# Patient Record
Sex: MALE | Race: WHITE | NOT HISPANIC OR LATINO | Employment: OTHER | URBAN - METROPOLITAN AREA
[De-identification: names, ages, dates, MRNs, and addresses within clinical notes are randomized per-mention and may not be internally consistent; named-entity substitution may affect disease eponyms.]

---

## 2017-01-21 ENCOUNTER — GENERIC CONVERSION - ENCOUNTER (OUTPATIENT)
Dept: OTHER | Facility: OTHER | Age: 67
End: 2017-01-21

## 2017-02-08 ENCOUNTER — GENERIC CONVERSION - ENCOUNTER (OUTPATIENT)
Dept: OTHER | Facility: OTHER | Age: 67
End: 2017-02-08

## 2017-02-24 ENCOUNTER — GENERIC CONVERSION - ENCOUNTER (OUTPATIENT)
Dept: OTHER | Facility: OTHER | Age: 67
End: 2017-02-24

## 2017-02-24 PROCEDURE — 88305 TISSUE EXAM BY PATHOLOGIST: CPT | Performed by: INTERNAL MEDICINE

## 2017-02-24 PROCEDURE — 88342 IMHCHEM/IMCYTCHM 1ST ANTB: CPT | Performed by: INTERNAL MEDICINE

## 2017-02-25 ENCOUNTER — LAB REQUISITION (OUTPATIENT)
Dept: LAB | Facility: HOSPITAL | Age: 67
End: 2017-02-25
Payer: COMMERCIAL

## 2017-02-25 DIAGNOSIS — R05.9 COUGH: ICD-10-CM

## 2017-02-25 DIAGNOSIS — R10.13 EPIGASTRIC PAIN: ICD-10-CM

## 2017-02-25 DIAGNOSIS — K22.719 BARRETT'S ESOPHAGUS WITH DYSPLASIA: ICD-10-CM

## 2017-02-25 DIAGNOSIS — R07.0 PAIN IN THROAT: ICD-10-CM

## 2017-02-27 ENCOUNTER — TRANSCRIBE ORDERS (OUTPATIENT)
Dept: ADMINISTRATIVE | Facility: HOSPITAL | Age: 67
End: 2017-02-27

## 2017-02-27 ENCOUNTER — ALLSCRIPTS OFFICE VISIT (OUTPATIENT)
Dept: OTHER | Facility: OTHER | Age: 67
End: 2017-02-27

## 2017-02-27 DIAGNOSIS — R07.9 CHEST PAIN: ICD-10-CM

## 2017-02-27 DIAGNOSIS — I10 ESSENTIAL (PRIMARY) HYPERTENSION: ICD-10-CM

## 2017-02-27 DIAGNOSIS — Z00.00 ENCOUNTER FOR GENERAL ADULT MEDICAL EXAMINATION WITHOUT ABNORMAL FINDINGS: ICD-10-CM

## 2017-02-27 DIAGNOSIS — E79.0 HYPERURICEMIA WITHOUT SIGNS OF INFLAMMATORY ARTHRITIS AND TOPHACEOUS DISEASE: ICD-10-CM

## 2017-02-27 DIAGNOSIS — E53.8 DEFICIENCY OF OTHER SPECIFIED B GROUP VITAMINS: ICD-10-CM

## 2017-02-27 DIAGNOSIS — Z12.5 ENCOUNTER FOR SCREENING FOR MALIGNANT NEOPLASM OF PROSTATE: ICD-10-CM

## 2017-02-27 DIAGNOSIS — Z23 ENCOUNTER FOR IMMUNIZATION: ICD-10-CM

## 2017-02-27 DIAGNOSIS — R07.9 CHEST PAIN, UNSPECIFIED TYPE: Primary | ICD-10-CM

## 2017-02-27 DIAGNOSIS — K76.0 FATTY (CHANGE OF) LIVER, NOT ELSEWHERE CLASSIFIED: ICD-10-CM

## 2017-02-27 DIAGNOSIS — E78.2 MIXED HYPERLIPIDEMIA: ICD-10-CM

## 2017-02-27 DIAGNOSIS — R73.01 IMPAIRED FASTING GLUCOSE: ICD-10-CM

## 2017-02-27 DIAGNOSIS — I65.29 OCCLUSION AND STENOSIS OF UNSPECIFIED CAROTID ARTERY: ICD-10-CM

## 2017-02-27 DIAGNOSIS — K21.9 GASTRO-ESOPHAGEAL REFLUX DISEASE WITHOUT ESOPHAGITIS: ICD-10-CM

## 2017-02-27 DIAGNOSIS — K22.70 BARRETT'S ESOPHAGUS WITHOUT DYSPLASIA: ICD-10-CM

## 2017-03-06 ENCOUNTER — GENERIC CONVERSION - ENCOUNTER (OUTPATIENT)
Dept: OTHER | Facility: OTHER | Age: 67
End: 2017-03-06

## 2017-03-08 ENCOUNTER — HOSPITAL ENCOUNTER (OUTPATIENT)
Dept: NON INVASIVE DIAGNOSTICS | Facility: CLINIC | Age: 67
Discharge: HOME | End: 2017-03-08
Payer: COMMERCIAL

## 2017-03-08 DIAGNOSIS — R07.9 CHEST PAIN, UNSPECIFIED TYPE: ICD-10-CM

## 2017-03-08 LAB
CHEST PAIN STATEMENT: NORMAL
MAX DIASTOLIC BP: 98 MMHG
MAX HEART RATE: 110 BPM
MAX PREDICTED HEART RATE: 156 BPM
MAX. SYSTOLIC BP: 162 MMHG
PROTOCOL NAME: NORMAL
REASON FOR TERMINATION: NORMAL
TARGET HR FORMULA: NORMAL
TEST INDICATION: NORMAL
TIME IN EXERCISE PHASE: 180 S

## 2017-03-08 PROCEDURE — 78452 HT MUSCLE IMAGE SPECT MULT: CPT

## 2017-03-08 PROCEDURE — A9502 TC99M TETROFOSMIN: HCPCS

## 2017-03-08 PROCEDURE — 93017 CV STRESS TEST TRACING ONLY: CPT

## 2017-03-08 RX ADMIN — REGADENOSON 0.4 MG: 0.08 INJECTION, SOLUTION INTRAVENOUS at 13:35

## 2017-03-11 ENCOUNTER — APPOINTMENT (OUTPATIENT)
Dept: LAB | Facility: MEDICAL CENTER | Age: 67
End: 2017-03-11
Payer: COMMERCIAL

## 2017-03-11 DIAGNOSIS — E79.0 HYPERURICEMIA WITHOUT SIGNS OF INFLAMMATORY ARTHRITIS AND TOPHACEOUS DISEASE: ICD-10-CM

## 2017-03-11 DIAGNOSIS — I10 ESSENTIAL (PRIMARY) HYPERTENSION: ICD-10-CM

## 2017-03-11 DIAGNOSIS — R73.01 IMPAIRED FASTING GLUCOSE: ICD-10-CM

## 2017-03-11 DIAGNOSIS — Z23 ENCOUNTER FOR IMMUNIZATION: ICD-10-CM

## 2017-03-11 DIAGNOSIS — Z12.5 ENCOUNTER FOR SCREENING FOR MALIGNANT NEOPLASM OF PROSTATE: ICD-10-CM

## 2017-03-11 DIAGNOSIS — K21.9 GASTRO-ESOPHAGEAL REFLUX DISEASE WITHOUT ESOPHAGITIS: ICD-10-CM

## 2017-03-11 DIAGNOSIS — K76.0 FATTY (CHANGE OF) LIVER, NOT ELSEWHERE CLASSIFIED: ICD-10-CM

## 2017-03-11 DIAGNOSIS — E53.8 DEFICIENCY OF OTHER SPECIFIED B GROUP VITAMINS: ICD-10-CM

## 2017-03-11 DIAGNOSIS — K22.70 BARRETT'S ESOPHAGUS WITHOUT DYSPLASIA: ICD-10-CM

## 2017-03-11 DIAGNOSIS — I65.29 OCCLUSION AND STENOSIS OF UNSPECIFIED CAROTID ARTERY: ICD-10-CM

## 2017-03-11 DIAGNOSIS — Z00.00 ENCOUNTER FOR GENERAL ADULT MEDICAL EXAMINATION WITHOUT ABNORMAL FINDINGS: ICD-10-CM

## 2017-03-11 DIAGNOSIS — E78.2 MIXED HYPERLIPIDEMIA: ICD-10-CM

## 2017-03-11 LAB
ALBUMIN SERPL BCP-MCNC: 3.8 G/DL (ref 3.5–5)
ALP SERPL-CCNC: 57 U/L (ref 46–116)
ALT SERPL W P-5'-P-CCNC: 42 U/L (ref 12–78)
ANION GAP SERPL CALCULATED.3IONS-SCNC: 7 MMOL/L (ref 4–13)
AST SERPL W P-5'-P-CCNC: 20 U/L (ref 5–45)
BASOPHILS # BLD AUTO: 0.04 THOUSANDS/ΜL (ref 0–0.1)
BASOPHILS NFR BLD AUTO: 1 % (ref 0–1)
BILIRUB SERPL-MCNC: 0.56 MG/DL (ref 0.2–1)
BUN SERPL-MCNC: 22 MG/DL (ref 5–25)
CALCIUM SERPL-MCNC: 9 MG/DL (ref 8.3–10.1)
CHLORIDE SERPL-SCNC: 104 MMOL/L (ref 100–108)
CHOLEST SERPL-MCNC: 235 MG/DL (ref 50–200)
CO2 SERPL-SCNC: 28 MMOL/L (ref 21–32)
CREAT SERPL-MCNC: 1.1 MG/DL (ref 0.6–1.3)
EOSINOPHIL # BLD AUTO: 0.27 THOUSAND/ΜL (ref 0–0.61)
EOSINOPHIL NFR BLD AUTO: 4 % (ref 0–6)
ERYTHROCYTE [DISTWIDTH] IN BLOOD BY AUTOMATED COUNT: 13.9 % (ref 11.6–15.1)
EST. AVERAGE GLUCOSE BLD GHB EST-MCNC: 123 MG/DL
GFR SERPL CREATININE-BSD FRML MDRD: >60 ML/MIN/1.73SQ M
GLUCOSE SERPL-MCNC: 112 MG/DL (ref 65–140)
HBA1C MFR BLD: 5.9 % (ref 4.2–6.3)
HCT VFR BLD AUTO: 45.7 % (ref 36.5–49.3)
HDLC SERPL-MCNC: 31 MG/DL (ref 40–60)
HGB BLD-MCNC: 15.4 G/DL (ref 12–17)
LDLC SERPL CALC-MCNC: 125 MG/DL (ref 0–100)
LYMPHOCYTES # BLD AUTO: 1.74 THOUSANDS/ΜL (ref 0.6–4.47)
LYMPHOCYTES NFR BLD AUTO: 25 % (ref 14–44)
MCH RBC QN AUTO: 30.6 PG (ref 26.8–34.3)
MCHC RBC AUTO-ENTMCNC: 33.7 G/DL (ref 31.4–37.4)
MCV RBC AUTO: 91 FL (ref 82–98)
MONOCYTES # BLD AUTO: 0.59 THOUSAND/ΜL (ref 0.17–1.22)
MONOCYTES NFR BLD AUTO: 9 % (ref 4–12)
NEUTROPHILS # BLD AUTO: 4.23 THOUSANDS/ΜL (ref 1.85–7.62)
NEUTS SEG NFR BLD AUTO: 61 % (ref 43–75)
NRBC BLD AUTO-RTO: 0 /100 WBCS
PLATELET # BLD AUTO: 211 THOUSANDS/UL (ref 149–390)
PMV BLD AUTO: 11.4 FL (ref 8.9–12.7)
POTASSIUM SERPL-SCNC: 4.4 MMOL/L (ref 3.5–5.3)
PROT SERPL-MCNC: 7.6 G/DL (ref 6.4–8.2)
PSA SERPL-MCNC: 0.5 NG/ML (ref 0–4)
RBC # BLD AUTO: 5.04 MILLION/UL (ref 3.88–5.62)
SODIUM SERPL-SCNC: 139 MMOL/L (ref 136–145)
TRIGL SERPL-MCNC: 396 MG/DL
TSH SERPL DL<=0.05 MIU/L-ACNC: 1.41 UIU/ML (ref 0.36–3.74)
VIT B12 SERPL-MCNC: 469 PG/ML (ref 100–900)
WBC # BLD AUTO: 6.88 THOUSAND/UL (ref 4.31–10.16)

## 2017-03-11 PROCEDURE — 80053 COMPREHEN METABOLIC PANEL: CPT

## 2017-03-11 PROCEDURE — 80061 LIPID PANEL: CPT

## 2017-03-11 PROCEDURE — G0103 PSA SCREENING: HCPCS

## 2017-03-11 PROCEDURE — 83036 HEMOGLOBIN GLYCOSYLATED A1C: CPT

## 2017-03-11 PROCEDURE — 84443 ASSAY THYROID STIM HORMONE: CPT

## 2017-03-11 PROCEDURE — 36415 COLL VENOUS BLD VENIPUNCTURE: CPT

## 2017-03-11 PROCEDURE — 85025 COMPLETE CBC W/AUTO DIFF WBC: CPT

## 2017-03-11 PROCEDURE — 82607 VITAMIN B-12: CPT

## 2017-03-12 ENCOUNTER — GENERIC CONVERSION - ENCOUNTER (OUTPATIENT)
Dept: OTHER | Facility: OTHER | Age: 67
End: 2017-03-12

## 2017-07-27 DIAGNOSIS — M25.50 PAIN IN JOINT: ICD-10-CM

## 2017-07-29 ENCOUNTER — APPOINTMENT (OUTPATIENT)
Dept: LAB | Facility: MEDICAL CENTER | Age: 67
End: 2017-07-29
Payer: COMMERCIAL

## 2017-07-29 DIAGNOSIS — M25.50 PAIN IN JOINT: ICD-10-CM

## 2017-07-29 PROCEDURE — 86618 LYME DISEASE ANTIBODY: CPT

## 2017-07-29 PROCEDURE — 36415 COLL VENOUS BLD VENIPUNCTURE: CPT

## 2017-07-31 ENCOUNTER — GENERIC CONVERSION - ENCOUNTER (OUTPATIENT)
Dept: OTHER | Facility: OTHER | Age: 67
End: 2017-07-31

## 2017-07-31 LAB
B BURGDOR IGG SER IA-ACNC: 0.12
B BURGDOR IGM SER IA-ACNC: 0.25

## 2017-09-15 ENCOUNTER — APPOINTMENT (OUTPATIENT)
Dept: RADIOLOGY | Facility: MEDICAL CENTER | Age: 67
End: 2017-09-15

## 2017-09-15 ENCOUNTER — TRANSCRIBE ORDERS (OUTPATIENT)
Dept: URGENT CARE | Facility: MEDICAL CENTER | Age: 67
End: 2017-09-15

## 2017-09-15 DIAGNOSIS — M94.0 COSTOCHONDRITIS: ICD-10-CM

## 2017-09-15 DIAGNOSIS — R07.81 RIB PAIN: Primary | ICD-10-CM

## 2017-09-15 PROCEDURE — 71101 X-RAY EXAM UNILAT RIBS/CHEST: CPT

## 2017-09-15 PROCEDURE — 71120 X-RAY EXAM BREASTBONE 2/>VWS: CPT

## 2017-09-27 ENCOUNTER — ALLSCRIPTS OFFICE VISIT (OUTPATIENT)
Dept: OTHER | Facility: OTHER | Age: 67
End: 2017-09-27

## 2017-09-27 DIAGNOSIS — I10 ESSENTIAL (PRIMARY) HYPERTENSION: ICD-10-CM

## 2017-09-27 DIAGNOSIS — E78.2 MIXED HYPERLIPIDEMIA: ICD-10-CM

## 2017-09-27 DIAGNOSIS — R73.01 IMPAIRED FASTING GLUCOSE: ICD-10-CM

## 2017-09-27 DIAGNOSIS — E79.0 HYPERURICEMIA WITHOUT SIGNS OF INFLAMMATORY ARTHRITIS AND TOPHACEOUS DISEASE: ICD-10-CM

## 2017-09-27 DIAGNOSIS — M25.50 PAIN IN JOINT: ICD-10-CM

## 2017-09-27 DIAGNOSIS — Z00.00 ENCOUNTER FOR GENERAL ADULT MEDICAL EXAMINATION WITHOUT ABNORMAL FINDINGS: ICD-10-CM

## 2017-09-27 DIAGNOSIS — K21.9 GASTRO-ESOPHAGEAL REFLUX DISEASE WITHOUT ESOPHAGITIS: ICD-10-CM
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
## 2017-09-27 DIAGNOSIS — K76.0 FATTY (CHANGE OF) LIVER, NOT ELSEWHERE CLASSIFIED: ICD-10-CM

## 2017-09-30 ENCOUNTER — TRANSCRIBE ORDERS (OUTPATIENT)
Dept: ADMINISTRATIVE | Facility: HOSPITAL | Age: 67
End: 2017-09-30

## 2017-09-30 ENCOUNTER — APPOINTMENT (OUTPATIENT)
Dept: LAB | Facility: MEDICAL CENTER | Age: 67
End: 2017-09-30
Payer: COMMERCIAL

## 2017-09-30 DIAGNOSIS — R73.01 IMPAIRED FASTING GLUCOSE: ICD-10-CM

## 2017-09-30 DIAGNOSIS — S00.86XA NONVENOMOUS INSECT BITE OF FACE WITHOUT INFECTION, INITIAL ENCOUNTER: Primary | ICD-10-CM

## 2017-09-30 DIAGNOSIS — I10 ESSENTIAL (PRIMARY) HYPERTENSION: ICD-10-CM

## 2017-09-30 DIAGNOSIS — M25.50 PAIN IN JOINT: ICD-10-CM

## 2017-09-30 DIAGNOSIS — Z00.00 ENCOUNTER FOR GENERAL ADULT MEDICAL EXAMINATION WITHOUT ABNORMAL FINDINGS: ICD-10-CM

## 2017-09-30 DIAGNOSIS — K76.0 FATTY (CHANGE OF) LIVER, NOT ELSEWHERE CLASSIFIED: ICD-10-CM

## 2017-09-30 DIAGNOSIS — E78.2 MIXED HYPERLIPIDEMIA: ICD-10-CM

## 2017-09-30 DIAGNOSIS — S00.86XA NONVENOMOUS INSECT BITE OF FACE WITHOUT INFECTION, INITIAL ENCOUNTER: ICD-10-CM

## 2017-09-30 DIAGNOSIS — K21.9 GASTRO-ESOPHAGEAL REFLUX DISEASE WITHOUT ESOPHAGITIS: ICD-10-CM

## 2017-09-30 DIAGNOSIS — E79.0 HYPERURICEMIA WITHOUT SIGNS OF INFLAMMATORY ARTHRITIS AND TOPHACEOUS DISEASE: ICD-10-CM
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
## 2017-09-30 LAB
ALBUMIN SERPL BCP-MCNC: 3.7 G/DL (ref 3.5–5)
ALP SERPL-CCNC: 54 U/L (ref 46–116)
ALT SERPL W P-5'-P-CCNC: 36 U/L (ref 12–78)
ANION GAP SERPL CALCULATED.3IONS-SCNC: 9 MMOL/L (ref 4–13)
AST SERPL W P-5'-P-CCNC: 21 U/L (ref 5–45)
BILIRUB SERPL-MCNC: 0.64 MG/DL (ref 0.2–1)
BUN SERPL-MCNC: 17 MG/DL (ref 5–25)
CALCIUM SERPL-MCNC: 9.4 MG/DL (ref 8.3–10.1)
CHLORIDE SERPL-SCNC: 105 MMOL/L (ref 100–108)
CHOLEST SERPL-MCNC: 192 MG/DL (ref 50–200)
CO2 SERPL-SCNC: 24 MMOL/L (ref 21–32)
CREAT SERPL-MCNC: 1 MG/DL (ref 0.6–1.3)
ERYTHROCYTE [DISTWIDTH] IN BLOOD BY AUTOMATED COUNT: 13.8 % (ref 11.6–15.1)
ERYTHROCYTE [SEDIMENTATION RATE] IN BLOOD: 15 MM/HOUR (ref 0–10)
EST. AVERAGE GLUCOSE BLD GHB EST-MCNC: 117 MG/DL
GFR SERPL CREATININE-BSD FRML MDRD: 79 ML/MIN/1.73SQ M
GLUCOSE P FAST SERPL-MCNC: 102 MG/DL (ref 65–99)
HBA1C MFR BLD: 5.7 % (ref 4.2–6.3)
HCT VFR BLD AUTO: 44.2 % (ref 36.5–49.3)
HDLC SERPL-MCNC: 33 MG/DL (ref 40–60)
HGB BLD-MCNC: 14.5 G/DL (ref 12–17)
LDLC SERPL CALC-MCNC: 113 MG/DL (ref 0–100)
MCH RBC QN AUTO: 30.7 PG (ref 26.8–34.3)
MCHC RBC AUTO-ENTMCNC: 32.8 G/DL (ref 31.4–37.4)
MCV RBC AUTO: 94 FL (ref 82–98)
PLATELET # BLD AUTO: 213 THOUSANDS/UL (ref 149–390)
PMV BLD AUTO: 11.1 FL (ref 8.9–12.7)
POTASSIUM SERPL-SCNC: 3.8 MMOL/L (ref 3.5–5.3)
PROT SERPL-MCNC: 7.5 G/DL (ref 6.4–8.2)
RBC # BLD AUTO: 4.72 MILLION/UL (ref 3.88–5.62)
SODIUM SERPL-SCNC: 138 MMOL/L (ref 136–145)
TRIGL SERPL-MCNC: 229 MG/DL
TSH SERPL DL<=0.05 MIU/L-ACNC: 1.22 UIU/ML (ref 0.36–3.74)
URATE SERPL-MCNC: 9.4 MG/DL (ref 4.2–8)
WBC # BLD AUTO: 5.98 THOUSAND/UL (ref 4.31–10.16)

## 2017-09-30 PROCEDURE — 80061 LIPID PANEL: CPT

## 2017-09-30 PROCEDURE — 85027 COMPLETE CBC AUTOMATED: CPT

## 2017-09-30 PROCEDURE — 86618 LYME DISEASE ANTIBODY: CPT

## 2017-09-30 PROCEDURE — 86666 EHRLICHIA ANTIBODY: CPT

## 2017-09-30 PROCEDURE — 80053 COMPREHEN METABOLIC PANEL: CPT

## 2017-09-30 PROCEDURE — 86038 ANTINUCLEAR ANTIBODIES: CPT

## 2017-09-30 PROCEDURE — 86430 RHEUMATOID FACTOR TEST QUAL: CPT

## 2017-09-30 PROCEDURE — 86753 PROTOZOA ANTIBODY NOS: CPT

## 2017-09-30 PROCEDURE — 83036 HEMOGLOBIN GLYCOSYLATED A1C: CPT

## 2017-09-30 PROCEDURE — 87798 DETECT AGENT NOS DNA AMP: CPT

## 2017-09-30 PROCEDURE — 85652 RBC SED RATE AUTOMATED: CPT

## 2017-09-30 PROCEDURE — 36415 COLL VENOUS BLD VENIPUNCTURE: CPT

## 2017-09-30 PROCEDURE — 84443 ASSAY THYROID STIM HORMONE: CPT

## 2017-09-30 PROCEDURE — 84550 ASSAY OF BLOOD/URIC ACID: CPT

## 2017-10-01 ENCOUNTER — GENERIC CONVERSION - ENCOUNTER (OUTPATIENT)
Dept: OTHER | Facility: OTHER | Age: 67
End: 2017-10-01

## 2017-10-02 LAB
B BURGDOR IGG SER IA-ACNC: 0.15
B BURGDOR IGM SER IA-ACNC: 0.23
RHEUMATOID FACT SER QL LA: NEGATIVE
RYE IGE QN: NEGATIVE

## 2017-10-03 LAB
B MICROTI IGG TITR SER: NORMAL {TITER}
B MICROTI IGM TITR SER: NORMAL {TITER}

## 2017-10-04 LAB — A PHAGOCYTOPH DNA BLD QL NAA+PROBE: NEGATIVE

## 2017-10-05 ENCOUNTER — GENERIC CONVERSION - ENCOUNTER (OUTPATIENT)
Dept: OTHER | Facility: OTHER | Age: 67
End: 2017-10-05

## 2017-10-05 LAB
A PHAGOCYTOPH IGG TITR SER IF: NEGATIVE {TITER}
A PHAGOCYTOPH IGM TITR SER IF: ABNORMAL {TITER}
E CHAFFEENSIS IGG TITR SER IF: NEGATIVE {TITER}
E CHAFFEENSIS IGM TITR SER IF: NEGATIVE {TITER}

## 2017-10-06 ENCOUNTER — GENERIC CONVERSION - ENCOUNTER (OUTPATIENT)
Dept: OTHER | Facility: OTHER | Age: 67
End: 2017-10-06

## 2017-11-29 DIAGNOSIS — I65.29 OCCLUSION AND STENOSIS OF UNSPECIFIED CAROTID ARTERY: ICD-10-CM

## 2017-12-11 ENCOUNTER — GENERIC CONVERSION - ENCOUNTER (OUTPATIENT)
Dept: OTHER | Facility: OTHER | Age: 67
End: 2017-12-11

## 2017-12-11 ENCOUNTER — HOSPITAL ENCOUNTER (OUTPATIENT)
Dept: NON INVASIVE DIAGNOSTICS | Facility: CLINIC | Age: 67
Discharge: HOME | End: 2017-12-11
Payer: COMMERCIAL

## 2017-12-11 DIAGNOSIS — I65.29 OCCLUSION AND STENOSIS OF UNSPECIFIED CAROTID ARTERY: ICD-10-CM

## 2017-12-11 PROCEDURE — 93880 EXTRACRANIAL BILAT STUDY: CPT

## 2018-01-10 NOTE — RESULT NOTES
Verified Results  (1) LYME ANTIBODY PROFILE W/REFLEX TO WESTERN BLOT [id number] 10:21AM Halie Blackmno Order Number: [id number]     Test Name Result Flag Reference   LYME IGG 0 12  0 00-0 79   NEGATIVE(0 00-0 79)-Absence of detectable Borrelia IgG Antibodies  A negative result does not exclude the possibility of Borrelia infection  If early Lyme disease is suspected,a second sample should be collected & tested 4 weeks after initial testing  LYME IGM 0 25  0 00-0 79   NEGATIVE (0 00-0 79)-Absence of detectable Borrelia IgM antibodies  A negative result does not exclude the possibility of Borrelia infection  If early lyme disease is suspected, a second sample should be collected & tested 4 weeks after initial testing

## 2018-01-12 NOTE — MISCELLANEOUS
Message  I spoke with patient's wife re: recent labs  + IGM Ehrlichia  trial of Doxycycline ( history of GI sensitivity) 100 mg BID with food  use probiotics        Plan  Tick bite    · Doxycycline Hyclate 100 MG Oral Tablet; take 1 tablet by mouth twice a day    Results/Data     (1) EHRLICHIA ANTIBODY PANEL   E CHAFFEENSIS IGG AB: Negative  Reference Range Neg:<1:64  E CHAFFEENSIS IGM AB: Negative  Reference Range Neg:<1:20  HGE IGG TITER: Negative  Reference Range Neg:<1:64  HGE IGM TITER: 1:64  Abnormal High Reference Range Neg:<1:20    Signatures   Electronically signed by : Emilia Fothergill, M D ; Oct  6 2017  9:05AM EST                       (Author)

## 2018-01-12 NOTE — MISCELLANEOUS
Message  GI Reminder Recall [facility]:   Date: 01/21/2017   Dear Terri Course:     Review of our records shows you are due for the following: EGD  Please call the following office to schedule your appointment:   [address] [place] [address], [address] [place] [address], [address] [place] [address] [phone]  We look forward to hearing from you!      Sincerely,     [facility]      Signatures   Electronically signed by : Tamy Henderson, ; Jan 21 2017  8:33AM EST                       (Author)

## 2018-01-12 NOTE — PROGRESS NOTES
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)   7  Carotid artery stenosis (433 10) (I65 29)   8  Asthma (493 90) (J45 909)   9  Hyperuricemia without signs inflammatory arthritis/tophaceous disease (790 6) (E79 0)    Plan  Accelerated hypertension    · We encourage you to begin to make lifestyle changes to help control your blood  pressure  These may include losing weight, increasing your activity level, limiting salt in  your diet, decreasing alcohol intake, and eating a diet low in fat and rich in fruits  and vegetables ; Status:Complete;   Done: 67EFD0063  Benign essential hypertension    · Hydrochlorothiazide 12 5 MG Oral Tablet; TAKE 1 TABLET DAILY   · Losartan Potassium 50 MG Oral Tablet; TAKE 1 TABLET TWICE DAILY  Colon cancer screening    · COLONOSCOPY; Status:Active; Requested for:15Mar2016;   Gastroesophageal reflux disease    · Famotidine 40 MG Oral Tablet (Pepcid)   · Omeprazole 20 MG Oral Capsule Delayed Release; TAKE 1 CAPSULE TWICE  DAILY  Mixed hyperlipidemia    · A diet low in sugar may help your condition ; Status:Complete;   Done: 94XBV8985   · Eat a low fat and low cholesterol diet ; Status:Complete;   Done: 42HLZ3120    Discussion/Summary    I changed Losartan to 50 mg twice a day  I encouraged him to try to take this on a daily basis if tolerated  prescription for HCTZ 12 5 mg daily as tolerated  continue to monitor blood pressures at home  Follow up in several months with repeat labs at that time  advised to call if any changes  Possible side effects of new medications were reviewed with the patient/guardian today  The treatment plan was reviewed with the patient/guardian   The patient/guardian understands and agrees with the treatment plan History of Present Illness  Follow-up visit  History of hypertension with intolerance to multiple blood pressure agents  He has been using Losartan 25 mg as needed  he has had a number of elevated blood pressure readings in the last month  he has been working 10 hour shifts  his wife is undergoing chemotherapy for breast cancer  he has been asymptomatic  no headaches or dizziness  He has been using up to 2 Losartan 25 mg a day  labs 11/2015 reviewed  Impaired fasting glucose  A1c 5 5  Hyperlipidemia mixed type with intolerance to statins, fish oils and fibrates  cholesterol 230  TGs 335 HDL 31    LFTs normal  creatinine 1 04  electrolytes normal  TSH 1 470  Review of Systems    Constitutional: recent 4 lb weight loss  Eyes: no eyesight problems  Cardiovascular: asthma uses infrequent prn Advair  , but no chest pain, no palpitations and no extremity edema  Respiratory: no cough, no orthopnea, no wheezing, no shortness of breath during exertion and no PND  Gastrointestinal: GERD status post Nissen fundoplication  GI evaluation 2014  10/2014 barium swallow showed spontaneous reflux  11/2014 EGD esophagitis  + Genao's  mild erosive gastritis  He is currently on Omeprazole 20 mg twice a day  no dysphagia  , but no abdominal pain, no nausea, no vomiting, no constipation, no diarrhea and no blood in stools  Genitourinary: 11/2015 PSA 0 4, but no urinary hesitancy and no nocturia  Musculoskeletal: no arthralgias and no myalgias  Integumentary: no rashes and no skin lesions  Neurological: as noted in HPI  Active Problems    1  Asthma (493 90) (J45 909)   2  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)   3  Benign essential hypertension (401 1) (I10)   4  Carotid artery stenosis (433 10) (I65 29)   5  Diverticulosis (562 10) (K57 90)   6  Drug-induced erectile dysfunction (607 84,E980 5) (N52 2)   7  Fatty infiltration of liver (571 8) (K76 0)   8   Gastroesophageal reflux disease (530 81) (K21 9)   9  Hyperuricemia without signs inflammatory arthritis/tophaceous disease (790 6) (E79 0)   10  Impaired fasting glucose (790 21) (R73 01)   11  Mixed hyperlipidemia (272 2) (E78 2)   12  History of Need for prophylactic vaccination and inoculation against influenza (V04 81)    (Z23)   13  Non-allergic rhinitis (472 0) (J31 0)   14  Osteoarthritis of knee (715 36) (M17 9)   15  Vitamin B12 deficiency (266 2) (E53 8)    Past Medical History    1  History of Acute pansinusitis (461 8) (J01 40)   2  History of Allergic Reaction (995 3)   3  History of Bursitis Of The Heel (727 3)   4  History of Derangement Of Meniscus Of Left Knee Due To Old Tear/Injury (717 5)   5  History of diverticulitis of colon (V12 79) (Z87 19)   6  History of dizziness (V13 89) (Z87 898)   7  History of Need for prophylactic vaccination and inoculation against influenza (V04 81)   (Z23)   8  History of Plantar fasciitis (728 71) (M72 2)   9  History of Shoulder Impingement (726 2)    Surgical History    1  History of Appendectomy   2  History of Carotid Thromboendarterectomy   3  History of Esophagogastric Fundoplasty Nissen Fundoplication   4  History of Partial Colectomy - Sigmoid   5  History of Ventral Hernia Repair    Family History    1  Family history of High blood pressure   2  Family history of High cholesterol    3  Family history of High blood pressure   4  Family history of High cholesterol    Social History    · Never A Smoker   · No alcohol use    Current Meds   1  Advair -21 MCG/ACT Inhalation Aerosol; AERO Inhal X 30; Therapy: 82XXP1062-  Requested for: 11VAI5384; Status: NEED INFORMATION Ordered   2  Advair -21 MCG/ACT Inhalation Aerosol; INHALE 2 PUFFS,  BY MOUTH, TWICE   DAILY; Therapy: 11UCS3437 to (Last YS:72NMK0215)  Requested for: 50UCO8778 Ordered   3  Co Q 10 10 MG Oral Capsule; 1 x daily Recorded   4  Famotidine 40 MG Oral Tablet; TAKE 1 TABLET TWICE DAILY;    Therapy: 87TMQ2611 to (Colleen Davila)  Requested for: 60Bxm0203; Last   Rx:55Xyf5791 Ordered   5 2016 [facility] CAPS Recorded   6  Levalbuterol HCl - 1 25 MG/3ML Inhalation Nebulization Solution; USE 1 UNIT DOSE IN   NEBULIZER EVERY 8 HOURS AS NEEDED; Therapy: 42NOF8479 to (Evaluate:11Vkg4254)  Requested for: 63XLG8030; Last   Rx:54Iux1996 Ordered   7  Losartan Potassium 25 MG Oral Tablet; TAKE 1 TABLET DAILY AS DIRECTED; Therapy: 93TWB3549 to (Evaluate:80Gbm2915)  Requested for: 23ZKC6193; Last   Rx:58Xqd4417 Ordered   8  Proventil  (90 Base) MCG/ACT Inhalation Aerosol Solution; TAKE 2 PUFFS 4   TIMES A DAY AS NEEDED; Therapy: 56APQ5227 to (Last Rx:57Zkn7073)  Requested for: 21Nmn5905 Ordered   9  Viagra 100 MG Oral Tablet; 1/2-1 TAB AS DIRECTED PRIOR TO INTERCOURSE; Therapy: 45OHQ7317 to (Evaluate:12Mkz7882); Last Rx:33Iws0822 Ordered    Allergies    1  Depo-Medrol (PF) SUSP   2  Doxycycline Monohydrate CAPS   3  Marcaine SOLN   4  Sulfa Drugs    Vitals  Vital Signs [Data Includes: Current Encounter]    Recorded: 18AZT7648 10:17AM   Temperature 97 4 F   Heart Rate 78   Respiration 16   Systolic 419   Diastolic 88   Height 5 ft 8 in   Weight 256 lb    BMI Calculated 38 92   BSA Calculated 2 27     Physical Exam    Constitutional   General appearance: No acute distress, well appearing and well nourished  Ears, Nose, Mouth, and Throat   Otoscopic examination: Tympanic membranes translucent with normal light reflex  Canals patent without erythema  Oropharynx: Normal with no erythema, edema, exudate or lesions  Neck   Neck: Supple, symmetric, trachea midline, no masses  Thyroid: Normal, no thyromegaly  There were no thyroid nodules  Pulmonary   Auscultation of lungs: Clear to auscultation  Cardiovascular   Auscultation of heart: Normal rate and rhythm, normal S1 and S2, no murmurs  Heart sounds: no gallop heard  Carotid pulses: 2+ bilaterally    no bruit heard over the right carotid and no bruit heard over the left carotid  Examination of extremities for edema and/or varicosities: Normal     Lymphatic   Palpation of lymph nodes in neck: No lymphadenopathy  Skin   Skin and subcutaneous tissue: Normal without rashes or lesions  Psychiatric   Mood and affect: Normal        Results/Data  Results   (1) CBC/PLT/DIFF 19SJF5825 09:28AM Driss Knife     Test Name Result Flag Reference   DIFFERENTIAL METHOD Automated     WBC COUNT 6 68 Thousand/uL  4 31-10 16   RBC COUNT 4 77 Million/uL  3 88-5 62   HEMOGLOBIN 14 7 g/dL  12 0-17 0   HEMATOCRIT 43 7 %  36 5-49 3   MCV 92 fL  82-98   MCH 30 8 pg  26 8-34 3   MCHC 33 6 g/dL  31 4-37 4   RDW 13 7 %  11 6-15 1   MPV 11 1 fL  8 9-12 7   PLATELET COUNT 016 Thousand/uL  149-390   nRBC AUTOMATED 0 /100 WBC     NEUTROPHILS RELATIVE PERCENT 59 %  43-75   LYMPHOCYTES RELATIVE PERCENT 28 %  14-44   MONOCYTES RELATIVE PERCENT 9 %  4-12   EOSINOPHILS RELATIVE PERCENT 4 %  0-6   NEUTROPHILS ABSOLUTE COUNT 3 94 Thousand/uL  1 85-7 62   LYMPHOCYTES ABSOLUTE COUNT 1 87 Thousand/uL  0 60-4 47   MONOCYTES ABSOLUTE COUNT 0 60 Thousand/uL  0 17-1 22   EOSINOPHILS ABSOLUTE COUNT 0 27 Thousand/uL  0 00-0 61   BASOPHILS ABSOLUTE COUNT 0 03 Thousand/uL  0 00-0 10     (1) COMPREHENSIVE METABOLIC PANEL 46INS6419 88:74RQ Driss Sneed   Has the patient been fasting for 10-12 hours? -YES     Test Name Result Flag Reference   SODIUM 140 mmol/L  136-145   POTASSIUM 4 6 mmol/L  3 5-5 3   Slightly hemolyzed   CHLORIDE 108 mmol/L  100-108   CARBON DIOXIDE 25 mmol/L  21 0-32 0   ANION GAP (CALC) 7 mmol/L  4-13   BILI, TOTAL 0 47 mg/dL  0 20-1 00   TOTAL PROTEIN 7 6 g/dL  6 4-8 2   ALK PHOSPHATAS 58 U/L     ALT (SGPT) 46 U/L  12-78   AST(SGOT) 25 U/L  5-45   GLUCOSE,RANDM 112 mg/dL     If patient is fasting, the ADA then defines impaired fasting glucose as  >100 mg/dl and diabetes as  >or equal to 126 mg/dl     ALBUMIN 3 7 g/dL  3 5-5 0   BLOOD UREA NITROGEN 13 mg/dL  5-25   CALCIUM 8 8 mg/dL  8 3-10 1   CREATININE 1 04 mg/dL  0 60-1 30   Standardized to IDMS reference method   eGFR African American >60 ml/min/1 73sq m     [facility] Disease Education Program recommendations are as  follows:  GFR calculation is accurate only with a steady state creatinine  Chronic Kidney disease less than 60 ml/min/1 73 sq  meters  Kidney failure less than 15 ml/min/1 73 sq  meters  eGFR Non-African American >60 ml/min/1 73sq m       (1) HEMOGLOBIN A1C 19QLU4868 09:28AM Arlette Sears     Test Name Result Flag Reference   HEMOGLOBIN A1C 5 5 %  4 0-5 6   5 7-6 4% impaired fasting glucose  >=6 5% diagnosis of diabetes  Falsely low levels are seen in conditions linked to short RBC life span  ? hemolytic anemia, and splenomegaly  Falsely elevated levels are seen in situations where there is an  increased production of RBC ? receipt of erythropoietin or blood  transfusions  Adopted from ADA-Clinical Practice Recommendations   EST  AVG  GLUCOSE 111 mg/dL       (1) LIPID PANEL, FASTING 40ZRX3091 09:28AM Arlette Sears   Has the patient been fasting for 10-12 hours? -YES     Test Name Result Flag Reference   TRIGLYCERIDES 335 mg/dL     TRIGLYCERIDE:  Normal              <150 mg/dl  Borderline High    150-199 mg/dl  High               200-499 mg/dl  Very High          >499 mg/dl  _______________________________________   CHOLESTEROL 230 mg/dL     CHOLESTEROL:  Desirable        <200 mg/dl  Borderline High  200-239 mg/dl  High             >239 mg/dl  ____________________________________   HDL,DIRECT 31 mg/dL     HDL:  High       >59 mg/dl  Low        <41 mg/dl  ______________________________   LDL CHOLESTEROL CALCULATED 132 mg/dL H 0-100   LDL, CALCULATED:  This screening LDL is a calculated result  It does not have the accuracy of the Direct Measured LDL in the  monitoring of patients with hyperlipidemia and/or statin therapy    Direct Measured LDL (Test Code 3126) must be ordered separately in these  patients   ______________________________     (1) PSA (SCREEN) (Dx V76 44 Screen for Prostate Cancer) 52UFA9960 09:28AM Malka Knee     Test Name Result Flag Reference   PSA 0 4 ng/mL   0-4 0   PSA values from one laboratory may not be comparable to those from  another because of the various testing technologies employed  Additionally, PSA results can not be interpreted as absolute evidence of  the presence or absence of disease  This PSA value was obtained by [employer] Chemiluminometric Immunoassay  (1) VITAMIN B12 41QBB0297 09:28AM Malka Knee     Test Name Result Flag Reference   VITAMIN B12 365 pg/mL  100-900     (1) TSH 28XQK8131 09:28AM Malka Knee     Test Name Result Flag Reference   TSH 1 470 uIU/ML  0 358-3 740   *Patients undergoing fluorescein dye angiography may retain small  amounts of fluorescein in the body for 48-72 hours post procedure  Samples containing fluorescein can produce falsely depressed TSH   values  If the patient had this procedure, a specimen should be  resubmitted post fluorescein clearance  * CT Maxillofacial  Without Contrast [id number] 04:28PM Malka Knee     Test Name Result Flag Reference   CT Maxfac/Sinus W/O (Report)     [facility] [address] [place];;[place];[address]  02/26/2015 1632  02/26/2015 1642      CT SINUSES    INDICATION- Chronic sinusitis, asthma    COMPARISON- Head CT from 5/8/2012    TECHNIQUE- Axial CT imaging through the sinuses was performed  In  addition, sagittal and coronal reformatted images were submitted for  interpretation  Examination was performed utilizing techniques to  minimize radiation dose, including the use of dose reduction software  IMAGE QUALITY- Diagnostic  FINDINGS-     FRONTAL SINUSES- Clear  ETHMOID AIR CELLS- Clear  MAXILLARY SINUSES- Clear  SPHENOID SINUSES- Clear  NASAL SEPTUM- Mildly deviated to the right with rightward directed  bone spur   Nasal passages are patent  ANTERIOR OSTEOMEATAL COMPLEX- Patent  OSSEOUS STRUCTURES- No erosion or destruction  The bony walls of the  carotid canals are intact  SOFT TISSUES- Normal     IMPRESSION-    1  No evidence of sinusitis  Mild rightward nasal septal deviation    ETranscribed on- [id number]    - JERALD PRIDE MD  Reading Radiologist- JERALD PRIDE MD  Electronically Signed- JERALD Cunningham MD  Released Date Time- 02/26/15 1714  ------------------------------------------------------------------------------  04702^GIA LEON  [address]265^GIA LEON     EGD 27ERV4923 12:00AM Malka Knee     Test Name Result Flag Reference   EGD 11/11/2014       * XR Esophagus Barium Swallow 93IGX7808 08:55AM Donah Pour     Test Name Result Flag Reference   XR Esophagus (Report)     [facility] [address];;[place];[address]  10/22/2014 0900  10/22/2014 0930  35 IMAGES    BARIUM SWALLOW-ESOPHAGRAM    INDICATION- History of fundoplication surgery approximately 20 years  ago  Currently complaining of burning and reflux symptomatology  Provided ICD-9 Code-   530 81    COMPARISON- CT abdomen, 6/21/2012    IMAGES- 35    FLUOROSCOPY TIME- 1 7 minutes    TECHNIQUE-  The patient was given effervescent granules and barium by mouth and  images of the esophagus were obtained  FINDINGS-    Multiple surgical clips are located to the left of the esophagogastric  junction  The esophagogastric junction is patent, without evidence of  ulceration, with typical diameter  There is no waist-like narrowing  No esophageal lesion is seen  Intermittent mild to moderate tertiary  contractions are present  When the patient was placed supine and coughs, moderately large amount  of spontaneous gastroesophageal reflux occurs with contrast reaching  the upper most thoracic esophagus  There is no hiatal hernia      IMPRESSION-       1  Status post Nissen fundoplication with surgical clips appreciated  to the left of the esophagogastric junction  2  Gastroesophageal junction is patent with typical diameter  There  is no waist-like narrowing  3  Spontaneous gastroesophageal reflux occurs  4  Intermittent tertiary contractions        Transcribed on- [id number]    JERALD Noonan MD  Reading Radiologist- JERALD Harper MD  Electronically Signed- JERALD Harper MD  Released Date Time- 10/22/14 1057  ------------------------------------------------------------------------------  00166^JALEESA ROUSE  [address]^JALEESA Tijerina [address] [facility]  Genao esophagus   EGD; every 1 year; Last 24UKP4786; Next Due: 10DAF7950;  Overdue    Future Appointments    Date/Time Provider Specialty Site   05/25/2016 04:00 PM ZULEIKA Hardy MD, [place] [address]   Electronically signed by : Denton Lesches, MDM D M D ,MD; Mar 15 2016 11:37AM EST                       (Author)

## 2018-01-13 VITALS
WEIGHT: 260.6 LBS | DIASTOLIC BLOOD PRESSURE: 88 MMHG | HEART RATE: 72 BPM | SYSTOLIC BLOOD PRESSURE: 172 MMHG | HEIGHT: 68 IN | TEMPERATURE: 97.8 F | RESPIRATION RATE: 18 BRPM | BODY MASS INDEX: 39.5 KG/M2

## 2018-01-13 NOTE — PROGRESS NOTES
Assessment    1  Cerumen impaction (380 4)   2  Bilateral impacted cerumen (380 4) (H61 23)    Discussion/Summary    LAVAGED FOR CERUMEN B/L  FOLLOW UP IF NO BETTER  The patient was counseled regarding diagnostic results, instructions for management, risk factor reductions, prognosis, patient and family education, impressions, risks and benefits of treatment options, importance of compliance with treatment  Chief Complaint  PATIENT RECNELTY HAD SEPTOPLASTY RECENTLY- HE HAD A PRLONGED POST OP COURSE AND HE IS BREATHING BETTER; History of Present Illness  Ear Fullness: The patient is being seen for worsening symptoms of and BLOCKED EARS B/L ear fullness  Symptoms:  ear fullness and ear plugging, but no ear pain, no ear drainage, no ear itching, no hearing loss, no tinnitus, no vertigo and no foreign body sensation in the ear  The patient is currently experiencing symptoms  Associated symptoms:  no sore throat, no headache, no nasal drainage, no nasal congestion, no postnasal drainage, no eye itching, no cough, no fever, no irritability and no nausea  No associated symptoms are reported  HPI: PRESSURE B/L EARS      Review of Systems    ENT: DECREASED HEARING AND BLOCKED EARS B/L  Cardiovascular: no chest pain and no palpitations  Respiratory: no shortness of breath and no wheezing  ROS reviewed  Active Problems    1  Asthma (493 90) (J45 909)   2  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)   3  Benign essential hypertension (401 1) (I10)   4  Carotid artery stenosis (433 10) (I65 29)   5  Cerumen impaction (380 4)   6  Diverticulosis (562 10) (K57 90)   7  Drug-induced erectile dysfunction (607 84,E980 5) (N52 2)   8  Esophageal reflux (530 81) (K21 9)   9  Fatty liver (571 8) (K76 0)   10  Hyperuricemia without signs inflammatory arthritis/tophaceous disease (790 6) (E79 0)   11  Impaired fasting glucose (790 21) (R73 01)   12  Mixed hyperlipidemia (272 2) (E78 2)   13   Need for prophylactic vaccination and inoculation against influenza (V04 81) (Z23)   14  Non-allergic rhinitis (472 0) (J31 0)   15  Osteoarthritis of knee (715 36) (M17 9)   16  Screening for prostate cancer (V76 44) (Z12 5)   17  Vitamin B12 deficiency (266 2) (E53 8)    Social History    · Never A Smoker   · No alcohol use    Current Meds   1  Advair -21 MCG/ACT Inhalation Aerosol; AERO Inhal X 30; Therapy: 29YNG0659-  Requested for: 36XOW4319; Status: NEED INFORMATION Ordered   2  Advair -21 MCG/ACT Inhalation Aerosol; INHALE 2 PUFFS,  BY MOUTH, TWICE   DAILY; Therapy: 01SQC3421 to (Last XD:97FXO1221)  Requested for: 55XYR7434 Ordered   3  Famotidine 40 MG Oral Tablet (Pepcid); TAKE 1 TABLET TWICE DAILY; Therapy: 32QQY3768 to (Liliana Gonzalez)  Requested for: 72Aiv1060; Last   Rx:08Sep2015 Ordered   4  Levalbuterol HCl - 1 25 MG/3ML Inhalation Nebulization Solution (Xopenex); USE 1 UNIT   DOSE IN NEBULIZER EVERY 8 HOURS AS NEEDED; Therapy: 53ZWJ7669 to (Evaluate:21Jkj3922)  Requested for: 42CAV9419; Last   Rx:10Mar2014 Ordered   5  Losartan Potassium 25 MG Oral Tablet; TAKE 1 TABLET DAILY AS DIRECTED; Therapy: 44QBA2482 to ([phone])  Requested for: 01JWE4761; Last   Rx:05Jan2015 Ordered   6  Permethrin 5 % External Cream; MASSAGE INTO SKIN FROM HEAD TO SOLES OF   FEET  WASH OFF AFTER 8-14 HOURS  REPEAT IN 1 WEEK; Therapy: 63WJM7270 to (Last Rx:54Pne1818)  Requested for: 06Kzp0023 Ordered   7  Proventil  (90 Base) MCG/ACT Inhalation Aerosol Solution; TAKE 2 PUFFS 4   TIMES A DAY AS NEEDED; Therapy: 47OQB6679 to (Last Rx:29Vsn5399)  Requested for: 29Syb4348 Ordered   8  Viagra 100 MG Oral Tablet; 1/2-1 TAB AS DIRECTED PRIOR TO INTERCOURSE; Therapy: 87TQC2129 to (Evaluate:81Rwo4451); Last Rx:47Vlq7194 Ordered    Allergies    1  Depo-Medrol (PF) SUSP   2  Doxycycline Monohydrate CAPS   3  Marcaine SOLN   4   Sulfa Drugs    Vitals   Recorded: 21Jan2016 02:22PM   Temperature 98 4 F, Tympanic Heart Rate 76   Respiration 16   Systolic 176, LUE, Sitting   Diastolic 90, LUE, Sitting   Height 5 ft 8 in   Weight 260 lb 8 0 oz   BMI Calculated 39 61   BSA Calculated 2 29     Physical Exam    Constitutional   General appearance: No acute distress, well appearing and well nourished  Eyes   Conjunctiva and lids: No swelling, erythema, or discharge  Pupils and irises: Equal, round and reactive to light  Ears, Nose, Mouth, and Throat CREUMEN B/L  Oropharynx: Normal with no erythema, edema, exudate or lesions  Pulmonary   Respiratory effort: No increased work of breathing or signs of respiratory distress  Auscultation of lungs: Clear to auscultation, equal breath sounds bilaterally, no wheezes, no rales, no rhonci  Procedure            Procedure: cerumen removal  ear cleaning due to otorrhea  Indication: tympanic membrane(s) could not be visualized in both ears  Prep: hydrogen peroxide was placed in the canal prior to the procedure  Procedure Note: The procedure was performed by the Provider  A otoscope was placed in the ear canal(s) to visualize the ear canal debris  The ear was cleaned by using warm water irrigation  The procedure was successful  Post-Procedure:   Patient Status: the patient tolerated the procedure well  Complications: there were no complications  Signatures   Electronically signed by :  Aggie Jernigan DO; Jan 21 2016  2:53PM EST                       (Author)

## 2018-01-14 VITALS
DIASTOLIC BLOOD PRESSURE: 92 MMHG | WEIGHT: 258.6 LBS | RESPIRATION RATE: 18 BRPM | SYSTOLIC BLOOD PRESSURE: 174 MMHG | BODY MASS INDEX: 39.19 KG/M2 | HEIGHT: 68 IN | TEMPERATURE: 97.3 F | HEART RATE: 80 BPM

## 2018-01-14 NOTE — RESULT NOTES
Message  Amrita Mccullough I have enclosed a copy of your recent labs  abnormals fasting blood glucose 112 normal less than 100  borderline 100 to 125  cholesterol 235 normal less than 200  triglycerides 396 normal less than 150  watch diet  Results/Data     (1) CBC/PLT/DIFF   WBC COUNT: 6 88 Thousand/uL Reference Range 4 31-10 16  RBC COUNT: 5 04 Million/uL Reference Range 3 88-5 62  HEMOGLOBIN: 15 4 g/dL Reference Range 12 0-17 0  HEMATOCRIT: 45 7 % Reference Range 36 5-49 3  MCV: 91 fL Reference Range 82-98  MCH: 30 6 pg Reference Range 26 8-34 3  MCHC: 33 7 g/dL Reference Range 31 4-37 4  RDW: 13 9 % Reference Range 11 6-15 1  MPV: 11 4 fL Reference Range 8 9-12 7  PLATELET COUNT: 908 Thousands/uL Reference Range 149-390  nRBC AUTOMATED: 0 /100 WBCs  NEUTROPHILS RELATIVE PERCENT: 61 % Reference Range 43-75  LYMPHOCYTES RELATIVE PERCENT: 25 % Reference Range 14-44  MONOCYTES RELATIVE PERCENT: 9 % Reference Range 4-12  EOSINOPHILS RELATIVE PERCENT: 4 % Reference Range 0-6  BASOPHILS RELATIVE PERCENT: 1 % Reference Range 0-1  NEUTROPHILS ABSOLUTE COUNT: 4 23 Thousands/? ??L Reference Range 1 85-7 62  LYMPHOCYTES ABSOLUTE COUNT: 1 74 Thousands/? ??L Reference Range 0 60-4 47  MONOCYTES ABSOLUTE COUNT: 0 59 Thousand/? ??L Reference Range 0 17-1 22  EOSINOPHILS ABSOLUTE COUNT: 0 27 Thousand/? ??L Reference Range 0 00-0 61  BASOPHILS ABSOLUTE COUNT: 0 04 Thousands/? ??L Reference Range 0 00-0 10  (1) COMPREHENSIVE METABOLIC PANEL   SODIUM: 477 mmol/L Reference Range 136-145  POTASSIUM: 4 4 mmol/L Reference Range 3 5-5 3  CHLORIDE: 104 mmol/L Reference Range 100-108  CARBON DIOXIDE: 28 mmol/L Reference Range 21-32  ANION GAP (CALC): 7 mmol/L Reference Range 4-13  BLOOD UREA NITROGEN: 22 mg/dL Reference Range 5-25  CREATININE: 1 10 mg/dL Reference Range 0 60-1 30  GLUCOSE,RANDM: 112 mg/dL Reference Range   CALCIUM: 9 0 mg/dL Reference Range 8 3-10 1  AST(SGOT): 20 U/L Reference Range 5-45  ALT (SGPT): 42 U/L Reference Range 12-78  ALK PHOSPHATAS: 57 U/L Reference Range   TOTAL PROTEIN: 7 6 g/dL Reference Range 6 4-8 2  ALBUMIN: 3 8 g/dL Reference Range 3 5-5 0  BILI, TOTAL: 0 56 mg/dL Reference Range 0 20-1 00  eGFR Non-: >60 0 ml/min/1 73sq m  (1) HEMOGLOBIN A1C   HEMOGLOBIN A1C: 5 9 % Reference Range 4 2-6 3  EST  AVG   GLUCOSE: 123 mg/dl  (1) LIPID PANEL, FASTING   CHOLESTEROL: 235 mg/dL Abnormal High Reference Range   TRIGLYCERIDES: 396 mg/dL Abnormal High Reference Range <=150  HDL,DIRECT: 31 mg/dL Abnormal Low Reference Range 40-60  LDL CHOLESTEROL CALCULATED: 125 mg/dL Abnormal High Reference Range  0-100  (1) PSA (SCREEN) (Dx V76 44 Screen for Prostate Cancer)   PROSTATE SPECIFIC ANTIGEN: 0 5 ng/mL Reference Range 0 0-4 0  (1) TSH   TSH: 1 410 uIU/mL Reference Range 0 358-3 740  (1) VITAMIN B12   VITAMIN B12: 469 pg/mL Reference Range 100-900    Signatures   Electronically signed by : ZULEIKA Coy ; Mar 12 2017  7:45PM EST                       (Author)

## 2018-01-16 NOTE — RESULT NOTES
Message  Fransisco Orellana I have enclosed a copy of your recent labs  Your fasting blood sugar slightly elevated at 102 normal less than 100  Triglycerides elevated at 229 these have decreased from 396  Cholesterol normal at 192 this is decreased from 235  uric acid elevated at 9  4 this is a test for gout  Verified Results  (1) CBC/ PLT (NO DIFF) [id number] 09:56AM Lorn Christensen Order Number: [id number]     Test Name Result Flag Reference   HEMATOCRIT 44 2 %  36 5-49 3   HEMOGLOBIN 14 5 g/dL  12 0-17 0   MCHC 32 8 g/dL  31 4-37 4   MCH 30 7 pg  26 8-34 3   MCV 94 fL  82-98   PLATELET COUNT 102 Thousands/uL  149-390   RBC COUNT 4 72 Million/uL  3 88-5 62   RDW 13 8 %  11 6-15 1   WBC COUNT 5 98 Thousand/uL  4 31-10 16   MPV 11 1 fL  8 9-12 7     (1) COMPREHENSIVE METABOLIC PANEL [id number] 72:43TZ Lorn Christensen Order Number: [id number]     Test Name Result Flag Reference   SODIUM 138 mmol/L  136-145   POTASSIUM 3 8 mmol/L  3 5-5 3   CHLORIDE 105 mmol/L  100-108   CARBON DIOXIDE 24 mmol/L  21-32   ANION GAP (CALC) 9 mmol/L  4-13   BLOOD UREA NITROGEN 17 mg/dL  5-25   CREATININE 1 00 mg/dL  0 60-1 30   Standardized to IDMS reference method   CALCIUM 9 4 mg/dL  8 3-10 1   BILI, TOTAL 0 64 mg/dL  0 20-1 00   ALK PHOSPHATAS 54 U/L     ALT (SGPT) 36 U/L  12-78   Specimen collection should occur prior to Sulfasalazine and/or Sulfapyridine administration due to the potential for falsely depressed results  AST(SGOT) 21 U/L  5-45   Specimen collection should occur prior to Sulfasalazine administration due to the potential for falsely depressed results  ALBUMIN 3 7 g/dL  3 5-5 0   TOTAL PROTEIN 7 5 g/dL  6 4-8 2   eGFR 79 ml/min/1 73sq m     [facility] Disease Education Program recommendations are as follows:  GFR calculation is accurate only with a steady state creatinine  Chronic Kidney disease less than 60 ml/min/1 73 sq  meters  Kidney failure less than 15 ml/min/1 73 sq  meters  Surgery GLUCOSE FASTING 102 mg/dL H 65-99   Specimen collection should occur prior to Sulfasalazine administration due to the potential for falsely depressed results  Specimen collection should occur prior to Sulfapyridine administration due to the potential for falsely elevated results  (1) HEMOGLOBIN A1C [id number] 09:56AM [facility] Order Number: [id number]     Test Name Result Flag Reference   HEMOGLOBIN A1C 5 7 %  4 2-6 3   EST  AVG  GLUCOSE 117 mg/dl       (1) LIPID PANEL, FASTING [id number] 09:56AM [facility] Order Number: [id number]     Test Name Result Flag Reference   CHOLESTEROL 192 mg/dL     HDL,DIRECT 33 mg/dL L 40-60   Specimen collection should occur prior to Metamizole administration due to the potential for falsley depressed results  LDL CHOLESTEROL CALCULATED 113 mg/dL H 0-100   Triglyceride:        Normal <150 mg/dl   Borderline High 150-199 mg/dl   High 200-499 mg/dl   Very High >499 mg/dl      Cholesterol:       Desirable <200 mg/dl    Borderline High 200-239 mg/dl    High >239 mg/dl      HDL Cholesterol:       High>59 mg/dL    Low <41 mg/dL      This screening LDL is a calculated result  It does not have the accuracy of the Direct Measured LDL in the monitoring of patients with hyperlipidemia and/or statin therapy  Direct Measure LDL (GJK715) must be ordered separately in these patients  TRIGLYCERIDES 229 mg/dL H <=150   Specimen collection should occur prior to N-Acetylcysteine or Metamizole administration due to the potential for falsely depressed results       (1) SED RATE [id number] 09:56AM [facility] Order Number: [id number]     Test Name Result Flag Reference   SED RATE 15 mm/hour H 0-10     (1) TSH [id number] 09:56AM [facility] Order Number: [id number]     Test Name Result Flag Reference   TSH 1 220 uIU/mL  0 358-3 740   Patients undergoing fluorescein dye angiography may retain small amounts of fluorescein in the body for 48-72 hours post procedure  Samples containing fluorescein can produce falsely depressed TSH values  If the patient had this procedure,a specimen should be resubmitted post fluorescein clearance  (1) URIC ACID [id number] 09:56AM Scot POLLACK Order Number: [id number]     Test Name Result Flag Reference   URIC ACID 9 4 mg/dL H 4 2-8 0   Specimen collection should occur prior to Metamizole administration due to the potential for falsely depressed results         Signatures   Electronically signed by : ZULEIKA Austin ; Oct  1 2017  3:16PM EST                       (Author)

## 2018-01-18 NOTE — RESULT NOTES
Jesenia Coto additional tests for tick borne illnesses normal       Verified Results  (1) BABESIA MICROTI ANTIBODY, IGG & IGM [id number] 09:56AM Vernell Schuler Order Number: [id number]     Test Name Result Flag Reference   BABESIA Otilio Beatrice <1:10  Neg:<1:10   This test was developed and its performance characteristics determined  by [employer]  It has not been cleared or approved by the  [place]  Food and Drug Administration  The FDA has determined that such  clearance or approval is not necessary  This test is used for clinical  purposes  It should not be regarded as investigational or research  BABESIA MICROTI IGM <1:10  Neg:<1:10   Performed at:  [address]  [id number]  : Chelsea Hilario MD, Phone:  [phone]     (1) ANAPLASMA PHAGOCYTOPHILUM, PCR [id number] 09:56AM Alda Crystal     Test Name Result Flag Reference   APHAG Negative  Negative   No Anaplasma phagocytophilum DNA detected  A  phagocytophilum has been  characterized as the causative agent of Human Granulocytic  Ehrlichiosis (HGE)  This test was developed and its performance characteristics  determined by [facility]  It has not been cleared or approved  by the Food and Drug Administration    Performed at:  [address]  [id number]  : Chelsea Hilario MD, Phone:  [phone]       Signatures   Electronically signed by : ZULEIKA Hooker ; Oct  5 2017  7:27AM EST                       (Author)

## 2018-01-25 ENCOUNTER — OFFICE VISIT (OUTPATIENT)
Dept: FAMILY MEDICINE CLINIC | Facility: CLINIC | Age: 68
End: 2018-01-25
Payer: COMMERCIAL

## 2018-01-25 VITALS
DIASTOLIC BLOOD PRESSURE: 80 MMHG | BODY MASS INDEX: 39.71 KG/M2 | HEIGHT: 67 IN | SYSTOLIC BLOOD PRESSURE: 148 MMHG | WEIGHT: 253 LBS | RESPIRATION RATE: 18 BRPM | HEART RATE: 78 BPM | TEMPERATURE: 99.1 F

## 2018-01-25 DIAGNOSIS — K21.9 GASTROESOPHAGEAL REFLUX DISEASE WITHOUT ESOPHAGITIS: ICD-10-CM

## 2018-01-25 DIAGNOSIS — K76.0 FATTY INFILTRATION OF LIVER: ICD-10-CM

## 2018-01-25 DIAGNOSIS — I10 BENIGN ESSENTIAL HYPERTENSION: Primary | ICD-10-CM

## 2018-01-25 DIAGNOSIS — K57.30 DIVERTICULOSIS OF LARGE INTESTINE WITHOUT HEMORRHAGE: ICD-10-CM

## 2018-01-25 DIAGNOSIS — Z23 NEED FOR 23-POLYVALENT PNEUMOCOCCAL POLYSACCHARIDE VACCINE: ICD-10-CM

## 2018-01-25 DIAGNOSIS — E79.0 HYPERURICEMIA WITHOUT SIGNS INFLAMMATORY ARTHRITIS/TOPHACEOUS DISEASE: ICD-10-CM

## 2018-01-25 DIAGNOSIS — I65.22 STENOSIS OF LEFT CAROTID ARTERY: ICD-10-CM

## 2018-01-25 DIAGNOSIS — R73.01 IMPAIRED FASTING GLUCOSE: ICD-10-CM

## 2018-01-25 DIAGNOSIS — E78.2 MIXED HYPERLIPIDEMIA: ICD-10-CM

## 2018-01-25 DIAGNOSIS — J45.20 MILD INTERMITTENT ASTHMA WITHOUT COMPLICATION: ICD-10-CM

## 2018-01-25 PROCEDURE — 99214 OFFICE O/P EST MOD 30 MIN: CPT | Performed by: FAMILY MEDICINE

## 2018-01-25 RX ORDER — HYDROCHLOROTHIAZIDE 25 MG/1
1 TABLET ORAL DAILY
COMMUNITY
Start: 2018-01-11 | End: 2018-04-20

## 2018-01-25 NOTE — PROGRESS NOTES
Assessment/Plan:    No problem-specific Assessment & Plan notes found for this encounter  Problem List Items Addressed This Visit     Asthma    Benign essential hypertension - Primary    Relevant Medications    hydrochlorothiazide (HYDRODIURIL) 25 mg tablet    Carotid artery stenosis    Diverticulosis    Fatty infiltration of liver    Gastroesophageal reflux disease    Hyperuricemia without signs inflammatory arthritis/tophaceous disease    Impaired fasting glucose    Mixed hyperlipidemia           patient is going to stop Losartan to see if his joint pains resolve  Although I explained to him this would be an uncommon side effect from this class of drug  he is going to continue with hydrochlorothiazide 25 mg a day and monitor his blood pressures at home  Could consider treatment for his elevated uric acid level although no guarantee his joint symptoms will improve  he declined treatment for now  Start 1 enteric-coated aspirin 81 mg daily  Watch diet  lose weight  Subjective:      Patient ID: José Powell is a 74 y o  male  Follow-up visit  Longstanding history of hypertension with intolerance to multiple blood pressure agents  He is currently on losartan 50 mg daily and HCTZ 25 mg daily  he states he has been experiencing side effects from losartan including joint pains and diarrhea  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  repetitive activity with arms and legs  his pain symptoms are primarily in shoulders, elbows knees  Morning stiffness lasting less than an hour  No swelling  He had a number rheumatologic studies done 09/2017 this included elevated uric acid level at 9 4  TSH 1 220  Sed rate 15  Rheumatoid factor negative  RED negative  Lyme titer negative  Babesia negative  Anaplasma negative  Ehrlichia antibody panel HGE IgM titer borderline abnormal at 1:64  He was treated with antibiotics at that time  CBC WBC 5980  Hemoglobin 14 5    Platelet count 016898  Chemistry profile normal except for   creatinine 1 00  LFTs normal electrolytes normal   A1c 5 7  hyperlipidemia mixed type with intolerance to statin fibroids and fish oils  09/2017 lipid profile cholesterol 192  Triglycerides 228  HDL 33    Review of Systems   Constitutional: Positive for fatigue  Negative for unexpected weight change  HENT: Negative for congestion, postnasal drip, rhinorrhea and sore throat  Respiratory: Positive for cough and wheezing  Asthma intermittent cough and wheezing he uses as needed Advair/albuterol  Cardiovascular: Negative for chest pain, palpitations and leg swelling  History of carotid artery disease status post right carotid endarterectomy  Follow-up carotid artery study 09/2017 patent right internal carotid artery  Less than 50% stenosis of left ICA  Gastrointestinal: Negative for blood in stool  GERD on as needed omeprazole no dysphagia  Status post Nissen fundoplication  Intermittent bouts of diarrhea past history of diverticulitis  Skin: Negative for rash  Neurological: Negative for light-headedness and headaches  Objective:     Physical Exam   HENT:   Right Ear: Tympanic membrane normal    Left Ear: Tympanic membrane normal    Mouth/Throat: Oropharynx is clear and moist and mucous membranes are normal  No oral lesions  Eyes: Conjunctivae and EOM are normal  Pupils are equal, round, and reactive to light  Neck: Carotid bruit is not present  No tracheal deviation present  No thyroid mass and no thyromegaly present  Cardiovascular: Normal rate, regular rhythm, S1 normal and S2 normal   Exam reveals no S3 and no S4  No murmur heard  Pulmonary/Chest: Breath sounds normal  He has no wheezes  He has no rales  Musculoskeletal: Normal range of motion  No active synovitis  Psychiatric: He has a normal mood and affect

## 2018-01-26 PROCEDURE — 90471 IMMUNIZATION ADMIN: CPT

## 2018-01-26 PROCEDURE — 90732 PPSV23 VACC 2 YRS+ SUBQ/IM: CPT

## 2018-02-09 ENCOUNTER — OFFICE VISIT (OUTPATIENT)
Dept: URGENT CARE | Facility: MEDICAL CENTER | Age: 68
End: 2018-02-09
Payer: COMMERCIAL

## 2018-02-09 VITALS
HEART RATE: 90 BPM | DIASTOLIC BLOOD PRESSURE: 92 MMHG | SYSTOLIC BLOOD PRESSURE: 165 MMHG | TEMPERATURE: 98.2 F | WEIGHT: 265 LBS | RESPIRATION RATE: 20 BRPM | BODY MASS INDEX: 41.5 KG/M2

## 2018-02-09 DIAGNOSIS — R59.0 ENLARGED LYMPH NODE IN NECK: Primary | ICD-10-CM

## 2018-02-09 PROCEDURE — 99213 OFFICE O/P EST LOW 20 MIN: CPT

## 2018-02-09 RX ORDER — AMOXICILLIN 500 MG/1
500 CAPSULE ORAL EVERY 8 HOURS SCHEDULED
Qty: 30 CAPSULE | Refills: 0 | Status: SHIPPED | OUTPATIENT
Start: 2018-02-09 | End: 2018-02-19

## 2018-02-10 NOTE — PATIENT INSTRUCTIONS
Take antibiotic as directed  Yogurt avoid GI upset  Increase fluid intake  Continue Motrin as directed for pain  Discussed the possibility of trigeminal neuralgia versus enlarged lymph node  Follow up with PCP    Go to ER for worsening symptoms

## 2018-02-12 NOTE — PROGRESS NOTES
[facility]        NAME: Abel Pereira is a 74 y o  male  : 1950    MRN: [id number]  DATE: 2018  TIME: 10:05 PM    Assessment and Plan   Enlarged lymph node in neck [R59 0]  1  Enlarged lymph node in neck  amoxicillin (AMOXIL) 500 mg capsule         Patient Instructions     Follow up with PCP in 3-5 days  Proceed to  ER if symptoms worsen  Chief Complaint     Chief Complaint   Patient presents with    Earache     Left, started one week ago         History of Present Illness   Abel Pereira presents to the clinic c/o    This is a 73 y/o M here c/o L ear pain x 7 days  Pt reports he thought maybe it was related to his teeth pt reports he went to see the dentist and there were no issues with his teeth  Pt reports last coupled of day pain seems to be worse  Pt reports he also has has pain in his left lymph node  Pt reports it is tender to touch  Pt denies any fever/chills, URI symptoms, fever/chills  Denies any numbness, tingling, loss of sensation, chest pain, shortness of breath  Earache    There is pain in the left ear  The current episode started in the past 7 days  The problem occurs constantly  The problem has been unchanged  There has been no fever  Pertinent negatives include no abdominal pain, coughing, diarrhea, ear discharge, headaches, hearing loss, neck pain, rash, rhinorrhea, sore throat or vomiting  There is no history of a chronic ear infection, hearing loss or a tympanostomy tube  Review of Systems   Review of Systems   Constitutional: Negative  Negative for chills, fatigue and fever  HENT: Positive for ear pain  Negative for congestion, ear discharge, hearing loss, postnasal drip, rhinorrhea, sinus pain, sinus pressure and sore throat  Eyes: Negative  Negative for pain and discharge  Respiratory: Negative for cough, chest tightness and shortness of breath  Cardiovascular: Negative for chest pain     Gastrointestinal: Negative for abdominal pain, constipation, diarrhea, nausea and vomiting  Genitourinary: Negative for difficulty urinating  Musculoskeletal: Negative for arthralgias, myalgias and neck pain  Skin: Negative for rash  Neurological: Negative for dizziness and headaches  Psychiatric/Behavioral: Negative for behavioral problems  Current Medications     Long-Term Prescriptions   Medication Sig Dispense Refill    fluticasone (FLONASE) 50 mcg/act nasal spray 1 spray into each nostril daily      hydrochlorothiazide (HYDRODIURIL) 25 mg tablet Take 1 tablet by mouth daily      multivitamin (THERAGRAN) TABS Take 1 tablet by mouth daily      losartan (COZAAR) 50 mg tablet Take 50 mg by mouth daily      omeprazole (PriLOSEC) 20 mg delayed release capsule Take 20 mg by mouth daily         Current Allergies     Allergies as of 02/09/2018 - Reviewed 02/09/2018   Allergen Reaction Noted    Depo-medrol [methylprednisolone] Anaphylaxis 12/09/2016    Sulfa antibiotics  09/10/2012            The following portions of the patient's history were reviewed and updated as appropriate: allergies, current medications, past family history, past medical history, past social history, past surgical history and problem list     Objective   /92 (Patient Position: Sitting)   Pulse 90   Temp 98 2 °F (36 8 °C) (Temporal)   Resp 20   Wt 120 kg (265 lb)   BMI 41 50 kg/m²        Physical Exam     Physical Exam   Constitutional: He is oriented to person, place, and time  Eyes: Pupils are equal, round, and reactive to light  Cardiovascular: Normal rate, regular rhythm and normal heart sounds  Pulmonary/Chest: Effort normal and breath sounds normal  No respiratory distress  He has no wheezes  He has no rales  He exhibits no tenderness  Lymphadenopathy:     He has cervical adenopathy  Left cervical: Superficial cervical adenopathy present  He has no axillary adenopathy     Neurological: He is alert and oriented to person, place, and time  He has normal strength  He displays a negative Romberg sign  Skin: No rash noted  Psychiatric: He has a normal mood and affect  His behavior is normal    Nursing note and vitals reviewed

## 2018-04-18 ENCOUNTER — TELEPHONE (OUTPATIENT)
Dept: FAMILY MEDICINE CLINIC | Facility: CLINIC | Age: 68
End: 2018-04-18

## 2018-04-18 DIAGNOSIS — I10 ESSENTIAL HYPERTENSION: Primary | ICD-10-CM

## 2018-04-18 RX ORDER — AMLODIPINE BESYLATE 5 MG/1
5 TABLET ORAL DAILY
Qty: 30 TABLET | Refills: 3 | Status: SHIPPED | OUTPATIENT
Start: 2018-04-18 | End: 2018-04-20 | Stop reason: ALTCHOICE

## 2018-04-18 NOTE — TELEPHONE ENCOUNTER
Spoke with pt wife, gave message  She wants you to send rx to pt pharmacy and he will try it  They also are going to try "something herbal"  (rx entered in pt chart, just approve it)

## 2018-04-18 NOTE — TELEPHONE ENCOUNTER
Pt wife called said he had a bad reaction to his Losartan Potassium  BP medication again  Asking if there is anything else that he can take or if he should retry one of the old ones   [phone]

## 2018-04-18 NOTE — TELEPHONE ENCOUNTER
Call patient he has been on a multitude of different blood pressure medications and has side effects with all of them not really sure what else to prescribe at this point  Amlodipine 5 mg daily ? ?

## 2018-04-20 ENCOUNTER — OFFICE VISIT (OUTPATIENT)
Dept: FAMILY MEDICINE CLINIC | Facility: CLINIC | Age: 68
End: 2018-04-20
Payer: COMMERCIAL

## 2018-04-20 VITALS
RESPIRATION RATE: 16 BRPM | TEMPERATURE: 96.4 F | SYSTOLIC BLOOD PRESSURE: 164 MMHG | WEIGHT: 258 LBS | BODY MASS INDEX: 40.49 KG/M2 | HEIGHT: 67 IN | HEART RATE: 72 BPM | DIASTOLIC BLOOD PRESSURE: 92 MMHG

## 2018-04-20 DIAGNOSIS — R10.84 GENERALIZED ABDOMINAL PAIN: Primary | ICD-10-CM

## 2018-04-20 DIAGNOSIS — I10 BENIGN ESSENTIAL HYPERTENSION: ICD-10-CM

## 2018-04-20 DIAGNOSIS — K21.9 GASTROESOPHAGEAL REFLUX DISEASE WITHOUT ESOPHAGITIS: ICD-10-CM

## 2018-04-20 PROCEDURE — 99214 OFFICE O/P EST MOD 30 MIN: CPT | Performed by: FAMILY MEDICINE

## 2018-04-20 RX ORDER — GLUCOSAMINE SULFATE 500 MG
500 CAPSULE ORAL DAILY
COMMUNITY
End: 2019-08-30 | Stop reason: ALTCHOICE

## 2018-04-20 RX ORDER — LEVALBUTEROL INHALATION SOLUTION 1.25 MG/3ML
1 SOLUTION RESPIRATORY (INHALATION) AS NEEDED
COMMUNITY
Start: 2014-03-10 | End: 2018-06-06 | Stop reason: SDUPTHER

## 2018-04-20 RX ORDER — CHLORTHALIDONE 25 MG/1
25 TABLET ORAL DAILY
Qty: 30 TABLET | Refills: 0 | Status: SHIPPED | OUTPATIENT
Start: 2018-04-20 | End: 2018-06-06 | Stop reason: SDUPTHER

## 2018-04-20 RX ORDER — SIMETHICONE 125 MG
125 TABLET,CHEWABLE ORAL EVERY 6 HOURS PRN
Qty: 30 TABLET | Refills: 0 | Status: SHIPPED | OUTPATIENT
Start: 2018-04-20 | End: 2018-06-06 | Stop reason: ALTCHOICE

## 2018-04-20 RX ORDER — LORATADINE 10 MG/1
10 TABLET ORAL DAILY
COMMUNITY
End: 2018-11-05

## 2018-04-20 RX ORDER — FAMOTIDINE 20 MG/1
20 TABLET, FILM COATED ORAL DAILY
COMMUNITY
End: 2018-11-05 | Stop reason: SDUPTHER

## 2018-04-20 NOTE — ASSESSMENT & PLAN NOTE
Pt has superficial abdominal pain at the site of his healed surgical scars  No evidence of infection  Likely the pain is secondary to the mild bloating/distention   Given he does not have ac acute and abdomen and has normal BS I am not concerned for an obstruction   -Simethicone for gas  -Aspercreme and Lidocaine patches to the scar tissue  -Reviewed signs/sx for which to go to the ER

## 2018-04-20 NOTE — PROGRESS NOTES
FAMILY MEDICINE PROGRESS NOTE  Anjali Guerrero 74 y o  male   DATE: April 20, 2018     ASSESSMENT and PLAN:  Anjali Guerrero is a 74 y o  male with:     Benign essential hypertension  BP Readings from Last 3 Encounters:   04/20/18 164/92   02/09/18 165/92   01/25/18 148/80   Uncontrolled HTN  Pt has a history of "intolerance", but not allergy to multiple classes of antihypertensives  Losartan caused him to have a cough, joint pain, worsened his asthma, etc  So he has stopped taking that  He is on a "water pill" that he is not intolerant to, HCTZ 25mg   -Switch to Chlorthalidone 25mg since it is the same class and slightly more potent, if pt tolerates it, ok to double to 50mg  -Check BP daily and call in with readings in 1-2 weeks  -If does not tolerated would potentially try HCTZ 50mg, though not much benefit likely  Gastroesophageal reflux disease  Cont Pepcid 20mg daily    Generalized abdominal pain  Pt has superficial abdominal pain at the site of his healed surgical scars  No evidence of infection  Likely the pain is secondary to the mild bloating/distention  Given he does not have ac acute and abdomen and has normal BS I am not concerned for an obstruction   -Simethicone for gas  -Aspercreme and Lidocaine patches to the scar tissue  -Reviewed signs/sx for which to go to the ER      SUBJECTIVE:  Anjali Guerrero is a 74 y o  male who presents today with a chief complaint of Hypertension and Abdominal Pain  Pt states that he is intolerant to all blood pressure medications because his system wont take it anymore  He has been taking Chlorthalidone for his blood pressure and is very worried about adverse effects  He is trying to watch his sodium intake right now  Pt has a list of his BP readings that range from 124//79 for the most part, except on episode of 104/55 while he was having a bowel movement     His last BP med was Losartan and was having blurry vision ,     Pt has known GERD, Genao's esophagus, Diverticulosis and Fatty liver  Has had 10 abdominal surgeries years ago, he had Randall fundoplication and can't vomit  He has abdominal pain for the past <1 week, has only had two episodes where his stomach gets bloated and then his incisions start hurting, but then had an episode of hypotension that he was concerned because he was having associated blurry vision  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 [address] him and thinks that       Abdominal Pain   This is a recurrent problem  The current episode started in the past 7 days  The onset quality is gradual  The problem occurs every several days  The problem has been unchanged  The pain is located in the generalized abdominal region  The pain is moderate  The abdominal pain does not radiate  Associated symptoms include belching, constipation (chronic issues) and flatus  Pertinent negatives include no anorexia, diarrhea, fever, hematochezia, hematuria, myalgias, nausea, vomiting or weight loss  The pain is aggravated by certain positions  He has tried H2 blockers for the symptoms  His past medical history is significant for abdominal surgery and GERD  Review of Systems   Constitutional: Negative for chills, fever and weight loss  Respiratory: Negative for cough and shortness of breath  Cardiovascular: Negative for chest pain and palpitations  Gastrointestinal: Positive for abdominal pain, constipation (chronic issues) and flatus  Negative for anorexia, diarrhea, hematochezia, nausea and vomiting  Genitourinary: Negative for hematuria  Musculoskeletal: Negative for myalgias  I have reviewed the patient's PMH, Social History, Medication List and Allergies        OBJECTIVE:  /92 (BP Location: Left arm, Patient Position: Sitting, Cuff Size: Large)   Pulse 72   Temp (!) 96 4 °F (35 8 °C)   Resp 16   Ht 5' 6 7" (1 694 m)   Wt 117 kg (258 lb)   BMI 40 77 kg/m²   Physical Exam   Constitutional: He appears well-developed and well-nourished  No distress  HENT:   Head: Normocephalic and atraumatic  Cardiovascular: Normal rate, regular rhythm and normal heart sounds  No murmur heard  Pulmonary/Chest: Effort normal and breath sounds normal  No respiratory distress  He has no wheezes  Abdominal: Soft  Normal appearance and bowel sounds are normal  He exhibits distension (morbidly obese, but does still appear slightly distended)  He exhibits no shifting dullness, no fluid wave, no ascites and no pulsatile midline mass  There is generalized tenderness (mostly at location of healed incision scars)  Neurological: He is alert  Skin: He is not diaphoretic  Vitals reviewed  Susanna Hardy MD

## 2018-04-20 NOTE — ASSESSMENT & PLAN NOTE
BP Readings from Last 3 Encounters:   04/20/18 164/92   02/09/18 165/92   01/25/18 148/80   Uncontrolled HTN  Pt has a history of "intolerance", but not allergy to multiple classes of antihypertensives  Losartan caused him to have a cough, joint pain, worsened his asthma, etc  So he has stopped taking that  He is on a "water pill" that he is not intolerant to, HCTZ 25mg   -Switch to Chlorthalidone 25mg since it is the same class and slightly more potent, if pt tolerates it, ok to double to 50mg  -Check BP daily and call in with readings in 1-2 weeks  -If does not tolerated would potentially try HCTZ 50mg, though not much benefit likely

## 2018-04-25 ENCOUNTER — TELEPHONE (OUTPATIENT)
Dept: FAMILY MEDICINE CLINIC | Facility: CLINIC | Age: 68
End: 2018-04-25

## 2018-04-25 NOTE — TELEPHONE ENCOUNTER
Pt's wife called  He has been feeling light headed and also has dry mouth after taking his prescription for chlorthalidone  Pt has been taking  one tab in the am 25 mg  Pt is asking if he should be taking the prescription differently or if any changes need to be made    [phone]

## 2018-05-19 RX ORDER — AMLODIPINE BESYLATE 5 MG/1
5 TABLET ORAL DAILY
Qty: 30 TABLET | Refills: 1 | Status: SHIPPED | OUTPATIENT
Start: 2018-05-19 | End: 2018-05-22 | Stop reason: ALTCHOICE

## 2018-05-22 ENCOUNTER — OFFICE VISIT (OUTPATIENT)
Dept: FAMILY MEDICINE CLINIC | Facility: CLINIC | Age: 68
End: 2018-05-22
Payer: COMMERCIAL

## 2018-05-22 VITALS
OXYGEN SATURATION: 98 % | DIASTOLIC BLOOD PRESSURE: 100 MMHG | HEART RATE: 81 BPM | TEMPERATURE: 97.6 F | SYSTOLIC BLOOD PRESSURE: 182 MMHG | WEIGHT: 218 LBS | BODY MASS INDEX: 34.21 KG/M2 | RESPIRATION RATE: 18 BRPM | HEIGHT: 67 IN

## 2018-05-22 DIAGNOSIS — I10 BENIGN ESSENTIAL HYPERTENSION: ICD-10-CM

## 2018-05-22 DIAGNOSIS — J45.21 MILD INTERMITTENT ASTHMA WITH ACUTE EXACERBATION: Primary | ICD-10-CM

## 2018-05-22 PROCEDURE — 99214 OFFICE O/P EST MOD 30 MIN: CPT | Performed by: FAMILY MEDICINE

## 2018-05-22 PROCEDURE — 3008F BODY MASS INDEX DOCD: CPT | Performed by: FAMILY MEDICINE

## 2018-05-22 RX ORDER — METHYLPREDNISOLONE 4 MG/1
TABLET ORAL
Qty: 21 TABLET | Refills: 0 | Status: SHIPPED | OUTPATIENT
Start: 2018-05-22 | End: 2018-06-06 | Stop reason: ALTCHOICE

## 2018-05-22 NOTE — PATIENT INSTRUCTIONS
-Start taking Mucinex DM 1200mg twice a day  -Drink at least 10 glasses of water per day  -Use intranasal saline  -Honey has been shown to help with coughs  -You can use Tylenol as needed for fevers  -Practice good hygiene and cover your mouth if you cough  -Call us back if you have new or worsening fevers and other symptoms    Asthma   AMBULATORY CARE:   Asthma  is a lung disease that makes breathing difficult  Chronic inflammation and reactions to triggers narrow the airways in your lungs  Asthma can become life-threatening if it is not managed  Cough-variant asthma  is a type of asthma that causes a dry cough that keeps coming back  A dry cough may be your only symptom, or you may also have chest tightness  These symptoms may be caused by exercise or exposure to odors, allergens, or respiratory tract infections  Cough-variant asthma is treated the same way as typical asthma  Common symptoms include the following:   · Coughing     · Wheezing     · Shortness of breath     · Chest tightness  Seek care immediately if:   · You have severe shortness of breath  · Your lips or nails turn blue or gray  · The skin around your neck and ribs pulls in with each breath  · You have shortness of breath, even after you take your short-term medicine as directed  · Your peak flow numbers are in the red zone of your AAP  Contact your healthcare provider if:   · You run out of medicine before your next refill is due  · Your symptoms get worse  · You need to take more medicine than usual to control your symptoms  · You have questions or concerns about your condition or care  Treatment for asthma  will depend on how severe your asthma is  Medicine may decrease inflammation, open airways, and make it easier to breathe  Medicines may be inhaled, taken as a pill, or injected  Short-term medicines relieve your symptoms quickly  Long-term medicines are used to prevent future attacks   You may also need medicine to help control your allergies  Manage and prevent future asthma attacks:   · Follow your asthma action plan  This is a written plan that you and your healthcare provider create  It explains which medicine you need and when to change doses if necessary  It also explains how you can monitor symptoms and use a peak flow meter  The meter measures how well your lungs are working  · Manage other health conditions , such as allergies, acid reflux, and sleep apnea  · Identify and avoid triggers  These may include pets, dust mites, mold, and cockroaches  · Do not smoke or be around others who smoke  Nicotine and other chemicals in cigarettes and cigars can cause lung damage  Ask your healthcare provider for information if you currently smoke and need help to quit  E-cigarettes or smokeless tobacco still contain nicotine  Talk to your healthcare provider before you use these products  · Ask about the flu vaccine  The flu can make your asthma worse  You may need a yearly flu shot  Follow up with your healthcare provider as directed: You will need to return to make sure your medicine is working and your symptoms are controlled  You may be referred to an asthma or allergy specialist  Reed Goldmann may be asked to keep a record of your peak flow values and bring it with you to your appointments  Write down your questions so you remember to ask them during your visits  © 2017 [employer]  Information is for End User's use only and may not be sold, redistributed or otherwise used for commercial purposes  All illustrations and images included in CareNotes® are the copyrighted property of [employer]  or [employer] Shah  The above information is an  only  It is not intended as medical advice for individual conditions or treatments  Talk to your doctor, nurse or pharmacist before following any medical regimen to see if it is safe and effective for you

## 2018-05-22 NOTE — PROGRESS NOTES
FAMILY MEDICINE PROGRESS NOTE  Kaila Izquierdo 74 y o  male   DATE: May 22, 2018     ASSESSMENT and PLAN:  Kaila Izquierdo is a 74 y o  male with:     Benign essential hypertension  BP Readings from Last 3 Encounters:   05/22/18 (!) 182/100   04/20/18 164/92   02/09/18 165/92      patient's blood pressure readings at home have been under better control since starting Chlorthalidone  Elevated today likely because of acute illness  RTC for BP check in 2 weeks, likely needs a repeat BMP at the next visit    Asthma  Pt has an asthma exacerbation, has not been taking Advair scheduled  Exacerbation on exam today with diffuse wheezing without evidence of focal consolidation   -Refill of high dose Advair  -Medrol dose jaci  -Tessalon PRN (has Rx at home)  -LE Inhalers/nebulizers PRN  -If symptoms don't improve, call back and check CXR or empiric Abx      SUBJECTIVE:  Kaila Izquierdo is a 74 y o  male who presents today with a chief complaint of Cough and Earache  Hasnt been feeling well for over a week  Then yesterday had a change in his sputum, and then   Has had PNA three times in his life, last was 3 years ago  Used to see a Pulmonologist   Has been using his inhalers more often  At least twice a day with the inhaler and tries to rarely use the nebulizer because of jiterrs  No fevers/chills  Cough   This is a new problem  The current episode started 1 to 4 weeks ago  The cough is productive of brown sputum  Associated symptoms include ear congestion, ear pain, hemoptysis, nasal congestion, a sore throat and wheezing  Pertinent negatives include no chills, fever or rhinorrhea  Earache    Associated symptoms include coughing and a sore throat  Pertinent negatives include no rhinorrhea  Review of Systems   Constitutional: Negative for chills and fever  HENT: Positive for ear pain and sore throat  Negative for rhinorrhea  Respiratory: Positive for cough, hemoptysis and wheezing        I have reviewed the patient's PMH, Social History, Medication List and Allergies  OBJECTIVE:  BP (!) 182/100   Pulse 81   Temp 97 6 °F (36 4 °C)   Resp 18   Ht 5' 6 7" (1 694 m)   Wt 98 9 kg (218 lb)   SpO2 98%   BMI 34 45 kg/m²   Physical Exam   Constitutional: He appears well-developed and well-nourished  No distress  HENT:   Head: Normocephalic and atraumatic  Right Ear: External ear normal    Left Ear: External ear normal    Nose: Right sinus exhibits no maxillary sinus tenderness and no frontal sinus tenderness  Left sinus exhibits no maxillary sinus tenderness and no frontal sinus tenderness  Mouth/Throat: Uvula is midline, oropharynx is clear and moist and mucous membranes are normal  No oropharyngeal exudate, posterior oropharyngeal edema, posterior oropharyngeal erythema or tonsillar abscesses  Eyes: Conjunctivae are normal  Right eye exhibits no discharge  Left eye exhibits no discharge  Neck: Normal range of motion  Neck supple  Cardiovascular: Normal rate and normal heart sounds  Pulmonary/Chest: Effort normal  No tachypnea  No respiratory distress  He has wheezes (diffuse wheezing)  He has no rhonchi  He has no rales  Lymphadenopathy:     He has no cervical adenopathy  Skin: He is not diaphoretic  Vitals reviewed  Breanna Wagner MD

## 2018-05-22 NOTE — ASSESSMENT & PLAN NOTE
BP Readings from Last 3 Encounters:   05/22/18 (!) 182/100   04/20/18 164/92   02/09/18 165/92      patient's blood pressure readings at home have been under better control since starting Chlorthalidone  Elevated today likely because of acute illness    RTC for BP check in 2 weeks, likely needs a repeat BMP at the next visit

## 2018-05-22 NOTE — ASSESSMENT & PLAN NOTE
Pt has an asthma exacerbation, has not been taking Advair scheduled   Exacerbation on exam today with diffuse wheezing without evidence of focal consolidation   -Refill of high dose Advair  -Medrol dose jaci  -Tessalon PRN (has Rx at home)  -LE Inhalers/nebulizers PRN  -If symptoms don't improve, call back and check CXR or empiric Abx

## 2018-05-24 ENCOUNTER — TELEPHONE (OUTPATIENT)
Dept: FAMILY MEDICINE CLINIC | Facility: CLINIC | Age: 68
End: 2018-05-24

## 2018-05-24 ENCOUNTER — TRANSCRIBE ORDERS (OUTPATIENT)
Dept: RADIOLOGY | Facility: HOSPITAL | Age: 68
End: 2018-05-24

## 2018-05-24 ENCOUNTER — HOSPITAL ENCOUNTER (OUTPATIENT)
Dept: RADIOLOGY | Facility: HOSPITAL | Age: 68
Discharge: HOME | End: 2018-05-24
Payer: COMMERCIAL

## 2018-05-24 DIAGNOSIS — J06.9 ACUTE URI: Primary | ICD-10-CM

## 2018-05-24 DIAGNOSIS — J06.9 ACUTE URI: ICD-10-CM

## 2018-05-24 PROCEDURE — 71046 X-RAY EXAM CHEST 2 VIEWS: CPT

## 2018-05-24 RX ORDER — BENZONATATE 100 MG/1
100 CAPSULE ORAL 3 TIMES DAILY PRN
Qty: 30 CAPSULE | Refills: 0 | Status: SHIPPED | OUTPATIENT
Start: 2018-05-24 | End: 2018-06-03

## 2018-05-24 NOTE — TELEPHONE ENCOUNTER
Pt was in with you on Tuesday  He is still coughing all night and also didn't go to work today  He mentioned you spoke to him about a cough medicine because he is coughing up mucous  The Wes Олег he has are two years old so he is asking if a new prescription can be put in for that also and if you want him to be on any antibiotic  He is on the second day of his prednisone  He is also asking if he can have an excuse for work for today and tomorrow  He would like a call back [phone]

## 2018-05-24 NOTE — TELEPHONE ENCOUNTER
Tessalon sent in  Cont Mucinex DM 1200mg BID  Ok to give work excuse  Check CXR to see if he needs Abx

## 2018-05-27 ENCOUNTER — TELEPHONE (OUTPATIENT)
Dept: FAMILY MEDICINE CLINIC | Facility: CLINIC | Age: 68
End: 2018-05-27

## 2018-05-27 NOTE — TELEPHONE ENCOUNTER
Please call Delmy Duran and let him know that his CXR was normal, he does not have pneumonia  Likely was worsening of his asthma

## 2018-06-06 ENCOUNTER — OFFICE VISIT (OUTPATIENT)
Dept: FAMILY MEDICINE CLINIC | Facility: CLINIC | Age: 68
End: 2018-06-06
Payer: COMMERCIAL

## 2018-06-06 VITALS
OXYGEN SATURATION: 98 % | DIASTOLIC BLOOD PRESSURE: 76 MMHG | BODY MASS INDEX: 39.51 KG/M2 | SYSTOLIC BLOOD PRESSURE: 182 MMHG | TEMPERATURE: 98.2 F | WEIGHT: 250 LBS | HEART RATE: 64 BPM | RESPIRATION RATE: 18 BRPM

## 2018-06-06 DIAGNOSIS — I10 BENIGN ESSENTIAL HYPERTENSION: ICD-10-CM

## 2018-06-06 DIAGNOSIS — J06.9 UPPER RESPIRATORY TRACT INFECTION, UNSPECIFIED TYPE: Primary | ICD-10-CM

## 2018-06-06 DIAGNOSIS — J45.21 MILD INTERMITTENT ASTHMA WITH ACUTE EXACERBATION: ICD-10-CM

## 2018-06-06 PROCEDURE — 99213 OFFICE O/P EST LOW 20 MIN: CPT | Performed by: FAMILY MEDICINE

## 2018-06-06 RX ORDER — ALBUTEROL SULFATE 90 UG/1
2 AEROSOL, METERED RESPIRATORY (INHALATION) EVERY 6 HOURS PRN
Qty: 18 G | Refills: 3 | Status: SHIPPED | OUTPATIENT
Start: 2018-06-06 | End: 2018-11-05

## 2018-06-06 RX ORDER — AMOXICILLIN 500 MG/1
500 CAPSULE ORAL 3 TIMES DAILY
Qty: 21 CAPSULE | Refills: 0 | Status: SHIPPED | OUTPATIENT
Start: 2018-06-06 | End: 2018-06-13

## 2018-06-06 RX ORDER — CHLORTHALIDONE 25 MG/1
25 TABLET ORAL DAILY
Qty: 90 TABLET | Refills: 3 | Status: SHIPPED | OUTPATIENT
Start: 2018-06-06 | End: 2018-11-05

## 2018-06-06 RX ORDER — BENZONATATE 200 MG/1
200 CAPSULE ORAL 3 TIMES DAILY PRN
Qty: 30 CAPSULE | Refills: 0 | Status: SHIPPED | OUTPATIENT
Start: 2018-06-06 | End: 2018-08-30 | Stop reason: ALTCHOICE

## 2018-06-06 RX ORDER — LEVALBUTEROL INHALATION SOLUTION 1.25 MG/3ML
1.25 SOLUTION RESPIRATORY (INHALATION) EVERY 8 HOURS PRN
Qty: 25 VIAL | Refills: 3 | Status: SHIPPED | OUTPATIENT
Start: 2018-06-06 | End: 2018-11-05

## 2018-06-06 NOTE — PROGRESS NOTES
Assessment/Plan:     Diagnoses and all orders for this visit:    Upper respiratory tract infection, unspecified type  -     amoxicillin (AMOXIL) 500 mg capsule; Take 1 capsule (500 mg total) by mouth 3 (three) times a day for 7 days  -     benzonatate (TESSALON) 200 MG capsule; Take 1 capsule (200 mg total) by mouth 3 (three) times a day as needed for cough    Mild intermittent asthma with acute exacerbation  -     albuterol (PROVENTIL HFA,VENTOLIN HFA) 90 mcg/act inhaler; Inhale 2 puffs every 6 (six) hours as needed for wheezing  -     levalbuterol (XOPENEX) 1 25 mg/3 mL nebulizer solution; Take 1 vial (1 25 mg total) by nebulization every 8 (eight) hours as needed for wheezing    Benign essential hypertension  -     chlorthalidone 25 mg tablet; Take 1 tablet (25 mg total) by mouth daily for 90 days    Other orders  -     US thyroid; Future  -     TSH, 3rd generation; Future  -     T4, free; Future        Start antibiotics  Change Advair 250/50 to BID  Continue with Tessalon and Xopenex  Prn Claritin  Monitor blood pressures at home  Recheck prn  Advised to call if any changes  Patient ID: Crista Medina is a 74 y o  male  History of asthma and allergic rhinitis  Patient was seen on 05/14 with URI symptoms  He was treated with Medrol Dose Puneet  He reports persistent cough productive of yellow phlegm  + nasal congestion with post nasal drainage  He has been using prn Tessalon Perles for cough  He is on Advair 250/50 once a day with prn Xopenex via nebulizer  05/24 CXR normal        The following portions of the patient's history were reviewed and updated as appropriate: allergies, current medications, past family history, past medical history, past social history, past surgical history and problem list     Review of Systems   Constitutional: Negative for chills and fever  HENT: Positive for sinus pressure  Negative for ear pain, rhinorrhea and sore throat           See HPI   Respiratory: Negative for shortness of breath and wheezing  See HPI   Cardiovascular: Negative for chest pain and palpitations  Hypertension with intolerance to multiple medications  She is currently on Chlorthalidone 25 mg daily  Home blood pressures readings reviewed  130-140/80-90 range  Gastrointestinal: Negative for diarrhea, nausea and vomiting  Musculoskeletal: Negative for myalgias  Neurological: Negative for headaches  Objective:      BP (!) 182/76   Pulse 64   Temp 98 2 °F (36 8 °C)   Resp 18   Wt 113 kg (250 lb)   SpO2 98%   BMI 39 51 kg/m²          Physical Exam   Constitutional: He appears well-developed and well-nourished  No distress  HENT:   Right Ear: Tympanic membrane normal    Left Ear: Tympanic membrane normal    Nose: Right sinus exhibits no maxillary sinus tenderness and no frontal sinus tenderness  Left sinus exhibits no maxillary sinus tenderness and no frontal sinus tenderness  Mouth/Throat: No posterior oropharyngeal erythema  Eyes: Conjunctivae are normal    Neck: No thyroid mass and no thyromegaly present  Cardiovascular: Normal rate, regular rhythm and normal heart sounds  Exam reveals no gallop  No murmur heard  Pulmonary/Chest: Breath sounds normal  He has no wheezes  He has no rales  Lymphadenopathy:     He has no cervical adenopathy  Skin: No rash noted  Nursing note and vitals reviewed

## 2018-08-29 ENCOUNTER — TELEPHONE (OUTPATIENT)
Dept: FAMILY MEDICINE CLINIC | Facility: CLINIC | Age: 68
End: 2018-08-29

## 2018-08-29 NOTE — TELEPHONE ENCOUNTER
Patient's wife called to say her  would like to try the SARAPIN injections in his left shoulder  She gets them and says the pt would like to try them  He had a very bad reaction to cortisone  Please advise      Wei Bateman   [phone]

## 2018-08-30 ENCOUNTER — OFFICE VISIT (OUTPATIENT)
Dept: FAMILY MEDICINE CLINIC | Facility: CLINIC | Age: 68
End: 2018-08-30
Payer: COMMERCIAL

## 2018-08-30 VITALS
WEIGHT: 257 LBS | BODY MASS INDEX: 40.61 KG/M2 | TEMPERATURE: 98 F | DIASTOLIC BLOOD PRESSURE: 86 MMHG | HEART RATE: 74 BPM | RESPIRATION RATE: 16 BRPM | SYSTOLIC BLOOD PRESSURE: 182 MMHG

## 2018-08-30 DIAGNOSIS — M75.42 IMPINGEMENT SYNDROME OF LEFT SHOULDER: ICD-10-CM

## 2018-08-30 DIAGNOSIS — N20.0 NEPHROLITHIASIS: ICD-10-CM

## 2018-08-30 DIAGNOSIS — R73.01 IMPAIRED FASTING GLUCOSE: ICD-10-CM

## 2018-08-30 DIAGNOSIS — E78.2 MIXED HYPERLIPIDEMIA: ICD-10-CM

## 2018-08-30 DIAGNOSIS — I10 BENIGN ESSENTIAL HYPERTENSION: ICD-10-CM

## 2018-08-30 DIAGNOSIS — M54.50 ACUTE BILATERAL LOW BACK PAIN WITHOUT SCIATICA: Primary | ICD-10-CM

## 2018-08-30 LAB
SL AMB  POCT GLUCOSE, UA: NORMAL
SL AMB LEUKOCYTE ESTERASE,UA: NORMAL
SL AMB POCT BILIRUBIN,UA: NORMAL
SL AMB POCT BLOOD,UA: NORMAL
SL AMB POCT CLARITY,UA: CLEAR
SL AMB POCT COLOR,UA: YELLOW
SL AMB POCT KETONES,UA: NORMAL
SL AMB POCT NITRITE,UA: NORMAL
SL AMB POCT PH,UA: 5
SL AMB POCT SPECIFIC GRAVITY,UA: 5
SL AMB POCT URINE PROTEIN: NORMAL
SL AMB POCT UROBILINOGEN: 0.2

## 2018-08-30 PROCEDURE — 20610 DRAIN/INJ JOINT/BURSA W/O US: CPT | Performed by: FAMILY MEDICINE

## 2018-08-30 PROCEDURE — 81002 URINALYSIS NONAUTO W/O SCOPE: CPT | Performed by: FAMILY MEDICINE

## 2018-08-30 PROCEDURE — 99214 OFFICE O/P EST MOD 30 MIN: CPT | Performed by: FAMILY MEDICINE

## 2018-08-30 NOTE — PROGRESS NOTES
Assessment/Plan     Diagnoses and all orders for this visit:    Acute bilateral low back pain without sciatica  -     POCT urine dip    Nephrolithiasis  -     US kidney and bladder; Future  -     POCT urine dip    Impingement syndrome of left shoulder  -     SARAPIN SOLN 2 mL; Inject 2 mL as directed once   -     Large joint arthrocentesis    Benign essential hypertension  -     CBC and differential  -     Comprehensive metabolic panel    Impaired fasting glucose  -     Hemoglobin A1C    Mixed hyperlipidemia  -     Lipid panel  -     TSH, 3rd generation with Free T4 reflex        UA negative  Schedule kidney and bladder u/s  Check labs  Prn Tylenol for lower back pain  sensitivity to NSAIDs  As a separate procedure today I performed an injection left shoulder  See procedure note  Consider PT if no improvement  Monitor Bps at home  Follow up once results are back  Advised to call if any changes     Patient ID: Emilie Ocasio is a 79 y o  male  Patient presents with intermittent bilateral lower back pain over the last week  Pain does not radiate into legs  No leg weakness or numbness  No bowel changes  + urinary frequency voiding in small amounts  no dysuria or gross hematuria  Remote history of kidney stone  No 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  Current medications reviewed  He has been using Tylenol for pain  History of hypertension with intolerance to multiple blood pressure medications  He is currently Chlorthalidone 25 mg as needed  he is monitoring his blood pressures at home  Last labs 09/2017 see note           The following portions of the patient's history were reviewed and updated as appropriate: allergies, current medications, past family history, past medical history, past social history, past surgical history and problem list     Review of Systems   Constitutional: Negative for activity change, appetite change, chills, fever and unexpected weight change  HENT: Negative for congestion, ear pain, postnasal drip, rhinorrhea, sinus pain, sinus pressure, sore throat and trouble swallowing  Eyes: Negative for visual disturbance  Respiratory: Negative for cough, shortness of breath and wheezing  Cardiovascular: Negative for chest pain, palpitations and leg swelling  Gastrointestinal: Negative for abdominal pain, blood in stool, constipation, diarrhea, nausea and vomiting  GERD/Genao's esophagus on Famotidine 20 mg daily  History of fatty liver  Genitourinary:        See HPI   Musculoskeletal: Negative for arthralgias and myalgias  See HPI  Recurrent left shoulder pain at times radiating to left upper arm  No left arm weakness or numbness  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  Repetitive activities with arms  09/2017 rheumatologic studies uric acid level elevated 9 4  TSH 1 220 sed rate 15  Rheumatoid factor negative  RED negative Lyme titer negative the PCN negative Anaplasma negative Ehrlichia antibody panel HGE titer borderline abnormal at 1:64 he was treated with antibiotics at that time  Skin: Negative for rash  Allergic/Immunologic: Positive for environmental allergies  Neurological: Negative for dizziness and headaches  Hematological: Negative for adenopathy  Does not bruise/bleed easily  Psychiatric/Behavioral: Negative for dysphoric mood and sleep disturbance  Objective:      BP (!) 182/86   Pulse 74   Temp 98 °F (36 7 °C)   Resp 16   Wt 117 kg (257 lb)   BMI 40 61 kg/m²          Physical Exam   Constitutional: He appears well-developed and well-nourished  No distress  Eyes: No scleral icterus  Cardiovascular: Normal rate, regular rhythm and normal heart sounds  Exam reveals no gallop  No murmur heard  Pulmonary/Chest: Effort normal and breath sounds normal  No respiratory distress  He has no wheezes  He has no rales  Abdominal: Soft   Bowel sounds are normal  He exhibits no distension and no mass  There is no tenderness  There is no rebound and no guarding  No CVA tenderness  Musculoskeletal: He exhibits tenderness  He exhibits no edema  Muscular tender areas lower lumbar paraspinal region bilaterally  + tenderness left subacromial area left shoulder with positive impingement sign  decreased range of motion with internal external rotation  No bicipital tendon tenderness  Negative cross arm test   Negative sulcus sign  Strength in upper extremities normal  negative SLR   Skin: No rash noted  Psychiatric: He has a normal mood and affect           Recent Results (from the past 168 hour(s))   POCT urine dip    Collection Time: 08/30/18  3:39 PM   Result Value Ref Range    LEUKOCYTE ESTERASE,UA neg      NITRITE,UA neg     SL AMB POCT UROBILINOGEN 0 2     SL AMB POCT URINE PROTEIN trace      PH,UA 5 0      BLOOD,UA neg      SPECIFIC GRAVITY,UA 5 0      KETONES,UA neg      BILIRUBIN,UA neg     GLUCOSE, UA neg      COLOR,UA yellow      CLARITY,UA clear      Recent Results (from the past 9408 hour(s))   CBC    Collection Time: 09/30/17  9:56 AM   Result Value Ref Range    WBC 5 98 4 31 - 10 16 Thousand/uL    RBC 4 72 3 88 - 5 62 Million/uL    Hemoglobin 14 5 12 0 - 17 0 g/dL    Hematocrit 44 2 36 5 - 49 3 %    MCV 94 82 - 98 fL    MCH 30 7 26 8 - 34 3 pg    MCHC 32 8 31 4 - 37 4 g/dL    RDW 13 8 11 6 - 15 1 %    Platelets 269 953 - 058 Thousands/uL    MPV 11 1 8 9 - 12 7 fL   Comprehensive metabolic panel    Collection Time: 09/30/17  9:56 AM   Result Value Ref Range    Sodium 138 136 - 145 mmol/L    Potassium 3 8 3 5 - 5 3 mmol/L    Chloride 105 100 - 108 mmol/L    CO2 24 21 - 32 mmol/L    ANION GAP 9 4 - 13 mmol/L    BUN 17 5 - 25 mg/dL    Creatinine 1 00 0 60 - 1 30 mg/dL    Glucose, Fasting 102 (H) 65 - 99 mg/dL    Calcium 9 4 8 3 - 10 1 mg/dL    AST 21 5 - 45 U/L    ALT 36 12 - 78 U/L    Alkaline Phosphatase 54 46 - 116 U/L    Total Protein 7 5 6 4 - 8 2 g/dL Albumin 3 7 3 5 - 5 0 g/dL    Total Bilirubin 0 64 0 20 - 1 00 mg/dL    eGFR 79 ml/min/1 73sq m   Hemoglobin A1c    Collection Time: 09/30/17  9:56 AM   Result Value Ref Range    Hemoglobin A1C 5 7 4 2 - 6 3 %     mg/dl   RED Screen w/ Reflex to Titer/Pattern    Collection Time: 09/30/17  9:56 AM   Result Value Ref Range    RED Negative Negative   Lipid panel    Collection Time: 09/30/17  9:56 AM   Result Value Ref Range    Cholesterol 192 50 - 200 mg/dL    Triglycerides 229 (H) <=150 mg/dL    HDL, Direct 33 (L) 40 - 60 mg/dL    LDL Calculated 113 (H) 0 - 100 mg/dL   Lyme Antibody Profile with reflex to WB    Collection Time: 09/30/17  9:56 AM   Result Value Ref Range    LYME AB IGG 0 15 0 00 - 0 79    LYME AB IGM 0 23 0 00 - 0 79   Sedimentation rate, automated    Collection Time: 09/30/17  9:56 AM   Result Value Ref Range    Sed Rate 15 (H) 0 - 10 mm/hour   RF Screen w/ Reflex to Titer    Collection Time: 09/30/17  9:56 AM   Result Value Ref Range    Rheumatoid Factor Negative Negative   TSH, 3rd generation    Collection Time: 09/30/17  9:56 AM   Result Value Ref Range    TSH 3RD GENERATON 1 220 0 358 - 3 740 uIU/mL   Uric acid    Collection Time: 09/30/17  9:56 AM   Result Value Ref Range    Uric Acid 9 4 (H) 4 2 - 8 0 mg/dL   Babesia microti antibody, IgG & Igm    Collection Time: 09/30/17  9:56 AM   Result Value Ref Range    Babesia microti IgG <1:10 Neg:<1:10    Babesia microti IgM <1:10 Neg:<1:10   Anaplasma Phagocytophilum, PCR    Collection Time: 09/30/17  9:56 AM   Result Value Ref Range    Anaplasma phagocytophilum Negative Negative   Ehrlichia antibody panel    Collection Time: 09/30/17  9:56 AM   Result Value Ref Range    E  Chaffeensis IgG Negative Neg:<1:64    E  Chaffeensis IgM Negative Neg:<1:20    HGE IgG Titer Negative Neg:<1:64    HGE IgM Titer 1:64 (H) Neg:<1:20   POCT urine dip    Collection Time: 08/30/18  3:39 PM   Result Value Ref Range    LEUKOCYTE ESTERASE,UA neg      NITRITE,UA neg SL AMB POCT UROBILINOGEN 0 2     SL AMB POCT URINE PROTEIN trace      PH,UA 5 0      BLOOD,UA neg      SPECIFIC GRAVITY,UA 5 0      KETONES,UA neg      BILIRUBIN,UA neg     GLUCOSE, UA neg      COLOR,UA yellow      CLARITY,UA clear            Large joint arthrocentesis  Date/Time: 8/30/2018 1:45 PM  Consent given by: patient  Site marked: site marked  Supporting Documentation  Indications: pain (impingement syndrome left shoulder  )   Procedure Details  Location: shoulder - L subacromial bursa  Preparation: Betadine   Needle size: 22 G  Ultrasound guidance: no  Approach: posterolateral    Patient tolerance: patient tolerated the procedure well with no immediate complications  Dressing:  Sterile dressing applied        Patient has a history of anaphylactic reaction to steroid injection  Sarapin 2 ML injection into left subacromial space  Patient tolerated procedure  No complications

## 2018-09-01 ENCOUNTER — APPOINTMENT (OUTPATIENT)
Dept: LAB | Facility: MEDICAL CENTER | Age: 68
End: 2018-09-01
Payer: COMMERCIAL

## 2018-09-01 LAB
ALBUMIN SERPL BCP-MCNC: 3.6 G/DL (ref 3.5–5)
ALP SERPL-CCNC: 49 U/L (ref 46–116)
ALT SERPL W P-5'-P-CCNC: 39 U/L (ref 12–78)
ANION GAP SERPL CALCULATED.3IONS-SCNC: 7 MMOL/L (ref 4–13)
AST SERPL W P-5'-P-CCNC: 21 U/L (ref 5–45)
BASOPHILS # BLD AUTO: 0.04 THOUSANDS/ΜL (ref 0–0.1)
BASOPHILS NFR BLD AUTO: 1 % (ref 0–1)
BILIRUB SERPL-MCNC: 0.51 MG/DL (ref 0.2–1)
BUN SERPL-MCNC: 19 MG/DL (ref 5–25)
CALCIUM SERPL-MCNC: 9.3 MG/DL (ref 8.3–10.1)
CHLORIDE SERPL-SCNC: 103 MMOL/L (ref 100–108)
CHOLEST SERPL-MCNC: 203 MG/DL (ref 50–200)
CO2 SERPL-SCNC: 26 MMOL/L (ref 21–32)
CREAT SERPL-MCNC: 1.04 MG/DL (ref 0.6–1.3)
EOSINOPHIL # BLD AUTO: 0.19 THOUSAND/ΜL (ref 0–0.61)
EOSINOPHIL NFR BLD AUTO: 3 % (ref 0–6)
ERYTHROCYTE [DISTWIDTH] IN BLOOD BY AUTOMATED COUNT: 13.3 % (ref 11.6–15.1)
EST. AVERAGE GLUCOSE BLD GHB EST-MCNC: 117 MG/DL
GFR SERPL CREATININE-BSD FRML MDRD: 74 ML/MIN/1.73SQ M
GLUCOSE P FAST SERPL-MCNC: 117 MG/DL (ref 65–99)
HBA1C MFR BLD: 5.7 % (ref 4.2–6.3)
HCT VFR BLD AUTO: 43.6 % (ref 36.5–49.3)
HDLC SERPL-MCNC: 30 MG/DL (ref 40–60)
HGB BLD-MCNC: 14.4 G/DL (ref 12–17)
IMM GRANULOCYTES # BLD AUTO: 0.01 THOUSAND/UL (ref 0–0.2)
IMM GRANULOCYTES NFR BLD AUTO: 0 % (ref 0–2)
LDLC SERPL CALC-MCNC: 106 MG/DL (ref 0–100)
LYMPHOCYTES # BLD AUTO: 1.59 THOUSANDS/ΜL (ref 0.6–4.47)
LYMPHOCYTES NFR BLD AUTO: 27 % (ref 14–44)
MCH RBC QN AUTO: 30.6 PG (ref 26.8–34.3)
MCHC RBC AUTO-ENTMCNC: 33 G/DL (ref 31.4–37.4)
MCV RBC AUTO: 93 FL (ref 82–98)
MONOCYTES # BLD AUTO: 0.54 THOUSAND/ΜL (ref 0.17–1.22)
MONOCYTES NFR BLD AUTO: 9 % (ref 4–12)
NEUTROPHILS # BLD AUTO: 3.54 THOUSANDS/ΜL (ref 1.85–7.62)
NEUTS SEG NFR BLD AUTO: 60 % (ref 43–75)
NONHDLC SERPL-MCNC: 173 MG/DL
NRBC BLD AUTO-RTO: 0 /100 WBCS
PLATELET # BLD AUTO: 194 THOUSANDS/UL (ref 149–390)
PMV BLD AUTO: 11.6 FL (ref 8.9–12.7)
POTASSIUM SERPL-SCNC: 3.8 MMOL/L (ref 3.5–5.3)
PROT SERPL-MCNC: 7.5 G/DL (ref 6.4–8.2)
RBC # BLD AUTO: 4.7 MILLION/UL (ref 3.88–5.62)
SODIUM SERPL-SCNC: 136 MMOL/L (ref 136–145)
TRIGL SERPL-MCNC: 336 MG/DL
TSH SERPL DL<=0.05 MIU/L-ACNC: 1.44 UIU/ML (ref 0.36–3.74)
WBC # BLD AUTO: 5.91 THOUSAND/UL (ref 4.31–10.16)

## 2018-09-01 PROCEDURE — 83036 HEMOGLOBIN GLYCOSYLATED A1C: CPT | Performed by: FAMILY MEDICINE

## 2018-09-01 PROCEDURE — 36415 COLL VENOUS BLD VENIPUNCTURE: CPT | Performed by: FAMILY MEDICINE

## 2018-09-01 PROCEDURE — 84443 ASSAY THYROID STIM HORMONE: CPT | Performed by: FAMILY MEDICINE

## 2018-09-01 PROCEDURE — 80061 LIPID PANEL: CPT | Performed by: FAMILY MEDICINE

## 2018-09-01 PROCEDURE — 85025 COMPLETE CBC W/AUTO DIFF WBC: CPT | Performed by: FAMILY MEDICINE

## 2018-09-01 PROCEDURE — 80053 COMPREHEN METABOLIC PANEL: CPT | Performed by: FAMILY MEDICINE

## 2018-10-29 DIAGNOSIS — I65.23 BILATERAL CAROTID ARTERY STENOSIS: Primary | ICD-10-CM

## 2018-11-02 RX ORDER — CHLORTHALIDONE 25 MG/1
25 TABLET ORAL DAILY
Refills: 3 | COMMUNITY
Start: 2018-10-08 | End: 2018-11-05

## 2018-11-05 ENCOUNTER — OFFICE VISIT (OUTPATIENT)
Dept: FAMILY MEDICINE CLINIC | Facility: CLINIC | Age: 68
End: 2018-11-05
Payer: COMMERCIAL

## 2018-11-05 VITALS
HEIGHT: 67 IN | WEIGHT: 265 LBS | DIASTOLIC BLOOD PRESSURE: 80 MMHG | BODY MASS INDEX: 41.59 KG/M2 | TEMPERATURE: 98.6 F | SYSTOLIC BLOOD PRESSURE: 178 MMHG | HEART RATE: 100 BPM

## 2018-11-05 DIAGNOSIS — I10 BENIGN ESSENTIAL HYPERTENSION: Primary | ICD-10-CM

## 2018-11-05 DIAGNOSIS — K21.9 GASTROESOPHAGEAL REFLUX DISEASE WITHOUT ESOPHAGITIS: ICD-10-CM

## 2018-11-05 PROBLEM — R10.84 GENERALIZED ABDOMINAL PAIN: Status: RESOLVED | Noted: 2018-04-20 | Resolved: 2018-11-05

## 2018-11-05 PROCEDURE — 99214 OFFICE O/P EST MOD 30 MIN: CPT | Performed by: FAMILY MEDICINE

## 2018-11-05 PROCEDURE — 1160F RVW MEDS BY RX/DR IN RCRD: CPT | Performed by: FAMILY MEDICINE

## 2018-11-05 PROCEDURE — 1036F TOBACCO NON-USER: CPT | Performed by: FAMILY MEDICINE

## 2018-11-05 PROCEDURE — 3008F BODY MASS INDEX DOCD: CPT | Performed by: FAMILY MEDICINE

## 2018-11-05 PROCEDURE — 1101F PT FALLS ASSESS-DOCD LE1/YR: CPT | Performed by: FAMILY MEDICINE

## 2018-11-05 RX ORDER — FAMOTIDINE 20 MG/1
20 TABLET, FILM COATED ORAL 2 TIMES DAILY PRN
Qty: 60 TABLET | Refills: 1 | Status: SHIPPED | OUTPATIENT
Start: 2018-11-05 | End: 2019-01-24 | Stop reason: ALTCHOICE

## 2018-11-05 RX ORDER — HYDROCHLOROTHIAZIDE 25 MG/1
25 TABLET ORAL DAILY
Qty: 30 TABLET | Refills: 1 | Status: SHIPPED | OUTPATIENT
Start: 2018-11-05 | End: 2019-01-18 | Stop reason: SDUPTHER

## 2018-11-05 RX ORDER — ASPIRIN 81 MG/1
81 TABLET ORAL DAILY
COMMUNITY
End: 2019-02-11 | Stop reason: ALTCHOICE

## 2018-11-05 RX ORDER — OMEPRAZOLE 20 MG/1
20 CAPSULE, DELAYED RELEASE ORAL DAILY
COMMUNITY
End: 2019-01-18 | Stop reason: ALTCHOICE

## 2018-11-05 NOTE — PATIENT INSTRUCTIONS
DASH Eating Plan   AMBULATORY CARE:   The DASH (Dietary Approaches to Stop Hypertension) Eating Plan  is designed to help prevent or lower high blood pressure  It can also help to lower LDL (bad) cholesterol and decrease your risk for heart disease  The plan is low in sodium, sugar, unhealthy fats, and total fat  It is high in potassium, calcium, magnesium, and fiber  These nutrients are added when you eat more fruits, vegetables, and whole grains  Your sodium limit each day: Your dietitian will tell you how much sodium is safe for you to have each day  People with high blood pressure should have no more than 1,500 to 2,300 mg of sodium in a day  A teaspoon (tsp) of salt has 2,300 mg of sodium  This may seem like a difficult goal, but small changes to the foods you eat can make a big difference  Your healthcare provider or dietitian can help you create a meal plan that follows your sodium limit  How to limit sodium:   · Read food labels  Food labels can help you choose foods that are low in sodium  The amount of sodium is listed in milligrams (mg)  The % Daily Value (DV) column tells you how much of your daily needs are met by 1 serving of the food for each nutrient listed  Choose foods that have less than 5% of the DV of sodium  These foods are considered low in sodium  Foods that have 20% or more of the DV of sodium are considered high in sodium  Avoid foods that have more than 300 mg of sodium in each serving  Choose foods that say low-sodium, reduced-sodium, or no salt added on the food label  · Avoid salt  Do not salt food at the table, and add very little salt to foods during cooking  Use herbs and spices, such as onions, garlic, and salt-free seasonings to add flavor to foods  Try lemon or lime juice or vinegar to give foods a tart flavor  Use hot peppers or a small amount of hot pepper sauce to add a spicy flavor to foods  · Ask about salt substitutes    Ask your healthcare provider if you may use salt substitutes  Some salt substitutes have ingredients that can be harmful if you have certain health conditions  · Choose foods carefully at restaurants  Meals from restaurants, especially fast food restaurants, are often high in sodium  Some restaurants have nutrition information that tells you the amount of sodium in their foods  Ask to have your food prepared with less, or no salt  What you need to know about fats:   · Include healthy fats  Examples are unsaturated fats and omega-3 fatty acids  Unsaturated fats are found in soybean, canola, olive, or sunflower oil, and liquid and soft tub margarines  Omega-3 fatty acids are found in fatty fish, such as salmon, tuna, mackerel, and sardines  It is also found in flaxseed oil and ground flaxseed  · Avoid unhealthy fats  Do not eat unhealthy fats, such as saturated fats and trans fats  Saturated fats are found in foods that contain fat from animals  Examples are fatty meats, whole milk, butter, cream, and other dairy foods  It is also found in shortening, stick margarine, palm oil, and coconut oil  Trans fats are found in fried foods, crackers, chips, and baked goods made with margarine or shortening  Foods to include: With the DASH eating plan, you need to eat a certain number of servings from each food group  This will help you get enough of certain nutrients and limit others  The amount of servings you should eat depends on how many calories you need  Your dietitian can tell you how many calories you need  The number of servings listed next to the food groups below are for people who need about 2,000 calories each day    · Grains:  6 to 8 servings (3 of these servings should be whole-grain foods)    ¨ 1 slice of whole-grain bread     ¨ 1 ounce of dry cereal    ¨ ½ cup of cooked cereal, pasta, or brown rice    · Vegetables and fruits:  4 to 5 servings of fruits and 4 to 5 servings of vegetables    ¨ 1 medium fruit    ¨ ½ cup of frozen, canned (no added salt), or chopped fresh vegetables     ¨ ½ cup of fresh, frozen, dried, or canned fruit (canned in light syrup or fruit juice)    ¨ ½ cup of vegetable or fruit juice    · Dairy:  2 to 3 servings    ¨ 1 cup of nonfat (skim) or 1% milk    ¨ 1½ ounces of fat-free or low-fat cheese    ¨ 6 ounces of nonfat or low-fat yogurt    · Lean meat, poultry, and fish:  6 ounces or less    [employer] (chicken, turkey) with no skin    ¨ Fish (especially fatty fish, such as salmon, fresh tuna, or mackerel)    ¨ Lean beef and pork (loin, round, extra lean hamburger)    ¨ Egg whites and egg substitutes    · Nuts, seeds, and legumes:  4 to 5 servings each week    ¨ ½ cup of cooked beans and peas    ¨ 1½ ounces of unsalted nuts    ¨ 2 tablespoons of peanut butter or seeds    · Sweets and added sugars:  5 or less each week    ¨ 1 tablespoon of sugar, jelly, or jam    ¨ ½ cup of sorbet or gelatin    ¨ 1 cup of lemonade    · Fats:  2 to 3 servings each week    ¨ 1 teaspoon of soft margarine or vegetable oil    ¨ 1 tablespoon of mayonnaise    ¨ 2 tablespoons of salad dressing  Foods to avoid:   · Grains:      [employer], such as doughnuts, pastries, cookies, and biscuits (high in fat and sugar)    ¨ Mixes for cornbread and biscuits, packaged foods, such as bread stuffing, rice and pasta mixes, macaroni and cheese, and instant cereals (high in sodium)    · Fruits and vegetables:      ¨ Regular, canned vegetables (high in sodium)    ¨ Sauerkraut, pickled vegetables, and other foods prepared in brine (high in sodium)    ¨ Fried vegetables or vegetables in butter or high-fat sauces    ¨ Fruit in cream or butter sauce (high in fat)    · Dairy:      ¨ Whole milk, 2% milk, and cream (high in fat)    ¨ Regular cheese and processed cheese (high in fat and sodium)    · Meats and protein foods:      ¨ Smoked or cured meat, such as corned beef, freeman, ham, hot dogs, and sausage (high in fat and sodium)    ¨ Canned beans and canned meats or spreads, such as potted meats, sardines, anchovies, and imitation seafood (high in sodium)    ¨ Deli or lunch meats, such as bologna, ham, turkey, and roast beef (high in sodium)    ¨ High-fat meat (T-bone steak, regular hamburger, and ribs)    ¨ Whole eggs and egg yolks (high in fat)    · Other:      ¨ Seasonings made with salt, such as garlic salt, celery salt, onion salt, seasoned salt, meat tenderizers, and monosodium glutamate (MSG)    ¨ Miso soup and canned or dried soup mixes (high in sodium)    ¨ Regular soy sauce, barbecue sauce, teriyaki sauce, steak sauce, Worcestershire sauce, and most flavored vinegars (high in sodium)    ¨ Regular condiments, such as mustard, ketchup, and salad dressings (high in sodium)    ¨ Gravy and sauces, such as Tristan or cheese sauces (high in sodium and fat)    ¨ Drinks high in sugar, such as soda or fruit drinks    [employer] foods, such as salted chips, popcorn, pretzels, pork rinds, salted crackers, and salted nuts    ¨ Frozen foods, such as dinners, entrees, vegetables with sauces, and breaded meats (high in sodium)  Other guidelines to follow:   · Maintain a healthy weight  Your risk for heart disease is higher if you are overweight  Your healthcare provider may suggest that you lose weight if you are overweight  You can lose weight by eating fewer calories and foods that have added sugars and fat  The DASH meal plan can help you do this  Decrease calories by eating smaller portions at each meal and fewer snacks  Ask your healthcare provider for more information about how to lose weight  · Exercise regularly  Regular exercise can help you reach or maintain a healthy weight  Regular exercise can also help decrease your blood pressure and improve your cholesterol levels  Get 30 minutes or more of moderate exercise each day of the week  To lose weight, get at least 60 minutes of exercise  Talk to your healthcare provider about the best exercise program for you      · Limit alcohol  Women should limit alcohol to 1 drink a day  Men should limit alcohol to 2 drinks a day  A drink of alcohol is 12 ounces of beer, 5 ounces of wine, or 1½ ounces of liquor  © 2017 [address] [employer] Information is for End User's use only and may not be sold, redistributed or otherwise used for commercial purposes  All illustrations and images included in CareNotes® are the copyrighted property of [employer] , [employer]  or Alex Shah  The above information is an  only  It is not intended as medical advice for individual conditions or treatments  Talk to your doctor, nurse or pharmacist before following any medical regimen to see if it is safe and effective for you

## 2018-11-05 NOTE — PROGRESS NOTES
FAMILY MEDICINE PROGRESS NOTE  Leopoldo Folks 79 y o  male   DATE: November 5, 2018     ASSESSMENT and PLAN:  Leopoldo Folks is a 79 y o  male with:     Benign essential hypertension  BP Readings from Last 3 Encounters:   11/05/18 (!) 178/80   08/30/18 (!) 182/86   06/06/18 (!) 182/76       Results from last 6 Months  Lab Units 09/01/18  0938   POTASSIUM mmol/L 3 8   CHLORIDE mmol/L 103   CO2 mmol/L 26   BUN mg/dL 19   CREATININE mg/dL 1 04   CALCIUM mg/dL 9 3   ALK PHOS U/L 49   ALT U/L 39   AST U/L 21     Uncontrolled on current regimen  Stopped Chlorthalidone this time because of generalized body aches  In the past had RENA with Losartan (cough), did ok with HCTZ in the past, will try again  -HCTZ 25mg daily  -Recheck BMP before follow-up    Gastroesophageal reflux disease without esophagitis  Has a sliding hiatal hernia s/p Nissen fundoplication (done by Dr Rachelle Rodriguez)  He does do Tums every now and then, also has a history of PUD  -Continue Omeprazole 20mg BID x 1 month  -Add Pepcid 20mg BID PRN and Gaviscon PRN      SUBJECTIVE:  Leopoldo Folks is a 79 y o  male who presents today with a chief complaint of Generalized Body Aches (For the last month); Heartburn; and Medication Problem  Pt has uncontrolled blood pressure for years  He states he has an intolerance to many other medications  Losartan gave him a cough  He has been having body aches and joint pain and stopped the Chlorthalidone 1 week ago and has noticed a difference  His home BP readings have been 150-70-80s  His wife watches his salt intake  She is here today and she is worried that he works too much  Has a sliding hiatal hernia s/p Nissen fundoplication (done by Dr Rachelle Rodriguez)  He does do Tums every now and then, restarted Omeprazole 20mg BID 2 weeks ago  Also has a history of PUD  Review of Systems   Eyes: Negative for visual disturbance  Respiratory: Negative for cough and shortness of breath      Cardiovascular: Negative for chest pain and palpitations  Neurological: Negative for dizziness and headaches  I have reviewed the patient's PMH, Social History, Medication List and Allergies as appropriate  OBJECTIVE:  BP (!) 178/80 (BP Location: Left arm, Patient Position: Sitting, Cuff Size: Large)   Pulse 100   Temp 98 6 °F (37 °C)   Ht 5' 6 7" (1 694 m)   Wt 120 kg (265 lb)   BMI 41 88 kg/m²    Physical Exam   Constitutional: He appears well-developed and well-nourished  No distress  Morbidly obese   Cardiovascular: Normal rate, regular rhythm and normal heart sounds  Pulmonary/Chest: Effort normal and breath sounds normal  No respiratory distress  He has no wheezes  He has no rales  Musculoskeletal: Normal range of motion  He exhibits no edema  Skin: He is not diaphoretic  Vitals reviewed  Piedad Cole MD    Note: Portions of the record may have been created with voice recognition software  Occasional wrong word or "sound a like" substitutions may have occurred due to the inherent limitations of voice recognition software  Read the chart carefully and recognize, using context, where substitutions have occurred

## 2018-11-05 NOTE — ASSESSMENT & PLAN NOTE
Has a sliding hiatal hernia s/p Nissen fundoplication (done by Dr Kirit Martinez)  He does do Tums every now and then, also has a history of PUD    -Continue Omeprazole 20mg BID x 1 month  -Add Pepcid 20mg BID PRN and Gaviscon PRN

## 2018-11-05 NOTE — ASSESSMENT & PLAN NOTE
BP Readings from Last 3 Encounters:   11/05/18 (!) 178/80   08/30/18 (!) 182/86   06/06/18 (!) 182/76       Results from last 6 Months  Lab Units 09/01/18  0938   POTASSIUM mmol/L 3 8   CHLORIDE mmol/L 103   CO2 mmol/L 26   BUN mg/dL 19   CREATININE mg/dL 1 04   CALCIUM mg/dL 9 3   ALK PHOS U/L 49   ALT U/L 39   AST U/L 21     Uncontrolled on current regimen  Stopped Chlorthalidone this time because of generalized body aches   In the past had RENA with Losartan (cough), did ok with HCTZ in the past, will try again  -HCTZ 25mg daily  -Recheck BMP before follow-up

## 2018-12-04 ENCOUNTER — TELEPHONE (OUTPATIENT)
Dept: FAMILY MEDICINE CLINIC | Facility: CLINIC | Age: 68
End: 2018-12-04

## 2018-12-04 NOTE — TELEPHONE ENCOUNTER
Patient needs a jury duty excuse  He got a summons for jury duty in [place]  He cannot serve due to chronic back issues and spastic bowel  He needs you to write a letter stating this and it must include any and all diagnoses that would prevent him from serving     Please call wife when ready

## 2018-12-14 ENCOUNTER — HOSPITAL ENCOUNTER (OUTPATIENT)
Dept: NON INVASIVE DIAGNOSTICS | Facility: CLINIC | Age: 68
Discharge: HOME | End: 2018-12-14
Payer: COMMERCIAL

## 2018-12-14 DIAGNOSIS — I65.23 BILATERAL CAROTID ARTERY STENOSIS: ICD-10-CM

## 2018-12-14 PROCEDURE — 93880 EXTRACRANIAL BILAT STUDY: CPT

## 2018-12-15 ENCOUNTER — APPOINTMENT (OUTPATIENT)
Dept: LAB | Facility: MEDICAL CENTER | Age: 68
End: 2018-12-15
Payer: COMMERCIAL

## 2018-12-15 DIAGNOSIS — I10 BENIGN ESSENTIAL HYPERTENSION: ICD-10-CM

## 2018-12-15 LAB
ANION GAP SERPL CALCULATED.3IONS-SCNC: 7 MMOL/L (ref 4–13)
BUN SERPL-MCNC: 20 MG/DL (ref 5–25)
CALCIUM SERPL-MCNC: 9.6 MG/DL (ref 8.3–10.1)
CHLORIDE SERPL-SCNC: 105 MMOL/L (ref 100–108)
CO2 SERPL-SCNC: 24 MMOL/L (ref 21–32)
CREAT SERPL-MCNC: 1.2 MG/DL (ref 0.6–1.3)
GFR SERPL CREATININE-BSD FRML MDRD: 63 ML/MIN/1.73SQ M
GLUCOSE P FAST SERPL-MCNC: 110 MG/DL (ref 65–99)
POTASSIUM SERPL-SCNC: 3.7 MMOL/L (ref 3.5–5.3)
SODIUM SERPL-SCNC: 136 MMOL/L (ref 136–145)

## 2018-12-15 PROCEDURE — 80048 BASIC METABOLIC PNL TOTAL CA: CPT

## 2018-12-15 PROCEDURE — 36415 COLL VENOUS BLD VENIPUNCTURE: CPT

## 2018-12-15 PROCEDURE — 93880 EXTRACRANIAL BILAT STUDY: CPT | Performed by: SURGERY

## 2018-12-31 ENCOUNTER — TELEPHONE (OUTPATIENT)
Dept: FAMILY MEDICINE CLINIC | Facility: CLINIC | Age: 68
End: 2018-12-31

## 2018-12-31 ENCOUNTER — OFFICE VISIT (OUTPATIENT)
Dept: URGENT CARE | Facility: MEDICAL CENTER | Age: 68
End: 2018-12-31
Payer: COMMERCIAL

## 2018-12-31 VITALS
OXYGEN SATURATION: 98 % | HEIGHT: 67 IN | RESPIRATION RATE: 17 BRPM | WEIGHT: 255 LBS | DIASTOLIC BLOOD PRESSURE: 88 MMHG | TEMPERATURE: 97.9 F | BODY MASS INDEX: 40.02 KG/M2 | SYSTOLIC BLOOD PRESSURE: 162 MMHG | HEART RATE: 70 BPM

## 2018-12-31 DIAGNOSIS — R19.7 DIARRHEA, UNSPECIFIED TYPE: Primary | ICD-10-CM

## 2018-12-31 PROCEDURE — 99213 OFFICE O/P EST LOW 20 MIN: CPT | Performed by: PHYSICIAN ASSISTANT

## 2018-12-31 RX ORDER — DICYCLOMINE HYDROCHLORIDE 10 MG/1
10 CAPSULE ORAL
Qty: 30 CAPSULE | Refills: 0 | Status: SHIPPED | OUTPATIENT
Start: 2018-12-31 | End: 2019-01-18 | Stop reason: SDUPTHER

## 2018-12-31 NOTE — TELEPHONE ENCOUNTER
Patient's wife called stating that patient has had diarrhea for a week, abdomen is tender to touch and he is weak  Suggested that patient go to ER to be checked as they could do blood work to check for dehydration and an x-ray to check abdomen  Patient's wife agreed and will inform patient

## 2018-12-31 NOTE — PATIENT INSTRUCTIONS
Irritable Bowel Syndrome   WHAT YOU NEED TO KNOW:   Irritable bowel syndrome (IBS) is a condition that prevents food from moving through your intestines normally  The food may move through too slowly or too quickly  This causes bloating, increased gas, constipation, or diarrhea  DISCHARGE INSTRUCTIONS:   Seek care immediately if:   · You have severe abdominal pain  · Your bowel movements are dark or have blood in them  Contact your healthcare provider if:   · You have a fever  · You have pain in your rectum  · Your abdominal pain does not go away, even after treatment  · You have questions or concerns about your condition or care  Medicines:   · Diarrhea medicine  helps decrease the amount of diarrhea you have  Some of these medicines coat the intestine and make bowel movements less watery  Other medicines work by slowing down how fast the intestines move food through  · Laxatives  help treat constipation by moving food and liquids out of your stomach faster  · Stool softeners  soften your bowel movements to prevent straining  · Muscle relaxers  decrease abdominal pain and muscle spasms  · Take your medicine as directed  Contact your healthcare provider if you think your medicine is not helping or if you have side effects  Tell him of her if you are allergic to any medicine  Keep a list of the medicines, vitamins, and herbs you take  Include the amounts, and when and why you take them  Bring the list or the pill bottles to follow-up visits  Carry your medicine list with you in case of an emergency  Manage IBS:   · Eat a variety of healthy foods  Healthy foods include fruits, vegetables, whole-grain breads, low-fat dairy products, beans, lean meats, and fish  You may need to avoid certain foods to decrease your symptoms  · Drink liquids as directed  Ask how much liquid to drink each day and which liquids are best for you   For most people, good liquids to drink are water, juice, and milk  · Exercise regularly  Ask about the best exercise plan for you  Exercise can decrease your blood pressure and improve your health  · Manage stress  Stress may slow healing and cause illness  Learn new ways to relax, such as deep breathing  · Keep a record  of everything you eat and drink, and your symptoms, for 3 weeks  Bring this record with you to your follow-up visits  Follow up with your healthcare provider as directed:  Write down your questions so you remember to ask them during your visits  © 2017 [employer] [employer]  Information is for End User's use only and may not be sold, redistributed or otherwise used for commercial purposes  All illustrations and images included in CareNotes® are the copyrighted property of [employer]  or Alex Shah  The above information is an  only  It is not intended as medical advice for individual conditions or treatments  Talk to your doctor, nurse or pharmacist before following any medical regimen to see if it is safe and effective for you

## 2018-12-31 NOTE — PROGRESS NOTES
[facility]      NAME: Junior Florentino is a 79 y o  male  : 1950    MRN: [id number]  DATE: 2018  TIME: 10:32 AM    Assessment and Plan   Diarrhea, unspecified type [R19 7]  1  Diarrhea, unspecified type  dicyclomine (BENTYL) 10 mg capsule       Patient Instructions     Take bentyl as directed  Any worsening of symptoms, go to ER  Follow up with PCP in 3-5 days  Proceed to  ER if symptoms worsen  Chief Complaint     Chief Complaint   Patient presents with    Diarrhea     x10 days with loose/watery stool, and abd pain (denies N/V, fevers)         History of Present Illness   Junior Florentino presents to the clinic c/o      24-year-old male, with a history of irritable bowel, comes in for evaluation of diarrhea that he has had for approximately 10 days, that has been on and off  Patient states he has had normal bowel movements in between  Days very, sometimes he has 1 episode, symptoms has 2-3 episodes of diarrhea  Has been nonbloody in nature  She does have some generalized abdominal pain as well  Denies any shortness of breath, chest pain, difficulty breathing  Denies any known fevers  No nausea or vomiting  Patient has extensive history of abdominal surgeries, and previous obstructions  In the past he has taken Bentyl, which he was able to handle  Review of Systems   Review of Systems   Constitutional: Negative for fever  HENT: Negative for sore throat  Cardiovascular: Negative for chest pain  Gastrointestinal: Positive for abdominal pain and diarrhea  Negative for blood in stool, nausea and vomiting           Current Medications     Long-Term Prescriptions   Medication Sig Dispense Refill    aspirin (ECOTRIN LOW STRENGTH) 81 mg EC tablet Take 81 mg by mouth daily      dicyclomine (BENTYL) 10 mg capsule Take 1 capsule (10 mg total) by mouth 4 (four) times a day (before meals and at bedtime) 30 capsule 0    famotidine (PEPCID) 20 mg tablet Take 1 tablet (20 mg total) by mouth 2 (two) times a day as needed for heartburn (Patient not taking: Reported on 12/31/2018 ) 60 tablet 1    fluticasone (FLONASE) 50 mcg/act nasal spray 1 spray into each nostril daily      hydrochlorothiazide (HYDRODIURIL) 25 mg tablet Take 1 tablet (25 mg total) by mouth daily 30 tablet 1    multivitamin (THERAGRAN) TABS Take 1 tablet by mouth daily         Current Allergies     Allergies as of 12/31/2018 - Reviewed 12/31/2018   Allergen Reaction Noted    Depo-medrol [methylprednisolone] Anaphylaxis 12/09/2016    Sulfa antibiotics  09/10/2012            The following portions of the patient's history were reviewed and updated as appropriate: allergies, current medications, past family history, past medical history, past social history, past surgical history and problem list     HISTORICAL INFO:  Past Medical History:   Diagnosis Date    Allergic reaction     LAST ASSESSED: [id number]    Asthma     Bursitis of heel     LAST ASSESSED: [id number]    Derangement of meniscus of left knee due to old injury     LAST ASSESSED: [id number]    Diverticulitis of colon     LAST ASSESSED: [id number]    Dizziness     LAST ASSESSED: [id number]    GERD (gastroesophageal reflux disease)     History of colon polyps     Hypertension     Malignant hypertension     LAST ASSESSED: [id number]    Plantar fasciitis     Seasonal allergies     Shoulder impingement     LAST ASSESSED: [id number]    Stroke ([facility] [address] )      Past Surgical History:   Procedure Laterality Date    APPENDECTOMY      CAROTID ENDARTARECTOMY      COLECTOMY Left     PARTIAL - SIGMOID; HEMICOLECTOMY FOR DIVERTICULITIS; ONSET: 1987    COLON SURGERY      COLONOSCOPY N/A 12/9/2016    Procedure: COLONOSCOPY;  Surgeon: Gabriel Salguero MD;  [facility];   Service:     HERNIA REPAIR      VENTRAL    NISSEN FUNDOPLICATION      ESOPHAGOGASTRIC FUNDOPLASTY LAST ASSESSED: [id number]    SINUS SURGERY      THROMBOENDARTERECTOMY      CAROTID; ONSET; MAY 2012       Objective   /88   Pulse 70   Temp 97 9 °F (36 6 °C) (Temporal)   Resp 17   Ht 5' 7" (1 702 m)   Wt 116 kg (255 lb)   SpO2 98%   BMI 39 94 kg/m²        Physical Exam     Physical Exam   Constitutional: He appears well-developed and well-nourished  No distress  HENT:   Head: Normocephalic and atraumatic  Cardiovascular: Normal rate, regular rhythm and normal heart sounds  Pulmonary/Chest: Effort normal and breath sounds normal  No respiratory distress  He has no wheezes  He has no rales  Abdominal: There is generalized tenderness  There is no CVA tenderness, no tenderness at McBurney's point and negative Metzger's sign  Skin: Skin is warm and dry  He is not diaphoretic  Nursing note and vitals reviewed  M*Modal software was used to dictate this note  It may contain errors with dictating incorrect words/spelling  Please contact provider directly for any questions

## 2019-01-02 ENCOUNTER — TELEPHONE (OUTPATIENT)
Dept: FAMILY MEDICINE CLINIC | Facility: CLINIC | Age: 69
End: 2019-01-02

## 2019-01-02 ENCOUNTER — OFFICE VISIT (OUTPATIENT)
Dept: FAMILY MEDICINE CLINIC | Facility: CLINIC | Age: 69
End: 2019-01-02
Payer: COMMERCIAL

## 2019-01-02 ENCOUNTER — APPOINTMENT (OUTPATIENT)
Dept: RADIOLOGY | Facility: MEDICAL CENTER | Age: 69
End: 2019-01-02
Payer: COMMERCIAL

## 2019-01-02 VITALS
WEIGHT: 255 LBS | HEIGHT: 67 IN | TEMPERATURE: 97 F | SYSTOLIC BLOOD PRESSURE: 180 MMHG | HEART RATE: 60 BPM | DIASTOLIC BLOOD PRESSURE: 102 MMHG | RESPIRATION RATE: 14 BRPM | BODY MASS INDEX: 40.02 KG/M2

## 2019-01-02 DIAGNOSIS — R10.13 EPIGASTRIC PAIN: Primary | ICD-10-CM

## 2019-01-02 DIAGNOSIS — R10.13 EPIGASTRIC PAIN: ICD-10-CM

## 2019-01-02 DIAGNOSIS — I10 BENIGN ESSENTIAL HYPERTENSION: ICD-10-CM

## 2019-01-02 DIAGNOSIS — R19.7 ACUTE DIARRHEA: ICD-10-CM

## 2019-01-02 PROCEDURE — 99214 OFFICE O/P EST MOD 30 MIN: CPT | Performed by: FAMILY MEDICINE

## 2019-01-02 PROCEDURE — 74022 RADEX COMPL AQT ABD SERIES: CPT

## 2019-01-02 RX ORDER — LEVALBUTEROL INHALATION SOLUTION 1.25 MG/3ML
1 SOLUTION RESPIRATORY (INHALATION) EVERY 8 HOURS PRN
Refills: 3 | COMMUNITY
Start: 2018-12-07 | End: 2019-05-13 | Stop reason: SDUPTHER

## 2019-01-02 RX ORDER — ONDANSETRON 4 MG/1
4 TABLET, ORALLY DISINTEGRATING ORAL EVERY 6 HOURS PRN
Qty: 20 TABLET | Refills: 0 | Status: SHIPPED | OUTPATIENT
Start: 2019-01-02 | End: 2019-06-05 | Stop reason: ALTCHOICE

## 2019-01-02 NOTE — TELEPHONE ENCOUNTER
Please call Romayne Lennert and let him know that I reviewed his AXR that showed he does NOT have an obstruction, but did have some air in his bowels related to the diarrhea  Once I have the stool results, we'll call him, in the meantime he can take the Zofran PRN and he can also start taking Imodium PRN  Thank you! Procedure: Xr Abdomen Obstruction Series    Result Date: 1/2/2019  Narrative: OBSTRUCTION SERIES INDICATION:   R10 13: Epigastric pain  Abdominal bloating diarrhea COMPARISON:  2/25/2008 EXAM PERFORMED/VIEWS:  XR ABDOMEN OBSTRUCTION SERIES FINDINGS: There is a nonobstructive bowel gas pattern  No free air beneath the hemidiaphragms  Several air-fluid levels within nondistended colon identified, which will be compatible with the provided clinical history of diarrhea  There are multiple surgical clips present in the left upper abdomen  Osseous structures are unremarkable  Examination of the chest reveals a normal cardiomediastinal silhouette  Lungs are clear  Impression: 1  No evidence of small bowel obstruction or free intraperitoneal air 2  Several air-fluid levels within nondistended colon, felt to correlate with the provided clinical history of diarrhea 3  Clear lungs Workstation performed: [id number]     Procedure: Vas Carotid Complete Study    Result Date: 12/15/2018  Narrative:  [facility] REPORT [facility]INICAL: Indications: Yearly surveillance of carotid artery disease  Patient is asymptomatic at this time  Operative History: 2012-05-11 Right Standard (ICA, ECA and CCA) endarterectomy Bovine pericardial patch Risk Factors The patient has history of HTN and HLD  Clinical Right Pressure:  184/ mm Hg, Left Pressure:  175/ mm Hg  FINDINGS:  Right        Impression     PSV  EDV (cm/s)  Direction of Flow  Ratio  Dist  ICA                   103          38                      1 20  Mid  ICA                     87          27                      1 02  Prox   ICA    Widely Patent   57 16                      0 66  Dist CCA                     84          21                            Mid CCA                      85          22                      0 89  Prox CCA                     96          24                            Ext Carotid                 110          21                      1 29  Prox Vert                     0           0  Not Identified            Subclavian                  113           0                             Left         Impression  PSV  EDV (cm/s)  Direction of Flow  Ratio  Dist  ICA                 56          20                      0 47  Mid  ICA                  97          37                      0 81  Prox  ICA    1 - 49%     121          50                      1 07  Dist CCA                 106          31                            Mid CCA                  119          31                      1 10  Prox CCA                 109          32                            Ext Carotid              179          36                      1 50  Prox Vert                 49          18  Antegrade                 Subclavian               119          18                               CONCLUSION:  Impression RIGHT: Widely patent internal carotid artery and endarterectomy site  The vertebral artery was not identified suggesting a hypoplastic versus diseased vessel  There is no significant subclavian artery disease  LEFT: There is <50% stenosis noted in the internal carotid artery  Plaque is heterogenous and irregular  Vertebral artery flow is antegrade  There is no significant subclavian artery disease  In comparison to the study of 12/11/2017, there is no significant interval change in the disease process  Recommend repeat testing in 1 year as per protocol unless otherwise indicated    Internal carotid artery stenosis determination by consensus criteria from: Kayla Block , et al  Carotid Artery Stenosis: Gray-Scale and Doppler US Diagnosis - Society of Radiologists in Ultrasound Consensus Conference, Radiology 2003; [id number]    SIGNATURE: Electronically Signed by: Jose Gutierrez on 2018-12-15 10:28:57 AM

## 2019-01-02 NOTE — PROGRESS NOTES
FAMILY MEDICINE PROGRESS NOTE  Yohana Desir 79 y o  male   DATE: January 2, 2019     ASSESSMENT and PLAN:  Yohana Desir is a 79 y o  male with:     1  Epigastric pain  Concern for possible SBO given absent/decreased BS with distention and tenderness to palpation  Pt has a history of IBS, diverticulitis s/p hemicolectomy and complicated history  Get Stat obstruction series  Add Zofran to help with nausea  - XR abdomen obstruction series; Future  - ondansetron (ZOFRAN-ODT) 4 mg disintegrating tablet; Take 1 tablet (4 mg total) by mouth every 6 (six) hours as needed for nausea or vomiting  Dispense: 20 tablet; Refill: 0    2  Acute diarrhea  May be related to constipation and now having diarrhea around an obstruction versus infectious GE, though unclear source  Avoid Imodium until results are available  Check stool studies and AXR  - Clostridium difficile toxin by PCR; Future  - Ova and parasite examination; Future  - Stool Enteric Bacterial Panel by PCR; Future  - Fecal leukocytes; Future    3  Benign essential hypertension  BP Readings from Last 3 Encounters:   01/02/19 (!) 180/102   12/31/18 162/88   11/05/18 (!) 178/80       Results from last 6 Months  Lab Units 12/15/18  1121 09/01/18  0938   POTASSIUM mmol/L 3 7 3 8   CHLORIDE mmol/L 105 103   CO2 mmol/L 24 26   BUN mg/dL 20 19   CREATININE mg/dL 1 20 1 04   CALCIUM mg/dL 9 6 9 3   ALK PHOS U/L  --  49   ALT U/L  --  39   AST U/L  --  21     Uncontrolled, has had RENA with other meds in the past, advised to retry chlorthalidone, if unable to tolerate, RTC to recheck and try another med  Patient agreeable with the plan and expressed understanding  I discucssed signs and symptoms for which to RTC, go to ER or seek urgent medical care  SUBJECTIVE:  Yohana Desir is a 79 y o  male who presents today with a chief complaint of Diarrhea (For the last weeek and a half ); Abdominal Pain (bi lateral abdominal pain); and Bloated  Pt here with his wife today    Pt has had diarrhea that is non-bloody, non-mucousy  Sometimes he has loose stools other times has pure water  He has abdominal pain due to previous abdominal surgeries  He will have diarrhea at least 5x/day  He has diarrhea after every meal  He feels nausea, no vomiting, but states that he can't vomit after his Nissen procedure years ago  Initially was constipated, and now has diarrhea constantly, has known IBS  Never has had IBS flares this bad though  He has a history of multiple abdominal surgeries, including hemicolectomy  He went urgent care on 12/31 and given Bentyl which helps moderately with the pain  No recent travel, Abx, meds  He has been non-compliant with his BP meds as well  Review of Systems   Constitutional: Negative for chills and fever  Respiratory: Negative for shortness of breath  Gastrointestinal: Positive for abdominal distention, abdominal pain, constipation, diarrhea, nausea and vomiting  Negative for blood in stool and rectal pain  I have reviewed the patient's PMH, Social History, Medication List and Allergies as appropriate  OBJECTIVE:  BP (!) 180/102 (BP Location: Left arm, Patient Position: Sitting, Cuff Size: Standard)   Pulse 60   Temp (!) 97 °F (36 1 °C)   Resp 14   Ht 5' 7" (1 702 m)   Wt 116 kg (255 lb)   BMI 39 94 kg/m²    Physical Exam   Constitutional: He appears well-developed  HENT:   Head: Normocephalic and atraumatic  Cardiovascular: Normal rate, regular rhythm and normal heart sounds  No murmur heard  Pulmonary/Chest: Effort normal and breath sounds normal  No respiratory distress  He has no wheezes  Abdominal: Soft  Normal appearance  He exhibits distension  He exhibits no fluid wave  Bowel sounds are decreased  There is tenderness in the right upper quadrant, epigastric area and periumbilical area  There is rigidity  There is no rebound and no guarding  Neurological: He is alert  Skin: He is not diaphoretic  There is pallor     Vitals reviewed  Andrzej Funes MD    Note: Portions of the record may have been created with voice recognition software  Occasional wrong word or "sound a like" substitutions may have occurred due to the inherent limitations of voice recognition software  Read the chart carefully and recognize, using context, where substitutions have occurred

## 2019-01-03 ENCOUNTER — APPOINTMENT (OUTPATIENT)
Dept: LAB | Facility: CLINIC | Age: 69
End: 2019-01-03
Payer: COMMERCIAL

## 2019-01-03 DIAGNOSIS — R19.7 ACUTE DIARRHEA: ICD-10-CM

## 2019-01-03 LAB
C DIFF TOX GENS STL QL NAA+PROBE: NORMAL
CAMPYLOBACTER DNA SPEC NAA+PROBE: NORMAL
SALMONELLA DNA SPEC QL NAA+PROBE: NORMAL
SHIGA TOXIN STX GENE SPEC NAA+PROBE: NORMAL
SHIGELLA DNA SPEC QL NAA+PROBE: NORMAL

## 2019-01-03 PROCEDURE — 87209 SMEAR COMPLEX STAIN: CPT

## 2019-01-03 PROCEDURE — 87505 NFCT AGENT DETECTION GI: CPT

## 2019-01-03 PROCEDURE — 87493 C DIFF AMPLIFIED PROBE: CPT

## 2019-01-03 PROCEDURE — 87177 OVA AND PARASITES SMEARS: CPT

## 2019-01-03 PROCEDURE — 89055 LEUKOCYTE ASSESSMENT FECAL: CPT

## 2019-01-04 ENCOUNTER — TELEPHONE (OUTPATIENT)
Dept: FAMILY MEDICINE CLINIC | Facility: CLINIC | Age: 69
End: 2019-01-04

## 2019-01-04 LAB
O+P STL CONC: NORMAL
WBC SPEC QL GRAM STN: NORMAL

## 2019-01-04 NOTE — TELEPHONE ENCOUNTER
Patient called office stating that he still has diarrhea and his abdominal pain is wrapping around to his back  Dr Cornelio Harris notified and suggested that patient go to ER  Patient informed and stated that he will talk to his wife and probably go to [facility] ER  Dr Cornelio Harris made aware of patient's response

## 2019-01-18 ENCOUNTER — OFFICE VISIT (OUTPATIENT)
Dept: FAMILY MEDICINE CLINIC | Facility: CLINIC | Age: 69
End: 2019-01-18
Payer: COMMERCIAL

## 2019-01-18 VITALS
HEART RATE: 86 BPM | DIASTOLIC BLOOD PRESSURE: 76 MMHG | HEIGHT: 67 IN | SYSTOLIC BLOOD PRESSURE: 142 MMHG | BODY MASS INDEX: 40.65 KG/M2 | WEIGHT: 259 LBS | TEMPERATURE: 97.7 F | RESPIRATION RATE: 16 BRPM

## 2019-01-18 DIAGNOSIS — E78.2 MIXED HYPERLIPIDEMIA: ICD-10-CM

## 2019-01-18 DIAGNOSIS — R73.01 IMPAIRED FASTING GLUCOSE: ICD-10-CM

## 2019-01-18 DIAGNOSIS — K21.9 GASTROESOPHAGEAL REFLUX DISEASE WITHOUT ESOPHAGITIS: ICD-10-CM

## 2019-01-18 DIAGNOSIS — R10.84 GENERALIZED ABDOMINAL PAIN: ICD-10-CM

## 2019-01-18 DIAGNOSIS — K76.0 FATTY INFILTRATION OF LIVER: ICD-10-CM

## 2019-01-18 DIAGNOSIS — K58.0 IRRITABLE BOWEL SYNDROME WITH DIARRHEA: Primary | ICD-10-CM

## 2019-01-18 DIAGNOSIS — I65.23 BILATERAL CAROTID ARTERY STENOSIS: ICD-10-CM

## 2019-01-18 DIAGNOSIS — E66.01 MORBID OBESITY (HCC): ICD-10-CM

## 2019-01-18 DIAGNOSIS — K22.70 BARRETT'S ESOPHAGUS WITHOUT DYSPLASIA: ICD-10-CM

## 2019-01-18 DIAGNOSIS — I10 BENIGN ESSENTIAL HYPERTENSION: ICD-10-CM

## 2019-01-18 PROCEDURE — 99214 OFFICE O/P EST MOD 30 MIN: CPT | Performed by: FAMILY MEDICINE

## 2019-01-18 RX ORDER — DICYCLOMINE HYDROCHLORIDE 10 MG/1
10 CAPSULE ORAL
Qty: 120 CAPSULE | Refills: 5 | Status: SHIPPED | OUTPATIENT
Start: 2019-01-18 | End: 2019-08-20 | Stop reason: HOSPADM

## 2019-01-18 RX ORDER — HYDROCHLOROTHIAZIDE 25 MG/1
25 TABLET ORAL DAILY
Qty: 90 TABLET | Refills: 3 | Status: SHIPPED | OUTPATIENT
Start: 2019-01-18 | End: 2019-08-02 | Stop reason: SDUPTHER

## 2019-01-18 RX ORDER — CHOLESTYRAMINE LIGHT 4 G/5.7G
4 POWDER, FOR SUSPENSION ORAL DAILY
Qty: 30 PACKET | Refills: 5 | Status: SHIPPED | OUTPATIENT
Start: 2019-01-18 | End: 2019-06-05 | Stop reason: ALTCHOICE

## 2019-01-18 NOTE — PROGRESS NOTES
Assessment/Plan:     Diagnoses and all orders for this visit:    Irritable bowel syndrome with diarrhea  -     dicyclomine (BENTYL) 10 mg capsule; Take 1 capsule (10 mg total) by mouth 4 (four) times a day (before meals and at bedtime) for 30 days  -     cholestyramine sugar free (QUESTRAN LIGHT) 4 g packet; Take 1 packet (4 g total) by mouth daily for 30 days    Generalized abdominal pain  -     US abdomen complete; Future    Benign essential hypertension  -     hydrochlorothiazide (HYDRODIURIL) 25 mg tablet; Take 1 tablet (25 mg total) by mouth daily for 90 days    Mixed hyperlipidemia    Morbid obesity ([address] )    Bilateral carotid artery stenosis    Gastroesophageal reflux disease without esophagitis    Genao's esophagus without dysplasia    Fatty infiltration of liver    Impaired fasting glucose        Schedule abdominal u/s trial of Questran powder daily  Continue with prn Bentyl  BMI Counseling: Body mass index is 40 57 kg/m²  Discussed the patient's BMI with him  The BMI is above average  BMI counseling and education was provided to the patient  Nutrition recommendations include reducing portion sizes, decreasing overall calorie intake, consuming healthier snacks, moderation in carbohydrate intake, reducing intake of saturated fat and trans fat and reducing intake of cholesterol  Patient ID: Familia Mendez is a 79 y o  male  Follow up visit  Current medications reviewed  Ongoing issues with diarrhea  Symptoms worse after meals  No specific aggravating foods  Diarrhea associated with abdominal pain and bloating  No rectal bleeding  01/2019 stool studies negative  Normal obstruction series  He has been using prn Bentyl 10 mg generally 2 doses a day  Last colonoscopy 12/2016  History of GERD on p r n  Pepcid  Status post fundoplication  Multiple abdominal surgeries s/p left hemicolectomy for diverticular disease  Repair of ventral hernias     History of hypertension with intolerance to multiple blood pressure medications  He is on HCTZ 25 mg daily and prn Losartan 25 mg  Labs 09/2018 see note  The following portions of the patient's history were reviewed and updated as appropriate: allergies, current medications, past family history, past medical history, past social history, past surgical history and problem list     Review of Systems   Constitutional: Positive for unexpected weight change (4 lb weight gain from 12/2018)  Negative for appetite change, chills and fever  HENT: Negative for congestion, ear pain, rhinorrhea, sore throat and trouble swallowing  Eyes: Negative for visual disturbance  Respiratory: Negative for cough, shortness of breath and wheezing  Cardiovascular: Negative for chest pain, palpitations and leg swelling  Carotid artery stenosis  Status post right CEA  Carotid artery Dopplers 12/2018 widely patent internal carotid artery and endarterectomy site  Left ICA left and 50% stenosis  03/2017 nuclear stress test normal    Gastrointestinal: Negative for nausea and vomiting  See HPI  GERD/Genao's esophagus  History of fatty liver  Genitourinary: Negative for difficulty urinating  See HPI   Musculoskeletal: Negative for arthralgias and myalgias  Recurrent left shoulder pain  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  Repetitive activities with arms  09/2017 rheumatologic studies uric acid level elevated 9 4  TSH 1 220 sed rate 15  Rheumatoid factor negative  RED negative Lyme titer negative the PCN negative Anaplasma negative Ehrlichia antibody panel HGE titer borderline abnormal at 1:64 he was treated with antibiotics at that time  Skin: Negative for rash  Allergic/Immunologic: Negative for environmental allergies  Neurological: Negative for dizziness, weakness, light-headedness and headaches  Hematological: Negative for adenopathy  Does not bruise/bleed easily     Psychiatric/Behavioral: Negative for dysphoric mood and sleep disturbance  Objective:      /76   Pulse 86   Temp 97 7 °F (36 5 °C)   Resp 16   Ht 5' 7" (1 702 m)   Wt 117 kg (259 lb)   BMI 40 57 kg/m²          Physical Exam   Constitutional: He is oriented to person, place, and time  He appears well-developed and well-nourished  No distress  Eyes: Conjunctivae are normal  No scleral icterus  Funduscopic exam normal   Neck: No JVD present  Carotid bruit is not present  No tracheal deviation present  No thyroid mass and no thyromegaly present  Cardiovascular: Normal rate, regular rhythm, normal heart sounds and intact distal pulses  Exam reveals no gallop  No murmur heard  Pulses:       Carotid pulses are 2+ on the right side, and 2+ on the left side  Pulmonary/Chest: Effort normal and breath sounds normal  No respiratory distress  He has no wheezes  He has no rales  Abdominal: Soft  Bowel sounds are normal  He exhibits no distension, no ascites and no mass  There is no hepatosplenomegaly  There is no tenderness  There is no rebound and no guarding  Musculoskeletal: He exhibits no edema  Lymphadenopathy:     He has no cervical adenopathy  Neurological: He is alert and oriented to person, place, and time  No cranial nerve deficit  Skin: No rash noted  Psychiatric: He has a normal mood and affect  Nursing note and vitals reviewed          Recent Results (from the past 6048 hour(s))   POCT urine dip    Collection Time: 08/30/18  3:39 PM   Result Value Ref Range    LEUKOCYTE ESTERASE,UA neg     NITRITE,UA neg     SL AMB POCT UROBILINOGEN 0 2     POCT URINE PROTEIN trace      PH,UA 5 0     BLOOD,UA neg     SPECIFIC GRAVITY,UA 5 0     KETONES,UA neg     BILIRUBIN,UA neg     GLUCOSE, UA neg      COLOR,UA yellow     CLARITY,UA clear    CBC and differential    Collection Time: 09/01/18  9:38 AM   Result Value Ref Range    WBC 5 91 4 31 - 10 16 Thousand/uL    RBC 4 70 3 88 - 5 62 Million/uL Hemoglobin 14 4 12 0 - 17 0 g/dL    Hematocrit 43 6 36 5 - 49 3 %    MCV 93 82 - 98 fL    MCH 30 6 26 8 - 34 3 pg    MCHC 33 0 31 4 - 37 4 g/dL    RDW 13 3 11 6 - 15 1 %    MPV 11 6 8 9 - 12 7 fL    Platelets 949 075 - 744 Thousands/uL    nRBC 0 /100 WBCs    Neutrophils Relative 60 43 - 75 %    Immat GRANS % 0 0 - 2 %    Lymphocytes Relative 27 14 - 44 %    Monocytes Relative 9 4 - 12 %    Eosinophils Relative 3 0 - 6 %    Basophils Relative 1 0 - 1 %    Neutrophils Absolute 3 54 1 85 - 7 62 Thousands/µL    Immature Grans Absolute 0 01 0 00 - 0 20 Thousand/uL    Lymphocytes Absolute 1 59 0 60 - 4 47 Thousands/µL    Monocytes Absolute 0 54 0 17 - 1 22 Thousand/µL    Eosinophils Absolute 0 19 0 00 - 0 61 Thousand/µL    Basophils Absolute 0 04 0 00 - 0 10 Thousands/µL   Comprehensive metabolic panel    Collection Time: 09/01/18  9:38 AM   Result Value Ref Range    Sodium 136 136 - 145 mmol/L    Potassium 3 8 3 5 - 5 3 mmol/L    Chloride 103 100 - 108 mmol/L    CO2 26 21 - 32 mmol/L    ANION GAP 7 4 - 13 mmol/L    BUN 19 5 - 25 mg/dL    Creatinine 1 04 0 60 - 1 30 mg/dL    Glucose, Fasting 117 (H) 65 - 99 mg/dL    Calcium 9 3 8 3 - 10 1 mg/dL    AST 21 5 - 45 U/L    ALT 39 12 - 78 U/L    Alkaline Phosphatase 49 46 - 116 U/L    Total Protein 7 5 6 4 - 8 2 g/dL    Albumin 3 6 3 5 - 5 0 g/dL    Total Bilirubin 0 51 0 20 - 1 00 mg/dL    eGFR 74 ml/min/1 73sq m   Lipid panel    Collection Time: 09/01/18  9:38 AM   Result Value Ref Range    Cholesterol 203 (H) 50 - 200 mg/dL    Triglycerides 336 (H) <=150 mg/dL    HDL, Direct 30 (L) 40 - 60 mg/dL    LDL Calculated 106 (H) 0 - 100 mg/dL    Non-HDL-Chol (CHOL-HDL) 173 mg/dl   TSH, 3rd generation with Free T4 reflex    Collection Time: 09/01/18  9:38 AM   Result Value Ref Range    TSH 3RD GENERATON 1 440 0 358 - 3 740 uIU/mL   Hemoglobin A1C    Collection Time: 09/01/18  9:38 AM   Result Value Ref Range    Hemoglobin A1C 5 7 4 2 - 6 3 %     mg/dl   Basic metabolic panel Collection Time: 12/15/18 11:21 AM   Result Value Ref Range    Sodium 136 136 - 145 mmol/L    Potassium 3 7 3 5 - 5 3 mmol/L    Chloride 105 100 - 108 mmol/L    CO2 24 21 - 32 mmol/L    ANION GAP 7 4 - 13 mmol/L    BUN 20 5 - 25 mg/dL    Creatinine 1 20 0 60 - 1 30 mg/dL    Glucose, Fasting 110 (H) 65 - 99 mg/dL    Calcium 9 6 8 3 - 10 1 mg/dL    eGFR 63 ml/min/1 73sq m   Ova and parasite examination    Collection Time: 01/03/19  7:16 AM   Result Value Ref Range    Ova + Parasite Exam       No ova, cysts, or parasites seen     One negative specimen does not rule out the possibility of a  parasitic infection  Stool Enteric Bacterial Panel by PCR    Collection Time: 01/03/19  7:16 AM   Result Value Ref Range    Salmonella sp PCR None Detected None Detected    Shigella sp/Enteroinvasive E  coli (EIEC) PCR None Detected None Detected    Campylobacter sp (jejuni and coli) PCR None Detected None Detected    Shiga toxin 1/Shiga toxin 2 genes PCR None Detected None Detected   Clostridium difficile toxin by PCR    Collection Time: 01/03/19  7:17 AM   Result Value Ref Range    C difficile toxin by PCR NEGATIVE for C difficle toxin by PCR  NEGATIVE for C difficle toxin by PCR  Fecal leukocytes    Collection Time: 01/03/19  7:17 AM   Result Value Ref Range    White Blood Cells, Stool Rare white blood cells   None Seen

## 2019-01-23 ENCOUNTER — HOSPITAL ENCOUNTER (OUTPATIENT)
Dept: RADIOLOGY | Facility: MEDICAL CENTER | Age: 69
Discharge: HOME | End: 2019-01-23
Payer: COMMERCIAL

## 2019-01-23 DIAGNOSIS — R10.84 GENERALIZED ABDOMINAL PAIN: ICD-10-CM

## 2019-01-23 PROCEDURE — 76700 US EXAM ABDOM COMPLETE: CPT

## 2019-01-24 ENCOUNTER — TELEPHONE (OUTPATIENT)
Dept: FAMILY MEDICINE CLINIC | Facility: CLINIC | Age: 69
End: 2019-01-24

## 2019-01-24 ENCOUNTER — APPOINTMENT (EMERGENCY)
Dept: CT IMAGING | Facility: HOSPITAL | Age: 69
End: 2019-01-24
Payer: COMMERCIAL

## 2019-01-24 ENCOUNTER — HOSPITAL ENCOUNTER (EMERGENCY)
Facility: HOSPITAL | Age: 69
Discharge: HOME | End: 2019-01-24
Attending: EMERGENCY MEDICINE
Payer: COMMERCIAL

## 2019-01-24 VITALS
TEMPERATURE: 97.8 F | WEIGHT: 252 LBS | SYSTOLIC BLOOD PRESSURE: 163 MMHG | OXYGEN SATURATION: 95 % | HEART RATE: 56 BPM | RESPIRATION RATE: 18 BRPM | DIASTOLIC BLOOD PRESSURE: 98 MMHG | BODY MASS INDEX: 39.47 KG/M2

## 2019-01-24 DIAGNOSIS — R10.9 ABDOMINAL PAIN: Primary | ICD-10-CM

## 2019-01-24 LAB
ALBUMIN SERPL BCP-MCNC: 3.7 G/DL (ref 3.5–5)
ALP SERPL-CCNC: 59 U/L (ref 46–116)
ALT SERPL W P-5'-P-CCNC: 41 U/L (ref 12–78)
ANION GAP SERPL CALCULATED.3IONS-SCNC: 11 MMOL/L (ref 4–13)
APTT PPP: 28 SECONDS (ref 26–38)
AST SERPL W P-5'-P-CCNC: 29 U/L (ref 5–45)
ATRIAL RATE: 147 BPM
BACTERIA UR QL AUTO: ABNORMAL /HPF
BASOPHILS # BLD AUTO: 0.05 THOUSANDS/ΜL (ref 0–0.1)
BASOPHILS NFR BLD AUTO: 1 % (ref 0–1)
BILIRUB SERPL-MCNC: 0.6 MG/DL (ref 0.2–1)
BILIRUB UR QL STRIP: NEGATIVE
BUN SERPL-MCNC: 13 MG/DL (ref 5–25)
CALCIUM SERPL-MCNC: 9.1 MG/DL (ref 8.3–10.1)
CHLORIDE SERPL-SCNC: 103 MMOL/L (ref 100–108)
CLARITY UR: CLEAR
CO2 SERPL-SCNC: 25 MMOL/L (ref 21–32)
COLOR UR: YELLOW
CREAT SERPL-MCNC: 1.13 MG/DL (ref 0.6–1.3)
EOSINOPHIL # BLD AUTO: 0.82 THOUSAND/ΜL (ref 0–0.61)
EOSINOPHIL NFR BLD AUTO: 12 % (ref 0–6)
ERYTHROCYTE [DISTWIDTH] IN BLOOD BY AUTOMATED COUNT: 13.2 % (ref 11.6–15.1)
GFR SERPL CREATININE-BSD FRML MDRD: 67 ML/MIN/1.73SQ M
GLUCOSE SERPL-MCNC: 118 MG/DL (ref 65–140)
GLUCOSE UR STRIP-MCNC: NEGATIVE MG/DL
HCT VFR BLD AUTO: 45 % (ref 36.5–49.3)
HGB BLD-MCNC: 15.1 G/DL (ref 12–17)
HGB UR QL STRIP.AUTO: NEGATIVE
IMM GRANULOCYTES # BLD AUTO: 0.01 THOUSAND/UL (ref 0–0.2)
IMM GRANULOCYTES NFR BLD AUTO: 0 % (ref 0–2)
INR PPP: 1.04 (ref 0.86–1.17)
KETONES UR STRIP-MCNC: NEGATIVE MG/DL
LACTATE SERPL-SCNC: 1.2 MMOL/L (ref 0.5–2)
LEUKOCYTE ESTERASE UR QL STRIP: NEGATIVE
LIPASE SERPL-CCNC: 107 U/L (ref 73–393)
LYMPHOCYTES # BLD AUTO: 1.54 THOUSANDS/ΜL (ref 0.6–4.47)
LYMPHOCYTES NFR BLD AUTO: 22 % (ref 14–44)
MCH RBC QN AUTO: 31 PG (ref 26.8–34.3)
MCHC RBC AUTO-ENTMCNC: 33.6 G/DL (ref 31.4–37.4)
MCV RBC AUTO: 92 FL (ref 82–98)
MONOCYTES # BLD AUTO: 0.49 THOUSAND/ΜL (ref 0.17–1.22)
MONOCYTES NFR BLD AUTO: 7 % (ref 4–12)
NEUTROPHILS # BLD AUTO: 4.23 THOUSANDS/ΜL (ref 1.85–7.62)
NEUTS SEG NFR BLD AUTO: 58 % (ref 43–75)
NITRITE UR QL STRIP: NEGATIVE
NON-SQ EPI CELLS URNS QL MICRO: ABNORMAL /HPF
NRBC BLD AUTO-RTO: 0 /100 WBCS
PH UR STRIP.AUTO: 5.5 [PH] (ref 4.5–8)
PLATELET # BLD AUTO: 222 THOUSANDS/UL (ref 149–390)
PMV BLD AUTO: 10.5 FL (ref 8.9–12.7)
POTASSIUM SERPL-SCNC: 4 MMOL/L (ref 3.5–5.3)
PROT SERPL-MCNC: 7.5 G/DL (ref 6.4–8.2)
PROT UR STRIP-MCNC: ABNORMAL MG/DL
PROTHROMBIN TIME: 13.3 SECONDS (ref 11.8–14.2)
QRS AXIS: 73 DEGREES
QRSD INTERVAL: 98 MS
QT INTERVAL: 422 MS
QTC INTERVAL: 417 MS
RBC # BLD AUTO: 4.87 MILLION/UL (ref 3.88–5.62)
RBC #/AREA URNS AUTO: ABNORMAL /HPF
SODIUM SERPL-SCNC: 139 MMOL/L (ref 136–145)
SP GR UR STRIP.AUTO: 1.02 (ref 1–1.03)
T WAVE AXIS: 26 DEGREES
TSH SERPL DL<=0.05 MIU/L-ACNC: 1.45 UIU/ML (ref 0.36–3.74)
UROBILINOGEN UR QL STRIP.AUTO: 0.2 E.U./DL
VENTRICULAR RATE: 59 BPM
WBC # BLD AUTO: 7.14 THOUSAND/UL (ref 4.31–10.16)
WBC #/AREA URNS AUTO: ABNORMAL /HPF

## 2019-01-24 PROCEDURE — 83605 ASSAY OF LACTIC ACID: CPT | Performed by: PHYSICIAN ASSISTANT

## 2019-01-24 PROCEDURE — 93005 ELECTROCARDIOGRAM TRACING: CPT

## 2019-01-24 PROCEDURE — 80053 COMPREHEN METABOLIC PANEL: CPT | Performed by: PHYSICIAN ASSISTANT

## 2019-01-24 PROCEDURE — 85730 THROMBOPLASTIN TIME PARTIAL: CPT | Performed by: PHYSICIAN ASSISTANT

## 2019-01-24 PROCEDURE — 96361 HYDRATE IV INFUSION ADD-ON: CPT

## 2019-01-24 PROCEDURE — 93010 ELECTROCARDIOGRAM REPORT: CPT | Performed by: INTERNAL MEDICINE

## 2019-01-24 PROCEDURE — 99284 EMERGENCY DEPT VISIT MOD MDM: CPT

## 2019-01-24 PROCEDURE — 83690 ASSAY OF LIPASE: CPT | Performed by: PHYSICIAN ASSISTANT

## 2019-01-24 PROCEDURE — 81001 URINALYSIS AUTO W/SCOPE: CPT

## 2019-01-24 PROCEDURE — 85025 COMPLETE CBC W/AUTO DIFF WBC: CPT | Performed by: PHYSICIAN ASSISTANT

## 2019-01-24 PROCEDURE — 74176 CT ABD & PELVIS W/O CONTRAST: CPT

## 2019-01-24 PROCEDURE — 85610 PROTHROMBIN TIME: CPT | Performed by: PHYSICIAN ASSISTANT

## 2019-01-24 PROCEDURE — 96374 THER/PROPH/DIAG INJ IV PUSH: CPT

## 2019-01-24 PROCEDURE — 84443 ASSAY THYROID STIM HORMONE: CPT | Performed by: PHYSICIAN ASSISTANT

## 2019-01-24 PROCEDURE — 36415 COLL VENOUS BLD VENIPUNCTURE: CPT | Performed by: PHYSICIAN ASSISTANT

## 2019-01-24 RX ORDER — ONDANSETRON 2 MG/ML
4 INJECTION INTRAMUSCULAR; INTRAVENOUS ONCE
Status: COMPLETED | OUTPATIENT
Start: 2019-01-24 | End: 2019-01-24

## 2019-01-24 RX ADMIN — SODIUM CHLORIDE 1000 ML: 0.9 INJECTION, SOLUTION INTRAVENOUS at 09:34

## 2019-01-24 RX ADMIN — ONDANSETRON 4 MG: 2 INJECTION INTRAMUSCULAR; INTRAVENOUS at 09:34

## 2019-01-24 NOTE — DISCHARGE INSTRUCTIONS

## 2019-01-24 NOTE — TELEPHONE ENCOUNTER
Call re u/s fatty liver  No gallstones  Procedure: Ct Abdomen Pelvis Wo Contrast    Result Date: 1/24/2019  Narrative: CT ABDOMEN AND PELVIS WITHOUT IV CONTRAST INDICATION: 79year old man presenting with epigastric abdominal pain and left flank pain  COMPARISON:  Abdominal CT 6/21/2012  TECHNIQUE:  CT examination of the abdomen and pelvis was performed without intravenous contrast   Axial, sagittal, and coronal 2D reformatted images were created from the source data and submitted for interpretation  Radiation dose length product (DLP) for this visit:  1101 mGy-cm   This examination, like all CT scans performed in the [facility], was performed utilizing techniques to minimize radiation dose exposure, including the use of iterative reconstruction and automated exposure control  Enteric contrast was not administered  FINDINGS: ABDOMEN LOWER CHEST:  No clinically significant abnormality identified in the visualized lower chest  LIVER/BILIARY TREE:  Liver is diffusely decreased in density consistent with fatty change  No CT evidence of suspicious hepatic mass  Normal hepatic contours  No biliary dilatation  GALLBLADDER:  No calcified gallstones  No pericholecystic inflammatory change  SPLEEN:  Unremarkable  PANCREAS:  Unremarkable  ADRENAL GLANDS:  Unremarkable  KIDNEYS/URETERS:  Unremarkable  No hydronephrosis  STOMACH AND BOWEL:  Perigastric surgical clips are noted  Sigmoid anastomosis is noted  Colonic diverticulosis without diverticulitis  APPENDIX:  No findings to suggest appendicitis  ABDOMINOPELVIC CAVITY:  No ascites or free intraperitoneal air  No lymphadenopathy  VESSELS:  Atherosclerotic changes are present  No evidence of aneurysm  PELVIS REPRODUCTIVE ORGANS:  Unremarkable for patient's age  URINARY BLADDER:  Nondistended  ABDOMINAL WALL/INGUINAL REGIONS:  Prior ventral hernia repair  Loops of small bowel are directly opposed to the hernia mesh which may indicated adhesions    This is unchanged since 2012  OSSEOUS STRUCTURES:  No acute fracture or destructive osseous lesion  Impression: 1  No acute plantar process in the abdomen or pelvis  2   Hepatic steatosis  Workstation performed: [id number]     Procedure: Xr Abdomen Obstruction Series    Result Date: 1/2/2019  Narrative: OBSTRUCTION SERIES INDICATION:   R10 13: Epigastric pain  Abdominal bloating diarrhea COMPARISON:  2/25/2008 EXAM PERFORMED/VIEWS:  XR ABDOMEN OBSTRUCTION SERIES FINDINGS: There is a nonobstructive bowel gas pattern  No free air beneath the hemidiaphragms  Several air-fluid levels within nondistended colon identified, which will be compatible with the provided clinical history of diarrhea  There are multiple surgical clips present in the left upper abdomen  Osseous structures are unremarkable  Examination of the chest reveals a normal cardiomediastinal silhouette  Lungs are clear  Impression: 1  No evidence of small bowel obstruction or free intraperitoneal air 2  Several air-fluid levels within nondistended colon, felt to correlate with the provided clinical history of diarrhea 3  Clear lungs Workstation performed: [id number]     Procedure: Us Abdomen Complete    Result Date: 1/23/2019  Narrative: ABDOMEN ULTRASOUND, COMPLETE INDICATION:   R10 84: Generalized abdominal pain  COMPARISON: January 14, 2014 TECHNIQUE:   Real-time ultrasound of the abdomen was performed with a curvilinear transducer with both volumetric sweeps and still imaging techniques  FINDINGS: PANCREAS: Pancreatic tail obscured by overlying bowel gas  Visualized portions of the pancreas are within normal limits  AORTA AND IVC:  Visualized portions are normal for patient age  LIVER: Size:  Top normal   The liver measures 17 4 cm in the midclavicular line  Contour:  Surface contour is smooth  Parenchyma:  Increased parenchymal echogenicity with diminished through transmission No evidence of suspicious mass   Limited imaging of the main portal vein shows it to be patent and hepatopetal  BILIARY: The gallbladder is normal in caliber  No wall thickening or pericholecystic fluid  No stones or sludge identified  No sonographic Metzger's sign  No intrahepatic biliary dilatation  CBD measures 4 mm  No choledocholithiasis  KIDNEY: Right kidney measures 13 1 x 5 8 cm  Within normal limits  Left kidney measures 12 5 x 5 2 cm  Within normal limits  SPLEEN: Measures 10 3 x 9 7 x 4 0 cm  Within normal limits  ASCITES:  None  Impression: Pancreatic tail obscured by overlying bowel gas  Top normal sized fatty liver  Unremarkable gallbladder  Unremarkable biliary ducts  Unremarkable kidneys and spleen   Workstation performed: [id number]

## 2019-01-24 NOTE — ED PROVIDER NOTES
History  Chief Complaint   Patient presents with    Flank Pain     pt c/o left flank pain that radiates to left side of abd  pt has hx of kidney stones and pancreatitis; states pain is similar to both  worsening since thanksgiving; advised to come to ED multiple times since   Abdominal Pain     This patient presents emergency room with a 32 month history of tenderness in his epigastric area  He complains of associated nausea but no active vomiting  He states that he does not vomit because of his previous Randall fundoplication  He states the pain is worse with eating at times  He went for a gallbladder ultrasound yesterday but is unaware of the results  He also complains of a 2 day history of sharp intermittent left flank pain that radiates to his groin  He denies any testicle pain  He denies any dysuria, hematuria, urgency, frequency  He denies any fever chills  He states that recently over the past month he had a three-week history of diarrhea  He had stool cultures that were normal   That has since resolved  He does move his bowels daily  He has a history of having a previous bowel resection, sigmoid colectomy, for diverticulitis  He also had an appendectomy in the past   His past medical history is positive for pancreatitis, diverticulitis/diverticulosis, asthma, per bursitis, GERD, Colon polyps, malignant hypertension, plantar fasciitis, seasonal allergies, impingement syndrome of the shoulder, stroke  Past surgical history is include a carotid endarterectomy, sigmoid colectomy, appendectomy, hernia repair-ventral, Randall fundoplication, sinus surgeries  Differential diagnosis includes but is  not limited to pancreatitis, kidney stone, gastritis, esophagitis, colitis, diverticulitis, pyelonephritis, urinary tract infection, bowel obstruction          History provided by:  Patient  Flank Pain   Pain location:  Epigastric and L flank  Pain quality: aching    Pain radiates to:  Groin  Pain severity:  Mild  Onset quality:  Gradual  Duration:  10 weeks  Timing:  Intermittent  Progression:  Waxing and waning  Chronicity:  New  Context: diet changes and previous surgery    Context: not alcohol use, not awakening from sleep, not eating, not laxative use, not medication withdrawal, not recent illness, not recent sexual activity, not recent travel, not retching, not sick contacts, not suspicious food intake and not trauma    Relieved by:  None tried  Worsened by:  Palpation and movement  Ineffective treatments:  None tried  Associated symptoms: anorexia, nausea, sore throat and vomiting    Associated symptoms: no belching, no chest pain, no chills, no constipation, no cough, no diarrhea, no dysuria, no fatigue, no fever, no flatus, no hematemesis, no hematochezia, no hematuria and no shortness of breath    Risk factors: no alcohol abuse, no aspirin use, not elderly, has not had multiple surgeries, no NSAID use, not obese, not pregnant and no recent hospitalization    Abdominal Pain   Pain location:  Epigastric and L flank  Pain quality: aching    Pain radiates to:  Groin  Pain severity:  Moderate  Onset quality:  Sudden  Duration:  2 days  Timing:  Intermittent  Progression:  Waxing and waning  Associated symptoms: anorexia, nausea, sore throat and vomiting    Associated symptoms: no belching, no chest pain, no chills, no constipation, no cough, no diarrhea, no dysuria, no fatigue, no fever, no flatus, no hematemesis, no hematochezia, no hematuria and no shortness of breath        Prior to Admission Medications   Prescriptions Last Dose Informant Patient Reported? Taking?    TURMERIC PO  Self Yes Yes   Sig: Take 1 Dose by mouth daily   aspirin (ECOTRIN LOW STRENGTH) 81 mg EC tablet  Self Yes Yes   Sig: Take 81 mg by mouth daily   cholestyramine sugar free (QUESTRAN LIGHT) 4 g packet   No Yes   Sig: Take 1 packet (4 g total) by mouth daily for 30 days   co-enzyme Q-10 30 MG capsule  Self Yes Yes   Sig: Take 30 mg by mouth daily   dicyclomine (BENTYL) 10 mg capsule   No Yes   Sig: Take 1 capsule (10 mg total) by mouth 4 (four) times a day (before meals and at bedtime) for 30 days   fluticasone (FLONASE) 50 mcg/act nasal spray  Self Yes Yes   Si spray into each nostril daily   glucosamine 500 MG CAPS capsule  Self Yes Yes   Sig: Take 500 mg by mouth daily   hydrochlorothiazide (HYDRODIURIL) 25 mg tablet   No Yes   Sig: Take 1 tablet (25 mg total) by mouth daily for 90 days   levalbuterol (XOPENEX) 1 25 mg/3 mL nebulizer solution  Self Yes Yes   Sig: Take 1 vial by nebulization every 8 (eight) hours as needed   multivitamin (THERAGRAN) TABS  Self Yes Yes   Sig: Take 1 tablet by mouth daily   ondansetron (ZOFRAN-ODT) 4 mg disintegrating tablet  Self No Yes   Sig: Take 1 tablet (4 mg total) by mouth every 6 (six) hours as needed for nausea or vomiting      Facility-Administered Medications: None       Past Medical History:   Diagnosis Date    Allergic reaction     LAST ASSESSED: [id number]    Asthma     Bursitis of heel     LAST ASSESSED: [id number]    Derangement of meniscus of left knee due to old injury     LAST ASSESSED: [id number]    Diverticulitis of colon     LAST ASSESSED: [id number]    Dizziness     LAST ASSESSED: [id number]    GERD (gastroesophageal reflux disease)     History of colon polyps     Hypertension     Malignant hypertension     LAST ASSESSED: [id number]    Plantar fasciitis     Seasonal allergies     Shoulder impingement     LAST ASSESSED: [id number]    Stroke ([facility] [address] )        Past Surgical History:   Procedure Laterality Date    APPENDECTOMY      CAROTID ENDARTARECTOMY      COLECTOMY Left     PARTIAL - SIGMOID; HEMICOLECTOMY FOR DIVERTICULITIS; ONSET:     COLON SURGERY      COLONOSCOPY N/A 2016    Procedure: COLONOSCOPY;  Surgeon: Marian Todd MD;  [facility];   Service:     HERNIA REPAIR      VENTRAL    NISSEN FUNDOPLICATION      ESOPHAGOGASTRIC FUNDOPLASTY LAST ASSESSED: [id number]    SINUS SURGERY      THROMBOENDARTERECTOMY      CAROTID; ONSET; MAY 2012       Family History   Problem Relation Age of Onset    Cancer Paternal Grandfather     Hypertension Mother     Hyperlipidemia Mother     Hypertension Father     Hyperlipidemia Father      I have reviewed and agree with the history as documented  Social History   Substance Use Topics    Smoking status: Former Smoker    Smokeless tobacco: Never Used    Alcohol use No        Review of Systems   Constitutional: Positive for activity change and appetite change  Negative for chills, diaphoresis, fatigue and fever  HENT: Positive for sore throat  Negative for congestion, dental problem, ear discharge, ear pain, mouth sores, postnasal drip, rhinorrhea and trouble swallowing  Eyes: Negative for pain, discharge, redness and itching  Respiratory: Negative for cough, chest tightness, shortness of breath and wheezing  Cardiovascular: Negative for chest pain and palpitations  Gastrointestinal: Positive for abdominal pain, anorexia, nausea and vomiting  Negative for constipation, diarrhea, flatus, hematemesis and hematochezia  Endocrine: Negative for cold intolerance, heat intolerance, polydipsia, polyphagia and polyuria  Genitourinary: Positive for flank pain  Negative for dysuria, frequency, hematuria and urgency  Musculoskeletal: Positive for back pain  Negative for myalgias, neck pain and neck stiffness  Skin: Negative for color change, pallor and rash  Neurological: Negative for weakness  Hematological: Negative for adenopathy  Psychiatric/Behavioral: Negative for confusion  All other systems reviewed and are negative  Physical Exam  Physical Exam   Constitutional: He is oriented to person, place, and time  He appears well-developed and well-nourished  No distress  HENT:   Head: Normocephalic and atraumatic     Right Ear: External ear normal    Left Ear: External ear normal    Nose: Nose normal    Eyes: Conjunctivae are normal  Right eye exhibits no discharge  Left eye exhibits no discharge  Neck: Neck supple  No JVD present  No tracheal deviation present  No thyromegaly present  Cardiovascular: Normal rate, regular rhythm and normal heart sounds  Exam reveals no gallop and no friction rub  No murmur heard  Pulmonary/Chest: Effort normal and breath sounds normal  No stridor  No respiratory distress  He has no wheezes  He has no rales  Abdominal: Soft  There is tenderness  There is guarding  There is no rebound  epigastric area   Musculoskeletal: He exhibits edema  Lymphadenopathy:     He has no cervical adenopathy  Neurological: He is alert and oriented to person, place, and time  Skin: Skin is warm  Capillary refill takes less than 2 seconds  He is not diaphoretic  Psychiatric: He has a normal mood and affect  His behavior is normal  Judgment and thought content normal    Nursing note and vitals reviewed        Vital Signs  ED Triage Vitals [01/24/19 0845]   Temperature Pulse Respirations Blood Pressure SpO2   97 8 °F (36 6 °C) 65 18 (!) 184/84 96 %      Temp Source Heart Rate Source Patient Position - Orthostatic VS BP Location FiO2 (%)   Oral Monitor Sitting Right arm --      Pain Score       --           Vitals:    01/24/19 0845 01/24/19 0945   BP: (!) 184/84 163/98   Pulse: 65 56   Patient Position - Orthostatic VS: Sitting Sitting       Visual Acuity      ED Medications  Medications   sodium chloride 0 9 % bolus 1,000 mL (0 mL Intravenous Stopped 1/24/19 1105)   ondansetron (ZOFRAN) injection 4 mg (4 mg Intravenous Given 1/24/19 0934)       Diagnostic Studies  Results Reviewed     Procedure Component Value Units Date/Time    Urine Microscopic [[id number]]  (Abnormal) Collected:  01/24/19 1005    Lab Status:  Final result Specimen:  Urine from Urine, Clean Catch Updated:  01/24/19 1043     RBC, UA None Seen /hpf      WBC, UA 0-1 (A) /hpf      Epithelial Cells None Seen /hpf      Bacteria, UA Occasional /hpf     ED Urine Macroscopic [[id number]]  (Abnormal) Collected:  01/24/19 1005    Lab Status:  Final result Specimen:  Urine Updated:  01/24/19 1004     Color, UA Yellow     Clarity, UA Clear     pH, UA 5 5     Leukocytes, UA Negative     Nitrite, UA Negative     Protein, UA 30 (1+) (A) mg/dl      Glucose, UA Negative mg/dl      Ketones, UA Negative mg/dl      Urobilinogen, UA 0 2 E U /dl      Bilirubin, UA Negative     Blood, UA Negative     Specific Gravity, UA 1 025    Narrative:       CLINITEK RESULT    Lactic acid, plasma [[id number]]  (Normal) Collected:  01/24/19 0927    Lab Status:  Final result Specimen:  Blood from Arm, Right Updated:  01/24/19 1002     LACTIC ACID 1 2 mmol/L     Narrative:         Result may be elevated if tourniquet was used during collection  TSH [[id number]]  (Normal) Collected:  01/24/19 0927    Lab Status:  Final result Specimen:  Blood from Arm, Right Updated:  01/24/19 1000     TSH 3RD GENERATON 1 446 uIU/mL     Narrative:         Patients undergoing fluorescein dye angiography may retain small amounts of fluorescein in the body for 48-72 hours post procedure  Samples containing fluorescein can produce falsely depressed TSH values  If the patient had this procedure,a specimen should be resubmitted post fluorescein clearance      Lipase [[id number]]  (Normal) Collected:  01/24/19 0927    Lab Status:  Final result Specimen:  Blood from Arm, Right Updated:  01/24/19 1000     Lipase 107 u/L     Comprehensive metabolic panel [[id number]] Collected:  01/24/19 0927    Lab Status:  Final result Specimen:  Blood from Arm, Right Updated:  01/24/19 0956     Sodium 139 mmol/L      Potassium 4 0 mmol/L      Chloride 103 mmol/L      CO2 25 mmol/L      ANION GAP 11 mmol/L      BUN 13 mg/dL      Creatinine 1 13 mg/dL      Glucose 118 mg/dL      Calcium 9 1 mg/dL      AST 29 U/L      ALT 41 U/L      Alkaline Phosphatase 59 U/L      Total Protein 7 5 g/dL Albumin 3 7 g/dL      Total Bilirubin 0 60 mg/dL      eGFR 67 ml/min/1 73sq m     Narrative:         National Kidney Disease Education Program recommendations are as follows:  GFR calculation is accurate only with a steady state creatinine  Chronic Kidney disease less than 60 ml/min/1 73 sq  meters  Kidney failure less than 15 ml/min/1 73 sq  meters  Protime-INR [[id number]]  (Normal) Collected:  01/24/19 0927    Lab Status:  Final result Specimen:  Blood from Arm, Right Updated:  01/24/19 0945     Protime 13 3 seconds      INR 1 04    APTT [[id number]]  (Normal) Collected:  01/24/19 0927    Lab Status:  Final result Specimen:  Blood from Arm, Right Updated:  01/24/19 0945     PTT 28 seconds     CBC and differential [[id number]]  (Abnormal) Collected:  01/24/19 0927    Lab Status:  Final result Specimen:  Blood from Arm, Right Updated:  01/24/19 0941     WBC 7 14 Thousand/uL      RBC 4 87 Million/uL      Hemoglobin 15 1 g/dL      Hematocrit 45 0 %      MCV 92 fL      MCH 31 0 pg      MCHC 33 6 g/dL      RDW 13 2 %      MPV 10 5 fL      Platelets 227 Thousands/uL      nRBC 0 /100 WBCs      Neutrophils Relative 58 %      Immat GRANS % 0 %      Lymphocytes Relative 22 %      Monocytes Relative 7 %      Eosinophils Relative 12 (H) %      Basophils Relative 1 %      Neutrophils Absolute 4 23 Thousands/µL      Immature Grans Absolute 0 01 Thousand/uL      Lymphocytes Absolute 1 54 Thousands/µL      Monocytes Absolute 0 49 Thousand/µL      Eosinophils Absolute 0 82 (H) Thousand/µL      Basophils Absolute 0 05 Thousands/µL                  CT abdomen pelvis wo contrast   ED Interpretation by Haseeb Loya PA-C (01/24 1037)   No acute process in the abdomen or pelvis  Final Result by Sivakumar Carvajal MD (01/24 1024)   1  No acute plantar process in the abdomen or pelvis  2   Hepatic steatosis           Workstation performed: [id number]                    Procedures  ECG 12 Lead Documentation  Date/Time: 1/24/2019 9:33 AM  Performed by: Sury Santos  Authorized by: Sury Santos     Indications / Diagnosis:  Epigstric pain  ECG reviewed by me, the ED Provider: yes    Patient location:  ED  Previous ECG:     Previous ECG:  Compared to current    Comparison ECG info:  12/10/15    Similarity:  No change    Comparison to cardiac monitor: Yes    Interpretation:     Interpretation: abnormal    Rate:     ECG rate:  59    ECG rate assessment: bradycardic    Rhythm:     Rhythm: sinus bradycardia      Rhythm comment:  Sionus arrythmia  Ectopy:     Ectopy: none    QRS:     QRS axis:  Normal  Conduction:     Conduction: normal    ST segments:     ST segments:  Normal  T waves:     T waves: normal             Phone Contacts  ED Phone Contact    ED Course  ED Course as of Jan 24 1705   Thu Jan 24, 2019   1103 I reviewed the results with the patient and his wife  Had ultrasound that he had taken yesterday at [facility] was normal   All of his laboratory studies were reviewed and were within normal limits  His CT scan did not demonstrate any acute process  He was discharged home and was instructed to follow up with his family doctor  He may need follow-up with Gastroenterology if his symptoms persist                                 MDM  Number of Diagnoses or Management Options  Abdominal pain: new and requires workup     Amount and/or Complexity of Data Reviewed  Clinical lab tests: ordered and reviewed  Tests in the radiology section of CPT®: ordered and reviewed  Tests in the medicine section of CPT®: ordered and reviewed  Review and summarize past medical records: (US 1/23/19-tail of the pancreas was obscured by gas    There is no acute evidence of cholecystitis or cholelithiasis )    Risk of Complications, Morbidity, and/or Mortality  Presenting problems: high  Diagnostic procedures: high  Management options: high  General comments: Patient presents emergency room with the a long history of abdominal pain   He was seen and evaluated  CT scan of the abdomen and pelvis were performed and were within normal limits  I reviewed his ultrasound from yesterday of the gallbladder which was also normal   His laboratory tests were reviewed were also within normal limits  He was discharged home and was instructed to follow up with his gastroenterologist for further evaluation  It has been a long time since he has had a colonoscopy  He will call and try to set up the repeat exam   In the meantime he will see his family physician    Patient Progress  Patient progress: stable    CritCare Time    Disposition  Final diagnoses:   Abdominal pain     Time reflects when diagnosis was documented in both MDM as applicable and the Disposition within this note     Time User Action Codes Description Comment    1/24/2019 10:37 AM Sampson Andrade Anastasia [R10 9] Abdominal pain       ED Disposition     ED Disposition Condition Comment    Discharge  Jayson Ruggiero discharge to home/self care      Condition at discharge: Good        Follow-up Information     Follow up With Specialties Details Why Contact Info    Richie Deng MD Family Medicine In 2 days  [address] [place] [address]  [phone]            Discharge Medication List as of 1/24/2019 10:40 AM      CONTINUE these medications which have NOT CHANGED    Details   aspirin (ECOTRIN LOW STRENGTH) 81 mg EC tablet Take 81 mg by mouth daily, Historical Med      cholestyramine sugar free (QUESTRAN LIGHT) 4 g packet Take 1 packet (4 g total) by mouth daily for 30 days, Starting Fri 1/18/2019, Until Sun 2/17/2019, Normal      co-enzyme Q-10 30 MG capsule Take 30 mg by mouth daily, Historical Med      dicyclomine (BENTYL) 10 mg capsule Take 1 capsule (10 mg total) by mouth 4 (four) times a day (before meals and at bedtime) for 30 days, Starting Fri 1/18/2019, Until Sun 2/17/2019, Normal      fluticasone (FLONASE) 50 mcg/act nasal spray 1 spray into each nostril daily, Historical Med      glucosamine 500 MG CAPS capsule Take 500 mg by mouth daily, Historical Med      hydrochlorothiazide (HYDRODIURIL) 25 mg tablet Take 1 tablet (25 mg total) by mouth daily for 90 days, Starting Fri 1/18/2019, Until Thu 4/18/2019, Normal      levalbuterol (XOPENEX) 1 25 mg/3 mL nebulizer solution Take 1 vial by nebulization every 8 (eight) hours as needed, Starting Fri 12/7/2018, Historical Med      multivitamin (THERAGRAN) TABS Take 1 tablet by mouth daily, Historical Med      ondansetron (ZOFRAN-ODT) 4 mg disintegrating tablet Take 1 tablet (4 mg total) by mouth every 6 (six) hours as needed for nausea or vomiting, Starting Wed 1/2/2019, Normal      TURMERIC PO Take 1 Dose by mouth daily, Historical Med           No discharge procedures on file      ED Provider  Electronically Signed by           Marga Alvarez PA-C  01/24/19 6342

## 2019-01-24 NOTE — TELEPHONE ENCOUNTER
Patient called and I gave message  He wanted to let you know he went to the ER today and would like for you to review ER visit  He stated he is home now  Please advise

## 2019-02-11 ENCOUNTER — OFFICE VISIT (OUTPATIENT)
Dept: FAMILY MEDICINE CLINIC | Facility: CLINIC | Age: 69
End: 2019-02-11
Payer: COMMERCIAL

## 2019-02-11 VITALS
BODY MASS INDEX: 37.89 KG/M2 | WEIGHT: 250 LBS | HEART RATE: 76 BPM | RESPIRATION RATE: 16 BRPM | DIASTOLIC BLOOD PRESSURE: 82 MMHG | HEIGHT: 68 IN | SYSTOLIC BLOOD PRESSURE: 170 MMHG | TEMPERATURE: 96.8 F | OXYGEN SATURATION: 98 %

## 2019-02-11 DIAGNOSIS — J45.21 MILD INTERMITTENT ASTHMA WITH ACUTE EXACERBATION: ICD-10-CM

## 2019-02-11 DIAGNOSIS — J01.00 ACUTE NON-RECURRENT MAXILLARY SINUSITIS: Primary | ICD-10-CM

## 2019-02-11 PROCEDURE — 99213 OFFICE O/P EST LOW 20 MIN: CPT | Performed by: NURSE PRACTITIONER

## 2019-02-11 RX ORDER — DEXTROMETHORPHAN HYDROBROMIDE AND PROMETHAZINE HYDROCHLORIDE 15; 6.25 MG/5ML; MG/5ML
5 SYRUP ORAL EVERY 6 HOURS PRN
Qty: 118 ML | Refills: 0 | Status: SHIPPED | OUTPATIENT
Start: 2019-02-11 | End: 2019-03-05 | Stop reason: SDUPTHER

## 2019-02-11 RX ORDER — AZITHROMYCIN 250 MG/1
500 TABLET, FILM COATED ORAL EVERY 24 HOURS
Qty: 10 TABLET | Refills: 0 | Status: SHIPPED | OUTPATIENT
Start: 2019-02-11 | End: 2019-02-16

## 2019-02-11 RX ORDER — FLUTICASONE PROPIONATE AND SALMETEROL XINAFOATE 230; 21 UG/1; UG/1
AEROSOL, METERED RESPIRATORY (INHALATION)
Qty: 12 INHALER | Refills: 0 | OUTPATIENT
Start: 2019-02-11

## 2019-02-11 NOTE — PROGRESS NOTES
Assessment/Plan:     Diagnoses and all orders for this visit:    Acute non-recurrent maxillary sinusitis  -     azithromycin (ZITHROMAX) 250 mg tablet; Take 2 tablets (500 mg total) by mouth every 24 hours for 5 days  -     promethazine-dextromethorphan (PHENERGAN-DM) 6 25-15 mg/5 mL oral syrup; Take 5 mL by mouth every 6 (six) hours as needed for cough    Discussed with patient plan to treat with azithromycin and promethazine DM for cough and congestion management  Patient instructed to call if no improvement in 72 hours or symptoms worsen      Subjective:      Patient ID: Elvis Malone is a 79 y o  male  68 y o male presenting with nasal congestion and cough for the past two weeks  He denies fevers, chills or generalized body aches being associated with other symptoms  He as taken multiple OTC cold medication  He reports that he generally does not feel well         Family History   Problem Relation Age of Onset    Cancer Paternal Grandfather     Hypertension Mother     Hyperlipidemia Mother     Hypertension Father     Hyperlipidemia Father      Social History     Socioeconomic History    Marital status: /Civil Union     Spouse name: Not on file    Number of children: Not on file    Years of education: Not on file    Highest education level: Not on file   Occupational History    Not on file   Social Needs    Financial resource strain: Not on file    Food insecurity:     Worry: Not on file     Inability: Not on file    Transportation needs:     Medical: Not on file     Non-medical: Not on file   Tobacco Use    Smoking status: Former Smoker    Smokeless tobacco: Never Used   Substance and Sexual Activity    Alcohol use: No    Drug use: No    Sexual activity: Not on file   Lifestyle    Physical activity:     Days per week: Not on file     Minutes per session: Not on file    Stress: Not on file   Relationships    Social connections:     Talks on phone: Not on file     Gets together: Not on file     Attends Taoist service: Not on file     Active member of club or organization: Not on file     Attends meetings of clubs or organizations: Not on file     Relationship status: Not on file    Intimate partner violence:     Fear of current or ex partner: Not on file     Emotionally abused: Not on file     Physically abused: Not on file     Forced sexual activity: Not on file   Other Topics Concern    Not on file   Social History Narrative    Not on file     Past Medical History:   Diagnosis Date    Allergic reaction     LAST ASSESSED: [id number]    Asthma     Bursitis of heel     LAST ASSESSED: [id number]    Derangement of meniscus of left knee due to old injury     LAST ASSESSED: [id number]    Diverticulitis of colon     LAST ASSESSED: [id number]    Dizziness     LAST ASSESSED: [id number]    GERD (gastroesophageal reflux disease)     History of colon polyps     Hypertension     Malignant hypertension     LAST ASSESSED: [id number]    Plantar fasciitis     Seasonal allergies     Shoulder impingement     LAST ASSESSED: [id number]    Stroke [facility])      Past Surgical History:   Procedure Laterality Date    APPENDECTOMY      CAROTID ENDARTARECTOMY      COLECTOMY Left     PARTIAL - SIGMOID; HEMICOLECTOMY FOR DIVERTICULITIS; ONSET: 1987    COLON SURGERY      COLONOSCOPY N/A 12/9/2016    Procedure: COLONOSCOPY;  Surgeon: Oral Torres MD;  [facility];   Service:     HERNIA REPAIR      VENTRAL    NISSEN FUNDOPLICATION      ESOPHAGOGASTRIC FUNDOPLASTY LAST ASSESSED: [id number]    SINUS SURGERY      THROMBOENDARTERECTOMY      CAROTID; ONSET; MAY 2012     Allergies   Allergen Reactions    Depo-Medrol [Methylprednisolone] Anaphylaxis     Severe Vagal Reaction    Iv Contrast [Iodinated Diagnostic Agents] Angioedema     Pt states itchy/swollen eyes, trouble breathing (years ago)    Sulfa Antibiotics        Current Outpatient Medications:     cholestyramine sugar free (QUESTRAN LIGHT) 4 g packet, Take 1 packet (4 g total) by mouth daily for 30 days, Disp: 30 packet, Rfl: 5    co-enzyme Q-10 30 MG capsule, Take 30 mg by mouth daily, Disp: , Rfl:     dicyclomine (BENTYL) 10 mg capsule, Take 1 capsule (10 mg total) by mouth 4 (four) times a day (before meals and at bedtime) for 30 days, Disp: 120 capsule, Rfl: 5    fluticasone (FLONASE) 50 mcg/act nasal spray, 1 spray into each nostril daily, Disp: , Rfl:     glucosamine 500 MG CAPS capsule, Take 500 mg by mouth daily, Disp: , Rfl:     hydrochlorothiazide (HYDRODIURIL) 25 mg tablet, Take 1 tablet (25 mg total) by mouth daily for 90 days, Disp: 90 tablet, Rfl: 3    levalbuterol (XOPENEX) 1 25 mg/3 mL nebulizer solution, Take 1 vial by nebulization every 8 (eight) hours as needed, Disp: , Rfl: 3    multivitamin (THERAGRAN) TABS, Take 1 tablet by mouth daily, Disp: , Rfl:     ondansetron (ZOFRAN-ODT) 4 mg disintegrating tablet, Take 1 tablet (4 mg total) by mouth every 6 (six) hours as needed for nausea or vomiting, Disp: 20 tablet, Rfl: 0    TURMERIC PO, Take 1 Dose by mouth daily, Disp: , Rfl:     azithromycin (ZITHROMAX) 250 mg tablet, Take 2 tablets (500 mg total) by mouth every 24 hours for 5 days, Disp: 10 tablet, Rfl: 0    promethazine-dextromethorphan (PHENERGAN-DM) 6 25-15 mg/5 mL oral syrup, Take 5 mL by mouth every 6 (six) hours as needed for cough, Disp: 118 mL, Rfl: 0      Review of Systems   Constitutional: Negative  HENT: Positive for congestion  Eyes: Negative  Respiratory: Positive for cough  Cardiovascular: Negative  Musculoskeletal: Negative  Neurological: Negative  Psychiatric/Behavioral: Negative  Objective:    BP (!) 184/90   Pulse 76   Temp (!) 96 8 °F (36 °C)   Resp 16   Ht 5' 8" (1 727 m)   Wt 113 kg (250 lb)   SpO2 98%   BMI 38 01 kg/m² (Reviewed)     Physical Exam   Constitutional: He is oriented to person, place, and time   Vital signs are normal  He appears well-developed and well-nourished  HENT:   Head: Normocephalic and atraumatic  Right Ear: Tympanic membrane, external ear and ear canal normal    Left Ear: Tympanic membrane, external ear and ear canal normal    Nose: Mucosal edema and rhinorrhea present  Right sinus exhibits maxillary sinus tenderness  Left sinus exhibits maxillary sinus tenderness  Mouth/Throat: Uvula is midline and mucous membranes are normal  Posterior oropharyngeal erythema present  Eyes: Pupils are equal, round, and reactive to light  Conjunctivae, EOM and lids are normal    Neck: Trachea normal and normal range of motion  Cardiovascular: Normal rate, regular rhythm and normal heart sounds  Pulmonary/Chest: Effort normal  He has rhonchi in the right upper field, the right middle field, the left upper field and the left middle field  Neurological: He is alert and oriented to person, place, and time  No cranial nerve deficit  Skin: Skin is warm and dry  Psychiatric: He has a normal mood and affect   His behavior is normal

## 2019-03-05 ENCOUNTER — OFFICE VISIT (OUTPATIENT)
Dept: FAMILY MEDICINE CLINIC | Facility: CLINIC | Age: 69
End: 2019-03-05
Payer: COMMERCIAL

## 2019-03-05 VITALS
DIASTOLIC BLOOD PRESSURE: 90 MMHG | HEIGHT: 68 IN | HEART RATE: 66 BPM | TEMPERATURE: 98.9 F | WEIGHT: 254.4 LBS | BODY MASS INDEX: 38.55 KG/M2 | SYSTOLIC BLOOD PRESSURE: 168 MMHG

## 2019-03-05 DIAGNOSIS — J01.00 ACUTE NON-RECURRENT MAXILLARY SINUSITIS: ICD-10-CM

## 2019-03-05 DIAGNOSIS — J20.9 ACUTE BRONCHITIS, UNSPECIFIED ORGANISM: ICD-10-CM

## 2019-03-05 DIAGNOSIS — J01.00 ACUTE MAXILLARY SINUSITIS, RECURRENCE NOT SPECIFIED: Primary | ICD-10-CM

## 2019-03-05 PROCEDURE — 3008F BODY MASS INDEX DOCD: CPT | Performed by: NURSE PRACTITIONER

## 2019-03-05 PROCEDURE — 1036F TOBACCO NON-USER: CPT | Performed by: NURSE PRACTITIONER

## 2019-03-05 PROCEDURE — 1160F RVW MEDS BY RX/DR IN RCRD: CPT | Performed by: NURSE PRACTITIONER

## 2019-03-05 PROCEDURE — 99214 OFFICE O/P EST MOD 30 MIN: CPT | Performed by: NURSE PRACTITIONER

## 2019-03-05 RX ORDER — LEVOFLOXACIN 500 MG/1
500 TABLET, FILM COATED ORAL EVERY 24 HOURS
Qty: 10 TABLET | Refills: 0 | Status: SHIPPED | OUTPATIENT
Start: 2019-03-05 | End: 2019-03-15

## 2019-03-05 RX ORDER — ALBUTEROL SULFATE 90 UG/1
AEROSOL, METERED RESPIRATORY (INHALATION)
COMMUNITY
Start: 2019-03-04 | End: 2019-07-30 | Stop reason: SDUPTHER

## 2019-03-05 RX ORDER — DEXTROMETHORPHAN HYDROBROMIDE AND PROMETHAZINE HYDROCHLORIDE 15; 6.25 MG/5ML; MG/5ML
5 SYRUP ORAL EVERY 6 HOURS PRN
Qty: 118 ML | Refills: 0 | Status: CANCELLED | OUTPATIENT
Start: 2019-03-05

## 2019-03-05 RX ORDER — CHLORTHALIDONE 25 MG/1
25 TABLET ORAL DAILY
Refills: 3 | COMMUNITY
Start: 2019-02-11 | End: 2019-08-20 | Stop reason: HOSPADM

## 2019-03-05 RX ORDER — DEXTROMETHORPHAN HYDROBROMIDE AND PROMETHAZINE HYDROCHLORIDE 15; 6.25 MG/5ML; MG/5ML
5 SYRUP ORAL EVERY 6 HOURS PRN
Qty: 118 ML | Refills: 0 | Status: SHIPPED | OUTPATIENT
Start: 2019-03-05 | End: 2019-06-05 | Stop reason: ALTCHOICE

## 2019-03-05 NOTE — PROGRESS NOTES
Assessment/Plan:         Problem List Items Addressed This Visit     None      Visit Diagnoses     Acute maxillary sinusitis + acute bronchitis    -  Primary  --Rest, fluids, OTC expectorant, Rx cough suppressant (refilled), cool mist humidifier, antibiotic   --Continue inhaler PRN  --Call for no improvement/worsening next 3-5 days    Relevant Medications    albuterol (PROVENTIL HFA,VENTOLIN HFA) 90 mcg/act inhaler    levofloxacin (LEVAQUIN) 500 mg tablet QD x 10 days    promethazine-dextromethorphan (PHENERGAN-DM) 6 25-15 mg/5 mL oral syrup        Hypertension      --BP high today on meds, but he insists that he has white coat hypertension and this is normal for him  --Advise home monitoring, calling for readings consistently > 130/80         Subjective:      Patient ID: Jaymie Sender is a 79 y o  male  Here with complaints of persistent cough productive of yellow sputum, nasal/sinus congestion, yellow rhinorrhea x 6 weeks  Seen here a month ago  Placed on Z-pack which he completed  Got a little better, but symptoms have remained unchanged since then  No fever/chills, headache, body aches, sore throat  Appetite fair  No N/V/D, abdominal pain  Taking Chloracedin  No worsening of asthma including increased chest tightness, wheezing, dyspnea, CP  Uses albuterol every other day on average  Will be retiring from [employer] in 2 months  [facility] [place])  States his home blood pressure readings are "fine" and he always runs high in the office  The following portions of the patient's history were reviewed and updated as appropriate: current medications, past family history, past medical history, past social history, past surgical history and problem list     Review of Systems   Constitutional: Negative for fever  HENT: Positive for rhinorrhea  Negative for ear pain and sore throat  Eyes: Negative for discharge  Respiratory: Positive for cough      Cardiovascular: Negative for chest pain  Musculoskeletal: Negative for myalgias  Neurological: Positive for headaches  Objective:      /90 (BP Location: Left arm, Patient Position: Sitting, Cuff Size: Standard)   Pulse 66   Temp 98 9 °F (37 2 °C)   Ht 5' 8" (1 727 m)   Wt 115 kg (254 lb 6 4 oz)   BMI 38 68 kg/m²          Physical Exam   Constitutional: He is oriented to person, place, and time  He appears well-developed and well-nourished  HENT:   Head: Normocephalic and atraumatic  Right Ear: External ear normal    Left Ear: External ear normal    Mouth/Throat: Oropharynx is clear and moist    Turbinates swollen, erythematous  Bilateral maxillary sinus tenderness with mild overlying erythema (baseline, per patient)   Eyes: Pupils are equal, round, and reactive to light  Conjunctivae are normal    Neck: Normal range of motion  Neck supple  Cardiovascular: Normal rate, regular rhythm, normal heart sounds and intact distal pulses  Pulmonary/Chest: Effort normal and breath sounds normal    Abdominal: Soft  Bowel sounds are normal  There is no tenderness  Lymphadenopathy:     He has no cervical adenopathy  Neurological: He is alert and oriented to person, place, and time  He has normal reflexes  Skin: Skin is warm and dry  Psychiatric: He has a normal mood and affect

## 2019-03-05 NOTE — PATIENT INSTRUCTIONS
Acute Bronchitis   WHAT YOU NEED TO KNOW:   Acute bronchitis is swelling and irritation in the air passages of your lungs  This irritation may cause you to cough or have other breathing problems  Acute bronchitis often starts because of another illness, such as a cold or the flu  The illness spreads from your nose and throat to your windpipe and airways  Bronchitis is often called a chest cold  Acute bronchitis lasts about 3 to 6 weeks and is usually not a serious illness  Your cough can last for several weeks  DISCHARGE INSTRUCTIONS:   Return to the emergency department if:   · You cough up blood  · Your lips or fingernails turn blue  · You feel like you are not getting enough air when you breathe  Contact your healthcare provider if:   · You have a fever  · Your breathing problems do not go away or get worse  · Your cough does not get better within 4 weeks  · You have questions or concerns about your condition or care  Self-care:   · Get more rest   Rest helps your body to heal  Slowly start to do more each day  Rest when you feel it is needed  · Avoid irritants in the air  Avoid chemicals, fumes, and dust  Wear a face mask if you must work around dust or fumes  Stay inside on days when air pollution levels are high  If you have allergies, stay inside when pollen counts are high  Do not use aerosol products, such as spray-on deodorant, bug spray, and hair spray  · Do not smoke or be around others who smoke  Nicotine and other chemicals in cigarettes and cigars damages the cilia that move mucus out of your lungs  Ask your healthcare provider for information if you currently smoke and need help to quit  E-cigarettes or smokeless tobacco still contain nicotine  Talk to your healthcare provider before you use these products  · Drink liquids as directed  Liquids help keep your air passages moist and help you cough up mucus   You may need to drink more liquids when you have acute bronchitis  Ask how much liquid to drink each day and which liquids are best for you  · Use a humidifier or vaporizer  Use a cool mist humidifier or a vaporizer to increase air moisture in your home  This may make it easier for you to breathe and help decrease your cough  Decrease risk for acute bronchitis:   · Get the vaccinations you need  Ask your healthcare provider if you should get vaccinated against the flu or pneumonia  · Prevent the spread of germs  You can decrease your risk of acute bronchitis and other illnesses by doing the following:     [facility] your hands often with soap and water  Carry germ-killing hand lotion or gel with you  You can use the lotion or gel to clean your hands when soap and water are not available  ¨ Do not touch your eyes, nose, or mouth unless you have washed your hands first     ¨ Always cover your mouth when you cough to prevent the spread of germs  It is best to cough into a tissue or your shirt sleeve instead of into your hand  Ask those around you cover their mouths when they cough  ¨ Try to avoid people who have a cold or the flu  If you are sick, stay away from others as much as possible  Medicines: Your healthcare provider may  give you any of the following:  · Ibuprofen or acetaminophen  are medicines that help lower your fever  They are available without a doctor's order  Ask your healthcare provider which medicine is right for you  Ask how much to take and how often to take it  Follow directions  These medicines can cause stomach bleeding if not taken correctly  Ibuprofen can cause kidney damage  Do not take ibuprofen if you have kidney disease, an ulcer, or allergies to aspirin  Acetaminophen can cause liver damage  Do not take more than 4,000 milligrams in 24 hours  · Decongestants  help loosen mucus in your lungs and make it easier to cough up  This can help you breathe easier  · Cough suppressants  decrease your urge to cough   If your cough produces mucus, do not take a cough suppressant unless your healthcare provider tells you to  Your healthcare provider may suggest that you take a cough suppressant at night so you can rest     · Inhalers  may be given  Your healthcare provider may give you one or more inhalers to help you breathe easier and cough less  An inhaler gives your medicine to open your airways  Ask your healthcare provider to show you how to use your inhaler correctly  · Take your medicine as directed  Contact your healthcare provider if you think your medicine is not helping or if you have side effects  Tell him of her if you are allergic to any medicine  Keep a list of the medicines, vitamins, and herbs you take  Include the amounts, and when and why you take them  Bring the list or the pill bottles to follow-up visits  Carry your medicine list with you in case of an emergency  Follow up with your healthcare provider as directed:  Write down questions you have so you will remember to ask them during your follow-up visits  © 2017 [facility] Information is for End User's use only and may not be sold, redistributed or otherwise used for commercial purposes  All illustrations and images included in CareNotes® are the copyrighted property of [employer]  or Alex Shah  The above information is an  only  It is not intended as medical advice for individual conditions or treatments  Talk to your doctor, nurse or pharmacist before following any medical regimen to see if it is safe and effective for you

## 2019-03-11 ENCOUNTER — TELEPHONE (OUTPATIENT)
Dept: FAMILY MEDICINE CLINIC | Facility: CLINIC | Age: 69
End: 2019-03-11

## 2019-03-11 DIAGNOSIS — J20.9 ACUTE BRONCHITIS, UNSPECIFIED ORGANISM: Primary | ICD-10-CM

## 2019-03-11 RX ORDER — BENZONATATE 200 MG/1
200 CAPSULE ORAL 3 TIMES DAILY PRN
Qty: 20 CAPSULE | Refills: 0 | Status: SHIPPED | OUTPATIENT
Start: 2019-03-11 | End: 2019-03-29 | Stop reason: SDUPTHER

## 2019-03-11 NOTE — TELEPHONE ENCOUNTER
Patient called for refill on the benzonate perles  He said he is using the phenergan with codeine but not during the day because it makes him tired  He is also using his inhalers during the day but still wants the benzonate

## 2019-03-29 DIAGNOSIS — J20.9 ACUTE BRONCHITIS, UNSPECIFIED ORGANISM: ICD-10-CM

## 2019-03-29 RX ORDER — BENZONATATE 200 MG/1
200 CAPSULE ORAL 3 TIMES DAILY PRN
Qty: 20 CAPSULE | Refills: 0 | Status: SHIPPED | OUTPATIENT
Start: 2019-03-29 | End: 2019-06-05 | Stop reason: ALTCHOICE

## 2019-05-13 DIAGNOSIS — J45.21 MILD INTERMITTENT ASTHMA WITH ACUTE EXACERBATION: Primary | ICD-10-CM

## 2019-05-13 RX ORDER — LEVALBUTEROL INHALATION SOLUTION 1.25 MG/3ML
SOLUTION RESPIRATORY (INHALATION)
Qty: 72 VIAL | Refills: 3 | Status: SHIPPED | OUTPATIENT
Start: 2019-05-13 | End: 2020-03-04 | Stop reason: ALTCHOICE

## 2019-05-15 ENCOUNTER — OFFICE VISIT (OUTPATIENT)
Dept: FAMILY MEDICINE CLINIC | Facility: CLINIC | Age: 69
End: 2019-05-15
Payer: COMMERCIAL

## 2019-05-15 VITALS
TEMPERATURE: 99.4 F | DIASTOLIC BLOOD PRESSURE: 92 MMHG | RESPIRATION RATE: 16 BRPM | OXYGEN SATURATION: 96 % | HEART RATE: 68 BPM | WEIGHT: 255 LBS | BODY MASS INDEX: 38.65 KG/M2 | SYSTOLIC BLOOD PRESSURE: 158 MMHG | HEIGHT: 68 IN

## 2019-05-15 DIAGNOSIS — M10.9 ACUTE GOUTY ARTHRITIS: Primary | ICD-10-CM

## 2019-05-15 DIAGNOSIS — J06.9 ACUTE URI: ICD-10-CM

## 2019-05-15 DIAGNOSIS — S99.921A INJURY OF RIGHT FOOT, INITIAL ENCOUNTER: ICD-10-CM

## 2019-05-15 PROCEDURE — 1160F RVW MEDS BY RX/DR IN RCRD: CPT | Performed by: FAMILY MEDICINE

## 2019-05-15 PROCEDURE — 99214 OFFICE O/P EST MOD 30 MIN: CPT | Performed by: FAMILY MEDICINE

## 2019-05-15 RX ORDER — AZITHROMYCIN 250 MG/1
TABLET, FILM COATED ORAL
Qty: 6 TABLET | Refills: 0 | Status: SHIPPED | OUTPATIENT
Start: 2019-05-15 | End: 2019-05-19

## 2019-05-15 RX ORDER — PREDNISONE 20 MG/1
20 TABLET ORAL 2 TIMES DAILY WITH MEALS
Qty: 10 TABLET | Refills: 0 | Status: SHIPPED | OUTPATIENT
Start: 2019-05-15 | End: 2019-06-05 | Stop reason: ALTCHOICE

## 2019-05-16 ENCOUNTER — APPOINTMENT (OUTPATIENT)
Dept: RADIOLOGY | Facility: MEDICAL CENTER | Age: 69
End: 2019-05-16
Payer: COMMERCIAL

## 2019-05-16 ENCOUNTER — TELEPHONE (OUTPATIENT)
Dept: FAMILY MEDICINE CLINIC | Facility: CLINIC | Age: 69
End: 2019-05-16

## 2019-05-16 DIAGNOSIS — S99.921A INJURY OF RIGHT FOOT, INITIAL ENCOUNTER: ICD-10-CM

## 2019-05-16 PROCEDURE — 73630 X-RAY EXAM OF FOOT: CPT

## 2019-05-20 ENCOUNTER — TELEPHONE (OUTPATIENT)
Dept: FAMILY MEDICINE CLINIC | Facility: CLINIC | Age: 69
End: 2019-05-20

## 2019-05-21 DIAGNOSIS — R10.84 GENERALIZED ABDOMINAL PAIN: Primary | ICD-10-CM

## 2019-05-23 ENCOUNTER — APPOINTMENT (OUTPATIENT)
Dept: LAB | Facility: MEDICAL CENTER | Age: 69
End: 2019-05-23
Payer: COMMERCIAL

## 2019-05-23 ENCOUNTER — TELEPHONE (OUTPATIENT)
Dept: FAMILY MEDICINE CLINIC | Facility: CLINIC | Age: 69
End: 2019-05-23

## 2019-05-23 DIAGNOSIS — R10.84 GENERALIZED ABDOMINAL PAIN: ICD-10-CM

## 2019-05-23 LAB
ALBUMIN SERPL BCP-MCNC: 3.9 G/DL (ref 3.5–5)
ALP SERPL-CCNC: 58 U/L (ref 46–116)
ALT SERPL W P-5'-P-CCNC: 49 U/L (ref 12–78)
ANION GAP SERPL CALCULATED.3IONS-SCNC: 10 MMOL/L (ref 4–13)
AST SERPL W P-5'-P-CCNC: 26 U/L (ref 5–45)
BASOPHILS # BLD AUTO: 0.05 THOUSANDS/ΜL (ref 0–0.1)
BASOPHILS NFR BLD AUTO: 1 % (ref 0–1)
BILIRUB SERPL-MCNC: 0.85 MG/DL (ref 0.2–1)
BUN SERPL-MCNC: 26 MG/DL (ref 5–25)
CALCIUM SERPL-MCNC: 9.1 MG/DL (ref 8.3–10.1)
CHLORIDE SERPL-SCNC: 98 MMOL/L (ref 100–108)
CO2 SERPL-SCNC: 27 MMOL/L (ref 21–32)
CREAT SERPL-MCNC: 1.26 MG/DL (ref 0.6–1.3)
EOSINOPHIL # BLD AUTO: 0.31 THOUSAND/ΜL (ref 0–0.61)
EOSINOPHIL NFR BLD AUTO: 4 % (ref 0–6)
ERYTHROCYTE [DISTWIDTH] IN BLOOD BY AUTOMATED COUNT: 13.3 % (ref 11.6–15.1)
GFR SERPL CREATININE-BSD FRML MDRD: 59 ML/MIN/1.73SQ M
GLUCOSE P FAST SERPL-MCNC: 122 MG/DL (ref 65–99)
HCT VFR BLD AUTO: 44.4 % (ref 36.5–49.3)
HGB BLD-MCNC: 14.9 G/DL (ref 12–17)
IMM GRANULOCYTES # BLD AUTO: 0.04 THOUSAND/UL (ref 0–0.2)
IMM GRANULOCYTES NFR BLD AUTO: 1 % (ref 0–2)
LIPASE SERPL-CCNC: 116 U/L (ref 73–393)
LYMPHOCYTES # BLD AUTO: 2.39 THOUSANDS/ΜL (ref 0.6–4.47)
LYMPHOCYTES NFR BLD AUTO: 28 % (ref 14–44)
MCH RBC QN AUTO: 31 PG (ref 26.8–34.3)
MCHC RBC AUTO-ENTMCNC: 33.6 G/DL (ref 31.4–37.4)
MCV RBC AUTO: 93 FL (ref 82–98)
MONOCYTES # BLD AUTO: 0.76 THOUSAND/ΜL (ref 0.17–1.22)
MONOCYTES NFR BLD AUTO: 9 % (ref 4–12)
NEUTROPHILS # BLD AUTO: 4.88 THOUSANDS/ΜL (ref 1.85–7.62)
NEUTS SEG NFR BLD AUTO: 57 % (ref 43–75)
NRBC BLD AUTO-RTO: 0 /100 WBCS
PLATELET # BLD AUTO: 234 THOUSANDS/UL (ref 149–390)
PMV BLD AUTO: 11.1 FL (ref 8.9–12.7)
POTASSIUM SERPL-SCNC: 3 MMOL/L (ref 3.5–5.3)
PROT SERPL-MCNC: 7.8 G/DL (ref 6.4–8.2)
RBC # BLD AUTO: 4.8 MILLION/UL (ref 3.88–5.62)
SODIUM SERPL-SCNC: 135 MMOL/L (ref 136–145)
WBC # BLD AUTO: 8.43 THOUSAND/UL (ref 4.31–10.16)

## 2019-05-23 PROCEDURE — 85025 COMPLETE CBC W/AUTO DIFF WBC: CPT | Performed by: FAMILY MEDICINE

## 2019-05-23 PROCEDURE — 83690 ASSAY OF LIPASE: CPT

## 2019-05-23 PROCEDURE — 80053 COMPREHEN METABOLIC PANEL: CPT | Performed by: FAMILY MEDICINE

## 2019-05-23 PROCEDURE — 36415 COLL VENOUS BLD VENIPUNCTURE: CPT | Performed by: FAMILY MEDICINE

## 2019-06-05 ENCOUNTER — OFFICE VISIT (OUTPATIENT)
Dept: FAMILY MEDICINE CLINIC | Facility: CLINIC | Age: 69
End: 2019-06-05
Payer: MEDICARE

## 2019-06-05 VITALS
TEMPERATURE: 97.4 F | HEIGHT: 67 IN | WEIGHT: 251 LBS | BODY MASS INDEX: 39.39 KG/M2 | DIASTOLIC BLOOD PRESSURE: 84 MMHG | SYSTOLIC BLOOD PRESSURE: 138 MMHG

## 2019-06-05 DIAGNOSIS — R10.13 EPIGASTRIC ABDOMINAL PAIN: Primary | ICD-10-CM

## 2019-06-05 PROCEDURE — 99213 OFFICE O/P EST LOW 20 MIN: CPT | Performed by: NURSE PRACTITIONER

## 2019-06-11 ENCOUNTER — HOSPITAL ENCOUNTER (OUTPATIENT)
Dept: ULTRASOUND IMAGING | Facility: HOSPITAL | Age: 69
Discharge: HOME | End: 2019-06-11
Payer: MEDICARE

## 2019-06-11 DIAGNOSIS — R10.13 EPIGASTRIC ABDOMINAL PAIN: ICD-10-CM

## 2019-06-11 PROCEDURE — 76700 US EXAM ABDOM COMPLETE: CPT

## 2019-06-17 ENCOUNTER — TELEPHONE (OUTPATIENT)
Dept: FAMILY MEDICINE CLINIC | Facility: CLINIC | Age: 69
End: 2019-06-17

## 2019-07-29 PROBLEM — R11.10 VOMITING: Status: ACTIVE | Noted: 2019-07-29

## 2019-07-29 PROBLEM — R10.9 ABDOMINAL PAIN: Status: ACTIVE | Noted: 2019-07-29

## 2019-07-30 DIAGNOSIS — J45.21 MILD INTERMITTENT ASTHMA WITH ACUTE EXACERBATION: Primary | ICD-10-CM

## 2019-07-30 DIAGNOSIS — J45.20 MILD INTERMITTENT ASTHMA WITHOUT COMPLICATION: ICD-10-CM

## 2019-07-30 RX ORDER — ALBUTEROL SULFATE 90 UG/1
AEROSOL, METERED RESPIRATORY (INHALATION)
Qty: 18 G | Refills: 3 | Status: SHIPPED | OUTPATIENT
Start: 2019-07-30 | End: 2020-09-04 | Stop reason: SDUPTHER

## 2019-08-02 DIAGNOSIS — I10 BENIGN ESSENTIAL HYPERTENSION: ICD-10-CM

## 2019-08-02 RX ORDER — HYDROCHLOROTHIAZIDE 25 MG/1
25 TABLET ORAL DAILY
Qty: 90 TABLET | Refills: 0 | Status: SHIPPED | OUTPATIENT
Start: 2019-08-02 | End: 2019-08-20 | Stop reason: HOSPADM

## 2019-08-02 NOTE — TELEPHONE ENCOUNTER
Patient needs refill on HYDROCHLOROTHIAZIDE 25 MG 1 PILL DAILY #90  W [facility]    [address] [facility]

## 2019-08-06 ENCOUNTER — TRANSCRIBE ORDERS (OUTPATIENT)
Dept: ADMINISTRATIVE | Facility: HOSPITAL | Age: 69
End: 2019-08-06

## 2019-08-06 ENCOUNTER — APPOINTMENT (OUTPATIENT)
Dept: LAB | Facility: MEDICAL CENTER | Age: 69
End: 2019-08-06
Payer: MEDICARE

## 2019-08-06 DIAGNOSIS — R74.01 NONSPECIFIC ELEVATION OF LEVELS OF TRANSAMINASE OR LACTIC ACID DEHYDROGENASE (LDH): Primary | ICD-10-CM

## 2019-08-06 DIAGNOSIS — R74.02 NONSPECIFIC ELEVATION OF LEVELS OF TRANSAMINASE OR LACTIC ACID DEHYDROGENASE (LDH): ICD-10-CM

## 2019-08-06 DIAGNOSIS — R74.02 NONSPECIFIC ELEVATION OF LEVELS OF TRANSAMINASE OR LACTIC ACID DEHYDROGENASE (LDH): Primary | ICD-10-CM

## 2019-08-06 DIAGNOSIS — R74.01 NONSPECIFIC ELEVATION OF LEVELS OF TRANSAMINASE OR LACTIC ACID DEHYDROGENASE (LDH): ICD-10-CM

## 2019-08-06 LAB
FERRITIN SERPL-MCNC: 349 NG/ML (ref 8–388)
IRON SERPL-MCNC: 84 UG/DL (ref 65–175)
TRANSFERRIN SERPL-MCNC: 203 MG/DL (ref 200–400)

## 2019-08-06 PROCEDURE — 84165 PROTEIN E-PHORESIS SERUM: CPT | Performed by: PATHOLOGY

## 2019-08-06 PROCEDURE — 86708 HEPATITIS A ANTIBODY: CPT

## 2019-08-06 PROCEDURE — 87340 HEPATITIS B SURFACE AG IA: CPT

## 2019-08-06 PROCEDURE — 82103 ALPHA-1-ANTITRYPSIN TOTAL: CPT

## 2019-08-06 PROCEDURE — 83540 ASSAY OF IRON: CPT

## 2019-08-06 PROCEDURE — 86803 HEPATITIS C AB TEST: CPT

## 2019-08-06 PROCEDURE — 84165 PROTEIN E-PHORESIS SERUM: CPT

## 2019-08-06 PROCEDURE — 86235 NUCLEAR ANTIGEN ANTIBODY: CPT

## 2019-08-06 PROCEDURE — 82390 ASSAY OF CERULOPLASMIN: CPT

## 2019-08-06 PROCEDURE — 82104 ALPHA-1-ANTITRYPSIN PHENO: CPT

## 2019-08-06 PROCEDURE — 82728 ASSAY OF FERRITIN: CPT

## 2019-08-06 PROCEDURE — 86376 MICROSOMAL ANTIBODY EACH: CPT

## 2019-08-06 PROCEDURE — 86706 HEP B SURFACE ANTIBODY: CPT

## 2019-08-06 PROCEDURE — 83516 IMMUNOASSAY NONANTIBODY: CPT

## 2019-08-06 PROCEDURE — 84466 ASSAY OF TRANSFERRIN: CPT

## 2019-08-06 PROCEDURE — 86038 ANTINUCLEAR ANTIBODIES: CPT

## 2019-08-06 PROCEDURE — 36415 COLL VENOUS BLD VENIPUNCTURE: CPT

## 2019-08-07 LAB
ACTIN IGG SERPL-ACNC: 3 UNITS (ref 0–19)
ALBUMIN SERPL ELPH-MCNC: 3.97 G/DL (ref 3.5–5)
ALBUMIN SERPL ELPH-MCNC: 59.3 % (ref 52–65)
ALPHA1 GLOB SERPL ELPH-MCNC: 0.25 G/DL (ref 0.1–0.4)
ALPHA1 GLOB SERPL ELPH-MCNC: 3.7 % (ref 2.5–5)
ALPHA2 GLOB SERPL ELPH-MCNC: 0.74 G/DL (ref 0.4–1.2)
ALPHA2 GLOB SERPL ELPH-MCNC: 11.1 % (ref 7–13)
BETA GLOB ABNORMAL SERPL ELPH-MCNC: 0.41 G/DL (ref 0.4–0.8)
BETA1 GLOB SERPL ELPH-MCNC: 6.1 % (ref 5–13)
BETA2 GLOB SERPL ELPH-MCNC: 6.3 % (ref 2–8)
BETA2+GAMMA GLOB SERPL ELPH-MCNC: 0.42 G/DL (ref 0.2–0.5)
CERULOPLASMIN SERPL-MCNC: 18.2 MG/DL (ref 16–31)
GAMMA GLOB ABNORMAL SERPL ELPH-MCNC: 0.9 G/DL (ref 0.5–1.6)
GAMMA GLOB SERPL ELPH-MCNC: 13.5 % (ref 12–22)
HAV AB SER QL IA: NORMAL
HBV SURFACE AB SER-ACNC: <3.1 MIU/ML
HBV SURFACE AG SER QL: NORMAL
HCV AB SER QL: NORMAL
IGG/ALB SER: 1.46 {RATIO} (ref 1.1–1.8)
LKM-1 AB SER-ACNC: <20.1 UNITS (ref 0–20)
PROT PATTERN SERPL ELPH-IMP: NORMAL
PROT SERPL-MCNC: 6.7 G/DL (ref 6.4–8.2)
RYE IGE QN: NEGATIVE

## 2019-08-08 LAB
A1AT PHENOTYP SERPL IFE: NORMAL
A1AT SERPL-MCNC: 114 MG/DL (ref 90–200)
TTG IGA SER-ACNC: <2 U/ML (ref 0–3)
TTG IGG SER-ACNC: <2 U/ML (ref 0–5)

## 2019-08-12 PROCEDURE — 88342 IMHCHEM/IMCYTCHM 1ST ANTB: CPT | Performed by: PATHOLOGY

## 2019-08-12 PROCEDURE — 88305 TISSUE EXAM BY PATHOLOGIST: CPT | Performed by: PATHOLOGY

## 2019-08-13 ENCOUNTER — LAB REQUISITION (OUTPATIENT)
Dept: LAB | Facility: HOSPITAL | Age: 69
End: 2019-08-13
Payer: MEDICARE

## 2019-08-13 DIAGNOSIS — K22.70 BARRETT'S ESOPHAGUS WITHOUT DYSPLASIA: ICD-10-CM

## 2019-08-13 DIAGNOSIS — R11.2 NAUSEA WITH VOMITING: ICD-10-CM

## 2019-08-13 DIAGNOSIS — R10.13 EPIGASTRIC PAIN: ICD-10-CM

## 2019-08-17 ENCOUNTER — APPOINTMENT (EMERGENCY)
Dept: RADIOLOGY | Facility: HOSPITAL | Age: 69
DRG: 247 | End: 2019-08-17
Payer: MEDICARE

## 2019-08-17 ENCOUNTER — HOSPITAL ENCOUNTER (INPATIENT)
Facility: HOSPITAL | Age: 69
LOS: 2 days | Discharge: HOME | DRG: 247 | End: 2019-08-20
Attending: EMERGENCY MEDICINE | Admitting: INTERNAL MEDICINE
Payer: MEDICARE

## 2019-08-17 ENCOUNTER — TRANSCRIBE ORDERS (OUTPATIENT)
Dept: ADMINISTRATIVE | Facility: HOSPITAL | Age: 69
End: 2019-08-17

## 2019-08-17 ENCOUNTER — APPOINTMENT (OUTPATIENT)
Dept: LAB | Facility: MEDICAL CENTER | Age: 69
DRG: 247 | End: 2019-08-17
Payer: MEDICARE

## 2019-08-17 DIAGNOSIS — K76.0 FATTY METAMORPHOSIS OF LIVER: Primary | ICD-10-CM

## 2019-08-17 DIAGNOSIS — I21.4 NSTEMI (NON-ST ELEVATED MYOCARDIAL INFARCTION) (HCC): ICD-10-CM

## 2019-08-17 DIAGNOSIS — R74.01 NONSPECIFIC ELEVATION OF LEVELS OF TRANSAMINASE OR LACTIC ACID DEHYDROGENASE (LDH): ICD-10-CM

## 2019-08-17 DIAGNOSIS — R07.9 CHEST PAIN: ICD-10-CM

## 2019-08-17 DIAGNOSIS — R74.02 NONSPECIFIC ELEVATION OF LEVELS OF TRANSAMINASE OR LACTIC ACID DEHYDROGENASE (LDH): ICD-10-CM

## 2019-08-17 DIAGNOSIS — R77.8 ELEVATED TROPONIN: Primary | ICD-10-CM

## 2019-08-17 DIAGNOSIS — K76.0 FATTY METAMORPHOSIS OF LIVER: ICD-10-CM

## 2019-08-17 PROBLEM — R07.89 OTHER CHEST PAIN: Status: ACTIVE | Noted: 2019-08-17

## 2019-08-17 LAB
ALBUMIN SERPL BCP-MCNC: 3.8 G/DL (ref 3.5–5)
ALP SERPL-CCNC: 52 U/L (ref 46–116)
ALT SERPL W P-5'-P-CCNC: 34 U/L (ref 12–78)
ANION GAP SERPL CALCULATED.3IONS-SCNC: 15 MMOL/L (ref 4–13)
AST SERPL W P-5'-P-CCNC: 31 U/L (ref 5–45)
BASOPHILS # BLD AUTO: 0.04 THOUSANDS/ΜL (ref 0–0.1)
BASOPHILS NFR BLD AUTO: 0 % (ref 0–1)
BILIRUB SERPL-MCNC: 0.6 MG/DL (ref 0.2–1)
BUN SERPL-MCNC: 16 MG/DL (ref 5–25)
CALCIUM SERPL-MCNC: 8.9 MG/DL (ref 8.3–10.1)
CHLORIDE SERPL-SCNC: 102 MMOL/L (ref 100–108)
CO2 SERPL-SCNC: 23 MMOL/L (ref 21–32)
CREAT SERPL-MCNC: 1.33 MG/DL (ref 0.6–1.3)
EOSINOPHIL # BLD AUTO: 0.06 THOUSAND/ΜL (ref 0–0.61)
EOSINOPHIL NFR BLD AUTO: 1 % (ref 0–6)
ERYTHROCYTE [DISTWIDTH] IN BLOOD BY AUTOMATED COUNT: 13 % (ref 11.6–15.1)
GFR SERPL CREATININE-BSD FRML MDRD: 55 ML/MIN/1.73SQ M
GLUCOSE SERPL-MCNC: 143 MG/DL (ref 65–140)
HCT VFR BLD AUTO: 41.3 % (ref 36.5–49.3)
HGB BLD-MCNC: 14 G/DL (ref 12–17)
IMM GRANULOCYTES # BLD AUTO: 0.04 THOUSAND/UL (ref 0–0.2)
IMM GRANULOCYTES NFR BLD AUTO: 0 % (ref 0–2)
LYMPHOCYTES # BLD AUTO: 1.09 THOUSANDS/ΜL (ref 0.6–4.47)
LYMPHOCYTES NFR BLD AUTO: 11 % (ref 14–44)
MCH RBC QN AUTO: 31.3 PG (ref 26.8–34.3)
MCHC RBC AUTO-ENTMCNC: 33.9 G/DL (ref 31.4–37.4)
MCV RBC AUTO: 92 FL (ref 82–98)
MONOCYTES # BLD AUTO: 0.6 THOUSAND/ΜL (ref 0.17–1.22)
MONOCYTES NFR BLD AUTO: 6 % (ref 4–12)
NEUTROPHILS # BLD AUTO: 7.95 THOUSANDS/ΜL (ref 1.85–7.62)
NEUTS SEG NFR BLD AUTO: 82 % (ref 43–75)
NRBC BLD AUTO-RTO: 0 /100 WBCS
NT-PROBNP SERPL-MCNC: 193 PG/ML
PLATELET # BLD AUTO: 206 THOUSANDS/UL (ref 149–390)
PMV BLD AUTO: 11.4 FL (ref 8.9–12.7)
POTASSIUM SERPL-SCNC: 3.6 MMOL/L (ref 3.5–5.3)
PROT SERPL-MCNC: 7.4 G/DL (ref 6.4–8.2)
RBC # BLD AUTO: 4.47 MILLION/UL (ref 3.88–5.62)
SODIUM SERPL-SCNC: 140 MMOL/L (ref 136–145)
TROPONIN I SERPL-MCNC: 0.02 NG/ML
WBC # BLD AUTO: 9.78 THOUSAND/UL (ref 4.31–10.16)

## 2019-08-17 PROCEDURE — 84484 ASSAY OF TROPONIN QUANT: CPT | Performed by: EMERGENCY MEDICINE

## 2019-08-17 PROCEDURE — 83883 ASSAY NEPHELOMETRY NOT SPEC: CPT

## 2019-08-17 PROCEDURE — 84478 ASSAY OF TRIGLYCERIDES: CPT

## 2019-08-17 PROCEDURE — 82977 ASSAY OF GGT: CPT

## 2019-08-17 PROCEDURE — 82465 ASSAY BLD/SERUM CHOLESTEROL: CPT

## 2019-08-17 PROCEDURE — 1123F ACP DISCUSS/DSCN MKR DOCD: CPT | Performed by: INTERNAL MEDICINE

## 2019-08-17 PROCEDURE — 82947 ASSAY GLUCOSE BLOOD QUANT: CPT

## 2019-08-17 PROCEDURE — 83010 ASSAY OF HAPTOGLOBIN QUANT: CPT

## 2019-08-17 PROCEDURE — 84450 TRANSFERASE (AST) (SGOT): CPT

## 2019-08-17 PROCEDURE — 84460 ALANINE AMINO (ALT) (SGPT): CPT

## 2019-08-17 PROCEDURE — 82247 BILIRUBIN TOTAL: CPT

## 2019-08-17 PROCEDURE — 36415 COLL VENOUS BLD VENIPUNCTURE: CPT

## 2019-08-17 PROCEDURE — 83880 ASSAY OF NATRIURETIC PEPTIDE: CPT | Performed by: EMERGENCY MEDICINE

## 2019-08-17 PROCEDURE — 99285 EMERGENCY DEPT VISIT HI MDM: CPT

## 2019-08-17 PROCEDURE — 85025 COMPLETE CBC W/AUTO DIFF WBC: CPT | Performed by: EMERGENCY MEDICINE

## 2019-08-17 PROCEDURE — 82172 ASSAY OF APOLIPOPROTEIN: CPT

## 2019-08-17 PROCEDURE — 71045 X-RAY EXAM CHEST 1 VIEW: CPT

## 2019-08-17 PROCEDURE — 80053 COMPREHEN METABOLIC PANEL: CPT | Performed by: EMERGENCY MEDICINE

## 2019-08-17 PROCEDURE — 93005 ELECTROCARDIOGRAM TRACING: CPT

## 2019-08-17 RX ORDER — ONDANSETRON 2 MG/ML
4 INJECTION INTRAMUSCULAR; INTRAVENOUS EVERY 6 HOURS PRN
Status: DISCONTINUED | OUTPATIENT
Start: 2019-08-17 | End: 2019-08-20 | Stop reason: HOSPADM

## 2019-08-17 RX ORDER — FLUTICASONE PROPIONATE 50 MCG
1 SPRAY, SUSPENSION (ML) NASAL DAILY
Status: DISCONTINUED | OUTPATIENT
Start: 2019-08-18 | End: 2019-08-20 | Stop reason: HOSPADM

## 2019-08-17 RX ORDER — ACETAMINOPHEN 325 MG/1
650 TABLET ORAL EVERY 6 HOURS PRN
Status: DISCONTINUED | OUTPATIENT
Start: 2019-08-17 | End: 2019-08-20 | Stop reason: HOSPADM

## 2019-08-17 RX ORDER — CHLORTHALIDONE 25 MG/1
25 TABLET ORAL DAILY
Status: DISCONTINUED | OUTPATIENT
Start: 2019-08-18 | End: 2019-08-18

## 2019-08-17 RX ORDER — PANTOPRAZOLE SODIUM 20 MG/1
20 TABLET, DELAYED RELEASE ORAL
Status: DISCONTINUED | OUTPATIENT
Start: 2019-08-18 | End: 2019-08-19

## 2019-08-17 RX ORDER — HYDROCHLOROTHIAZIDE 25 MG/1
25 TABLET ORAL DAILY
Status: DISCONTINUED | OUTPATIENT
Start: 2019-08-18 | End: 2019-08-18

## 2019-08-17 RX ORDER — SODIUM PHOSPHATE,MONO-DIBASIC 19G-7G/118
500 ENEMA (ML) RECTAL DAILY
Status: DISCONTINUED | OUTPATIENT
Start: 2019-08-18 | End: 2019-08-20 | Stop reason: HOSPADM

## 2019-08-17 RX ORDER — ATORVASTATIN CALCIUM 40 MG/1
40 TABLET, FILM COATED ORAL EVERY EVENING
Status: DISCONTINUED | OUTPATIENT
Start: 2019-08-18 | End: 2019-08-20 | Stop reason: HOSPADM

## 2019-08-17 RX ORDER — ASPIRIN 81 MG/1
81 TABLET, CHEWABLE ORAL DAILY
Status: DISCONTINUED | OUTPATIENT
Start: 2019-08-18 | End: 2019-08-20 | Stop reason: HOSPADM

## 2019-08-17 RX ORDER — MAGNESIUM HYDROXIDE/ALUMINUM HYDROXICE/SIMETHICONE 120; 1200; 1200 MG/30ML; MG/30ML; MG/30ML
30 SUSPENSION ORAL ONCE
Status: COMPLETED | OUTPATIENT
Start: 2019-08-17 | End: 2019-08-17

## 2019-08-17 RX ADMIN — ALUMINUM HYDROXIDE, MAGNESIUM HYDROXIDE, AND SIMETHICONE 30 ML: 200; 200; 20 SUSPENSION ORAL at 22:15

## 2019-08-18 ENCOUNTER — APPOINTMENT (OUTPATIENT)
Dept: NON INVASIVE DIAGNOSTICS | Facility: HOSPITAL | Age: 69
DRG: 247 | End: 2019-08-18
Payer: MEDICARE

## 2019-08-18 LAB
ANION GAP SERPL CALCULATED.3IONS-SCNC: 11 MMOL/L (ref 4–13)
APTT PPP: 30 SECONDS (ref 23–37)
APTT PPP: 41 SECONDS (ref 23–37)
APTT PPP: 49 SECONDS (ref 23–37)
APTT PPP: 76 SECONDS (ref 23–37)
ATRIAL RATE: 54 BPM
ATRIAL RATE: 57 BPM
ATRIAL RATE: 60 BPM
BUN SERPL-MCNC: 14 MG/DL (ref 5–25)
CALCIUM SERPL-MCNC: 9 MG/DL (ref 8.3–10.1)
CHLORIDE SERPL-SCNC: 103 MMOL/L (ref 100–108)
CO2 SERPL-SCNC: 25 MMOL/L (ref 21–32)
CREAT SERPL-MCNC: 1.07 MG/DL (ref 0.6–1.3)
ERYTHROCYTE [DISTWIDTH] IN BLOOD BY AUTOMATED COUNT: 13.1 % (ref 11.6–15.1)
GFR SERPL CREATININE-BSD FRML MDRD: 71 ML/MIN/1.73SQ M
GLUCOSE P FAST SERPL-MCNC: 109 MG/DL (ref 65–99)
GLUCOSE SERPL-MCNC: 109 MG/DL (ref 65–140)
HCT VFR BLD AUTO: 43.1 % (ref 36.5–49.3)
HGB BLD-MCNC: 14.3 G/DL (ref 12–17)
INR PPP: 1.15 (ref 0.84–1.19)
MCH RBC QN AUTO: 31.2 PG (ref 26.8–34.3)
MCHC RBC AUTO-ENTMCNC: 33.2 G/DL (ref 31.4–37.4)
MCV RBC AUTO: 94 FL (ref 82–98)
P AXIS: 0 DEGREES
P AXIS: 14 DEGREES
P AXIS: 19 DEGREES
PLATELET # BLD AUTO: 220 THOUSANDS/UL (ref 149–390)
PMV BLD AUTO: 11.2 FL (ref 8.9–12.7)
POTASSIUM SERPL-SCNC: 3.3 MMOL/L (ref 3.5–5.3)
PR INTERVAL: 182 MS
PR INTERVAL: 188 MS
PR INTERVAL: 190 MS
PROTHROMBIN TIME: 14.1 SECONDS (ref 11.6–14.5)
QRS AXIS: 28 DEGREES
QRS AXIS: 35 DEGREES
QRS AXIS: 36 DEGREES
QRSD INTERVAL: 112 MS
QRSD INTERVAL: 114 MS
QRSD INTERVAL: 114 MS
QT INTERVAL: 450 MS
QT INTERVAL: 454 MS
QT INTERVAL: 456 MS
QTC INTERVAL: 432 MS
QTC INTERVAL: 441 MS
QTC INTERVAL: 450 MS
RBC # BLD AUTO: 4.58 MILLION/UL (ref 3.88–5.62)
SODIUM SERPL-SCNC: 139 MMOL/L (ref 136–145)
T WAVE AXIS: 2 DEGREES
T WAVE AXIS: 4 DEGREES
T WAVE AXIS: 8 DEGREES
TROPONIN I SERPL-MCNC: 1.53 NG/ML
TROPONIN I SERPL-MCNC: 3.07 NG/ML
TROPONIN I SERPL-MCNC: 3.82 NG/ML
TROPONIN I SERPL-MCNC: 4.61 NG/ML
VENTRICULAR RATE: 54 BPM
VENTRICULAR RATE: 57 BPM
VENTRICULAR RATE: 60 BPM
WBC # BLD AUTO: 8.03 THOUSAND/UL (ref 4.31–10.16)

## 2019-08-18 PROCEDURE — 93005 ELECTROCARDIOGRAM TRACING: CPT

## 2019-08-18 PROCEDURE — 85730 THROMBOPLASTIN TIME PARTIAL: CPT | Performed by: INTERNAL MEDICINE

## 2019-08-18 PROCEDURE — 84484 ASSAY OF TROPONIN QUANT: CPT | Performed by: NURSE PRACTITIONER

## 2019-08-18 PROCEDURE — 85610 PROTHROMBIN TIME: CPT | Performed by: NURSE PRACTITIONER

## 2019-08-18 PROCEDURE — 85730 THROMBOPLASTIN TIME PARTIAL: CPT | Performed by: NURSE PRACTITIONER

## 2019-08-18 PROCEDURE — 93010 ELECTROCARDIOGRAM REPORT: CPT | Performed by: INTERNAL MEDICINE

## 2019-08-18 PROCEDURE — 85027 COMPLETE CBC AUTOMATED: CPT | Performed by: NURSE PRACTITIONER

## 2019-08-18 PROCEDURE — 99220 PR INITIAL OBSERVATION CARE/DAY 70 MINUTES: CPT | Performed by: INTERNAL MEDICINE

## 2019-08-18 PROCEDURE — 93306 TTE W/DOPPLER COMPLETE: CPT | Performed by: INTERNAL MEDICINE

## 2019-08-18 PROCEDURE — 84484 ASSAY OF TROPONIN QUANT: CPT | Performed by: INTERNAL MEDICINE

## 2019-08-18 PROCEDURE — 99285 EMERGENCY DEPT VISIT HI MDM: CPT | Performed by: EMERGENCY MEDICINE

## 2019-08-18 PROCEDURE — 93306 TTE W/DOPPLER COMPLETE: CPT

## 2019-08-18 PROCEDURE — 99222 1ST HOSP IP/OBS MODERATE 55: CPT | Performed by: INTERNAL MEDICINE

## 2019-08-18 PROCEDURE — 80048 BASIC METABOLIC PNL TOTAL CA: CPT | Performed by: INTERNAL MEDICINE

## 2019-08-18 RX ORDER — ALBUTEROL SULFATE 90 UG/1
2 AEROSOL, METERED RESPIRATORY (INHALATION) EVERY 4 HOURS PRN
Status: DISCONTINUED | OUTPATIENT
Start: 2019-08-18 | End: 2019-08-20 | Stop reason: HOSPADM

## 2019-08-18 RX ORDER — DIPHENHYDRAMINE HCL 25 MG
50 TABLET ORAL ONCE
Status: COMPLETED | OUTPATIENT
Start: 2019-08-19 | End: 2019-08-19

## 2019-08-18 RX ORDER — HYDROCHLOROTHIAZIDE 12.5 MG/1
12.5 TABLET ORAL DAILY
Status: DISCONTINUED | OUTPATIENT
Start: 2019-08-18 | End: 2019-08-20

## 2019-08-18 RX ORDER — HEPARIN SODIUM 1000 [USP'U]/ML
2000 INJECTION, SOLUTION INTRAVENOUS; SUBCUTANEOUS AS NEEDED
Status: DISCONTINUED | OUTPATIENT
Start: 2019-08-18 | End: 2019-08-20 | Stop reason: HOSPADM

## 2019-08-18 RX ORDER — PREDNISONE 50 MG/1
50 TABLET ORAL EVERY 6 HOURS
Status: DISCONTINUED | OUTPATIENT
Start: 2019-08-18 | End: 2019-08-18

## 2019-08-18 RX ORDER — HEPARIN SODIUM 10000 [USP'U]/100ML
3-20 INJECTION, SOLUTION INTRAVENOUS
Status: DISCONTINUED | OUTPATIENT
Start: 2019-08-18 | End: 2019-08-20 | Stop reason: HOSPADM

## 2019-08-18 RX ORDER — ASPIRIN 81 MG/1
324 TABLET, CHEWABLE ORAL ONCE
Status: COMPLETED | OUTPATIENT
Start: 2019-08-19 | End: 2019-08-19

## 2019-08-18 RX ORDER — CARVEDILOL 3.12 MG/1
3.12 TABLET ORAL 2 TIMES DAILY WITH MEALS
Status: DISCONTINUED | OUTPATIENT
Start: 2019-08-18 | End: 2019-08-20 | Stop reason: HOSPADM

## 2019-08-18 RX ORDER — POTASSIUM CHLORIDE 20 MEQ/1
40 TABLET, EXTENDED RELEASE ORAL ONCE
Status: COMPLETED | OUTPATIENT
Start: 2019-08-18 | End: 2019-08-18

## 2019-08-18 RX ORDER — SODIUM CHLORIDE 9 MG/ML
100 INJECTION, SOLUTION INTRAVENOUS CONTINUOUS
Status: DISCONTINUED | OUTPATIENT
Start: 2019-08-19 | End: 2019-08-19

## 2019-08-18 RX ORDER — HEPARIN SODIUM 1000 [USP'U]/ML
4000 INJECTION, SOLUTION INTRAVENOUS; SUBCUTANEOUS AS NEEDED
Status: DISCONTINUED | OUTPATIENT
Start: 2019-08-18 | End: 2019-08-20 | Stop reason: HOSPADM

## 2019-08-18 RX ORDER — PREDNISONE 50 MG/1
50 TABLET ORAL EVERY 6 HOURS
Status: COMPLETED | OUTPATIENT
Start: 2019-08-18 | End: 2019-08-19

## 2019-08-18 RX ORDER — LEVALBUTEROL 1.25 MG/.5ML
1.25 SOLUTION, CONCENTRATE RESPIRATORY (INHALATION) EVERY 8 HOURS PRN
Status: DISCONTINUED | OUTPATIENT
Start: 2019-08-18 | End: 2019-08-20 | Stop reason: HOSPADM

## 2019-08-18 RX ADMIN — Medication 500 MG: at 08:57

## 2019-08-18 RX ADMIN — CARVEDILOL 3.12 MG: 3.12 TABLET, FILM COATED ORAL at 09:01

## 2019-08-18 RX ADMIN — ATORVASTATIN CALCIUM 40 MG: 40 TABLET, FILM COATED ORAL at 17:27

## 2019-08-18 RX ADMIN — HYDROCHLOROTHIAZIDE 12.5 MG: 12.5 TABLET ORAL at 09:01

## 2019-08-18 RX ADMIN — HEPARIN SODIUM AND DEXTROSE 11.1 UNITS/KG/HR: 10000; 5 INJECTION INTRAVENOUS at 10:19

## 2019-08-18 RX ADMIN — Medication 30 MG: at 08:57

## 2019-08-18 RX ADMIN — HEPARIN SODIUM 2000 UNITS: 1000 INJECTION INTRAVENOUS; SUBCUTANEOUS at 17:26

## 2019-08-18 RX ADMIN — ATORVASTATIN CALCIUM 40 MG: 40 TABLET, FILM COATED ORAL at 01:15

## 2019-08-18 RX ADMIN — PREDNISONE 50 MG: 50 TABLET ORAL at 23:19

## 2019-08-18 RX ADMIN — ASPIRIN 81 MG 81 MG: 81 TABLET ORAL at 08:58

## 2019-08-18 RX ADMIN — PANTOPRAZOLE SODIUM 20 MG: 20 TABLET, DELAYED RELEASE ORAL at 08:58

## 2019-08-18 RX ADMIN — POTASSIUM CHLORIDE 40 MEQ: 1500 TABLET, EXTENDED RELEASE ORAL at 08:58

## 2019-08-18 RX ADMIN — FLUTICASONE PROPIONATE 1 SPRAY: 50 SPRAY, METERED NASAL at 09:02

## 2019-08-18 RX ADMIN — TICAGRELOR 180 MG: 90 TABLET ORAL at 11:35

## 2019-08-18 RX ADMIN — PREDNISONE 50 MG: 50 TABLET ORAL at 17:29

## 2019-08-18 RX ADMIN — TICAGRELOR 90 MG: 90 TABLET ORAL at 23:19

## 2019-08-18 NOTE — ASSESSMENT & PLAN NOTE
· Has history of Randall fundoplication for sliding hiatal hernia (and multiple other GI surgeries)  · Follows with GI Dr Raeford Mcardle as an outpatient, had EGD last week--I cannot see the report but patient tells me they found nonbleeding ulcers  · Patient is aware of the risks of GI bleeding while on dual anti-platelet therapy but understands that this is necessary status post stent  · He states he was doing okay until this morning when he had epigastric pain due to drinking ice water  · Continue home medicine including Protonix 20 b i d   And add Pepcid as needed

## 2019-08-18 NOTE — UTILIZATION REVIEW
Initial Clinical Review    Admission: Date/Time/Statement:    OBS  ORDER    8/17  @   2259   08/18/19 1421  Inpatient Admission Once     Transfer Service: General Medicine       Question Answer Comment   Admitting Physician MALIKA SPANN    Level of Care Med Surg    Estimated length of stay More than 2 Midnights    Certification I certify that inpatient services are medically necessary for this patient for a duration of greater than two midnights  See H&P and MD Progress Notes for additional information about the patient's course of treatment  08/18/19 1420         ED Arrival Information     Expected Arrival Acuity Means of Arrival Escorted By Service Admission Type    - 8/17/2019 21:09 Urgent Ambulance [facility] Urgent    Arrival Complaint    Chest Pain        Chief Complaint   Patient presents with    Chest Pain     Arrives via EMS for evaluation of sudden onset of substernal chest pain which started a couple hours ago  Describes the pain as heavy  Denies cardiac history; pt does have significant abdominal PMH  Received 2x SL nitro and 324 mg aspirin en route  Assessment/Plan: * Other chest pain  Assessment & Plan  · Initially presented with substernal chest pain without radiation 3 hours prior  · EKG was negative  · Troponin< 0 02     · MARCK score is 3  · Telemetry monitoring  · Serial troponins and EKGs        Abdominal pain  Assessment & Plan  · Had Randall fundoplication for sliding hiatal hernia  · Has had several weeks of decreased appetite and recurrent epigastric pain  · Follows with GI as an outpatient     Benign essential hypertension  Assessment & Plan  · /81  · Continue to monitor  · Continue chlorthalidone and hydrochlorothiazide  Anticipated Length of Stay:  Patient will be admitted on an Observation basis with an anticipated length of stay of  less than 2 midnights     Justification for Hospital Stay:  Telemetry observation    Alyse Molina is a 81 y o  male with a history of multiple abdominal surgeries, hypertension, GERD, and asthma who presents with chest pain  He said it started 3 hours prior to calling the ambulance, was substernal, severe, constant, with mild shortness of breath  He denies sweating, nausea, vomiting, or radiation  He has an extensive abdominal history including Niessen fundoplication for sliding hiatal hernia, peptic ulcer disease, ventral hernia and appendectomy, and peritonitis  He says that it felt like his usual pain he gets from his stomach ulcers but this time it lasted longer  He did not try any treatment, and there were no worsening factors  Denies dizziness, leg swelling, or lightheadedness  He mentions that he is very active and last week he was even cutting lumbar with a chainsaw  He said that he was repeatedly bending over, and he reported feeling some chest tightness or fullness associated with some mild shortness of breath  He expresses concern that his sliding hiatal hernia has returned, as the symptoms are similar  Per  Cardiology  Consult:     Chest pain with elevated troponin concerning for type 1 MI  Troponin 0 02/1 53/3 07  Reviewed ECG showing sinus bradycardia, no ischemic changes   Check lipid panel  Continue aspirin and statin  Start heparin drip and low-dose beta-blocker  Echocardiogram ordered  Plan for cardiac cath tomorrow  Patient has allergy to IV contrast  Ordered prednisone prep prior  Patient reports he tolerates Prednisone PO prior to IV contrast      2  Hypertension- average pressure 163/70  Patient is on hydrochlorothiazide at home, discontinued for now  Coreg added  Monitor BP     3  Hypokalemia- potassium 3 3  Replete    PROGRESS  NOTE  8/18  @    7:51  9583 while reviewing morning labs noted troponin jessica from <0 02 to 3 07  Patient denies chest pain  Upon assessment he is laying in bed, calm, respirations unlabored, skin pink warm and dry  EKGs overnight showed no interval change    EKG obtained at 7: 12 a m  Showed no interval change  Contacted Dr Tanisha Mccall on-call for Cardiology to notify of troponin rise  Patient made NPO  Cardiology consult placed  Per Dr Tanisha Mccall, no clear evidence for acute coronary syndrome in the absence of chest pain or ST elevations, so holding on heparin drip pending Cardiology consultation  I spoke with patient, provided explanation and answered his questions to his satisfaction  ·  NSTEMI - Presented with chest pain and now elevated troponin  No significant EKG changes  He will be started on heparin infusion, continue aspirin, start on statin, beta-blocker  Echocardiogram ordered  Check lipid profile  Patient will be scheduled for cardiac catheterization tomorrow  Cardiology following  · Gastric ulcer - Hx of Nissen fundoplication in 6753  Recent EGD on 8/12/19 which showed mucosa suggestive of Barrets esophagus  Continue PPI  Essential hypertension - blood pressure is elevated in the 236Q systolic    Will continue his home medication with HCTZ, low-dose beta-blocker started         ED Triage Vitals [08/17/19 2113]   Temperature Pulse Respirations Blood Pressure SpO2   98 1 °F (36 7 °C) 56 20 170/79 100 %      Temp Source Heart Rate Source Patient Position - Orthostatic VS BP Location FiO2 (%)   Oral Monitor Lying Right arm --      Pain Score       8        Wt Readings from Last 1 Encounters:   08/17/19 96 kg (211 lb 10 3 oz)     Additional Vital Signs:   08/18/19 0709  97 7 °F (36 5 °C)  58  18  174/81Abnormal   116  98 %  None (Room air)  Sitting   08/17/19 2348    55  18  153/74    97 %  None (Room air)  Lying   08/17/19 2230    54Abnormal   20  147/67    98 %       08/17/19 2130    62  20  171/88Abnormal     100 %       08/17/19 2113  98 1 °F (36 7 °C)  56  20  170/79    100 %  None (Room air)           Pertinent Labs/Diagnostic Test Results:   Results from last 7 days   Lab Units 08/17/19  2211   WBC Thousand/uL 9 78   HEMOGLOBIN g/dL 14 0   HEMATOCRIT % 41 3 PLATELETS Thousands/uL 206   NEUTROS ABS Thousands/µL 7 95*         Results from last 7 days   Lab Units 08/18/19  0411 08/17/19  2211   SODIUM mmol/L 139 140   POTASSIUM mmol/L 3 3* 3 6   CHLORIDE mmol/L 103 102   CO2 mmol/L 25 23   ANION GAP mmol/L 11 15*   BUN mg/dL 14 16   CREATININE mg/dL 1 07 1 33*   EGFR ml/min/1 73sq m 71 55   CALCIUM mg/dL 9 0 8 9     Results from last 7 days   Lab Units 08/17/19  2211   AST U/L 31   ALT U/L 34   ALK PHOS U/L 52   TOTAL PROTEIN g/dL 7 4   ALBUMIN g/dL 3 8   TOTAL BILIRUBIN mg/dL 0 60         Results from last 7 days   Lab Units 08/18/19  0411 08/17/19  2211   GLUCOSE RANDOM mg/dL 109 143*           Results from last 7 days   Lab Units 08/18/19  0754 08/18/19  0411 08/18/19  0116 08/17/19  2211   TROPONIN I ng/mL 4 61* 3 07* 1 53* 0 02           Results from last 7 days   Lab Units 08/17/19  2220   NT-PRO BNP pg/mL 193*       EKG:  NSR      No ectopy  Or  ST  changes      ED Treatment:   Medication Administration from 08/17/2019 2109 to 08/17/2019 2320       Date/Time Order Dose Route Action Action by Comments     08/17/2019 2215 aluminum-magnesium hydroxide-simethicone (MYLANTA) 200-200-20 mg/5 mL oral suspension 30 mL 30 mL Oral Given Froy Chiang RN         Past Medical History:   Diagnosis Date    Allergic reaction     LAST ASSESSED: [id number]    Asthma     Bursitis of heel     LAST ASSESSED: [id number]    Derangement of meniscus of left knee due to old injury     LAST ASSESSED: [id number]    Diverticulitis of colon     LAST ASSESSED: [id number]    Dizziness     LAST ASSESSED: [id number]    GERD (gastroesophageal reflux disease)     History of colon polyps     Hypertension     Malignant hypertension     LAST ASSESSED: [id number]    Plantar fasciitis     Seasonal allergies     Shoulder impingement     LAST ASSESSED: 74IUM0171    Stroke ([facility] [address] )      Present on Admission:   Abdominal pain   Benign essential hypertension      Admitting Diagnosis: Chest pain [R07 9]  Elevated troponin [R74 8]  Age/Sex: 81 y o  male  Admission Orders:    Current Facility-Administered Medications:  acetaminophen 650 mg Oral Q6H PRN REGLA Griffiths   albuterol 2 puff Inhalation Q4H PRN Temitope Enrique MD   [START ON 8/19/2019] aspirin 324 mg Oral Once Rivka Clarke, VIANNEYNP   aspirin 81 mg Oral Daily REGLA Griffiths   atorvastatin 40 mg Oral QPM REGLA Griffiths   carvedilol 3 125 mg Oral BID With Meals REGLA Andujar   co-enzyme Q-10 30 mg Oral Daily REGLA Griffiths   [START ON 8/19/2019] diphenhydrAMINE 50 mg Oral Once Rivka Clarke, REGLA   fluticasone 1 spray Nasal Daily REGLA Griffiths   glucosamine sulfate 500 mg Oral Daily REGLA Griffiths   heparin  Intravenous Once Marilianda Vince, REGLA   levalbuterol 1 25 mg Nebulization Q8H PRN Temitope Enrique MD   ondansetron 4 mg Intravenous Q6H PRN REGLA Griffiths   pantoprazole 20 mg Oral Early Morning REGLA Griffiths   potassium chloride 40 mEq Oral Once Mckaylaa Vince, REGLA   predniSONE 50 mg Oral Q6H VIANNEY AndujarNP   [START ON 8/19/2019] sodium chloride 100 mL/hr Intravenous Continuous VIANNEY AndujarNP       IP CONSULT TO CARDIOLOGY   Tele  2 DE  Cardiac  Cath   8/18    Network Utilization Review Department  Phone: [phone]; Fax [phone]  [email or web address]  ATTENTION: Please call with any questions or concerns to [phone]  and carefully listen to the prompts so that you are directed to the right person  Send all requests for admission clinical reviews, approved or denied determinations and any other requests to fax [phone]   All voicemails are confidential

## 2019-08-18 NOTE — ASSESSMENT & PLAN NOTE
· Had Randall fundoplication for sliding hiatal hernia  · Has had several weeks of decreased appetite and recurrent epigastric pain  · Follows with GI as an outpatient
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
## 2019-08-18 NOTE — ED PROVIDER NOTES
History  Chief Complaint   Patient presents with    Chest Pain     Arrives via EMS for evaluation of sudden onset of substernal chest pain which started a couple hours ago  Describes the pain as heavy  Denies cardiac history; pt does have significant abdominal PMH  Received 2x SL nitro and 324 mg aspirin en route  81year old male presenting for evaluation of chest pain  Patient states for the past few hours he has had substernal chest pain without significant radiation  He does not have significant past cardiac history although he has multiple risk factors including hypertension, hyperlipidemia, obstructive sleep apnea, obesity, family history  States that he is not experiencing any nausea, vomiting, abdominal pain, lightheadedness, dizziness  Does have a significant history of GERD and and has not been 100% compliant with medications  He was given a full aspirin along with 2 sublingual nitros pre-hospital which he states have decreased his chest pain  Heart score is 7 and troponin is 0 02  Patient admitted for cardiac workup  Chest Pain   Associated symptoms: no abdominal pain, no back pain, no cough, no dizziness, no fever, no headache, no nausea, no numbness, no palpitations, no shortness of breath and not vomiting        Prior to Admission Medications   Prescriptions Last Dose Informant Patient Reported? Taking?    B Complex Vitamins (VITAMIN B COMPLEX PO) 8/15/2019  Yes Yes   Sig: Take 1 tablet by mouth daily   TURMERIC PO 8/15/2019 Self Yes No   Sig: Take 1 Dose by mouth daily   albuterol (PROVENTIL HFA,VENTOLIN HFA) 90 mcg/act inhaler 8/17/2019 at Unknown time  No Yes   Sig: INHALE 2 PUFFS BY MOUTH EVERY 6 HOURS AS NEEDED FOR WHEEZING   chlorthalidone 25 mg tablet Not Taking at Unknown time Self Yes No   Sig: Take 25 mg by mouth daily   co-enzyme Q-10 30 MG capsule 8/17/2019 at Unknown time Self Yes Yes   Sig: Take 30 mg by mouth daily   dicyclomine (BENTYL) 10 mg capsule  Self No No   Sig: Take 1 capsule (10 mg total) by mouth 4 (four) times a day (before meals and at bedtime) for 30 days   esomeprazole (NexIUM) 20 mg capsule 2019 at Unknown time  Yes Yes   Sig: Take 20 mg by mouth every morning before breakfast   fluticasone (FLONASE) 50 mcg/act nasal spray  Self Yes No   Si spray into each nostril daily   glucosamine 500 MG CAPS capsule Not Taking at Unknown time Self Yes No   Sig: Take 500 mg by mouth daily   hydrochlorothiazide (HYDRODIURIL) 25 mg tablet 8/15/2019  No No   Sig: Take 1 tablet (25 mg total) by mouth daily for 138 days   levalbuterol (XOPENEX) 1 25 mg/3 mL nebulizer solution 2019 at Unknown time  No Yes   Sig: INHALE 1 VIAL BY NEBULIZER EVERY 8 HOURS AS NEEDED FOR WHEEZING   multivitamin (THERAGRAN) TABS 2019 at Unknown time Self Yes Yes   Sig: Take 1 tablet by mouth daily      Facility-Administered Medications: None       Past Medical History:   Diagnosis Date    Allergic reaction     LAST ASSESSED: [id number]    Asthma     Bursitis of heel     LAST ASSESSED: [id number]    Derangement of meniscus of left knee due to old injury     LAST ASSESSED: [id number]    Diverticulitis of colon     LAST ASSESSED: [id number]    Dizziness     LAST ASSESSED: [id number]    GERD (gastroesophageal reflux disease)     History of colon polyps     Hypertension     Malignant hypertension     LAST ASSESSED: [id number]    Plantar fasciitis     Seasonal allergies     Shoulder impingement     LAST ASSESSED: [id number]    Stroke ([facility] [address] )        Past Surgical History:   Procedure Laterality Date    APPENDECTOMY      CAROTID ENDARTARECTOMY      COLECTOMY Left     PARTIAL - SIGMOID; HEMICOLECTOMY FOR DIVERTICULITIS; ONSET:     COLON SURGERY      COLONOSCOPY N/A 2016    Procedure: COLONOSCOPY;  Surgeon: Narcisa Escoto MD;  [facility];   Service:     HERNIA REPAIR      VENTRAL    NISSEN FUNDOPLICATION      ESOPHAGOGASTRIC FUNDOPLASTY LAST ASSESSED: [id number]    SINUS SURGERY      THROMBOENDARTERECTOMY      CAROTID; ONSET; MAY 2012       Family History   Problem Relation Age of Onset    Cancer Paternal Grandfather     Hypertension Mother     Hyperlipidemia Mother     Hypertension Father     Hyperlipidemia Father      I have reviewed and agree with the history as documented  Social History     Tobacco Use    Smoking status: Former Smoker    Smokeless tobacco: Never Used   Substance Use Topics    Alcohol use: No    Drug use: No        Review of Systems   Constitutional: Negative for activity change, appetite change and fever  HENT: Negative for facial swelling  Respiratory: Negative for cough, choking, shortness of breath and wheezing  Cardiovascular: Positive for chest pain  Negative for palpitations and leg swelling  Gastrointestinal: Negative for abdominal pain, constipation, diarrhea, nausea and vomiting  Genitourinary: Negative for difficulty urinating and flank pain  Musculoskeletal: Negative for back pain and neck pain  Skin: Negative for color change  Allergic/Immunologic: Negative for immunocompromised state  Neurological: Negative for dizziness, syncope, facial asymmetry, light-headedness, numbness and headaches  Psychiatric/Behavioral: Negative for agitation, behavioral problems and confusion  Physical Exam  Physical Exam   Constitutional: He is oriented to person, place, and time  He appears well-developed and well-nourished  HENT:   Head: Normocephalic and atraumatic  Nose: Nose normal    Eyes: Pupils are equal, round, and reactive to light  Conjunctivae and EOM are normal    Neck: Normal range of motion  Neck supple  Cardiovascular: Regular rhythm and normal pulses  Pulses:       Carotid pulses are 2+ on the right side, and 2+ on the left side  Regular rate and rhythm appropriate S1-S2 no murmurs rubs noted   Pulmonary/Chest: Breath sounds normal  No accessory muscle usage  Tachypnea noted   No respiratory distress  He has no decreased breath sounds  He has no wheezes  He has no rhonchi  He has no rales  Patient conversationally dyspneic   Abdominal: Soft  Bowel sounds are normal    Musculoskeletal: Normal range of motion  Neurological: He is alert and oriented to person, place, and time  Skin: Skin is warm  Psychiatric: He has a normal mood and affect  Judgment normal    Nursing note and vitals reviewed        Vital Signs  ED Triage Vitals [08/17/19 2113]   Temperature Pulse Respirations Blood Pressure SpO2   98 1 °F (36 7 °C) 56 20 170/79 100 %      Temp Source Heart Rate Source Patient Position - Orthostatic VS BP Location FiO2 (%)   Oral Monitor Lying Right arm --      Pain Score       8           Vitals:    08/17/19 2130 08/17/19 2230 08/17/19 2348 08/18/19 0709   BP: (!) 171/88 147/67 153/74 (!) 174/81   Pulse: 62 (!) 54 55 58   Patient Position - Orthostatic VS:   Lying Sitting         Visual Acuity      ED Medications  Medications   co-enzyme Q-10 capsule 30 mg (30 mg Oral Given 8/18/19 0857)   pantoprazole (PROTONIX) EC tablet 20 mg (20 mg Oral Given 8/18/19 0858)   fluticasone (FLONASE) 50 mcg/act nasal spray 1 spray (1 spray Nasal Given 8/18/19 0902)   glucosamine sulfate capsule 500 mg (500 mg Oral Given 8/18/19 0857)   ondansetron (ZOFRAN) injection 4 mg (has no administration in time range)   acetaminophen (TYLENOL) tablet 650 mg (has no administration in time range)   aspirin chewable tablet 81 mg (81 mg Oral Given 8/18/19 0858)   atorvastatin (LIPITOR) tablet 40 mg (40 mg Oral Given 8/18/19 0115)   albuterol (PROVENTIL HFA,VENTOLIN HFA) inhaler 2 puff (has no administration in time range)   levalbuterol (XOPENEX) inhalation solution 1 25 mg (has no administration in time range)   carvedilol (COREG) tablet 3 125 mg (3 125 mg Oral Given 8/18/19 0901)   sodium chloride 0 9 % infusion (has no administration in time range)   diphenhydrAMINE (BENADRYL) tablet 50 mg (has no administration in time range)   aspirin chewable tablet 324 mg (has no administration in time range)   predniSONE tablet 50 mg (has no administration in time range)   heparin (porcine) 25,000 units in 250 mL infusion (premix) (11 1 Units/kg/hr × 90 kg (Order-Specific) Intravenous New Bag 8/18/19 1019)   heparin (porcine) injection 4,000 Units (has no administration in time range)   heparin (porcine) injection 2,000 Units (has no administration in time range)   hydrochlorothiazide (HYDRODIURIL) tablet 12 5 mg (12 5 mg Oral Given 8/18/19 0901)   ticagrelor (BRILINTA) tablet 90 mg (has no administration in time range)   aluminum-magnesium hydroxide-simethicone (MYLANTA) 200-200-20 mg/5 mL oral suspension 30 mL (30 mL Oral Given 8/17/19 2215)   potassium chloride (K-DUR,KLOR-CON) CR tablet 40 mEq (40 mEq Oral Given 8/18/19 0858)   ticagrelor (BRILINTA) tablet 180 mg (180 mg Oral Given 8/18/19 1135)       Diagnostic Studies  Results Reviewed     Procedure Component Value Units Date/Time    NT-BNP PRO [[id number]]  (Abnormal) Collected:  08/17/19 2220    Lab Status:  Final result Specimen:  Blood Updated:  08/17/19 2246     NT-proBNP 193 pg/mL     Troponin I [[id number]]  (Normal) Collected:  08/17/19 2211    Lab Status:  Final result Specimen:  Blood from Hand, Right Updated:  08/17/19 2236     Troponin I 0 02 ng/mL     Comprehensive metabolic panel [[id number]]  (Abnormal) Collected:  08/17/19 2211    Lab Status:  Final result Specimen:  Blood from Hand, Right Updated:  08/17/19 2233     Sodium 140 mmol/L      Potassium 3 6 mmol/L      Chloride 102 mmol/L      CO2 23 mmol/L      ANION GAP 15 mmol/L      BUN 16 mg/dL      Creatinine 1 33 mg/dL      Glucose 143 mg/dL      Calcium 8 9 mg/dL      AST 31 U/L      ALT 34 U/L      Alkaline Phosphatase 52 U/L      Total Protein 7 4 g/dL      Albumin 3 8 g/dL      Total Bilirubin 0 60 mg/dL      eGFR 55 ml/min/1 73sq m     Narrative:       [place] guidelines for Chronic Kidney Disease (CKD):     Stage 1 with normal or high GFR (GFR > 90 mL/min/1 73 square meters)    Stage 2 Mild CKD (GFR = 60-89 mL/min/1 73 square meters)    Stage 3A Moderate CKD (GFR = 45-59 mL/min/1 73 square meters)    Stage 3B Moderate CKD (GFR = 30-44 mL/min/1 73 square meters)    Stage 4 Severe CKD (GFR = 15-29 mL/min/1 73 square meters)    Stage 5 End Stage CKD (GFR <15 mL/min/1 73 square meters)  Note: GFR calculation is accurate only with a steady state creatinine    CBC and differential [[id number]]  (Abnormal) Collected:  08/17/19 2211    Lab Status:  Final result Specimen:  Blood from Hand, Right Updated:  08/17/19 2228     WBC 9 78 Thousand/uL      RBC 4 47 Million/uL      Hemoglobin 14 0 g/dL      Hematocrit 41 3 %      MCV 92 fL      MCH 31 3 pg      MCHC 33 9 g/dL      RDW 13 0 %      MPV 11 4 fL      Platelets 059 Thousands/uL      nRBC 0 /100 WBCs      Neutrophils Relative 82 %      Immat GRANS % 0 %      Lymphocytes Relative 11 %      Monocytes Relative 6 %      Eosinophils Relative 1 %      Basophils Relative 0 %      Neutrophils Absolute 7 95 Thousands/µL      Immature Grans Absolute 0 04 Thousand/uL      Lymphocytes Absolute 1 09 Thousands/µL      Monocytes Absolute 0 60 Thousand/µL      Eosinophils Absolute 0 06 Thousand/µL      Basophils Absolute 0 04 Thousands/µL                  XR chest 1 view portable   Final Result by Nelly Baker MD (08/18 1142)      No acute cardiopulmonary disease              Workstation performed: [id number]                    Procedures  Procedures       ED Course  ED Course as of Aug 18 1432   Sat Aug 17, 2019   2236 Troponin I: 0 02         HEART Risk Score      Most Recent Value   History  1 Filed at: 08/17/2019 2316   ECG  0 Filed at: 08/17/2019 2316   Age  2 Filed at: 08/17/2019 2316   Risk Factors  2 Filed at: 08/17/2019 2316   Troponin  0 Filed at: 08/17/2019 2316   Heart Score Risk Calculator   History  1 Filed at: 08/17/2019 2316   ECG  0 Filed at: 08/17/2019 2316   Age  2 Filed at: 08/17/2019 2316   Risk Factors  2 Filed at: 08/17/2019 2316   Troponin  0 Filed at: 08/17/2019 2316   HEART Score  5 Filed at: 08/17/2019 2316   HEART Score  5 Filed at: 08/17/2019 2316            PERC Rule for PE      Most Recent Value   PERC Rule for PE   Age >=50  1 Filed at: 08/17/2019 2145   HR >=100  0 Filed at: 08/17/2019 2145   O2 Sat on room air < 95%  0 Filed at: 08/17/2019 2145   History of PE or DVT  0 Filed at: 08/17/2019 2145   Recent trauma or surgery  0 Filed at: 08/17/2019 2145   Hemoptysis  0 Filed at: 08/17/2019 2145   Exogenous estrogen  0 Filed at: 08/17/2019 2145   Unilateral leg swelling  0 Filed at: 08/17/2019 2145   PERC Rule for PE Results  1 Filed at: 08/17/2019 2145              MARCK Risk Score      Most Recent Value   Age >= 72  1 Filed at: 08/17/2019 2342   Known CAD (stenosis >= 50%)  0 Filed at: 08/17/2019 2342   Recent (<=24 hrs) Service Angina  1 Filed at: 08/17/2019 2342   ST Deviation >= 0 5 mm  0 Filed at: 08/17/2019 2342   3+ CAD Risk Factors (FHx, HTN, HLP, DM, Smoker)  1 Filed at: 08/17/2019 2342   Aspirin Use Past 7 Days  0 Filed at: 08/17/2019 2342   Elevated Cardiac Markers  0 Filed at: 08/17/2019 2342   MARCK Risk Score (Calculated)  3 Filed at: 08/17/2019 2342        Wells' Criteria for PE      Most Recent Value   Wells' Criteria for PE   Clinical signs and symptoms of DVT  0 Filed at: 08/17/2019 2145   PE is primary diagnosis or equally likely  0 Filed at: 08/17/2019 2145   HR >100  0 Filed at: 08/17/2019 2145   Immobilization at least 3 days or Surgery in the previous 4 weeks  0 Filed at: 08/17/2019 2145   Previous, objectively diagnosed PE or DVT  0 Filed at: 08/17/2019 2145   Hemoptysis  0 Filed at: 08/17/2019 2145   Malignancy with treatment within 6 months or palliative  0 Filed at: 08/17/2019 2145   Wells' Criteria Total  0 Filed at: 08/17/2019 2145            MDM  Number of Diagnoses or Management Options  Chest pain: new and requires workup  Elevated troponin: new and requires workup  Diagnosis management comments: 81year old male presenting for evaluation of chest pain  Due to significant risk factors along with a detectable troponin patient's heart score is 7  EKG unremarkable  No STEMI alert called  Patient admitted for rule out chest pain and to her troponins  Amount and/or Complexity of Data Reviewed  Clinical lab tests: ordered and reviewed  Tests in the radiology section of CPT®: ordered and reviewed  Tests in the medicine section of CPT®: ordered and reviewed  Decide to obtain previous medical records or to obtain history from someone other than the patient: yes  Review and summarize past medical records: yes  Discuss the patient with other providers: yes  Independent visualization of images, tracings, or specimens: yes    Risk of Complications, Morbidity, and/or Mortality  Presenting problems: high  Diagnostic procedures: high  Management options: high    Patient Progress  Patient progress: stable      Disposition  Final diagnoses:   Elevated troponin   Chest pain     Time reflects when diagnosis was documented in both MDM as applicable and the Disposition within this note     Time User Action Codes Description Comment    8/17/2019 10:58 PM Breana GILLILAND Add [R74 8] Elevated troponin     8/17/2019 10:58 PM Liz Millan Add [R07 9] Chest pain       ED Disposition     ED Disposition Condition Date/Time Comment    Admit Stable Sat Aug 17, 2019 10:58 PM Case was discussed with SUELLEN and the patient's admission status was agreed to be Admission Status: observation status to the service of Dr Mendoza Daily           Follow-up Information    None         Current Discharge Medication List      CONTINUE these medications which have NOT CHANGED    Details   albuterol (PROVENTIL HFA,VENTOLIN HFA) 90 mcg/act inhaler INHALE 2 PUFFS BY MOUTH EVERY 6 HOURS AS NEEDED FOR WHEEZING  Qty: 18 g, Refills: 3    Associated Diagnoses: Mild intermittent asthma without complication      B Complex Vitamins (VITAMIN B COMPLEX PO) Take 1 tablet by mouth daily      co-enzyme Q-10 30 MG capsule Take 30 mg by mouth daily      esomeprazole (NexIUM) 20 mg capsule Take 20 mg by mouth every morning before breakfast      levalbuterol (XOPENEX) 1 25 mg/3 mL nebulizer solution INHALE 1 VIAL BY NEBULIZER EVERY 8 HOURS AS NEEDED FOR WHEEZING  Qty: 72 vial, Refills: 3    Associated Diagnoses: Mild intermittent asthma with acute exacerbation      multivitamin (THERAGRAN) TABS Take 1 tablet by mouth daily      chlorthalidone 25 mg tablet Take 25 mg by mouth daily  Refills: 3      dicyclomine (BENTYL) 10 mg capsule Take 1 capsule (10 mg total) by mouth 4 (four) times a day (before meals and at bedtime) for 30 days  Qty: 120 capsule, Refills: 5    Associated Diagnoses: Irritable bowel syndrome with diarrhea      fluticasone (FLONASE) 50 mcg/act nasal spray 1 spray into each nostril daily      glucosamine 500 MG CAPS capsule Take 500 mg by mouth daily      hydrochlorothiazide (HYDRODIURIL) 25 mg tablet Take 1 tablet (25 mg total) by mouth daily for 138 days  Qty: 90 tablet, Refills: 0    Associated Diagnoses: Benign essential hypertension      TURMERIC PO Take 1 Dose by mouth daily           No discharge procedures on file      ED Provider  Electronically Signed by           Dar Brock PA-C  08/18/19 2303

## 2019-08-18 NOTE — QUICK NOTE
0972 while reviewing morning labs noted troponin jessica from <0 02 to 3 07  Patient denies chest pain  Upon assessment he is laying in bed, calm, respirations unlabored, skin pink warm and dry  EKGs overnight showed no interval change  EKG obtained at 7:12 a m  Showed no interval change  Contacted Dr Peg Cruz on-call for Cardiology to notify of troponin rise  Patient made NPO  Cardiology consult placed  Per Dr Peg Cruz, no clear evidence for acute coronary syndrome in the absence of chest pain or ST elevations, so holding on heparin drip pending Cardiology consultation  I spoke with patient, provided explanation and answered his questions to his satisfaction

## 2019-08-18 NOTE — ASSESSMENT & PLAN NOTE
· Initially presented with substernal chest pain without radiation 3 hours prior  · EKG was negative  · Troponin< 0 02     · MARCK score is 3  · Telemetry monitoring  · Serial troponins and EKGs

## 2019-08-18 NOTE — PLAN OF CARE
Problem: Potential for Falls  Goal: Patient will remain free of falls  Description  INTERVENTIONS:  - Assess patient frequently for physical needs  -  Identify cognitive and physical deficits and behaviors that affect risk of falls    -  [place] fall precautions as indicated by assessment   - Educate patient/family on patient safety including physical limitations  - Instruct patient to call for assistance with activity based on assessment  - Modify environment to reduce risk of injury  - Consider OT/PT consult to assist with strengthening/mobility  Outcome: Progressing

## 2019-08-18 NOTE — ASSESSMENT & PLAN NOTE
· Blood pressure ranging 141/83 to 174/81  · Continue to monitor  · Continue Coreg; if Cardiology opts to add ACE-inhibitor I would likely discontinue the hydrochlorothiazide

## 2019-08-18 NOTE — ASSESSMENT & PLAN NOTE
· Initially presented with substernal chest pain during exertion (while cutting trees) with significant troponin rise; also with multiple personal risk factors including strong family cardiac history   · Appreciate Cardiology input  · Cardiac cath done today: 95 % lesion in the distal RCA s/p RENE x 2  Mid circumflex: There was a diffuse 65 % stenosis  Proximal RCA: There was a 50 % stenosis  · Aspirin, Brilinta and Lipitor added to patient's regimen  Continue beta-blocker ?add ACE-I  · Check A1c in a m

## 2019-08-18 NOTE — CONSULTS
Consultation - Cardiology Team One  Blanca Rios 81 y o  male MRN: [id number]  Unit/Bed#: -01 Encounter: [id number]    Inpatient consult to Cardiology  Consult performed by: REGLA Elaine  Consult ordered by: REGLA King      Physician Requesting Consult: Evan Mata MD  Reason for Consult / Principal Problem: Chest pain/Elevated troponin       Assessment/ Plan    1  Chest pain with elevated troponin concerning for type 1 MI  Troponin 0 02/1 53/3 07  Reviewed ECG showing sinus bradycardia, no ischemic changes   Check lipid panel  Continue aspirin and statin  Start heparin drip and low-dose beta-blocker  Echocardiogram ordered  Plan for cardiac cath tomorrow  Patient has allergy to IV contrast  Ordered prednisone prep prior  Patient reports he tolerates Prednisone PO prior to IV contrast     2  Hypertension- average pressure 163/70  Patient is on hydrochlorothiazide at home, discontinued for now  Coreg added  Monitor BP    3  Hypokalemia- potassium 3 3  Replete    History of Present Illness   HPI: Blanca Rios is a 81y o  year old male who has a history of hypertension, hyperlipidemia, GERD, PUD, asthma and multiple abdominal surgeries  He does not follow with a cardiologist   He has no history of known CAD, heart failure or dysrhythmias  He presents to [facility] ER 8/17/19 via EMS with complaint of chest pain  Patient reports he first developed chest pain approximately 1 month ago when he was in [facility]) cutting a tree down  He reports chest tightness while bending over that was relieved with rest   Since being back from [facility]) patient reports he has been trying to take it easy  He was walking on a regular basis approximately 1 mile but started not feel right" so he discontinued walking routinely  About 4 days ago, he was cutting a tree down in his yard and while bending over he  developed chest pain described as tightness that was relieved when standing up and resting    He reports shortness of breath with chest discomfort  He continue to rest for the past couple days  Yesterday late afternoon at rest, he developed chest pain described again as tightness without radiation  He reports nausea, diaphoresis and shortness of breath with chest pain  He called 911 and on arrival EMS gave him nitroglycerin sublingual   Patient reports the chest pain improved with sublingual nitro and eventually resolved  No further complaint of chest pain today  Patient has no history of known CAD  He had a nuclear stress test 2017 showing normal study after pharmacologic vasodilatation that reproduction of symptoms and myocardial perfusion imaging was normal at rest and with stress  LV systolic function was normal      The patient lives at home independently with his wife  He reports he has been fairly active but recently decreased his activity level due to not feeling right    He denies history of tobacco use or alcohol use  He has a strong family history of heart disease including grandfather had MI in his 62s and , mother had CAD in her 62s and father had valvular disease  EKG reviewed personally:  Sinus bradycardia  Ventricular rate 54 beats per minute  NM interval 188 milliseconds  QRS  duration 112 milliseconds  QT interval 456 milliseconds  QTC interval 432 milliseconds    Telemetry reviewed personally:   Sinus rhythm heart rate 50- 60s  No ectopy    Review of Systems   Constitution: Negative for chills and fever  Cardiovascular: Negative for chest pain, dyspnea on exertion, leg swelling, orthopnea and palpitations  Respiratory: Negative for shortness of breath  Musculoskeletal: Negative for falls  Gastrointestinal: Negative for bloating, abdominal pain, nausea and vomiting  Neurological: Negative for dizziness and light-headedness  Psychiatric/Behavioral: Negative for altered mental status  All other systems reviewed and are negative      Historical Information   Past Medical History:   Diagnosis Date    Allergic reaction     LAST ASSESSED: [id number]    Asthma     Bursitis of heel     LAST ASSESSED: [id number]    Derangement of meniscus of left knee due to old injury     LAST ASSESSED: [id number]    Diverticulitis of colon     LAST ASSESSED: 39MKH5205    Dizziness     LAST ASSESSED: [id number]    GERD (gastroesophageal reflux disease)     History of colon polyps     Hypertension     Malignant hypertension     LAST ASSESSED: [id number]    Plantar fasciitis     Seasonal allergies     Shoulder impingement     LAST ASSESSED: [id number]    Stroke [facility])      Past Surgical History:   Procedure Laterality Date    APPENDECTOMY      CAROTID ENDARTARECTOMY      COLECTOMY Left     PARTIAL - SIGMOID; HEMICOLECTOMY FOR DIVERTICULITIS; ONSET: 1987    COLON SURGERY      COLONOSCOPY N/A 12/9/2016    Procedure: COLONOSCOPY;  Surgeon: Roger Cesar MD;  [facility];   Service:     HERNIA REPAIR      VENTRAL    NISSEN FUNDOPLICATION      ESOPHAGOGASTRIC FUNDOPLASTY LAST ASSESSED: [id number]    SINUS SURGERY      THROMBOENDARTERECTOMY      CAROTID; ONSET; MAY 2012     Social History     Substance and Sexual Activity   Alcohol Use No     Social History     Substance and Sexual Activity   Drug Use No     Social History     Tobacco Use   Smoking Status Former Smoker   Smokeless Tobacco Never Used     Family History:   Family History   Problem Relation Age of Onset    Cancer Paternal Grandfather     Hypertension Mother     Hyperlipidemia Mother     Hypertension Father     Hyperlipidemia Father        Meds/Allergies   all current active meds have been reviewed and current meds:   Current Facility-Administered Medications   Medication Dose Route Frequency    acetaminophen (TYLENOL) tablet 650 mg  650 mg Oral Q6H PRN    albuterol (PROVENTIL HFA,VENTOLIN HFA) inhaler 2 puff  2 puff Inhalation Q4H PRN    aspirin chewable tablet 81 mg  81 mg Oral Daily    atorvastatin (LIPITOR) tablet 40 mg  40 mg Oral QPM    chlorthalidone tablet 25 mg  25 mg Oral Daily    co-enzyme Q-10 capsule 30 mg  30 mg Oral Daily    enoxaparin (LOVENOX) subcutaneous injection 40 mg  40 mg Subcutaneous Daily    fluticasone (FLONASE) 50 mcg/act nasal spray 1 spray  1 spray Nasal Daily    glucosamine sulfate capsule 500 mg  500 mg Oral Daily    hydrochlorothiazide (HYDRODIURIL) tablet 25 mg  25 mg Oral Daily    levalbuterol (XOPENEX) inhalation solution 1 25 mg  1 25 mg Nebulization Q8H PRN    ondansetron (ZOFRAN) injection 4 mg  4 mg Intravenous Q6H PRN    pantoprazole (PROTONIX) EC tablet 20 mg  20 mg Oral Early Morning          Allergies   Allergen Reactions    Depo-Medrol [Methylprednisolone] Anaphylaxis     Severe Vagal Reaction    Iv Contrast [Iodinated Diagnostic Agents] Angioedema     Pt states itchy/swollen eyes, trouble breathing (years ago)    Sulfa Antibiotics        Objective   Vitals: Blood pressure (!) 174/81, pulse 58, temperature 97 7 °F (36 5 °C), temperature source Oral, resp  rate 18, height 5' 7 5" (1 715 m), weight 96 kg (211 lb 10 3 oz), SpO2 98 %  ,     Body mass index is 32 66 kg/m²  ,     Systolic (17FEO), LX , Min:147 , SEX:462     Diastolic (38CLF), ECA:62, Min:67, Max:88      No intake or output data in the 24 hours ending 19 0740  Weight (last 2 days)     Date/Time   Weight    19 2113   96 (211 64)            Invasive Devices     Peripheral Intravenous Line            Peripheral IV 19 Right Hand less than 1 day              Physical Exam   Constitutional: He is oriented to person, place, and time  No distress  HENT:   Head: Normocephalic  Neck: Neck supple  Cardiovascular: Normal rate, regular rhythm, normal heart sounds and intact distal pulses  Pulmonary/Chest: Effort normal and breath sounds normal  No stridor  No respiratory distress  RA   Abdominal: Soft  Bowel sounds are normal    Musculoskeletal: He exhibits no edema  Neurological: He is alert and oriented to person, place, and time  Skin: Skin is warm and dry  He is not diaphoretic  Psychiatric: He has a normal mood and affect  His behavior is normal          LABORATORY RESULTS:  Results from last 7 days   Lab Units 08/18/19  0411 08/18/19  0116 08/17/19  2211   TROPONIN I ng/mL 3 07* 1 53* 0 02     CBC with diff:   Results from last 7 days   Lab Units 08/17/19  2211   WBC Thousand/uL 9 78   HEMOGLOBIN g/dL 14 0   HEMATOCRIT % 41 3   MCV fL 92   PLATELETS Thousands/uL 206   MCH pg 31 3   MCHC g/dL 33 9   RDW % 13 0   MPV fL 11 4   NRBC AUTO /100 WBCs 0       CMP:  Results from last 7 days   Lab Units 08/18/19  0411 08/17/19  2211   POTASSIUM mmol/L 3 3* 3 6   CHLORIDE mmol/L 103 102   CO2 mmol/L 25 23   BUN mg/dL 14 16   CREATININE mg/dL 1 07 1 33*   CALCIUM mg/dL 9 0 8 9   AST U/L  --  31   ALT U/L  --  34   ALK PHOS U/L  --  52   EGFR ml/min/1 73sq m 71 55       BMP:  Results from last 7 days   Lab Units 08/18/19  0411 08/17/19  2211   POTASSIUM mmol/L 3 3* 3 6   CHLORIDE mmol/L 103 102   CO2 mmol/L 25 23   BUN mg/dL 14 16   CREATININE mg/dL 1 07 1 33*   CALCIUM mg/dL 9 0 8 9          Lab Results   Component Value Date    NTBNP 193 (H) 08/17/2019         Lipid Profile:   Lab Results   Component Value Date    CHOL 230 11/11/2015    CHOL 221 10/01/2014    CHOL 216 04/18/2014     Lab Results   Component Value Date    HDL 30 (L) 09/01/2018    HDL 33 (L) 09/30/2017    HDL 31 (L) 03/11/2017     Lab Results   Component Value Date    LDLCALC 106 (H) 09/01/2018    LDLCALC 113 (H) 09/30/2017    LDLCALC 125 (H) 03/11/2017     Lab Results   Component Value Date    TRIG 336 (H) 09/01/2018    TRIG 229 (H) 09/30/2017    TRIG 396 (H) 03/11/2017     Cardiac testing:   No results found for this or any previous visit  No results found for this or any previous visit  No procedure found  No results found for this or any previous visit  Imaging:   No results found      Thank you for allowing us to participate in this patient's care  Counseling / Coordination of Care  Total floor / unit time spent today 45 minutes  Greater than 50% of total time was spent with the patient and / or family counseling and / or coordination of care  A description of the counseling / coordination of care: Review of history, current assessment, development of a plan  Code Status: Level 1 - Full Code    ** Please Note: Dragon 360 Dictation voice to text software may have been used in the creation of this document   **

## 2019-08-18 NOTE — RESPIRATORY THERAPY NOTE
RT Protocol Note  Alyse oMlina 81 y o  male MRN: [id number]  Unit/Bed#: -01 Encounter: [id number]    Assessment    Principal Problem:    Other chest pain  Active Problems:    Benign essential hypertension    Abdominal pain      Home Pulmonary Medications: Xop neb PRN and Albuterol inhaler PRN       Past Medical History:   Diagnosis Date    Allergic reaction     LAST ASSESSED: [id number]    Asthma     Bursitis of heel     LAST ASSESSED: 23AUG2013    Derangement of meniscus of left knee due to old injury     LAST ASSESSED: 23AUG2013    Diverticulitis of colon     LAST ASSESSED: 15OCT2013    Dizziness     LAST ASSESSED: [id number]    GERD (gastroesophageal reflux disease)     History of colon polyps     Hypertension     Malignant hypertension     LAST ASSESSED: [id number]    Plantar fasciitis     Seasonal allergies     Shoulder impingement     LAST ASSESSED: 09ZOI8751    Stroke ([facility] [address] )      Social History     Socioeconomic History    Marital status: /Civil Union     Spouse name: None    Number of children: None    Years of education: None    Highest education level: None   Occupational History    None   Social Needs    Financial resource strain: None    Food insecurity:     Worry: None     Inability: None    Transportation needs:     Medical: None     Non-medical: None   Tobacco Use    Smoking status: Former Smoker    Smokeless tobacco: Never Used   Substance and Sexual Activity    Alcohol use: No    Drug use: No    Sexual activity: None   Lifestyle    Physical activity:     Days per week: None     Minutes per session: None    Stress: None   Relationships    Social connections:     Talks on phone: None     Gets together: None     Attends Restorationist service: None     Active member of club or organization: None     Attends meetings of clubs or organizations: None     Relationship status: None    Intimate partner violence:     Fear of current or ex partner: None     Emotionally abused: None     Physically abused: None     Forced sexual activity: None   Other Topics Concern    None   Social History Narrative    None       Subjective         Objective    Physical Exam:        Vitals:  Blood pressure 153/74, pulse 55, temperature 98 1 °F (36 7 °C), temperature source Oral, resp  rate 18, height 5' 7 5" (1 715 m), weight 96 kg (211 lb 10 3 oz), SpO2 97 %  Imaging and other studies: I have personally reviewed pertinent reports  O2 Device: (P) RA     Plan    Respiratory Plan: Mild Distress pathway        Resp Comments: (P) Assessed pt per respiratory protocol bilateral BS are diminished  SPO2 is 97% on RA  Pt has hx of asthma and takes albuterol inhaler and xop neb PRN at home  Pt had complaints of SOB due to pain  Will order home regimen and continue to monitor

## 2019-08-18 NOTE — ED ATTENDING ATTESTATION
Shandra Caballero MD, saw and evaluated the patient  I have discussed the patient with the resident/non-physician practitioner and agree with the resident's/non-physician practitioner's findings, Plan of Care, and MDM as documented in the resident's/non-physician practitioner's note, except where noted  All available labs and Radiology studies were reviewed  I was present for key portions of any procedure(s) performed by the resident/non-physician practitioner and I was immediately available to provide assistance  At this point I agree with the current assessment done in the Emergency Department  I have conducted an independent evaluation of this patient a history and physical is as follows:    72-year-old male with history of hypertension and hyperlipidemia presenting for evaluation of substernal chest pain  Does not radiate  Nonpleuritic and nonexertional   Patient does have some mild shortness of breath on arrival, states he feels like it is because of the pain, no longer feels dyspneic once the pain improved  No history of DVT or PE  No calf swelling or tenderness  No personal history of coronary artery disease  He was given 325 mg of aspirin and 2 doses of nitroglycerin in route to the emergency department by EMS, reports improvement in his pain  He has had pain like this the past from stomach ulcers, but states that usually that pain is momentary while this pain has lasted  Patient's vital signs reviewed  Heart with regular rate and rhythm, no murmurs, rubs, or gallops  Lungs clear to auscultation bilaterally  Abdominal exam benign  No lower extremity edema  I personally interpreted the patient's EKG, which reveals normal rate, normal sinus rhythm, normal axis, normal intervals, no ischemic changes, grossly unchanged from prior  Initial troponin is 0 02  Patient is moderate risk by HEART score  History and exam not consistent with PE    Will admit for chest pain observation      Critical Care Time  Procedures

## 2019-08-19 ENCOUNTER — APPOINTMENT (INPATIENT)
Dept: NON INVASIVE DIAGNOSTICS | Facility: HOSPITAL | Age: 69
DRG: 247 | End: 2019-08-19
Payer: MEDICARE

## 2019-08-19 PROBLEM — R10.13 EPIGASTRIC PAIN: Status: ACTIVE | Noted: 2019-07-29

## 2019-08-19 PROBLEM — I21.4 NSTEMI (NON-ST ELEVATED MYOCARDIAL INFARCTION) (HCC): Status: ACTIVE | Noted: 2019-08-17

## 2019-08-19 PROBLEM — E78.5 DYSLIPIDEMIA: Status: ACTIVE | Noted: 2019-08-19

## 2019-08-19 LAB
ANION GAP SERPL CALCULATED.3IONS-SCNC: 10 MMOL/L (ref 4–13)
APTT PPP: 73 SECONDS (ref 23–37)
ATRIAL RATE: 150 BPM
ATRIAL RATE: 55 BPM
ATRIAL RATE: 58 BPM
BUN SERPL-MCNC: 14 MG/DL (ref 5–25)
CALCIUM SERPL-MCNC: 9.2 MG/DL (ref 8.3–10.1)
CHLORIDE SERPL-SCNC: 103 MMOL/L (ref 100–108)
CHOLEST SERPL-MCNC: 202 MG/DL (ref 50–200)
CO2 SERPL-SCNC: 25 MMOL/L (ref 21–32)
CREAT SERPL-MCNC: 1.12 MG/DL (ref 0.6–1.3)
ERYTHROCYTE [DISTWIDTH] IN BLOOD BY AUTOMATED COUNT: 13.2 % (ref 11.6–15.1)
GFR SERPL CREATININE-BSD FRML MDRD: 68 ML/MIN/1.73SQ M
GLUCOSE SERPL-MCNC: 164 MG/DL (ref 65–140)
HCT VFR BLD AUTO: 44.9 % (ref 36.5–49.3)
HDLC SERPL-MCNC: 45 MG/DL (ref 40–60)
HGB BLD-MCNC: 14.8 G/DL (ref 12–17)
KCT BLD-ACNC: 285 SEC (ref 89–137)
LDLC SERPL CALC-MCNC: 147 MG/DL (ref 0–100)
MCH RBC QN AUTO: 30.5 PG (ref 26.8–34.3)
MCHC RBC AUTO-ENTMCNC: 33 G/DL (ref 31.4–37.4)
MCV RBC AUTO: 93 FL (ref 82–98)
P AXIS: 32 DEGREES
P AXIS: 36 DEGREES
P AXIS: 62 DEGREES
PLATELET # BLD AUTO: 228 THOUSANDS/UL (ref 149–390)
PMV BLD AUTO: 11.1 FL (ref 8.9–12.7)
POTASSIUM SERPL-SCNC: 3.8 MMOL/L (ref 3.5–5.3)
PR INTERVAL: 174 MS
PR INTERVAL: 196 MS
PR INTERVAL: 206 MS
QRS AXIS: 54 DEGREES
QRS AXIS: 61 DEGREES
QRS AXIS: 64 DEGREES
QRSD INTERVAL: 112 MS
QRSD INTERVAL: 114 MS
QRSD INTERVAL: 116 MS
QT INTERVAL: 446 MS
QT INTERVAL: 454 MS
QT INTERVAL: 464 MS
QTC INTERVAL: 426 MS
QTC INTERVAL: 443 MS
QTC INTERVAL: 445 MS
RBC # BLD AUTO: 4.85 MILLION/UL (ref 3.88–5.62)
SODIUM SERPL-SCNC: 138 MMOL/L (ref 136–145)
SPECIMEN SOURCE: ABNORMAL
T WAVE AXIS: -11 DEGREES
T WAVE AXIS: -12 DEGREES
T WAVE AXIS: 49 DEGREES
TRIGL SERPL-MCNC: 48 MG/DL
VENTRICULAR RATE: 55 BPM
VENTRICULAR RATE: 55 BPM
VENTRICULAR RATE: 58 BPM
WBC # BLD AUTO: 7.98 THOUSAND/UL (ref 4.31–10.16)

## 2019-08-19 PROCEDURE — C1725 CATH, TRANSLUMIN NON-LASER: HCPCS | Performed by: NURSE PRACTITIONER

## 2019-08-19 PROCEDURE — 027035Z DILATION OF CORONARY ARTERY, ONE ARTERY WITH TWO DRUG-ELUTING INTRALUMINAL DEVICES, PERCUTANEOUS APPROACH: ICD-10-PCS | Performed by: INTERNAL MEDICINE

## 2019-08-19 PROCEDURE — 85027 COMPLETE CBC AUTOMATED: CPT | Performed by: NURSE PRACTITIONER

## 2019-08-19 PROCEDURE — C1769 GUIDE WIRE: HCPCS | Performed by: NURSE PRACTITIONER

## 2019-08-19 PROCEDURE — 92928 PRQ TCAT PLMT NTRAC ST 1 LES: CPT | Performed by: INTERNAL MEDICINE

## 2019-08-19 PROCEDURE — C1874 STENT, COATED/COV W/DEL SYS: HCPCS

## 2019-08-19 PROCEDURE — 99232 SBSQ HOSP IP/OBS MODERATE 35: CPT | Performed by: INTERNAL MEDICINE

## 2019-08-19 PROCEDURE — 85730 THROMBOPLASTIN TIME PARTIAL: CPT | Performed by: INTERNAL MEDICINE

## 2019-08-19 PROCEDURE — 93010 ELECTROCARDIOGRAM REPORT: CPT | Performed by: INTERNAL MEDICINE

## 2019-08-19 PROCEDURE — 85347 COAGULATION TIME ACTIVATED: CPT

## 2019-08-19 PROCEDURE — 99152 MOD SED SAME PHYS/QHP 5/>YRS: CPT | Performed by: NURSE PRACTITIONER

## 2019-08-19 PROCEDURE — 80061 LIPID PANEL: CPT | Performed by: NURSE PRACTITIONER

## 2019-08-19 PROCEDURE — C9600 PERC DRUG-EL COR STENT SING: HCPCS | Performed by: NURSE PRACTITIONER

## 2019-08-19 PROCEDURE — 99153 MOD SED SAME PHYS/QHP EA: CPT | Performed by: NURSE PRACTITIONER

## 2019-08-19 PROCEDURE — 93005 ELECTROCARDIOGRAM TRACING: CPT

## 2019-08-19 PROCEDURE — 99152 MOD SED SAME PHYS/QHP 5/>YRS: CPT | Performed by: INTERNAL MEDICINE

## 2019-08-19 PROCEDURE — C1887 CATHETER, GUIDING: HCPCS | Performed by: NURSE PRACTITIONER

## 2019-08-19 PROCEDURE — C1894 INTRO/SHEATH, NON-LASER: HCPCS | Performed by: NURSE PRACTITIONER

## 2019-08-19 PROCEDURE — 99232 SBSQ HOSP IP/OBS MODERATE 35: CPT | Performed by: PHYSICIAN ASSISTANT

## 2019-08-19 PROCEDURE — 93454 CORONARY ARTERY ANGIO S&I: CPT | Performed by: INTERNAL MEDICINE

## 2019-08-19 PROCEDURE — 93454 CORONARY ARTERY ANGIO S&I: CPT | Performed by: NURSE PRACTITIONER

## 2019-08-19 PROCEDURE — 80048 BASIC METABOLIC PNL TOTAL CA: CPT | Performed by: NURSE PRACTITIONER

## 2019-08-19 PROCEDURE — B2111ZZ FLUOROSCOPY OF MULTIPLE CORONARY ARTERIES USING LOW OSMOLAR CONTRAST: ICD-10-PCS | Performed by: INTERNAL MEDICINE

## 2019-08-19 RX ORDER — MIDAZOLAM HYDROCHLORIDE 1 MG/ML
INJECTION INTRAMUSCULAR; INTRAVENOUS CODE/TRAUMA/SEDATION MEDICATION
Status: COMPLETED | OUTPATIENT
Start: 2019-08-19 | End: 2019-08-19

## 2019-08-19 RX ORDER — HEPARIN SODIUM 1000 [USP'U]/ML
INJECTION, SOLUTION INTRAVENOUS; SUBCUTANEOUS CODE/TRAUMA/SEDATION MEDICATION
Status: COMPLETED | OUTPATIENT
Start: 2019-08-19 | End: 2019-08-19

## 2019-08-19 RX ORDER — SODIUM CHLORIDE 9 MG/ML
100 INJECTION, SOLUTION INTRAVENOUS CONTINUOUS
Status: DISPENSED | OUTPATIENT
Start: 2019-08-19 | End: 2019-08-19

## 2019-08-19 RX ORDER — VERAPAMIL HYDROCHLORIDE 2.5 MG/ML
INJECTION, SOLUTION INTRAVENOUS CODE/TRAUMA/SEDATION MEDICATION
Status: COMPLETED | OUTPATIENT
Start: 2019-08-19 | End: 2019-08-19

## 2019-08-19 RX ORDER — LIDOCAINE HYDROCHLORIDE 10 MG/ML
INJECTION, SOLUTION INFILTRATION; PERINEURAL CODE/TRAUMA/SEDATION MEDICATION
Status: COMPLETED | OUTPATIENT
Start: 2019-08-19 | End: 2019-08-19

## 2019-08-19 RX ORDER — FENTANYL CITRATE 50 UG/ML
INJECTION, SOLUTION INTRAMUSCULAR; INTRAVENOUS CODE/TRAUMA/SEDATION MEDICATION
Status: COMPLETED | OUTPATIENT
Start: 2019-08-19 | End: 2019-08-19

## 2019-08-19 RX ORDER — FAMOTIDINE 20 MG/1
20 TABLET, FILM COATED ORAL 2 TIMES DAILY
Status: DISCONTINUED | OUTPATIENT
Start: 2019-08-19 | End: 2019-08-20 | Stop reason: HOSPADM

## 2019-08-19 RX ORDER — NITROGLYCERIN 20 MG/100ML
INJECTION INTRAVENOUS CODE/TRAUMA/SEDATION MEDICATION
Status: COMPLETED | OUTPATIENT
Start: 2019-08-19 | End: 2019-08-19

## 2019-08-19 RX ORDER — PANTOPRAZOLE SODIUM 20 MG/1
20 TABLET, DELAYED RELEASE ORAL
Status: DISCONTINUED | OUTPATIENT
Start: 2019-08-19 | End: 2019-08-20 | Stop reason: HOSPADM

## 2019-08-19 RX ADMIN — FAMOTIDINE 20 MG: 20 TABLET ORAL at 17:28

## 2019-08-19 RX ADMIN — TICAGRELOR 90 MG: 90 TABLET ORAL at 08:39

## 2019-08-19 RX ADMIN — Medication 30 MG: at 08:41

## 2019-08-19 RX ADMIN — VERAPAMIL HYDROCHLORIDE 2.5 MG: 2.5 INJECTION, SOLUTION INTRAVENOUS at 10:28

## 2019-08-19 RX ADMIN — LIDOCAINE HYDROCHLORIDE 1 ML: 10 INJECTION, SOLUTION INFILTRATION; PERINEURAL at 10:25

## 2019-08-19 RX ADMIN — ASPIRIN 81 MG 81 MG: 81 TABLET ORAL at 08:39

## 2019-08-19 RX ADMIN — MIDAZOLAM HYDROCHLORIDE 2 MG: 1 INJECTION, SOLUTION INTRAMUSCULAR; INTRAVENOUS at 10:20

## 2019-08-19 RX ADMIN — MIDAZOLAM HYDROCHLORIDE 1 MG: 1 INJECTION, SOLUTION INTRAMUSCULAR; INTRAVENOUS at 10:29

## 2019-08-19 RX ADMIN — DIPHENHYDRAMINE HCL 50 MG: 25 TABLET ORAL at 09:57

## 2019-08-19 RX ADMIN — FENTANYL CITRATE 25 MCG: 50 INJECTION INTRAMUSCULAR; INTRAVENOUS at 10:29

## 2019-08-19 RX ADMIN — CARVEDILOL 3.12 MG: 3.12 TABLET, FILM COATED ORAL at 08:39

## 2019-08-19 RX ADMIN — HEPARIN SODIUM AND DEXTROSE 13.1 UNITS/KG/HR: 10000; 5 INJECTION INTRAVENOUS at 04:19

## 2019-08-19 RX ADMIN — SODIUM CHLORIDE 100 ML/HR: 0.9 INJECTION, SOLUTION INTRAVENOUS at 16:16

## 2019-08-19 RX ADMIN — PANTOPRAZOLE SODIUM 20 MG: 20 TABLET, DELAYED RELEASE ORAL at 06:25

## 2019-08-19 RX ADMIN — PREDNISONE 50 MG: 50 TABLET ORAL at 06:25

## 2019-08-19 RX ADMIN — TICAGRELOR 90 MG: 90 TABLET ORAL at 20:40

## 2019-08-19 RX ADMIN — CARVEDILOL 3.12 MG: 3.12 TABLET, FILM COATED ORAL at 17:28

## 2019-08-19 RX ADMIN — PANTOPRAZOLE SODIUM 20 MG: 20 TABLET, DELAYED RELEASE ORAL at 17:28

## 2019-08-19 RX ADMIN — HEPARIN SODIUM 4000 UNITS: 1000 INJECTION INTRAVENOUS; SUBCUTANEOUS at 10:28

## 2019-08-19 RX ADMIN — Medication 500 MG: at 08:39

## 2019-08-19 RX ADMIN — ASPIRIN 81 MG 324 MG: 81 TABLET ORAL at 06:27

## 2019-08-19 RX ADMIN — FENTANYL CITRATE 50 MCG: 50 INJECTION INTRAMUSCULAR; INTRAVENOUS at 10:20

## 2019-08-19 RX ADMIN — NITROGLYCERIN 200 MCG: 20 INJECTION INTRAVENOUS at 10:28

## 2019-08-19 RX ADMIN — SODIUM CHLORIDE 100 ML/HR: 0.9 INJECTION, SOLUTION INTRAVENOUS at 11:25

## 2019-08-19 RX ADMIN — SODIUM CHLORIDE 100 ML/HR: 0.9 INJECTION, SOLUTION INTRAVENOUS at 06:28

## 2019-08-19 RX ADMIN — FAMOTIDINE 20 MG: 20 TABLET ORAL at 08:39

## 2019-08-19 RX ADMIN — ATORVASTATIN CALCIUM 40 MG: 40 TABLET, FILM COATED ORAL at 17:28

## 2019-08-19 RX ADMIN — HEPARIN SODIUM 6000 UNITS: 1000 INJECTION INTRAVENOUS; SUBCUTANEOUS at 10:31

## 2019-08-19 RX ADMIN — IOHEXOL 100 ML: 350 INJECTION, SOLUTION INTRAVENOUS at 10:51

## 2019-08-19 NOTE — PROGRESS NOTES
Progress Note - Mari Section 1950, 81 y o  male MRN: [id number]    Unit/Bed#: -01 Encounter: [id number]    Primary Care Provider: Suzie Farris MD   Date and time admitted to hospital: 8/17/2019  9:09 PM  * NSTEMI (non-ST elevated myocardial infarction) [facility])  Assessment & Plan  · Initially presented with substernal chest pain during exertion (while cutting trees) with significant troponin rise; also with multiple personal risk factors including strong family cardiac history   · Appreciate Cardiology input  · Cardiac cath done today: 95 % lesion in the distal RCA s/p RENE x 2  Mid circumflex: There was a diffuse 65 % stenosis  Proximal RCA: There was a 50 % stenosis  · Aspirin, Brilinta and Lipitor added to patient's regimen  Continue beta-blocker ?add ACE-I  · Check A1c in a m  Epigastric pain  Assessment & Plan  · Has history of Randall fundoplication for sliding hiatal hernia (and multiple other GI surgeries)  · Follows with GI Dr Piper Alvarez as an outpatient, had EGD last week--I cannot see the report but patient tells me they found nonbleeding ulcers  · Patient is aware of the risks of GI bleeding while on dual anti-platelet therapy but understands that this is necessary status post stent  · He states he was doing okay until this morning when he had epigastric pain due to drinking ice water  · Continue home medicine including Protonix 20 b i d  And add Pepcid as needed    Benign essential hypertension  Assessment & Plan  · Blood pressure ranging 141/83 to 174/81  · Continue to monitor  · Continue Coreg; if Cardiology opts to add ACE-inhibitor I would likely discontinue the hydrochlorothiazide    Dyslipidemia  Assessment & Plan  · Agree with statin use    VTE Pharmacologic Prophylaxis:   Pharmacologic: Heparin Drip  Mechanical VTE Prophylaxis in Place: Yes    Patient Centered Rounds: I have performed bedside rounds with nursing staff today      Discussions with Specialists or Other Care Team Provider: slca    Education and Discussions with Family / Patient:     Time Spent for Care: 30 minutes  More than 50% of total time spent on counseling and coordination of care as described above  Patient provided detailed history to me regarding his known history of coronary disease in family and his prior abdominal issues  Current Length of Stay: 1 day(s)    Current Patient Status: Inpatient   Certification Statement: The patient will continue to require additional inpatient hospital stay due to Cardiac catheterization    Discharge Plan:  Likely discharge tomorrow    Code Status: Level 1 - Full Code    Subjective:   Evaluated patient earlier in the morning  He was complaining of epigastric discomfort after having ice water to drink, which he reports does typically trigger abdominal discomfort in him  Otherwise he denies any chest pain, pressure, tightness, shortness of breath, lightheadedness  Objective:     Vitals:   Temp (24hrs), Av 9 °F (36 6 °C), Min:97 7 °F (36 5 °C), Max:98 °F (36 7 °C)    Temp:  [97 7 °F (36 5 °C)-98 °F (36 7 °C)] 97 9 °F (36 6 °C)  HR:  [49-64] 63  Resp:  [18] 18  BP: (141-159)/(71-83) 141/83  SpO2:  [97 %-98 %] 98 %  Body mass index is 32 66 kg/m²  Input and Output Summary (last 24 hours): Intake/Output Summary (Last 24 hours) at 2019 1120  Last data filed at 2019 1728  Gross per 24 hour   Intake 120 ml   Output 700 ml   Net -580 ml       Physical Exam:     Physical Exam   Constitutional: He appears well-developed and well-nourished  No distress  HENT:   Head: Normocephalic and atraumatic  Eyes: Conjunctivae are normal  Right eye exhibits no discharge  Left eye exhibits no discharge  No scleral icterus  Cardiovascular: Normal rate, regular rhythm and normal heart sounds  Exam reveals no gallop and no friction rub  No murmur heard  Pulmonary/Chest: Effort normal and breath sounds normal  No stridor  No respiratory distress  He has no wheezes     Abdominal: Soft  Bowel sounds are normal  He exhibits no distension  There is tenderness (epigastric mild tenderness)  Multiple abdominal scars noted   Musculoskeletal: He exhibits no edema  Neurological: He is alert  Awake alert and interactive he is a good historian   Skin: Skin is warm and dry  No rash noted  He is not diaphoretic  No erythema  No pallor  Psychiatric: He has a normal mood and affect  His behavior is normal  Judgment and thought content normal    Vitals reviewed  Additional Data:     Labs:    Results from last 7 days   Lab Units 08/19/19  0429 08/17/19  2211   WBC Thousand/uL 7 98   < > 9 78   HEMOGLOBIN g/dL 14 8   < > 14 0   HEMATOCRIT % 44 9   < > 41 3   PLATELETS Thousands/uL 228   < > 206   NEUTROS PCT %  --   --  82*   LYMPHS PCT %  --   --  11*   MONOS PCT %  --   --  6   EOS PCT %  --   --  1    < > = values in this interval not displayed  Results from last 7 days   Lab Units 08/19/19  0429 08/17/19 2211   SODIUM mmol/L 138   < > 140   POTASSIUM mmol/L 3 8   < > 3 6   CHLORIDE mmol/L 103   < > 102   CO2 mmol/L 25   < > 23   BUN mg/dL 14   < > 16   CREATININE mg/dL 1 12   < > 1 33*   ANION GAP mmol/L 10   < > 15*   CALCIUM mg/dL 9 2   < > 8 9   ALBUMIN g/dL  --   --  3 8   TOTAL BILIRUBIN mg/dL  --   --  0 60   ALK PHOS U/L  --   --  52   ALT U/L  --   --  34   AST U/L  --   --  31   GLUCOSE RANDOM mg/dL 164*   < > 143*    < > = values in this interval not displayed  Results from last 7 days   Lab Units 08/18/19  0954   INR  1 15                   * I Have Reviewed All Lab Data Listed Above  * Additional Pertinent Lab Tests Reviewed:  Roger 66 Admission Reviewed    Imaging:    Imaging Reports Reviewed Today Include:   Imaging Personally Reviewed by Myself Includes:      Recent Cultures (last 7 days):           Last 24 Hours Medication List:     Current Facility-Administered Medications:  acetaminophen 650 mg Oral Q6H PRN REGLA Figueroa    albuterol 2 puff Inhalation Q4H PRN Kristian Lombardo MD    aspirin 81 mg Oral Daily Nisha Crews, REGLA    atorvastatin 40 mg Oral QPM Nisha Crews, REGLA    carvedilol 3 125 mg Oral BID With Meals REGLA Beal    co-enzyme Q-10 30 mg Oral Daily Nisha Crews, CRNP    famotidine 20 mg Oral BID An Christina PA-C    fluticasone 1 spray Nasal Daily Nisha Crews, REGLA    glucosamine sulfate 500 mg Oral Daily Nisha Crews, CRNP    heparin (porcine) 3-20 Units/kg/hr (Order-Specific) Intravenous Titrated REGLA Beal Last Rate: Stopped (08/19/19 1018)   heparin (porcine) 2,000 Units Intravenous PRN REGLA Beal    heparin (porcine) 4,000 Units Intravenous PRN REGLA Beal    hydrochlorothiazide 12 5 mg Oral Daily Kristian Lombardo MD    levalbuterol 1 25 mg Nebulization Q8H PRN Kristian Lombardo MD    ondansetron 4 mg Intravenous Q6H PRN Nisha Dominick, REGLA    pantoprazole 20 mg Oral BID AC An Christina PA-C    sodium chloride 100 mL/hr Intravenous Continuous Jo Hays DO    ticagrelor 90 mg Oral Q12H REGLA Beal         Today, Patient Was Seen By: Katharine Dan PA-C    ** Please Note: Dictation voice to text software may have been used in the creation of this document   **

## 2019-08-19 NOTE — PLAN OF CARE
Problem: Potential for Falls  Goal: Patient will remain free of falls  Description  INTERVENTIONS:  - Assess patient frequently for physical needs  -  Identify cognitive and physical deficits and behaviors that affect risk of falls    -  [place] fall precautions as indicated by assessment   - Educate patient/family on patient safety including physical limitations  - Instruct patient to call for assistance with activity based on assessment  - Modify environment to reduce risk of injury  - Consider OT/PT consult to assist with strengthening/mobility  Outcome: Progressing

## 2019-08-19 NOTE — PROGRESS NOTES
Cardiology Progress Note - Efren Leal 81 y o  male MRN: [id number]    Unit/Bed#: -01 Encounter: [id number]      Assessment/Recommendations:  1  Chest pain: likely Type 1 NSTEMI, for cardiac cath today  Continued on heparin gtt, ASA, statin, Brilinta, and B-blocker  Continued on steroid prep for dye allergy  2   HTN: mildly elevated today, may require ACE-I prior to discharge, will review needs after cath  Subjective:   Patient seen and examined  No significant events overnight   ; pertinent negatives - chest pain, chest pressure/discomfort, dyspnea, irregular heart beat and palpitations  Objective:     Vitals: Blood pressure 150/71, pulse 57, temperature 97 9 °F (36 6 °C), temperature source Oral, resp  rate 18, height 5' 7 5" (1 715 m), weight 96 kg (211 lb 10 3 oz), SpO2 98 %  , Body mass index is 32 66 kg/m² ,   Orthostatic Blood Pressures      Most Recent Value   Blood Pressure  150/71 filed at 08/19/2019 0736   Patient Position - Orthostatic VS  Sitting filed at 08/19/2019 0736            Intake/Output Summary (Last 24 hours) at 8/19/2019 1012  Last data filed at 8/18/2019 1728  Gross per 24 hour   Intake 120 ml   Output 700 ml   Net -580 ml       TELE: No significant arrhythmias seen      Physical Exam:    GEN: Efren Leal appears well, alert and oriented x 3, pleasant and cooperative   HEENT: pupils equal, round, and reactive to light; extraocular muscles intact  NECK: supple, no carotid bruits   HEART: regular rhythm, normal S1 and S2, no murmurs, clicks, gallops or rubs   LUNGS: clear to auscultation bilaterally; no wheezes, rales, or rhonchi   ABDOMEN: normal bowel sounds, soft, no tenderness, no distention  EXTREMITIES: peripheral pulses normal; no clubbing, cyanosis, or edema  NEURO: no focal findings   SKIN: normal without suspicious lesions on exposed skin    Medications:      Current Facility-Administered Medications:     acetaminophen (TYLENOL) tablet 650 mg, 650 mg, Oral, Q6H PRN, Reggy Nose, CRNP    albuterol (PROVENTIL HFA,VENTOLIN HFA) inhaler 2 puff, 2 puff, Inhalation, Q4H PRN, Qing Boogie MD    aspirin chewable tablet 81 mg, 81 mg, Oral, Daily, Reggy Nose, CRNP, 81 mg at 08/19/19 0839    atorvastatin (LIPITOR) tablet 40 mg, 40 mg, Oral, QPM, Reggy Nose, CRNP, 40 mg at 08/18/19 1727    carvedilol (COREG) tablet 3 125 mg, 3 125 mg, Oral, BID With Meals, Eric Alex, CRNP, 3 125 mg at 08/19/19 1194    co-enzyme Q-10 capsule 30 mg, 30 mg, Oral, Daily, Reggy Nose, CRNP, 30 mg at 08/19/19 0841    famotidine (PEPCID) tablet 20 mg, 20 mg, Oral, BID, An Christina PA-C, 20 mg at 08/19/19 0839    fluticasone (FLONASE) 50 mcg/act nasal spray 1 spray, 1 spray, Nasal, Daily, Reggy Nose, CRNP, 1 spray at 08/18/19 0902    glucosamine sulfate capsule 500 mg, 500 mg, Oral, Daily, Reggy Nose, CRNP, 500 mg at 08/19/19 0014    heparin (porcine) 25,000 units in 250 mL infusion (premix), 3-20 Units/kg/hr (Order-Specific), Intravenous, Titrated, Eric Alex, CRNP, Last Rate: 11 8 mL/hr at 08/19/19 0554, 13 1 Units/kg/hr at 08/19/19 0554    heparin (porcine) injection 2,000 Units, 2,000 Units, Intravenous, PRN, Eric Alex, CRNP, 2,000 Units at 08/18/19 1726    heparin (porcine) injection 4,000 Units, 4,000 Units, Intravenous, PRN, Eric Alex, CRNP    hydrochlorothiazide (HYDRODIURIL) tablet 12 5 mg, 12 5 mg, Oral, Daily, Qing Boogie MD, Stopped at 08/19/19 0852    levalbuterol (XOPENEX) inhalation solution 1 25 mg, 1 25 mg, Nebulization, Q8H PRN, Qing Boogie MD    ondansetron [facility]) injection 4 mg, 4 mg, Intravenous, Q6H PRN, Reggy Nose, CRNP    pantoprazole (PROTONIX) EC tablet 20 mg, 20 mg, Oral, BID AC, An Christina PA-C    sodium chloride 0 9 % infusion, 100 mL/hr, Intravenous, Continuous, Eric Alex, CRNP, Last Rate: 100 mL/hr at 08/19/19 0628, 100 mL/hr at 08/19/19 0628    ticagrelor (BRILINTA) tablet 90 mg, 90 mg, Oral, Evonne Expose, CRNP, 90 mg at 08/19/19 1224     Labs & Results:    Results from last 7 days   Lab Units 08/18/19  1340 08/18/19  0754 08/18/19  0411   TROPONIN I ng/mL 3 82* 4 61* 3 07*     Results from last 7 days   Lab Units 08/19/19  0429 08/18/19  0952 08/17/19  2211   WBC Thousand/uL 7 98 8 03 9 78   HEMOGLOBIN g/dL 14 8 14 3 14 0   HEMATOCRIT % 44 9 43 1 41 3   PLATELETS Thousands/uL 228 220 206     Results from last 7 days   Lab Units 08/19/19  0429   TRIGLYCERIDES mg/dL 48   HDL mg/dL 45     Results from last 7 days   Lab Units 08/19/19  0429 08/18/19  0411 08/17/19  2211   POTASSIUM mmol/L 3 8 3 3* 3 6   CHLORIDE mmol/L 103 103 102   CO2 mmol/L 25 25 23   BUN mg/dL 14 14 16   CREATININE mg/dL 1 12 1 07 1 33*   CALCIUM mg/dL 9 2 9 0 8 9   ALK PHOS U/L  --   --  52   ALT U/L  --   --  34   AST U/L  --   --  31     Results from last 7 days   Lab Units 08/19/19  0429 08/18/19  2313 08/18/19  1621  08/18/19  0954   INR   --   --   --   --  1 15   PTT seconds 73* 76* 49*   < > 30    < > = values in this interval not displayed             Echo: personally reviewed - normal LVEF with HK of basal-mid Inferior wall, mild LVH, G1DD, mild MR    EKG personally reviewed by Peter Delgado MD

## 2019-08-20 ENCOUNTER — TRANSITIONAL CARE MANAGEMENT (OUTPATIENT)
Dept: FAMILY MEDICINE CLINIC | Facility: CLINIC | Age: 69
End: 2019-08-20

## 2019-08-20 ENCOUNTER — TELEPHONE (OUTPATIENT)
Dept: FAMILY MEDICINE CLINIC | Facility: CLINIC | Age: 69
End: 2019-08-20

## 2019-08-20 VITALS
TEMPERATURE: 98.1 F | RESPIRATION RATE: 18 BRPM | HEIGHT: 68 IN | DIASTOLIC BLOOD PRESSURE: 70 MMHG | OXYGEN SATURATION: 97 % | HEART RATE: 77 BPM | BODY MASS INDEX: 32.08 KG/M2 | SYSTOLIC BLOOD PRESSURE: 156 MMHG | WEIGHT: 211.64 LBS

## 2019-08-20 LAB
APTT PPP: 27 SECONDS (ref 23–37)
EST. AVERAGE GLUCOSE BLD GHB EST-MCNC: 114 MG/DL
HBA1C MFR BLD: 5.6 % (ref 4.2–6.3)

## 2019-08-20 PROCEDURE — 99232 SBSQ HOSP IP/OBS MODERATE 35: CPT | Performed by: INTERNAL MEDICINE

## 2019-08-20 PROCEDURE — 85730 THROMBOPLASTIN TIME PARTIAL: CPT | Performed by: INTERNAL MEDICINE

## 2019-08-20 PROCEDURE — 83036 HEMOGLOBIN GLYCOSYLATED A1C: CPT | Performed by: PHYSICIAN ASSISTANT

## 2019-08-20 PROCEDURE — 99239 HOSP IP/OBS DSCHRG MGMT >30: CPT | Performed by: PHYSICIAN ASSISTANT

## 2019-08-20 RX ORDER — ASPIRIN 81 MG/1
81 TABLET, CHEWABLE ORAL DAILY
Qty: 30 TABLET | Refills: 0
Start: 2019-08-21 | End: 2020-05-13

## 2019-08-20 RX ORDER — LISINOPRIL 10 MG/1
10 TABLET ORAL DAILY
Status: DISCONTINUED | OUTPATIENT
Start: 2019-08-20 | End: 2019-08-20 | Stop reason: HOSPADM

## 2019-08-20 RX ORDER — CARVEDILOL 3.12 MG/1
3.12 TABLET ORAL 2 TIMES DAILY WITH MEALS
Qty: 60 TABLET | Refills: 0 | Status: SHIPPED | OUTPATIENT
Start: 2019-08-20 | End: 2019-08-28 | Stop reason: SINTOL

## 2019-08-20 RX ORDER — LISINOPRIL 10 MG/1
10 TABLET ORAL DAILY
Qty: 30 TABLET | Refills: 0 | Status: SHIPPED | OUTPATIENT
Start: 2019-08-21 | End: 2019-08-30 | Stop reason: SDUPTHER

## 2019-08-20 RX ORDER — ACETAMINOPHEN 325 MG/1
650 TABLET ORAL EVERY 6 HOURS PRN
Qty: 30 TABLET | Refills: 0
Start: 2019-08-20

## 2019-08-20 RX ORDER — ATORVASTATIN CALCIUM 40 MG/1
40 TABLET, FILM COATED ORAL EVERY EVENING
Qty: 30 TABLET | Refills: 0 | Status: SHIPPED | OUTPATIENT
Start: 2019-08-20 | End: 2019-08-30 | Stop reason: SDUPTHER

## 2019-08-20 RX ADMIN — CARVEDILOL 3.12 MG: 3.12 TABLET, FILM COATED ORAL at 08:51

## 2019-08-20 RX ADMIN — LISINOPRIL 10 MG: 10 TABLET ORAL at 08:51

## 2019-08-20 RX ADMIN — PANTOPRAZOLE SODIUM 20 MG: 20 TABLET, DELAYED RELEASE ORAL at 06:11

## 2019-08-20 RX ADMIN — Medication 500 MG: at 08:51

## 2019-08-20 RX ADMIN — ASPIRIN 81 MG 81 MG: 81 TABLET ORAL at 08:51

## 2019-08-20 RX ADMIN — TICAGRELOR 90 MG: 90 TABLET ORAL at 08:51

## 2019-08-20 RX ADMIN — FAMOTIDINE 20 MG: 20 TABLET ORAL at 08:51

## 2019-08-20 RX ADMIN — Medication 30 MG: at 09:00

## 2019-08-20 NOTE — DISCHARGE SUMMARY
Discharge- Delmy Duran 1950, 81 y o  male MRN: [id number]    Unit/Bed#: -01 Encounter: [id number]    Primary Care Provider: Shailesh Dawson MD   Date and time admitted to hospital: 8/17/2019  9:09 PM  * NSTEMI (non-ST elevated myocardial infarction) [facility])  Assessment & Plan  · Initially presented with substernal chest pain during exertion (while cutting trees) with significant troponin rise; also with multiple personal risk factors including strong family cardiac history   · Diagnosed with type 1 non STEMI  · Appreciate Cardiology input  · Cardiac cath: 95 % lesion in the distal RCA s/p RENE x 2  Mid circumflex: There was a diffuse 65 % stenosis   Proximal RCA: There was a 50 % stenosis  · Aspirin, Brilinta and Lipitor, beta-blocker/ACE-I were added        Epigastric pain  Assessment & Plan  · Has history of Randall fundoplication for sliding hiatal hernia (and multiple other GI surgeries)  · Follows with GI Dr Janis Lynne as an outpatient, had EGD last week- reviewed report which showed Genao's and superficial stomach nonbleeding ulcers  · Patient is aware of the risks of GI bleeding while on dual anti-platelet therapy but understands that this is necessary status post stent  · Continue home medicine including nexium     Benign essential hypertension  Assessment & Plan  · Blood pressure not controlled  · Continue to monitor at home and pcp office  · Continue Coreg; add ACE-inhibitor    Dyslipidemia  Assessment & Plan  · Agree with statin use    Discharging Physician / Practitioner: Windy Pérez PA-C  PCP: Shailesh Dawson MD  Admission Date:   Admission Orders (From admission, onward)     Ordered        08/18/19 1420  Inpatient Admission  Once         08/17/19 2259  Place in Observation  Once                   Discharge Date: 08/20/19    Resolved Problems  Date Reviewed: 8/20/2019    None          Consultations During Hospital Stay:  · Cardiology    Procedures Performed:   · Cardiac catheterization    Significant Findings / Test Results:   · As above    Incidental Findings:   · none    Test Results Pending at Discharge (will require follow up):   · none     Outpatient Tests Requested:  · Blood pressure check    Complications:  none    Reason for Admission:  Chest pain    Hospital Course:     Alyse Molina is a 81 y o  male patient who originally presented to the hospital on 8/17/2019 due to chest pain while exerting himself  He was found have a troponin rise from the time of his admission  He was seen in consultation by Cardiology  He was placed on heparin drip and underwent cardiac catheterization on August 19th  He did have stents placed and was put on Brilinta and aspirin as well as statin, beta-blocker and ACE-inhibitor  He was clinically stable and discharged home today with outpatient follow-up with Cardiology    Please see above list of diagnoses and related plan for additional information  Condition at Discharge: good     Discharge Day Visit / Exam:     Subjective:  Patient had an uneventful night and feels ready for discharge home today  Vitals: Blood Pressure: 156/70 (08/20/19 0851)  Pulse: 77 (08/20/19 0810)  Temperature: 98 1 °F (36 7 °C) (08/20/19 0810)  Temp Source: Oral (08/20/19 0810)  Respirations: 18 (08/20/19 0810)  Height: 5' 7 5" (171 5 cm) (08/17/19 2113)  Weight - Scale: 96 kg (211 lb 10 3 oz) (08/17/19 2113)  SpO2: 97 % (08/20/19 0810)  Exam:   Physical Exam   Constitutional: He appears well-developed and well-nourished  No distress  Clinically non toxic well appearing    HENT:   Head: Normocephalic  Eyes: Conjunctivae are normal  Right eye exhibits no discharge  Left eye exhibits no discharge  No scleral icterus  Cardiovascular: Normal rate, regular rhythm and normal heart sounds  Exam reveals no friction rub  No murmur heard  Pulmonary/Chest: Effort normal and breath sounds normal  No stridor  No respiratory distress  He has no wheezes  He has no rales  Abdominal: Soft  There is no guarding  Musculoskeletal: He exhibits no edema  Neurological: He is alert  Awake alert interactive, ambulating without difficulty   Skin: Skin is warm and dry  No rash noted  He is not diaphoretic  No erythema  No pallor  Psychiatric: He has a normal mood and affect  His behavior is normal  Judgment and thought content normal    Vitals reviewed  Discussion with Family:  Case discussed with wife at bedside  All questions and concerns addressed    Discharge instructions/Information to patient and family:   See after visit summary for information provided to patient and family  Provisions for Follow-Up Care:  See after visit summary for information related to follow-up care and any pertinent home health orders  Disposition:   home  Planned Readmission: none     Discharge Statement:  I spent 40 minutes discharging the patient  This time was spent on the day of discharge  I had direct contact with the patient on the day of discharge  Greater than 50% of the total time was spent examining patient, answering all patient questions, arranging and discussing plan of care with patient as well as directly providing post-discharge instructions  Additional time then spent on discharge activities  Case was discussed with Cardiology and case management  Bedside nursing rounds performed    Discharge Medications:  See after visit summary for reconciled discharge medications provided to patient and family        ** Please Note: This note has been constructed using a voice recognition system **

## 2019-08-20 NOTE — TELEPHONE ENCOUNTER
I scheduled patient for a TCM on 8/30/19  Discharged from [facility]  8/17-8/20 heart attack

## 2019-08-20 NOTE — PLAN OF CARE
Problem: Potential for Falls  Goal: Patient will remain free of falls  Description  INTERVENTIONS:  - Assess patient frequently for physical needs  -  Identify cognitive and physical deficits and behaviors that affect risk of falls    -  [place] fall precautions as indicated by assessment   - Educate patient/family on patient safety including physical limitations  - Instruct patient to call for assistance with activity based on assessment  - Modify environment to reduce risk of injury  - Consider OT/PT consult to assist with strengthening/mobility  Outcome: Adequate for Discharge     Problem: CARDIOVASCULAR - ADULT  Goal: Maintains optimal cardiac output and hemodynamic stability  Description  INTERVENTIONS:  - Monitor I/O, vital signs and rhythm  - Monitor for S/S and trends of decreased cardiac output  - Administer and titrate ordered vasoactive medications to optimize hemodynamic stability  - Assess quality of pulses, skin color and temperature  - Assess for signs of decreased coronary artery perfusion  - Instruct patient to report change in severity of symptoms  Outcome: Adequate for Discharge  Goal: Absence of cardiac dysrhythmias or at baseline rhythm  Description  INTERVENTIONS:  - Continuous cardiac monitoring, vital signs, obtain 12 lead EKG if ordered  - Administer antiarrhythmic and heart rate control medications as ordered  - Monitor electrolytes and administer replacement therapy as ordered  Outcome: Adequate for Discharge     Problem: PAIN - ADULT  Goal: Verbalizes/displays adequate comfort level or baseline comfort level  Description  Interventions:  - Encourage patient to monitor pain and request assistance  - Assess pain using appropriate pain scale  - Administer analgesics based on type and severity of pain and evaluate response  - Implement non-pharmacological measures as appropriate and evaluate response  - Consider cultural and social influences on pain and pain management  - Notify physician/advanced practitioner if interventions unsuccessful or patient reports new pain  Outcome: Adequate for Discharge     Problem: DISCHARGE PLANNING  Goal: Discharge to home or other facility with appropriate resources  Description  INTERVENTIONS:  - Identify barriers to discharge w/patient and caregiver  - Arrange for needed discharge resources and transportation as appropriate  - Identify discharge learning needs (meds, wound care, etc )  - Arrange for interpretive services to assist at discharge as needed  - Refer to Case Management Department for coordinating discharge planning if the patient needs post-hospital services based on physician/advanced practitioner order or complex needs related to functional status, cognitive ability, or social support system  Outcome: Adequate for Discharge     Problem: Knowledge Deficit  Goal: Patient/family/caregiver demonstrates understanding of disease process, treatment plan, medications, and discharge instructions  Description  Complete learning assessment and assess knowledge base    Interventions:  - Provide teaching at level of understanding  - Provide teaching via preferred learning methods  Outcome: Adequate for Discharge

## 2019-08-20 NOTE — SOCIAL WORK
Cm received script for Brilinta and additional scripts pt will be going home with  Pt is agreeable to have scripts filled in [address] and utilize [facility]  Cm sent scripts to [facility]  Bobbi Caldwell from [facility] contacted cm to provide co pay cost which is $40/month  Pt's other medications are $0 co pay  Cm met with pt to inform him of the $40/month co pay  He checked with his wife and she stated they are able to afford that  Cm explained medications will be coming up at this time since there is a DC order  No further cm interventions needed at this time

## 2019-08-20 NOTE — PLAN OF CARE
Problem: Potential for Falls  Goal: Patient will remain free of falls  Description  INTERVENTIONS:  - Assess patient frequently for physical needs  -  Identify cognitive and physical deficits and behaviors that affect risk of falls    -  [place] fall precautions as indicated by assessment   - Educate patient/family on patient safety including physical limitations  - Instruct patient to call for assistance with activity based on assessment  - Modify environment to reduce risk of injury  - Consider OT/PT consult to assist with strengthening/mobility  Outcome: Progressing     Problem: CARDIOVASCULAR - ADULT  Goal: Maintains optimal cardiac output and hemodynamic stability  Description  INTERVENTIONS:  - Monitor I/O, vital signs and rhythm  - Monitor for S/S and trends of decreased cardiac output  - Administer and titrate ordered vasoactive medications to optimize hemodynamic stability  - Assess quality of pulses, skin color and temperature  - Assess for signs of decreased coronary artery perfusion  - Instruct patient to report change in severity of symptoms  Outcome: Progressing  Goal: Absence of cardiac dysrhythmias or at baseline rhythm  Description  INTERVENTIONS:  - Continuous cardiac monitoring, vital signs, obtain 12 lead EKG if ordered  - Administer antiarrhythmic and heart rate control medications as ordered  - Monitor electrolytes and administer replacement therapy as ordered  Outcome: Progressing     Problem: PAIN - ADULT  Goal: Verbalizes/displays adequate comfort level or baseline comfort level  Description  Interventions:  - Encourage patient to monitor pain and request assistance  - Assess pain using appropriate pain scale  - Administer analgesics based on type and severity of pain and evaluate response  - Implement non-pharmacological measures as appropriate and evaluate response  - Consider cultural and social influences on pain and pain management  - Notify physician/advanced practitioner if interventions unsuccessful or patient reports new pain  Outcome: Progressing     Problem: DISCHARGE PLANNING  Goal: Discharge to home or other facility with appropriate resources  Description  INTERVENTIONS:  - Identify barriers to discharge w/patient and caregiver  - Arrange for needed discharge resources and transportation as appropriate  - Identify discharge learning needs (meds, wound care, etc )  - Arrange for interpretive services to assist at discharge as needed  - Refer to Case Management Department for coordinating discharge planning if the patient needs post-hospital services based on physician/advanced practitioner order or complex needs related to functional status, cognitive ability, or social support system  Outcome: Progressing     Problem: Knowledge Deficit  Goal: Patient/family/caregiver demonstrates understanding of disease process, treatment plan, medications, and discharge instructions  Description  Complete learning assessment and assess knowledge base    Interventions:  - Provide teaching at level of understanding  - Provide teaching via preferred learning methods  Outcome: Progressing

## 2019-08-20 NOTE — ASSESSMENT & PLAN NOTE
· Initially presented with substernal chest pain during exertion (while cutting trees) with significant troponin rise; also with multiple personal risk factors including strong family cardiac history   · Diagnosed with type 1 non STEMI  · Appreciate Cardiology input  · Cardiac cath: 95 % lesion in the distal RCA s/p RENE x 2  Mid circumflex: There was a diffuse 65 % stenosis   Proximal RCA: There was a 50 % stenosis  · Aspirin, Brilinta and Lipitor, beta-blocker/ACE-I were added

## 2019-08-20 NOTE — ASSESSMENT & PLAN NOTE
· Blood pressure not controlled  · Continue to monitor at home and pcp office  · Continue Coreg; add ACE-inhibitor

## 2019-08-20 NOTE — PLAN OF CARE
Problem: Potential for Falls  Goal: Patient will remain free of falls  Description  INTERVENTIONS:  - Assess patient frequently for physical needs  -  Identify cognitive and physical deficits and behaviors that affect risk of falls    -  [place] fall precautions as indicated by assessment   - Educate patient/family on patient safety including physical limitations  - Instruct patient to call for assistance with activity based on assessment  - Modify environment to reduce risk of injury  - Consider OT/PT consult to assist with strengthening/mobility  8/20/2019 1053 by Bennett Blanco RN  Outcome: Completed  8/20/2019 1038 by Bennett Blanco RN  Outcome: Adequate for Discharge     Problem: CARDIOVASCULAR - ADULT  Goal: Maintains optimal cardiac output and hemodynamic stability  Description  INTERVENTIONS:  - Monitor I/O, vital signs and rhythm  - Monitor for S/S and trends of decreased cardiac output  - Administer and titrate ordered vasoactive medications to optimize hemodynamic stability  - Assess quality of pulses, skin color and temperature  - Assess for signs of decreased coronary artery perfusion  - Instruct patient to report change in severity of symptoms  8/20/2019 1053 by Bennett Blanco RN  Outcome: Completed  8/20/2019 1038 by Bennett Blanco RN  Outcome: Adequate for Discharge  Goal: Absence of cardiac dysrhythmias or at baseline rhythm  Description  INTERVENTIONS:  - Continuous cardiac monitoring, vital signs, obtain 12 lead EKG if ordered  - Administer antiarrhythmic and heart rate control medications as ordered  - Monitor electrolytes and administer replacement therapy as ordered  8/20/2019 1053 by Bennett Blanco RN  Outcome: Completed  8/20/2019 1038 by Bennett Blanco RN  Outcome: Adequate for Discharge     Problem: PAIN - ADULT  Goal: Verbalizes/displays adequate comfort level or baseline comfort level  Description  Interventions:  - Encourage patient to monitor pain and request assistance  - Assess pain using appropriate pain scale  - Administer analgesics based on type and severity of pain and evaluate response  - Implement non-pharmacological measures as appropriate and evaluate response  - Consider cultural and social influences on pain and pain management  - Notify physician/advanced practitioner if interventions unsuccessful or patient reports new pain  8/20/2019 1053 by Annabel Null RN  Outcome: Completed  8/20/2019 1038 by Annabel Null RN  Outcome: Adequate for Discharge     Problem: DISCHARGE PLANNING  Goal: Discharge to home or other facility with appropriate resources  Description  INTERVENTIONS:  - Identify barriers to discharge w/patient and caregiver  - Arrange for needed discharge resources and transportation as appropriate  - Identify discharge learning needs (meds, wound care, etc )  - Arrange for interpretive services to assist at discharge as needed  - Refer to Case Management Department for coordinating discharge planning if the patient needs post-hospital services based on physician/advanced practitioner order or complex needs related to functional status, cognitive ability, or social support system  8/20/2019 1053 by Annabel Null RN  Outcome: Completed  8/20/2019 1038 by Annabel Null RN  Outcome: Adequate for Discharge     Problem: Knowledge Deficit  Goal: Patient/family/caregiver demonstrates understanding of disease process, treatment plan, medications, and discharge instructions  Description  Complete learning assessment and assess knowledge base    Interventions:  - Provide teaching at level of understanding  - Provide teaching via preferred learning methods  8/20/2019 1053 by Annabel Null RN  Outcome: Completed  8/20/2019 1038 by Annabel Null RN  Outcome: Adequate for Discharge

## 2019-08-20 NOTE — PROGRESS NOTES
Cardiology Progress Note - Key Simms 81 y o  male MRN: [id number]    Unit/Bed#: -01 Encounter: [id number]      Assessment/Recommendations:  1  Chest pain: likely Type 1 NSTEMI, s/p cardiac cath and now s/p 2 RENE to dRCA  Also with continued 65% mCx and 50% pRCA lesions for medical management  Continued on ASA, statin, Brilinta, and B-blocker  s/p steroid prep for dye allergy  2   HTN: mildly elevated, added ACE-I to help with BP management  Subjective:   Patient seen and examined  No significant events overnight   ; pertinent negatives - chest pain, chest pressure/discomfort, dyspnea, irregular heart beat and palpitations  Objective:     Vitals: Blood pressure 156/70, pulse 77, temperature 98 1 °F (36 7 °C), temperature source Oral, resp  rate 18, height 5' 7 5" (1 715 m), weight 96 kg (211 lb 10 3 oz), SpO2 97 %  , Body mass index is 32 66 kg/m² ,   Orthostatic Blood Pressures      Most Recent Value   Blood Pressure  156/70 filed at 08/20/2019 0851   Patient Position - Orthostatic VS  Lying filed at 08/20/2019 0810            Intake/Output Summary (Last 24 hours) at 8/20/2019 0939  Last data filed at 8/20/2019 0410  Gross per 24 hour   Intake 360 ml   Output 300 ml   Net 60 ml       TELE: No significant arrhythmias seen      Physical Exam:    GEN: Key Simms appears well, alert and oriented x 3, pleasant and cooperative   HEENT: pupils equal, round, and reactive to light; extraocular muscles intact  NECK: supple, no carotid bruits   HEART: regular rhythm, normal S1 and S2, no murmurs, clicks, gallops or rubs   LUNGS: clear to auscultation bilaterally; no wheezes, rales, or rhonchi   ABDOMEN: normal bowel sounds, soft, no tenderness, no distention  EXTREMITIES: peripheral pulses normal; no clubbing, cyanosis, or edema  NEURO: no focal findings   SKIN: normal without suspicious lesions on exposed skin      Medications:      Current Facility-Administered Medications:     acetaminophen (TYLENOL) tablet 650 mg, 650 mg, Oral, Q6H PRN, VIANNEY FriedNP    albuterol (PROVENTIL HFA,VENTOLIN HFA) inhaler 2 puff, 2 puff, Inhalation, Q4H PRN, Angelica Cruz MD    aspirin chewable tablet 81 mg, 81 mg, Oral, Daily, VIANNEY FriedNP, 81 mg at 08/20/19 0851    atorvastatin (LIPITOR) tablet 40 mg, 40 mg, Oral, QPM, VIANNEY FriedNP, 40 mg at 08/19/19 1728    carvedilol (COREG) tablet 3 125 mg, 3 125 mg, Oral, BID With Meals, REGLA Miller, 3 125 mg at 08/20/19 9718    co-enzyme Q-10 capsule 30 mg, 30 mg, Oral, Daily, VIANNEY FriedNP, 30 mg at 08/20/19 0900    famotidine (PEPCID) tablet 20 mg, 20 mg, Oral, BID, An Christina PA-C, 20 mg at 08/20/19 0851    fluticasone (FLONASE) 50 mcg/act nasal spray 1 spray, 1 spray, Nasal, Daily, REGLA Fried, 1 spray at 08/18/19 0902    glucosamine sulfate capsule 500 mg, 500 mg, Oral, Daily, Patriciaanilda Rivera, CRNP, 500 mg at 08/20/19 6721    heparin (porcine) 25,000 units in 250 mL infusion (premix), 3-20 Units/kg/hr (Order-Specific), Intravenous, Titrated, REGLA Miller, Stopped at 08/19/19 1018    heparin (porcine) injection 2,000 Units, 2,000 Units, Intravenous, PRN, REGLA Miller, 2,000 Units at 08/18/19 1726    heparin (porcine) injection 4,000 Units, 4,000 Units, Intravenous, PRN, REGLA Miller    levalbuterol [facility]) inhalation solution 1 25 mg, 1 25 mg, Nebulization, Q8H PRN, Angelica Cruz MD    lisinopril (ZESTRIL) tablet 10 mg, 10 mg, Oral, Daily, An Christina PA-C, 10 mg at 08/20/19 0851    ondansetron (ZOFRAN) injection 4 mg, 4 mg, Intravenous, Q6H PRN, REGLA Fried    pantoprazole (PROTONIX) EC tablet 20 mg, 20 mg, Oral, BID AC, An Christina PA-C, 20 mg at 08/20/19 8644    ticagrelor (BRILINTA) tablet 90 mg, 90 mg, Oral, Q12H, REGLA Miller, 90 mg at 08/20/19 0937     Labs & Results:    Results from last 7 days   Lab Units 08/18/19  1340 08/18/19  0754 08/18/19  0411   TROPONIN I ng/mL 3 82* 4 61* 3 07*     Results from last 7 days   Lab Units 08/19/19  0429 08/18/19  0952 08/17/19  2211   WBC Thousand/uL 7 98 8 03 9 78   HEMOGLOBIN g/dL 14 8 14 3 14 0   HEMATOCRIT % 44 9 43 1 41 3   PLATELETS Thousands/uL 228 220 206     Results from last 7 days   Lab Units 08/19/19  0429   TRIGLYCERIDES mg/dL 48   HDL mg/dL 45     Results from last 7 days   Lab Units 08/19/19  0429 08/18/19  0411 08/17/19  2211   POTASSIUM mmol/L 3 8 3 3* 3 6   CHLORIDE mmol/L 103 103 102   CO2 mmol/L 25 25 23   BUN mg/dL 14 14 16   CREATININE mg/dL 1 12 1 07 1 33*   CALCIUM mg/dL 9 2 9 0 8 9   ALK PHOS U/L  --   --  52   ALT U/L  --   --  34   AST U/L  --   --  31     Results from last 7 days   Lab Units 08/20/19  0457 08/19/19  0429 08/18/19  2313  08/18/19  0954   INR   --   --   --   --  1 15   PTT seconds 27 73* 76*   < > 30    < > = values in this interval not displayed             Echo: personally reviewed - normal LVEF with HK of basal-mid Inferior wall, mild LVH, G1DD, mild MR    EKG personally reviewed by Jenna Dailey MD

## 2019-08-20 NOTE — ASSESSMENT & PLAN NOTE
· Has history of Randall fundoplication for sliding hiatal hernia (and multiple other GI surgeries)  · Follows with GI Dr Crystal Cortes as an outpatient, had EGD last week- reviewed report which showed Genao's and superficial stomach nonbleeding ulcers  · Patient is aware of the risks of GI bleeding while on dual anti-platelet therapy but understands that this is necessary status post stent  · Continue home medicine including nexium

## 2019-08-21 LAB
A2 MACROGLOB SERPL-MCNC: 320 MG/DL (ref 110–276)
ALT SERPL W P-5'-P-CCNC: 30 IU/L (ref 0–55)
APO A-I SERPL-MCNC: 120 MG/DL (ref 101–178)
AST SERPL W P-5'-P-CCNC: 37 IU/L (ref 0–40)
BILIRUB SERPL-MCNC: 0.3 MG/DL (ref 0–1.2)
CHOLEST SERPL-MCNC: 208 MG/DL (ref 100–199)
FIBROSIS SCORING:: ABNORMAL
FIBROSIS STAGE SERPL QL: ABNORMAL
GGT SERPL-CCNC: 14 IU/L (ref 0–65)
GLUCOSE SERPL-MCNC: 113 MG/DL (ref 65–99)
HAPTOGLOB SERPL-MCNC: 110 MG/DL (ref 34–200)
LABORATORY COMMENT REPORT: ABNORMAL
LIVER FIBR SCORE SERPL CALC.FIBROSURE: 0.46 (ref 0–0.21)
NECROINFLAMMATORY ACT GRADE SERPL QL: ABNORMAL
NECROINFLAMMATORY ACT SCORE SERPL: 0.5
SERVICE CMNT-IMP: ABNORMAL
SL AMB INTERPRETATION: ABNORMAL
SL AMB NASH SCORING: ABNORMAL
SL AMB STEATOSIS GRADE: ABNORMAL
SL AMB STEATOSIS SCORE: 0.5 (ref 0–0.3)
STEATOSIS GRADING: ABNORMAL
TRIGL SERPL-MCNC: 173 MG/DL (ref 0–149)

## 2019-08-24 ENCOUNTER — APPOINTMENT (OUTPATIENT)
Dept: LAB | Facility: MEDICAL CENTER | Age: 69
End: 2019-08-24
Payer: MEDICARE

## 2019-08-24 DIAGNOSIS — I21.4 NSTEMI (NON-ST ELEVATED MYOCARDIAL INFARCTION) (HCC): ICD-10-CM

## 2019-08-24 LAB
ANION GAP SERPL CALCULATED.3IONS-SCNC: 7 MMOL/L (ref 4–13)
BUN SERPL-MCNC: 20 MG/DL (ref 5–25)
CALCIUM SERPL-MCNC: 9 MG/DL (ref 8.3–10.1)
CHLORIDE SERPL-SCNC: 104 MMOL/L (ref 100–108)
CO2 SERPL-SCNC: 26 MMOL/L (ref 21–32)
CREAT SERPL-MCNC: 1.03 MG/DL (ref 0.6–1.3)
ERYTHROCYTE [DISTWIDTH] IN BLOOD BY AUTOMATED COUNT: 13.2 % (ref 11.6–15.1)
GFR SERPL CREATININE-BSD FRML MDRD: 75 ML/MIN/1.73SQ M
GLUCOSE P FAST SERPL-MCNC: 104 MG/DL (ref 65–99)
HCT VFR BLD AUTO: 45.5 % (ref 36.5–49.3)
HGB BLD-MCNC: 14.9 G/DL (ref 12–17)
MCH RBC QN AUTO: 31 PG (ref 26.8–34.3)
MCHC RBC AUTO-ENTMCNC: 32.7 G/DL (ref 31.4–37.4)
MCV RBC AUTO: 95 FL (ref 82–98)
PLATELET # BLD AUTO: 228 THOUSANDS/UL (ref 149–390)
PMV BLD AUTO: 11.9 FL (ref 8.9–12.7)
POTASSIUM SERPL-SCNC: 3.8 MMOL/L (ref 3.5–5.3)
RBC # BLD AUTO: 4.81 MILLION/UL (ref 3.88–5.62)
SODIUM SERPL-SCNC: 137 MMOL/L (ref 136–145)
WBC # BLD AUTO: 10.14 THOUSAND/UL (ref 4.31–10.16)

## 2019-08-24 PROCEDURE — 36415 COLL VENOUS BLD VENIPUNCTURE: CPT

## 2019-08-24 PROCEDURE — 80048 BASIC METABOLIC PNL TOTAL CA: CPT

## 2019-08-24 PROCEDURE — 85027 COMPLETE CBC AUTOMATED: CPT

## 2019-08-25 NOTE — PROGRESS NOTES
MI Follow Up Office Visit Note  Efren Leal   81 y o    male   MRN: [id number]  [address]  [place] [address]  [address] [address] [phone]    PCP: Bandar Oneal MD  Cardiologist:   Will be Dr Tanisha Mccall             Assessment/plan  Non-STEMI, type 1 ( 8/19/19)  Trop to 4 6, status post RENE-distal RCA x 2; residual nonobstructive disease of the proximal RCA  (50%) and mid circumflex ( 65%) , medically managed  --on aspirin 81, Brilinta 90 mg Q 12, beta-blocker, statin  Hx reflux esophagitis, Barretts esophagus   S/P Nissen fundoplication 0697  --EGD 8/13/19:  Superficial ulcer crater  gastric antrum (Dr Kailyn Baez, Pepcid, Nexium  --reports he follows with GI tomorrow  Bradycardia, symptomatic, on coreg 3 125 BID-EKG today:  Marked sinus bradycardia 40 beats per minute  His bradycardia is likely compounded by the beta-blocker as well as vagally mediated  --STOP coreg  --24 hour Holter monitor early next week  Hyperlipidemia, on atorvastatin 40 mg daily  --lipid profile August 2019:  Direct , triglyceride 48, total cholesterol 202  --a heart healthy diet was emphasized  --repeat fasting lipid profile 8 weeks  Hypertension  /72,on coreg 3 125 bid , and lisinopril 10 mg/d  Chantal Gunn STOP coreg   Cardiac testing  --cardiac catheterization   Mid circumflex: There was a diffuse 65 % stenosis  Proximal RCA: There was a 50 % stenosis  Distal RCA: There was a 95 % stenosis at the origin of RPL1  Intervention: 95 % lesion in the distal RCA s/p RENE x 2    --TTE 8/19  EF 60 %  hypokinesis of the basal-mid inferior wall(s)  RV fxn normal  Grade 1 diastolic dysfunction       Summary of recommendations  Heart healthy diet   Educational information provided  Fasting lipid profile 8 weeks  STOP carvedilol  24 hr Holter the beginning of next week  Has follow up with GI tomorrow  Cardiac rehab- scheduled for 9/9  Medical adherence to dual antiplatelet therapy reinforced  Follow up will be scheduled with Dr Jesus Valera 1 month            HPI  Mac Brock is a 82 yo gentleman with  history of hypertension and hyperlipidemia, no previous known history of coronary artery disease  He has had Nissen fundoplication in the past for GERD  He also had a recent outpatient EGD which showed Genao's esophagus and esophageal non bleeding stomach ulcers  Recently he presented to [facility] with recurrent chest pain  He notes that was initially exertional and then it started to occur at rest prompting him to come to the hospital   He had a rising troponin  He was diagnosed with an acute coronary syndrome and followed by Cardiology  Given an IV contrast allergy he was prepared with histamine blockers and steroids  He underwent cardiac catheterization which showed a 95% lesion in the distal RCA for which he underwent deployment of a drug-eluting stent x2  There is also a diffuse 65% stenosis in the mid circumflex and 50% stenosis of the proximal RCA, medically managed  He was placed on dual antiplatelet therapy with aspirin Brilinta  He is also placed on high-intensity statin beta-blocker and ACE-inhibitor  An echocardiogram showed preserved LV function, hypokinesis of the basal inferior wall and no significant valve disease  Interval history  Presented to the emergency room 8/27 with abdominal pain, and vomiting  /86  Pt reports epigastric/left upper abdominal radiating into back since this morning which has become worse with each meal, describes this pain as aching  Pt reports pain intensifies <15 minutes after meals  Pt reports associated sxs of nausea, one episode of vomiting  Worked up with CBC, CMP, Lipase, Coags, Lactate, Troponin, EKG, CXR, CT abdomen   Troponin was less than 0 02    EKG:  Normal sinus rhythm  CTs abdomen:  No acute findings  Chest x-ray: NAD  hgb 15  He was given Maalox for symptoms       8/28/19 he presents for cardiology follow-up  He denies chest pain or shortness of breath  He has had some epigastric/abdominal discomfort with some nausea  He has been checking his vital signs at home and they brought me a list   His systolic blood pressures been running low, even before he has been taking the Coreg  The lowest on Monday August 26 was 74/42  I am going to stop the beta-blocker  Will check a Holter monitor next week which he is agreeable to  He has follow-up with his GI doctor tomorrow  I discussed the importance of adhering to his dual antiplatelet therapy, uninterrupted        Assessment  Diagnoses and all orders for this visit:    Stented coronary artery    Benign essential hypertension    Fatty infiltration of liver    Dyslipidemia    Coronary artery disease involving native heart without angina pectoris, unspecified vessel or lesion type          Past Medical History:   Diagnosis Date    Allergic reaction     LAST ASSESSED: [id number]    Asthma     Bursitis of heel     LAST ASSESSED: [id number]    Derangement of meniscus of left knee due to old injury     LAST ASSESSED: [id number]    Diverticulitis of colon     LAST ASSESSED: [id number]    Dizziness     LAST ASSESSED: [id number]    GERD (gastroesophageal reflux disease)     History of colon polyps     Hypertension     Malignant hypertension     LAST ASSESSED: [id number]    NSTEMI [facility]non-ST elevated myocardial infarction) [facility] [address] [facility]     Plantar fasciitis     Seasonal allergies     Shoulder impingement     LAST ASSESSED: [id number]    Stroke ([facility] )        Review of Systems   Constitution: Negative for chills  Cardiovascular: Negative for chest pain, claudication, cyanosis, dyspnea on exertion, irregular heartbeat, leg swelling, near-syncope, orthopnea, palpitations, paroxysmal nocturnal dyspnea and syncope  Respiratory: Negative for cough and shortness of breath  Gastrointestinal: Negative for heartburn and nausea  See HPI    Some epigastric discomfort and nausea   Neurological: Negative for dizziness, focal weakness, headaches, light-headedness and weakness  All other systems reviewed and are negative  Allergies   Allergen Reactions    Depo-Medrol [Methylprednisolone] Anaphylaxis     Severe Vagal Reaction    Iv Contrast [Iodinated Diagnostic Agents] Angioedema     Pt states itchy/swollen eyes, trouble breathing (years ago)    Sulfa Antibiotics            Current Outpatient Medications:     acetaminophen (TYLENOL) 325 mg tablet, Take 2 tablets (650 mg total) by mouth every 6 (six) hours as needed for mild pain, headaches or fever, Disp: 30 tablet, Rfl: 0    albuterol (PROVENTIL HFA,VENTOLIN HFA) 90 mcg/act inhaler, INHALE 2 PUFFS BY MOUTH EVERY 6 HOURS AS NEEDED FOR WHEEZING, Disp: 18 g, Rfl: 3    aspirin 81 mg chewable tablet, Chew 1 tablet (81 mg total) daily, Disp: 30 tablet, Rfl: 0    atorvastatin (LIPITOR) 40 mg tablet, Take 1 tablet (40 mg total) by mouth every evening, Disp: 30 tablet, Rfl: 0    B Complex Vitamins (VITAMIN B COMPLEX PO), Take 1 tablet by mouth daily, Disp: , Rfl:     carvedilol (COREG) 3 125 mg tablet, Take 1 tablet (3 125 mg total) by mouth 2 (two) times a day with meals, Disp: 60 tablet, Rfl: 0    co-enzyme Q-10 30 MG capsule, Take 30 mg by mouth daily, Disp: , Rfl:     esomeprazole (NexIUM) 20 mg capsule, Take 20 mg by mouth every morning before breakfast, Disp: , Rfl:     famotidine (PEPCID) 20 mg tablet, Take 1 tablet (20 mg total) by mouth daily at bedtime as needed for heartburn for up to 14 days, Disp: 14 tablet, Rfl: 0    fluticasone (FLONASE) 50 mcg/act nasal spray, 1 spray into each nostril daily, Disp: , Rfl:     glucosamine 500 MG CAPS capsule, Take 500 mg by mouth daily, Disp: , Rfl:     levalbuterol (XOPENEX) 1 25 mg/3 mL nebulizer solution, INHALE 1 VIAL BY NEBULIZER EVERY 8 HOURS AS NEEDED FOR WHEEZING, Disp: 72 vial, Rfl: 3    lisinopril (ZESTRIL) 10 mg tablet, Take 1 tablet (10 mg total) by mouth daily, Disp: 30 tablet, Rfl: 0    multivitamin (THERAGRAN) TABS, Take 1 tablet by mouth daily, Disp: , Rfl:     sucralfate (CARAFATE) 1 g tablet, Take 1 tablet (1 g total) by mouth 3 (three) times a day for 14 days, Disp: 42 tablet, Rfl: 0    ticagrelor (BRILINTA) 90 MG, Take 1 tablet (90 mg total) by mouth every 12 (twelve) hours, Disp: 60 tablet, Rfl: 0    TURMERIC PO, Take 1 Dose by mouth daily, Disp: , Rfl:   No current facility-administered medications for this visit           Social History     Socioeconomic History    Marital status: /Civil Union     Spouse name: Not on file    Number of children: Not on file    Years of education: Not on file    Highest education level: Not on file   Occupational History    Not on file   Social Needs    Financial resource strain: Not on file    Food insecurity:     Worry: Not on file     Inability: Not on file    Transportation needs:     Medical: Not on file     Non-medical: Not on file   Tobacco Use    Smoking status: Former Smoker    Smokeless tobacco: Never Used   Substance and Sexual Activity    Alcohol use: No    Drug use: No    Sexual activity: Not on file   Lifestyle    Physical activity:     Days per week: Not on file     Minutes per session: Not on file    Stress: Not on file   Relationships    Social connections:     Talks on phone: Not on file     Gets together: Not on file     Attends Nondenominational service: Not on file     Active member of club or organization: Not on file     Attends meetings of clubs or organizations: Not on file     Relationship status: Not on file    Intimate partner violence:     Fear of current or ex partner: Not on file     Emotionally abused: Not on file     Physically abused: Not on file     Forced sexual activity: Not on file   Other Topics Concern    Not on file   Social History Narrative    Not on file       Family History   Problem Relation Age of Onset    Cancer Paternal Dennis Tothstephon Hypertension Mother    Ambar Patten Hyperlipidemia Mother     Hypertension Father     Hyperlipidemia Father        Physical Exam   Constitutional: He is oriented to person, place, and time  No distress  HENT:   Head: Normocephalic and atraumatic  Eyes: Conjunctivae and EOM are normal    Neck: Normal range of motion  Neck supple  Cardiovascular: Normal rate, regular rhythm, normal heart sounds and intact distal pulses  Pulmonary/Chest: Effort normal and breath sounds normal    Abdominal: Soft  Bowel sounds are normal    Musculoskeletal: Normal range of motion  Neurological: He is alert and oriented to person, place, and time  Skin: Skin is warm and dry  Psychiatric: He has a normal mood and affect  Nursing note and vitals reviewed  Vitals: There were no vitals taken for this visit  Wt Readings from Last 3 Encounters:   08/27/19 96 6 kg (212 lb 15 4 oz)   08/17/19 96 kg (211 lb 10 3 oz)   07/29/19 102 kg (225 lb)         Labs & Results:  Lab Results   Component Value Date    WBC 10 21 (H) 08/27/2019    HGB 15 4 08/27/2019    HCT 45 5 08/27/2019    MCV 94 08/27/2019     08/27/2019     No results found for: BNP  No components found for: CHEM  Troponin I   Date Value Ref Range Status   08/27/2019 0 02 <=0 04 ng/mL Final     Comment:       S[employer] Chemistry analyzer 99% cutoff is > 0 04 ng/mL in network labs     o cTnI 99% cutoff is useful only when applied to patients in the clinical setting of myocardial ischemia   o cTnI 99% cutoff should be interpreted in the context of clinical history, ECG findings and possibly cardiac imaging to establish correct diagnosis     o cTnI 99% cutoff may be suggestive but clearly not indicative of a coronary event without the clinical setting of myocardial ischemia      08/18/2019 3 82 (H) <=0 04 ng/mL Final     Comment:       S[employer]ens Chemistry analyzer 99% cutoff is > 0 04 ng/mL in network labs     o cTnI 99% cutoff is useful only when applied to patients in the clinical setting of myocardial ischemia   o cTnI 99% cutoff should be interpreted in the context of clinical history, ECG findings and possibly cardiac imaging to establish correct diagnosis  o cTnI 99% cutoff may be suggestive but clearly not indicative of a coronary event without the clinical setting of myocardial ischemia      2019 4 61 (H) <=0 04 ng/mL Final     Comment:       Siemens Chemistry analyzer 99% cutoff is > 0 04 ng/mL in network labs     o cTnI 99% cutoff is useful only when applied to patients in the clinical setting of myocardial ischemia   o cTnI 99% cutoff should be interpreted in the context of clinical history, ECG findings and possibly cardiac imaging to establish correct diagnosis  o cTnI 99% cutoff may be suggestive but clearly not indicative of a coronary event without the clinical setting of myocardial ischemia  Results for orders placed during the hospital encounter of 19   Echo complete with contrast if indicated    Narrative [address]  [phone]    Transthoracic Echocardiogram  2D, M-mode, Doppler, and Color Doppler    Study date:  18-Aug-2019    Patient: Ghulam Larose  MR number: [id number]  Account number: [de-identified]  : 1950  Age: 81 years  Gender: Male  Status: Outpatient  Location: Bedside  Height: 67 in  Weight: 210 5 lb  BP: 153/ 74 mmHg    Indications: Assess left ventricular function  Diagnoses: I21 3 - ST elevation (STEMI) myocardial infarction of unspecified site    Sonographer:  Kishore Berry RDCS  Primary Physician:  Daniel Jackson MD  Referring Physician:  REGLA Vanegas  Group:  [facility]  Interpreting Physician:  Paolo Salinas DO    SUMMARY    LEFT VENTRICLE:  Systolic function was normal  Ejection fraction was estimated to be 60 %  There was hypokinesis of the basal-mid inferior wall(s)  Wall thickness was mildly increased    The changes were consistent with concentric remodeling (increased wall thickness with normal wall mass)  Doppler parameters were consistent with abnormal left ventricular relaxation (grade 1 diastolic dysfunction)  RIGHT VENTRICLE:  The size was normal   Systolic function was normal     LEFT ATRIUM:  The atrium was mildly dilated  MITRAL VALVE:  There was mild regurgitation  HISTORY: PRIOR HISTORY: hypertension, CP    PROCEDURE: The procedure was performed at the bedside  This was a routine study  The transthoracic approach was used  The study included complete 2D imaging, M-mode, complete spectral Doppler, and color Doppler  The heart rate was 54 bpm,  at the start of the study  Images were obtained from the parasternal, apical, subcostal, and suprasternal notch acoustic windows  Image quality was adequate  LEFT VENTRICLE: Size was normal  Systolic function was normal  Ejection fraction was estimated to be 60 %  There was hypokinesis of the basal-mid inferior wall(s)  Wall thickness was mildly increased  The changes were consistent with  concentric remodeling (increased wall thickness with normal wall mass)  DOPPLER: Doppler parameters were consistent with abnormal left ventricular relaxation (grade 1 diastolic dysfunction)  There was no evidence of elevated ventricular  filling pressure by Doppler parameters  RIGHT VENTRICLE: The size was normal  Systolic function was normal  Wall thickness was normal     LEFT ATRIUM: The atrium was mildly dilated  RIGHT ATRIUM: Size was normal     MITRAL VALVE: Valve structure was normal  There was normal leaflet separation  DOPPLER: The transmitral velocity was within the normal range  There was no evidence for stenosis  There was mild regurgitation  AORTIC VALVE: The valve was trileaflet  Leaflets exhibited normal thickness and normal cuspal separation  DOPPLER: Transaortic velocity was within the normal range  There was no evidence for stenosis  There was no regurgitation      TRICUSPID VALVE: The valve structure was normal  There was normal leaflet separation  DOPPLER: The transtricuspid velocity was within the normal range  There was no evidence for stenosis  There was trace regurgitation  The tricuspid jet  envelope definition was inadequate for estimation of RV systolic pressure  There are no indirect findings (abnormal RV volume or geometry, altered pulmonary flow velocity profile, or leftward septal displacement) which would suggest  moderate or severe pulmonary hypertension  PULMONIC VALVE: Leaflets exhibited normal thickness, no calcification, and normal cuspal separation  DOPPLER: The transpulmonic velocity was within the normal range  There was trace regurgitation  PERICARDIUM: There was no pericardial effusion  The pericardium was normal in appearance  AORTA: The root exhibited normal size  SYSTEMIC VEINS: IVC: The inferior vena cava was not well visualized  SYSTEM MEASUREMENT TABLES    2D  %FS: 27 42 %  Ao Diam: 3 5 cm  EDV(Teich): 106 68 ml  EF(Teich): 53 22 %  ESV(Teich): 49 91 ml  IVSd: 1 23 cm  LA Area: 19 6 cm2  LA Diam: 4 5 cm  LVEDV MOD A4C: 180 5 ml  LVEF MOD A4C: 65 88 %  LVESV MOD A4C: 61 59 ml  LVIDd: 4 78 cm  LVIDs: 3 47 cm  LVLd A4C: 9 17 cm  LVLs A4C: 7 45 cm  LVPWd: 1 21 cm  RA Area: 14 45 cm2  RVIDd: 3 32 cm  SV MOD A4C: 118 9 ml  SV(Teich): 56 77 ml    MM  TAPSE: 3 01 cm    PW  E': 0 06 m/s  E/E': 9 06  MV A Martín: 0 78 m/s  MV Dec [place]: 1 87 m/s2  MV DecT: 295 74 ms  MV E Martín: 0 55 m/s  MV E/A Ratio: 0 71  MV PHT: 85 76 ms  MVA By PHT: 2 57 cm2    [employer] Accredited Echocardiography [facility]    Prepared and electronically signed by    Mary Akers DO  Signed 18-Aug-2019 11:49:47       No results found for this or any previous visit  This note was completed in part utilizing m-[employer] direct voice recognition software     Grammatical errors, random word insertion, spelling mistakes, and incomplete sentences may be an occasional consequence of the system secondary to software limitations, ambient noise and hardware issues  At the time of dictation, efforts were made to edit, clarify and /or correct errors  Please read the chart carefully and recognize, using context, where substitutions have occurred    If you have any questions or concerns about the context, text or information contained within the body of this dictation, please contact myself, the provider, for further clarification

## 2019-08-27 ENCOUNTER — APPOINTMENT (EMERGENCY)
Dept: CT IMAGING | Facility: HOSPITAL | Age: 69
End: 2019-08-27
Payer: MEDICARE

## 2019-08-27 ENCOUNTER — APPOINTMENT (EMERGENCY)
Dept: RADIOLOGY | Facility: HOSPITAL | Age: 69
End: 2019-08-27
Payer: MEDICARE

## 2019-08-27 ENCOUNTER — HOSPITAL ENCOUNTER (EMERGENCY)
Facility: HOSPITAL | Age: 69
Discharge: HOME | End: 2019-08-27
Attending: EMERGENCY MEDICINE | Admitting: EMERGENCY MEDICINE
Payer: MEDICARE

## 2019-08-27 VITALS
RESPIRATION RATE: 18 BRPM | HEART RATE: 44 BPM | DIASTOLIC BLOOD PRESSURE: 70 MMHG | BODY MASS INDEX: 32.86 KG/M2 | WEIGHT: 212.96 LBS | SYSTOLIC BLOOD PRESSURE: 162 MMHG | OXYGEN SATURATION: 100 % | TEMPERATURE: 97.6 F

## 2019-08-27 DIAGNOSIS — R10.9 ABDOMINAL PAIN: ICD-10-CM

## 2019-08-27 DIAGNOSIS — K25.9 MULTIPLE GASTRIC ULCERS: Primary | ICD-10-CM

## 2019-08-27 LAB
ALBUMIN SERPL BCP-MCNC: 4 G/DL (ref 3.5–5)
ALP SERPL-CCNC: 65 U/L (ref 46–116)
ALT SERPL W P-5'-P-CCNC: 38 U/L (ref 12–78)
ANION GAP SERPL CALCULATED.3IONS-SCNC: 10 MMOL/L (ref 4–13)
APTT PPP: 29 SECONDS (ref 23–37)
AST SERPL W P-5'-P-CCNC: 33 U/L (ref 5–45)
ATRIAL RATE: 42 BPM
ATRIAL RATE: 48 BPM
BACTERIA UR QL AUTO: ABNORMAL /HPF
BASOPHILS # BLD AUTO: 0.04 THOUSANDS/ΜL (ref 0–0.1)
BASOPHILS NFR BLD AUTO: 0 % (ref 0–1)
BILIRUB SERPL-MCNC: 1 MG/DL (ref 0.2–1)
BILIRUB UR QL STRIP: NEGATIVE
BUN SERPL-MCNC: 22 MG/DL (ref 5–25)
CALCIUM SERPL-MCNC: 9.6 MG/DL (ref 8.3–10.1)
CHLORIDE SERPL-SCNC: 100 MMOL/L (ref 100–108)
CLARITY UR: CLEAR
CO2 SERPL-SCNC: 26 MMOL/L (ref 21–32)
COLOR UR: YELLOW
CREAT SERPL-MCNC: 1.25 MG/DL (ref 0.6–1.3)
EOSINOPHIL # BLD AUTO: 0.3 THOUSAND/ΜL (ref 0–0.61)
EOSINOPHIL NFR BLD AUTO: 3 % (ref 0–6)
ERYTHROCYTE [DISTWIDTH] IN BLOOD BY AUTOMATED COUNT: 13.3 % (ref 11.6–15.1)
GFR SERPL CREATININE-BSD FRML MDRD: 59 ML/MIN/1.73SQ M
GLUCOSE SERPL-MCNC: 134 MG/DL (ref 65–140)
GLUCOSE UR STRIP-MCNC: NEGATIVE MG/DL
HCT VFR BLD AUTO: 45.5 % (ref 36.5–49.3)
HGB BLD-MCNC: 15.4 G/DL (ref 12–17)
HGB UR QL STRIP.AUTO: NEGATIVE
IMM GRANULOCYTES # BLD AUTO: 0.04 THOUSAND/UL (ref 0–0.2)
IMM GRANULOCYTES NFR BLD AUTO: 0 % (ref 0–2)
INR PPP: 1.15 (ref 0.84–1.19)
KETONES UR STRIP-MCNC: NEGATIVE MG/DL
LACTATE SERPL-SCNC: 1.8 MMOL/L (ref 0.5–2)
LEUKOCYTE ESTERASE UR QL STRIP: NEGATIVE
LIPASE SERPL-CCNC: 192 U/L (ref 73–393)
LYMPHOCYTES # BLD AUTO: 2.15 THOUSANDS/ΜL (ref 0.6–4.47)
LYMPHOCYTES NFR BLD AUTO: 21 % (ref 14–44)
MCH RBC QN AUTO: 31.8 PG (ref 26.8–34.3)
MCHC RBC AUTO-ENTMCNC: 33.8 G/DL (ref 31.4–37.4)
MCV RBC AUTO: 94 FL (ref 82–98)
MONOCYTES # BLD AUTO: 0.97 THOUSAND/ΜL (ref 0.17–1.22)
MONOCYTES NFR BLD AUTO: 10 % (ref 4–12)
MUCOUS THREADS UR QL AUTO: ABNORMAL
NEUTROPHILS # BLD AUTO: 6.71 THOUSANDS/ΜL (ref 1.85–7.62)
NEUTS SEG NFR BLD AUTO: 66 % (ref 43–75)
NITRITE UR QL STRIP: NEGATIVE
NON-SQ EPI CELLS URNS QL MICRO: ABNORMAL /HPF
NRBC BLD AUTO-RTO: 0 /100 WBCS
P AXIS: 27 DEGREES
P AXIS: 42 DEGREES
PH UR STRIP.AUTO: 5.5 [PH] (ref 4.5–8)
PLATELET # BLD AUTO: 228 THOUSANDS/UL (ref 149–390)
PMV BLD AUTO: 11.6 FL (ref 8.9–12.7)
POTASSIUM SERPL-SCNC: 4 MMOL/L (ref 3.5–5.3)
PR INTERVAL: 176 MS
PR INTERVAL: 200 MS
PROT SERPL-MCNC: 7.9 G/DL (ref 6.4–8.2)
PROT UR STRIP-MCNC: ABNORMAL MG/DL
PROTHROMBIN TIME: 14.1 SECONDS (ref 11.6–14.5)
QRS AXIS: 66 DEGREES
QRS AXIS: 70 DEGREES
QRSD INTERVAL: 104 MS
QRSD INTERVAL: 106 MS
QT INTERVAL: 444 MS
QT INTERVAL: 512 MS
QTC INTERVAL: 396 MS
QTC INTERVAL: 427 MS
RBC # BLD AUTO: 4.84 MILLION/UL (ref 3.88–5.62)
RBC #/AREA URNS AUTO: ABNORMAL /HPF
SODIUM SERPL-SCNC: 136 MMOL/L (ref 136–145)
SP GR UR STRIP.AUTO: 1.02 (ref 1–1.03)
T WAVE AXIS: 20 DEGREES
T WAVE AXIS: 23 DEGREES
TROPONIN I SERPL-MCNC: 0.02 NG/ML
UROBILINOGEN UR QL STRIP.AUTO: 0.2 E.U./DL
VENTRICULAR RATE: 42 BPM
VENTRICULAR RATE: 48 BPM
WBC # BLD AUTO: 10.21 THOUSAND/UL (ref 4.31–10.16)
WBC #/AREA URNS AUTO: ABNORMAL /HPF

## 2019-08-27 PROCEDURE — 84484 ASSAY OF TROPONIN QUANT: CPT | Performed by: PHYSICIAN ASSISTANT

## 2019-08-27 PROCEDURE — 96375 TX/PRO/DX INJ NEW DRUG ADDON: CPT

## 2019-08-27 PROCEDURE — 96374 THER/PROPH/DIAG INJ IV PUSH: CPT

## 2019-08-27 PROCEDURE — 36415 COLL VENOUS BLD VENIPUNCTURE: CPT | Performed by: PHYSICIAN ASSISTANT

## 2019-08-27 PROCEDURE — 83605 ASSAY OF LACTIC ACID: CPT | Performed by: PHYSICIAN ASSISTANT

## 2019-08-27 PROCEDURE — 80053 COMPREHEN METABOLIC PANEL: CPT | Performed by: PHYSICIAN ASSISTANT

## 2019-08-27 PROCEDURE — 81001 URINALYSIS AUTO W/SCOPE: CPT

## 2019-08-27 PROCEDURE — 85610 PROTHROMBIN TIME: CPT | Performed by: PHYSICIAN ASSISTANT

## 2019-08-27 PROCEDURE — C9113 INJ PANTOPRAZOLE SODIUM, VIA: HCPCS | Performed by: PHYSICIAN ASSISTANT

## 2019-08-27 PROCEDURE — 96361 HYDRATE IV INFUSION ADD-ON: CPT

## 2019-08-27 PROCEDURE — 93010 ELECTROCARDIOGRAM REPORT: CPT | Performed by: INTERNAL MEDICINE

## 2019-08-27 PROCEDURE — 85730 THROMBOPLASTIN TIME PARTIAL: CPT | Performed by: PHYSICIAN ASSISTANT

## 2019-08-27 PROCEDURE — 93005 ELECTROCARDIOGRAM TRACING: CPT

## 2019-08-27 PROCEDURE — 99285 EMERGENCY DEPT VISIT HI MDM: CPT

## 2019-08-27 PROCEDURE — 99284 EMERGENCY DEPT VISIT MOD MDM: CPT | Performed by: PHYSICIAN ASSISTANT

## 2019-08-27 PROCEDURE — 74176 CT ABD & PELVIS W/O CONTRAST: CPT

## 2019-08-27 PROCEDURE — 71046 X-RAY EXAM CHEST 2 VIEWS: CPT

## 2019-08-27 PROCEDURE — 85025 COMPLETE CBC W/AUTO DIFF WBC: CPT | Performed by: PHYSICIAN ASSISTANT

## 2019-08-27 PROCEDURE — 83690 ASSAY OF LIPASE: CPT | Performed by: PHYSICIAN ASSISTANT

## 2019-08-27 RX ORDER — LIDOCAINE HYDROCHLORIDE 20 MG/ML
15 SOLUTION OROPHARYNGEAL ONCE
Status: COMPLETED | OUTPATIENT
Start: 2019-08-27 | End: 2019-08-27

## 2019-08-27 RX ORDER — MAGNESIUM HYDROXIDE/ALUMINUM HYDROXICE/SIMETHICONE 120; 1200; 1200 MG/30ML; MG/30ML; MG/30ML
30 SUSPENSION ORAL ONCE
Status: COMPLETED | OUTPATIENT
Start: 2019-08-27 | End: 2019-08-27

## 2019-08-27 RX ORDER — SUCRALFATE 1 G/1
1 TABLET ORAL ONCE
Status: COMPLETED | OUTPATIENT
Start: 2019-08-27 | End: 2019-08-27

## 2019-08-27 RX ORDER — SUCRALFATE ORAL 1 G/10ML
1000 SUSPENSION ORAL ONCE
Status: DISCONTINUED | OUTPATIENT
Start: 2019-08-27 | End: 2019-08-27

## 2019-08-27 RX ORDER — SUCRALFATE 1 G/1
1 TABLET ORAL 3 TIMES DAILY
Qty: 42 TABLET | Refills: 0 | Status: SHIPPED | OUTPATIENT
Start: 2019-08-27 | End: 2019-08-30 | Stop reason: ALTCHOICE

## 2019-08-27 RX ORDER — PANTOPRAZOLE SODIUM 40 MG/1
40 INJECTION, POWDER, FOR SOLUTION INTRAVENOUS ONCE
Status: COMPLETED | OUTPATIENT
Start: 2019-08-27 | End: 2019-08-27

## 2019-08-27 RX ORDER — ONDANSETRON 2 MG/ML
4 INJECTION INTRAMUSCULAR; INTRAVENOUS ONCE
Status: COMPLETED | OUTPATIENT
Start: 2019-08-27 | End: 2019-08-27

## 2019-08-27 RX ORDER — FAMOTIDINE 20 MG/1
20 TABLET, FILM COATED ORAL
Qty: 14 TABLET | Refills: 0 | Status: SHIPPED | OUTPATIENT
Start: 2019-08-27 | End: 2019-08-30 | Stop reason: ALTCHOICE

## 2019-08-27 RX ADMIN — SODIUM CHLORIDE 500 ML: 0.9 INJECTION, SOLUTION INTRAVENOUS at 02:34

## 2019-08-27 RX ADMIN — SUCRALFATE 1 G: 1 TABLET ORAL at 03:14

## 2019-08-27 RX ADMIN — ONDANSETRON 4 MG: 2 INJECTION INTRAMUSCULAR; INTRAVENOUS at 02:00

## 2019-08-27 RX ADMIN — LIDOCAINE HYDROCHLORIDE 15 ML: 20 SOLUTION ORAL; TOPICAL at 01:59

## 2019-08-27 RX ADMIN — ALUMINUM HYDROXIDE, MAGNESIUM HYDROXIDE, AND SIMETHICONE 30 ML: 200; 200; 20 SUSPENSION ORAL at 01:59

## 2019-08-27 RX ADMIN — PANTOPRAZOLE SODIUM 40 MG: 40 INJECTION, POWDER, FOR SOLUTION INTRAVENOUS at 02:00

## 2019-08-27 NOTE — ED NOTES
Pt in negative distress at this time  Ambulated off unit with steady gait  No other questions upon discharge       Jj Diggs RN  08/27/19 6133

## 2019-08-27 NOTE — ED PROVIDER NOTES
Pt Name: Nereida Quinn  MRN: [id number]  [place]: 1950  Age/Sex: 81 y o  male  Date of evaluation: 8/27/2019  PCP: Jamal Damian MD    [address] [place] [address]    Chief Complaint   Patient presents with    Abdominal Pain     Pt presents to ED c/o left sided abdominal radiating into back since this morning  Pt has hx of gastric ulcers  (+)nausea, 1 episode of vomitting phelgm (-)diarrhea         HPI    Mp Swartz presents to the Emergency Department complaining of Abdominal Pain  Nereida Quinn is a 81 y o  male who presents due to Abdominal Pain, Vomiting  Pt reports epigastric/left upper abdominal radiating into back since this morning which has become worse with each meal, describes this pain as aching  Pt reports pain intensifies <15 minutes after meals  Pt reports associated sxs of nausea, one episode of vomiting  Pt significant PMH admission 8/18/2019 for exertional CP, found to have NSTEMI with multivessel stenosis 95% RCA, 65% circumflex with 2 RENE placed, started on Dual antiplatelet, Statin, Beta Blocker, ACE Inhibitor, though noted on EGD performed by Dr Rommel Amaya 1 week prior to have Genao's esophagus, superficial Gastric Ulcers  Denies Diarrhea, CP, SOB, palpitations, dysuria, hematuria, hemoptysis, hematemesis, and no other complaints at this time        History provided by:  Patient   used: No    Abdominal Pain   Pain location:  LUQ and epigastric  Pain quality: aching    Pain radiates to:  Back  Pain severity:  Moderate  Onset quality:  Gradual  Timing:  Constant  Progression:  Waxing and waning  Chronicity:  New  Context: eating and previous surgery    Context: not alcohol use, not awakening from sleep, not diet changes, not laxative use, not medication withdrawal, not recent travel, not retching, not sick contacts, not suspicious food intake and not trauma    Relieved by:  Nothing  Worsened by:  Eating  Ineffective treatments:  None tried  Associated symptoms: anorexia, nausea and vomiting    Associated symptoms: no belching, no chest pain, no chills, no constipation, no cough, no diarrhea, no dysuria, no fatigue, no fever, no flatus, no hematemesis, no hematochezia, no hematuria, no melena, no shortness of breath and no sore throat    Risk factors: obesity and recent hospitalization    Risk factors: no alcohol abuse, no aspirin use, not elderly, no NSAID use and not pregnant          Past Medical and Surgical History    Past Medical History:   Diagnosis Date    Allergic reaction     LAST ASSESSED: [id number]    Asthma     Bursitis of heel     LAST ASSESSED: [id number]    Derangement of meniscus of left knee due to old injury     LAST ASSESSED: [id number]    Diverticulitis of colon     LAST ASSESSED: [id number]    Dizziness     LAST ASSESSED: [id number]    GERD (gastroesophageal reflux disease)     History of colon polyps     Hypertension     Malignant hypertension     LAST ASSESSED: [id number]    NSTEMI (non-ST elevated myocardial infarction) ([facility] )     Plantar fasciitis     Seasonal allergies     Shoulder impingement     LAST ASSESSED: [id number]    Stroke ([facility] )        Past Surgical History:   Procedure Laterality Date    APPENDECTOMY      CAROTID ENDARTARECTOMY      COLECTOMY Left     PARTIAL - SIGMOID; HEMICOLECTOMY FOR DIVERTICULITIS; ONSET: 1987    COLON SURGERY      COLONOSCOPY N/A 12/9/2016    Procedure: COLONOSCOPY;  Surgeon: Henok Nobles MD;  [facility];   Service:     HERNIA REPAIR      VENTRAL    NISSEN FUNDOPLICATION      ESOPHAGOGASTRIC FUNDOPLASTY LAST ASSESSED: [id number]    SINUS SURGERY      THROMBOENDARTERECTOMY      CAROTID; ONSET; MAY 2012       Family History   Problem Relation Age of Onset    Cancer Paternal Grandfather     Hypertension Mother     Hyperlipidemia Mother     Hypertension Father     Hyperlipidemia Father        Social History     Tobacco Use    Smoking status: Former Smoker    Smokeless tobacco: Never Used   Substance Use Topics    Alcohol use: No    Drug use: No              Allergies    Allergies   Allergen Reactions    Depo-Medrol [Methylprednisolone] Anaphylaxis     Severe Vagal Reaction    Iv Contrast [Iodinated Diagnostic Agents] Angioedema     Pt states itchy/swollen eyes, trouble breathing (years ago)    Sulfa Antibiotics        Home Medications:    Prior to Admission medications    Medication Sig Start Date End Date Taking?  Authorizing Provider   acetaminophen (TYLENOL) 325 mg tablet Take 2 tablets (650 mg total) by mouth every 6 (six) hours as needed for mild pain, headaches or fever 8/20/19   Rosendo Wiggins PA-C   albuterol (PROVENTIL HFA,VENTOLIN HFA) 90 mcg/act inhaler INHALE 2 PUFFS BY MOUTH EVERY 6 HOURS AS NEEDED FOR WHEEZING 7/30/19   Thiago Miller MD   aspirin 81 mg chewable tablet Chew 1 tablet (81 mg total) daily 8/21/19   An Christina PA-C   atorvastatin (LIPITOR) 40 mg tablet Take 1 tablet (40 mg total) by mouth every evening 8/20/19   An Christina PA-C   B Complex Vitamins (VITAMIN B COMPLEX PO) Take 1 tablet by mouth daily    Historical Provider, MD   carvedilol (COREG) 3 125 mg tablet Take 1 tablet (3 125 mg total) by mouth 2 (two) times a day with meals 8/20/19   An Christina PA-C   co-enzyme Q-10 30 MG capsule Take 30 mg by mouth daily    Historical Provider, MD   esomeprazole (NexIUM) 20 mg capsule Take 20 mg by mouth every morning before breakfast    Historical Provider, MD   fluticasone (FLONASE) 50 mcg/act nasal spray 1 spray into each nostril daily    Historical Provider, MD   glucosamine 500 MG CAPS capsule Take 500 mg by mouth daily    Historical Provider, MD   levalbuterol (XOPENEX) 1 25 mg/3 mL nebulizer solution INHALE 1 VIAL BY NEBULIZER EVERY 8 HOURS AS NEEDED FOR WHEEZING 5/13/19   Thiago Miller MD   lisinopril (ZESTRIL) 10 mg tablet Take 1 tablet (10 mg total) by mouth daily 8/21/19   An Christina PA-C   multivitamin (THERAGRAN) TABS Take 1 tablet by mouth daily Historical Provider, MD   ticagrelor (BRILINTA) 90 MG Take 1 tablet (90 mg total) by mouth every 12 (twelve) hours 8/20/19   An Christina PA-C   TURMERIC PO Take 1 Dose by mouth daily    Historical Provider, MD           Review of Systems    Review of Systems   Constitutional: Negative for activity change, appetite change, chills, diaphoresis, fatigue and fever  HENT: Negative for sore throat  Respiratory: Negative for cough and shortness of breath  Cardiovascular: Negative for chest pain, palpitations and leg swelling  Gastrointestinal: Positive for abdominal pain, anorexia, nausea and vomiting  Negative for abdominal distention, constipation, diarrhea, flatus, hematemesis, hematochezia and melena  Genitourinary: Negative for decreased urine volume, difficulty urinating, dysuria, flank pain, frequency, genital sores, hematuria and urgency  Skin: Negative for rash and wound  Neurological: Negative for dizziness, light-headedness and headaches  Hematological: Negative for adenopathy  Psychiatric/Behavioral: The patient is not nervous/anxious  All other systems reviewed and negative  Physical Exam      ED Triage Vitals [08/27/19 0144]   Temperature Pulse Respirations Blood Pressure SpO2   97 6 °F (36 4 °C) (!) 54 18 (!) 196/86 100 %      Temp Source Heart Rate Source Patient Position - Orthostatic VS BP Location FiO2 (%)   Oral Monitor Lying Right arm --      Pain Score       Worst Possible Pain               Physical Exam   Constitutional: He is oriented to person, place, and time  He appears well-developed and well-nourished  Non-toxic appearance  He does not appear ill  He appears distressed (mild)  HENT:   Head: Normocephalic and atraumatic  Mouth/Throat: Oropharynx is clear and moist    Eyes: Pupils are equal, round, and reactive to light  Conjunctivae and EOM are normal    Neck: Normal range of motion  Neck supple  No hepatojugular reflux and no JVD present   Carotid bruit is not present  Cardiovascular: Normal rate, regular rhythm, normal heart sounds, intact distal pulses and normal pulses  No extrasystoles are present  PMI is not displaced  Exam reveals no gallop and no friction rub  No murmur heard  Pulses:       Radial pulses are 2+ on the right side, and 2+ on the left side  Dorsalis pedis pulses are 2+ on the right side, and 2+ on the left side  Posterior tibial pulses are 2+ on the right side, and 2+ on the left side  Pulmonary/Chest: Effort normal and breath sounds normal  No stridor  No respiratory distress  He has no wheezes  He has no rales  He exhibits no tenderness  Abdominal: Soft  Bowel sounds are normal  He exhibits no distension and no mass  There is no hepatosplenomegaly  There is tenderness in the epigastric area and left upper quadrant  There is no rigidity, no rebound, no guarding, no CVA tenderness, no tenderness at McBurney's point and negative Metzger's sign  No hernia  Negative Metzger's  Negative Appendiceal signs (Psoas, Rovsing's, Obturator)  Negative Peritoneal Signs   Musculoskeletal: Normal range of motion  Neurological: He is alert and oriented to person, place, and time  Skin: Skin is warm and dry  Capillary refill takes less than 2 seconds  He is not diaphoretic  Nursing note and vitals reviewed            Diagnostic Results    ECG      Labs:    Results for orders placed or performed during the hospital encounter of 08/27/19   CBC and differential   Result Value Ref Range    WBC 10 21 (H) 4 31 - 10 16 Thousand/uL    RBC 4 84 3 88 - 5 62 Million/uL    Hemoglobin 15 4 12 0 - 17 0 g/dL    Hematocrit 45 5 36 5 - 49 3 %    MCV 94 82 - 98 fL    MCH 31 8 26 8 - 34 3 pg    MCHC 33 8 31 4 - 37 4 g/dL    RDW 13 3 11 6 - 15 1 %    MPV 11 6 8 9 - 12 7 fL    Platelets 429 033 - 294 Thousands/uL    nRBC 0 /100 WBCs    Neutrophils Relative 66 43 - 75 %    Immat GRANS % 0 0 - 2 %    Lymphocytes Relative 21 14 - 44 %    Monocytes Relative 10 4 - 12 %    Eosinophils Relative 3 0 - 6 %    Basophils Relative 0 0 - 1 %    Neutrophils Absolute 6 71 1 85 - 7 62 Thousands/µL    Immature Grans Absolute 0 04 0 00 - 0 20 Thousand/uL    Lymphocytes Absolute 2 15 0 60 - 4 47 Thousands/µL    Monocytes Absolute 0 97 0 17 - 1 22 Thousand/µL    Eosinophils Absolute 0 30 0 00 - 0 61 Thousand/µL    Basophils Absolute 0 04 0 00 - 0 10 Thousands/µL   Comprehensive metabolic panel   Result Value Ref Range    Sodium 136 136 - 145 mmol/L    Potassium 4 0 3 5 - 5 3 mmol/L    Chloride 100 100 - 108 mmol/L    CO2 26 21 - 32 mmol/L    ANION GAP 10 4 - 13 mmol/L    BUN 22 5 - 25 mg/dL    Creatinine 1 25 0 60 - 1 30 mg/dL    Glucose 134 65 - 140 mg/dL    Calcium 9 6 8 3 - 10 1 mg/dL    AST 33 5 - 45 U/L    ALT 38 12 - 78 U/L    Alkaline Phosphatase 65 46 - 116 U/L    Total Protein 7 9 6 4 - 8 2 g/dL    Albumin 4 0 3 5 - 5 0 g/dL    Total Bilirubin 1 00 0 20 - 1 00 mg/dL    eGFR 59 ml/min/1 73sq m   Lipase   Result Value Ref Range    Lipase 192 73 - 393 u/L   Troponin I   Result Value Ref Range    Troponin I 0 02 <=0 04 ng/mL   Lactic acid, plasma   Result Value Ref Range    LACTIC ACID 1 8 0 5 - 2 0 mmol/L   Protime-INR   Result Value Ref Range    Protime 14 1 11 6 - 14 5 seconds    INR 1 15 0 84 - 1 19   APTT   Result Value Ref Range    PTT 29 23 - 37 seconds   Urine Microscopic   Result Value Ref Range    RBC, UA 1-2 (A) None Seen, 0-5 /hpf    WBC, UA 0-1 (A) None Seen, 0-5, 5-55, 5-65 /hpf    Epithelial Cells None Seen None Seen, Occasional /hpf    Bacteria, UA None Seen None Seen, Occasional /hpf    MUCUS THREADS Occasional (A) None Seen   ED Urine Macroscopic   Result Value Ref Range    Color, UA Yellow     Clarity, UA Clear     pH, UA 5 5 4 5 - 8 0    Leukocytes, UA Negative Negative    Nitrite, UA Negative Negative    Protein, UA Trace (A) Negative mg/dl    Glucose, UA Negative Negative mg/dl    Ketones, UA Negative Negative mg/dl    Urobilinogen, UA 0 2 0 2, 1 0 E U /dl E U /dl    Bilirubin, UA Negative Negative    Blood, UA Negative Negative    Specific Gravity, UA 1 020 1 003 - 1 030       All labs reviewed and utilized in the medical decision making process    Radiology:    CT abdomen pelvis wo contrast   Final Result      No evidence of bowel obstruction  There is colonic diverticulosis without evidence of acute diverticulitis  Atherosclerosis  No abdominal aortic aneurysm  Workstation performed: [id number]         XR chest 2 views   ED Interpretation   No acute cardiopulmonary disease, no air under diaphragm          All radiology studies independently viewed by me and interpreted by the radiologist     Procedure    ECG 12 Lead Documentation Only  Date/Time: 8/27/2019 2:10 AM  Performed by: Enio Sharma PA-C  Authorized by:  Enio Sharma PA-C     Indications / Diagnosis:  Epigastric pain  ECG reviewed by me, the ED Provider: yes    Patient location:  ED  Previous ECG:     Comparison to cardiac monitor: Yes    Interpretation:     Interpretation: normal    Rate:     ECG rate:  48    ECG rate assessment: normal    Rhythm:     Rhythm: sinus bradycardia    Ectopy:     Ectopy: none    QRS:     QRS axis:  Normal    QRS intervals:  Normal  Conduction:     Conduction: normal    ST segments:     ST segments:  Non-specific  T waves:     T waves: normal            Assessment and Plan    MDM  Number of Diagnoses or Management Options     Amount and/or Complexity of Data Reviewed  Clinical lab tests: reviewed and ordered  Tests in the radiology section of CPT®: ordered and reviewed  Tests in the medicine section of CPT®: reviewed and ordered  Obtain history from someone other than the patient: yes  Review and summarize past medical records: yes  Independent visualization of images, tracings, or specimens: yes    Risk of Complications, Morbidity, and/or Mortality  Presenting problems: moderate  Diagnostic procedures: moderate  Management options: moderate        Initial ED assessment:  Hien Rosenbaum is a 81 y o  male with significant PMH for NSTEMI 10 days ago with RENE x2 in RCA, Genao's Esophagus, Gastric Ulcers, HTN, GERD, Asthma, Obesity, Former Smoker  who presents with Abdominal Pain, Vomiting  Vitals signs reviewed  Physical examination remarkable for epigastric, LUQ TTP, Obese abdomen  Initial Ddx  includes but is not limited to:   gastritis, PUD, GERD, gastroparesis, gastroenteritis, hepatitis, pancreatitis, colitis, enteritis, diverticulitis, food poisoning, mesenteric adenitis, mesenteric ischemia, IBD, IBS, ileus, bowel obstruction, volvulus, internal hernia, cholecystitis, biliary colic, choledocholithiasis, perforated viscus, splenic etiology, constipation, AAA, testicular torsion, renal colic, pyelonephritis, UTI; cardiac etiology  Initial ED plan:   Plan will be to perform diagnostic testing of CBC, CMP, Lipase, Coags, Lactate, Troponin, EKG, CXR, CT abdomen and treat symptomatically  Final ED summary/disposition: Discussed results of diagnostic testing with pt and spouse with permission and in detail  Home care recommendations given with discharge paperwork  Return to ED instructions given if new/worsening sxs  MDM  Reviewed: previous chart, nursing note and vitals  Reviewed previous: labs, ECG and x-ray  Interpretation: labs, ECG and x-ray        ED Course of Care and Re-Assessments    ED Course as of Aug 27 0351   Tue Aug 27, 2019   0156 Pt allergic to IV contrast    CT abdomen pelvis wo contrast   0251 Troponin I: 0 02   0350 Heart score 5, patient recently with NSTEMI, denies chest pain, shortness of breath though pain was epigastric region  Patient has improved with GI cocktail, Carafate, EKG is remaining without signs of ischemia                     history slightly suspicious = 0, ECG nonspecific repolarization disturbance = 1, age 72 years or older = 2, risk factors 3 or more or history of atherosclerotic disease = 2, troponin below normal limit = 0  4-6 => 12-16 6% risk of MACE in the following 6 weeks  Medications   ondansetron (ZOFRAN) injection 4 mg (4 mg Intravenous Given 8/27/19 0200)   pantoprazole (PROTONIX) injection 40 mg (40 mg Intravenous Given 8/27/19 0200)   aluminum-magnesium hydroxide-simethicone (MYLANTA) 200-200-20 mg/5 mL oral suspension 30 mL (30 mL Oral Given 8/27/19 0159)   Lidocaine Viscous HCl (XYLOCAINE) 2 % mucosal solution 15 mL (15 mL Swish & Swallow Given 8/27/19 0159)   sodium chloride 0 9 % bolus 500 mL (500 mL Intravenous New Bag 8/27/19 0234)   sucralfate (CARAFATE) tablet 1 g (1 g Oral Given 8/27/19 0314)         FINAL IMPRESSION    Final diagnoses:   Abdominal pain   Multiple gastric ulcers         DISPOSITION/PLAN  Time reflects when diagnosis was documented in both MDM as applicable and the Disposition within this note     Time User Action Codes Description Comment    8/27/2019  3:37 AM Topete Lias Add [R10 9] Abdominal pain     8/27/2019  3:37 AM Topete Lias Add [K25 9] Multiple gastric ulcers     8/27/2019  3:37 AM Topete Lias Modify [R10 9] Abdominal pain     8/27/2019  3:37 AM Topete Lias Modify [K25 9] Multiple gastric ulcers       ED Disposition     ED Disposition Condition Date/Time Comment    Discharge Stable Tue Aug 27, 2019  3:36 AM Silvia Alyson discharge to home/self care              Follow-up Information     Follow up With Specialties Details Why Contact Info Additional Information    Michael London MD Family Medicine Go to  For follow up [address] [facility] [address]       Rebecca Davis MD Gastroenterology Go to  For follow up [address] [facility] 8 [facility] 107 Emergency Department Emergency Medicine Go to  If symptoms worsen [address] [facility] [address]  [facility] ED, [address], [place], [place], [address]              PATIENT REFERRED TO:    Yimi Woodson MD  [address] [facility] [address]  [phone]    Go to   For follow up    Jovi Acosta MD  [address]  [address]    Go to   For follow up    [place] Emergency Department  [address] [facility] [address]  [phone]  Go to   If symptoms worsen      DISCHARGE MEDICATIONS:    Patient's Medications   Discharge Prescriptions    FAMOTIDINE (PEPCID) 20 MG TABLET    Take 1 tablet (20 mg total) by mouth daily at bedtime as needed for heartburn for up to 14 days       Start Date: 8/27/2019 End Date: 9/10/2019       Order Dose: 20 mg       Quantity: 14 tablet    Refills: 0    SUCRALFATE (CARAFATE) 1 G TABLET    Take 1 tablet (1 g total) by mouth 3 (three) times a day for 14 days       Start Date: 8/27/2019 End Date: 9/10/2019       Order Dose: 1 g       Quantity: 42 tablet    Refills: 0       No discharge procedures on file           MARIE Patel PA-C  08/27/19 5825

## 2019-08-28 ENCOUNTER — OFFICE VISIT (OUTPATIENT)
Dept: CARDIOLOGY CLINIC | Facility: CLINIC | Age: 69
End: 2019-08-28
Payer: MEDICARE

## 2019-08-28 VITALS — HEART RATE: 52 BPM | OXYGEN SATURATION: 95 % | HEIGHT: 68 IN | BODY MASS INDEX: 32.28 KG/M2 | WEIGHT: 213 LBS

## 2019-08-28 DIAGNOSIS — I25.10 CORONARY ARTERY DISEASE INVOLVING NATIVE HEART WITHOUT ANGINA PECTORIS, UNSPECIFIED VESSEL OR LESION TYPE: ICD-10-CM

## 2019-08-28 DIAGNOSIS — Z95.5 STENTED CORONARY ARTERY: Primary | ICD-10-CM

## 2019-08-28 DIAGNOSIS — I10 BENIGN ESSENTIAL HYPERTENSION: ICD-10-CM

## 2019-08-28 DIAGNOSIS — K76.0 FATTY INFILTRATION OF LIVER: ICD-10-CM

## 2019-08-28 DIAGNOSIS — E78.5 DYSLIPIDEMIA: ICD-10-CM

## 2019-08-28 DIAGNOSIS — R00.1 BRADYCARDIA: ICD-10-CM

## 2019-08-28 PROCEDURE — 99214 OFFICE O/P EST MOD 30 MIN: CPT | Performed by: NURSE PRACTITIONER

## 2019-08-28 PROCEDURE — 93000 ELECTROCARDIOGRAM COMPLETE: CPT | Performed by: NURSE PRACTITIONER

## 2019-08-28 NOTE — LETTER
August 28, 2019     Arely Metz MD  [address] [place] [id number]    Patient: Lance Camara   YOB: 1950   Date of Visit: 8/28/2019       Dear Dr Ashly Plascencia:    Thank you for referring Lance Camara to me for evaluation  Below are my notes for this consultation  If you have questions, please do not hesitate to call me  I look forward to following your patient along with you  Sincerely,        REGLA Khan        CC: Anne-Marie Smalls, REGLA Yancey  8/28/2019  1:06 PM  Sign at close encounter  MI Follow Up Office Visit Note  Lance Camara   81 y o    male   MRN: [id number]  [address]  [place] [address] [facility]  [address] [place] [address] [address] [address] [phone]    PCP: Arely Metz MD  Cardiologist:   Will be Dr William Lim             Assessment/plan  Non-STEMI, type 1 ( 8/19/19)  Trop to 4 6, status post RENE-distal RCA x 2; residual nonobstructive disease of the proximal RCA  (50%) and mid circumflex ( 65%) , medically managed  --on aspirin 81, Brilinta 90 mg Q 12, beta-blocker, statin  Hx reflux esophagitis, Barretts esophagus   S/P Nissen fundoplication 2812  --EGD 8/13/19:  Superficial ulcer crater  gastric antrum (Dr Norval Dandy, Pepcid, Nexium  --reports he follows with GI tomorrow  Bradycardia, symptomatic, on coreg 3 125 BID-EKG today:  Marked sinus bradycardia 40 beats per minute  His bradycardia is likely compounded by the beta-blocker as well as vagally mediated  --STOP coreg  --24 hour Holter monitor early next week  Hyperlipidemia, on atorvastatin 40 mg daily  --lipid profile August 2019:  Direct , triglyceride 48, total cholesterol 202  --a heart healthy diet was emphasized  --repeat fasting lipid profile 8 weeks  Hypertension  /72,on coreg 3 125 bid , and lisinopril 10 mg/d  Jose Glass STOP coreg   Cardiac testing  --cardiac catheterization   Mid circumflex: There was a diffuse 65 % stenosis    Proximal RCA: There was a 50 % stenosis  Distal RCA: There was a 95 % stenosis at the origin of RPL1  Intervention: 95 % lesion in the distal RCA s/p RENE x 2    --TTE 8/19  EF 60 %  hypokinesis of the basal-mid inferior wall(s)  RV fxn normal  Grade 1 diastolic dysfunction       Summary of recommendations  Heart healthy diet  Educational information provided  Fasting lipid profile 8 weeks  STOP carvedilol  24 hr Holter the beginning of next week  Has follow up with GI tomorrow  Cardiac rehab- scheduled for 9/9  Medical adherence to dual antiplatelet therapy reinforced  Follow up will be scheduled with Dr Marcus Chicas 1 month            HPI  Dwayne Georges is a 82 yo gentleman with   history of hypertension and hyperlipidemia, no previous known history of coronary artery disease  He has had Nissen fundoplication in the past for GERD  He also had a recent outpatient EGD which showed Genao's esophagus and esophageal non bleeding stomach ulcers  Recently he presented to [facility] with recurrent chest pain  He notes that was initially exertional and then it started to occur at rest prompting him to come to the hospital   He had a rising troponin  He was diagnosed with an acute coronary syndrome and followed by Cardiology  Given an IV contrast allergy he was prepared with histamine blockers and steroids  He underwent cardiac catheterization which showed a 95% lesion in the distal RCA for which he underwent deployment of a drug-eluting stent x2  There is also a diffuse 65% stenosis in the mid circumflex and 50% stenosis of the proximal RCA, medically managed  He was placed on dual antiplatelet therapy with aspirin Brilinta  He is also placed on high-intensity statin beta-blocker and ACE-inhibitor  An echocardiogram showed preserved LV function, hypokinesis of the basal inferior wall and no significant valve disease  Interval history  Presented to the emergency room 8/27 with abdominal pain, and vomiting  /86  Pt reports epigastric/left upper abdominal radiating into back since this morning which has become worse with each meal, describes this pain as aching  Pt reports pain intensifies <15 minutes after meals  Pt reports associated sxs of nausea, one episode of vomiting  Worked up with CBC, CMP, Lipase, Coags, Lactate, Troponin, EKG, CXR, CT abdomen   Troponin was less than 0 02  EKG:  Normal sinus rhythm  CTs abdomen:  No acute findings  Chest x-ray: NAD  hgb 15  He was given Maalox for symptoms       8/28/19 he presents for cardiology follow-up  He denies chest pain or shortness of breath  He has had some epigastric/abdominal discomfort with some nausea  He has been checking his vital signs at home and they brought me a list   His systolic blood pressures been running low, even before he has been taking the Coreg  The lowest on Monday August 26 was 74/42  I am going to stop the beta-blocker  Will check a Holter monitor next week which he is agreeable to  He has follow-up with his GI doctor tomorrow    I discussed the importance of adhering to his dual antiplatelet therapy, uninterrupted        Assessment  Diagnoses and all orders for this visit:    Stented coronary artery    Benign essential hypertension    Fatty infiltration of liver    Dyslipidemia    Coronary artery disease involving native heart without angina pectoris, unspecified vessel or lesion type          Past Medical History:   Diagnosis Date    Allergic reaction     LAST ASSESSED: [id number]    Asthma     Bursitis of heel     LAST ASSESSED: [id number]    Derangement of meniscus of left knee due to old injury     LAST ASSESSED: [id number]    Diverticulitis of colon     LAST ASSESSED: [id number]    Dizziness     LAST ASSESSED: [id number]    GERD (gastroesophageal reflux disease)     History of colon polyps     Hypertension     Malignant hypertension     LAST ASSESSED: [id number]    [facility]     Plantar fasciitis  Seasonal allergies     Shoulder impingement     LAST ASSESSED: [id number]    St[facility] )        Review of Systems   Constitution: Negative for chills  Cardiovascular: Negative for chest pain, claudication, cyanosis, dyspnea on exertion, irregular heartbeat, leg swelling, near-syncope, orthopnea, palpitations, paroxysmal nocturnal dyspnea and syncope  Respiratory: Negative for cough and shortness of breath  Gastrointestinal: Negative for heartburn and nausea  See HPI  Some epigastric discomfort and nausea   Neurological: Negative for dizziness, focal weakness, headaches, light-headedness and weakness  All other systems reviewed and are negative  Allergies   Allergen Reactions    Depo-Medrol [Methylprednisolone] Anaphylaxis     Severe Vagal Reaction    Iv Contrast [Iodinated Diagnostic Agents] Angioedema     Pt states itchy/swollen eyes, trouble breathing (years ago)    Sulfa Antibiotics            Current Outpatient Medications:     acetaminophen (TYLENOL) 325 mg tablet, Take 2 tablets (650 mg total) by mouth every 6 (six) hours as needed for mild pain, headaches or fever, Disp: 30 tablet, Rfl: 0    albuterol (PROVENTIL HFA,VENTOLIN HFA) 90 mcg/act inhaler, INHALE 2 PUFFS BY MOUTH EVERY 6 HOURS AS NEEDED FOR WHEEZING, Disp: 18 g, Rfl: 3    aspirin 81 mg chewable tablet, Chew 1 tablet (81 mg total) daily, Disp: 30 tablet, Rfl: 0    atorvastatin (LIPITOR) 40 mg tablet, Take 1 tablet (40 mg total) by mouth every evening, Disp: 30 tablet, Rfl: 0    B Complex Vitamins (VITAMIN B COMPLEX PO), Take 1 tablet by mouth daily, Disp: , Rfl:     carvedilol (COREG) 3 125 mg tablet, Take 1 tablet (3 125 mg total) by mouth 2 (two) times a day with meals, Disp: 60 tablet, Rfl: 0    co-enzyme Q-10 30 MG capsule, Take 30 mg by mouth daily, Disp: , Rfl:     esomeprazole (NexIUM) 20 mg capsule, Take 20 mg by mouth every morning before breakfast, Disp: , Rfl:     famotidine (PEPCID) 20 mg tablet, Take 1 tablet (20 mg total) by mouth daily at bedtime as needed for heartburn for up to 14 days, Disp: 14 tablet, Rfl: 0    fluticasone (FLONASE) 50 mcg/act nasal spray, 1 spray into each nostril daily, Disp: , Rfl:     glucosamine 500 MG CAPS capsule, Take 500 mg by mouth daily, Disp: , Rfl:     levalbuterol (XOPENEX) 1 25 mg/3 mL nebulizer solution, INHALE 1 VIAL BY NEBULIZER EVERY 8 HOURS AS NEEDED FOR WHEEZING, Disp: 72 vial, Rfl: 3    lisinopril (ZESTRIL) 10 mg tablet, Take 1 tablet (10 mg total) by mouth daily, Disp: 30 tablet, Rfl: 0    multivitamin (THERAGRAN) TABS, Take 1 tablet by mouth daily, Disp: , Rfl:     sucralfate (CARAFATE) 1 g tablet, Take 1 tablet (1 g total) by mouth 3 (three) times a day for 14 days, Disp: 42 tablet, Rfl: 0    ticagrelor (BRILINTA) 90 MG, Take 1 tablet (90 mg total) by mouth every 12 (twelve) hours, Disp: 60 tablet, Rfl: 0    TURMERIC PO, Take 1 Dose by mouth daily, Disp: , Rfl:   No current facility-administered medications for this visit           Social History     Socioeconomic History    Marital status: /Civil Union     Spouse name: Not on file    Number of children: Not on file    Years of education: Not on file    Highest education level: Not on file   Occupational History    Not on file   Social Needs    Financial resource strain: Not on file    Food insecurity:     Worry: Not on file     Inability: Not on file    Transportation needs:     Medical: Not on file     Non-medical: Not on file   Tobacco Use    Smoking status: Former Smoker    Smokeless tobacco: Never Used   Substance and Sexual Activity    Alcohol use: No    Drug use: No    Sexual activity: Not on file   Lifestyle    Physical activity:     Days per week: Not on file     Minutes per session: Not on file    Stress: Not on file   Relationships    Social connections:     Talks on phone: Not on file     Gets together: Not on file     Attends Yarsanism service: Not on file     Active member of club or organization: Not on file     Attends meetings of clubs or organizations: Not on file     Relationship status: Not on file    Intimate partner violence:     Fear of current or ex partner: Not on file     Emotionally abused: Not on file     Physically abused: Not on file     Forced sexual activity: Not on file   Other Topics Concern    Not on file   Social History Narrative    Not on file       Family History   Problem Relation Age of Onset    Cancer Paternal Grandfather     Hypertension Mother     Hyperlipidemia Mother     Hypertension Father     Hyperlipidemia Father        Physical Exam   Constitutional: He is oriented to person, place, and time  No distress  HENT:   Head: Normocephalic and atraumatic  Eyes: Conjunctivae and EOM are normal    Neck: Normal range of motion  Neck supple  Cardiovascular: Normal rate, regular rhythm, normal heart sounds and intact distal pulses  Pulmonary/Chest: Effort normal and breath sounds normal    Abdominal: Soft  Bowel sounds are normal    Musculoskeletal: Normal range of motion  Neurological: He is alert and oriented to person, place, and time  Skin: Skin is warm and dry  Psychiatric: He has a normal mood and affect  Nursing note and vitals reviewed  Vitals: There were no vitals taken for this visit     Wt Readings from Last 3 Encounters:   08/27/19 96 6 kg (212 lb 15 4 oz)   08/17/19 96 kg (211 lb 10 3 oz)   07/29/19 102 kg (225 lb)         Labs & Results:  Lab Results   Component Value Date    WBC 10 21 (H) 08/27/2019    HGB 15 4 08/27/2019    HCT 45 5 08/27/2019    MCV 94 08/27/2019     08/27/2019     No results found for: BNP  No components found for: CHEM  Troponin I   Date Value Ref Range Status   08/27/2019 0 02 <=0 04 ng/mL Final     Comment:       S[employer] Chemistry analyzer 99% cutoff is > 0 04 ng/mL in network labs     o cTnI 99% cutoff is useful only when applied to patients in the clinical setting of myocardial ischemia   o cTnI 99% cutoff should be interpreted in the context of clinical history, ECG findings and possibly cardiac imaging to establish correct diagnosis  o cTnI 99% cutoff may be suggestive but clearly not indicative of a coronary event without the clinical setting of myocardial ischemia      2019 3 82 (H) <=0 04 ng/mL Final     Comment:       Siemens Chemistry analyzer 99% cutoff is > 0 04 ng/mL in network labs     o cTnI 99% cutoff is useful only when applied to patients in the clinical setting of myocardial ischemia   o cTnI 99% cutoff should be interpreted in the context of clinical history, ECG findings and possibly cardiac imaging to establish correct diagnosis  o cTnI 99% cutoff may be suggestive but clearly not indicative of a coronary event without the clinical setting of myocardial ischemia      2019 4 61 (H) <=0 04 ng/mL Final     Comment:       Siemens Chemistry analyzer 99% cutoff is > 0 04 ng/mL in network labs     o cTnI 99% cutoff is useful only when applied to patients in the clinical setting of myocardial ischemia   o cTnI 99% cutoff should be interpreted in the context of clinical history, ECG findings and possibly cardiac imaging to establish correct diagnosis  o cTnI 99% cutoff may be suggestive but clearly not indicative of a coronary event without the clinical setting of myocardial ischemia  Results for orders placed during the hospital encounter of 19   Echo complete with contrast if indicated    Narrative [place] [address] [address] [facility]  [phone]    Transthoracic Echocardiogram  2D, M-mode, Doppler, and Color Doppler    Study date:  18-Aug-2019    Patient: Neil Phlegm  MR number: [id number]  Account number: [de-identified]  : 1950  Age: 81 years  Gender: Male  Status: Outpatient  Location: Bedside  Height: 67 in  Weight: 210 5 lb  BP: 153/ 74 mmHg    Indications: Assess left ventricular function      Diagnoses: I21 3 - ST elevation (STEMI) myocardial infarction of unspecified site    Sonographer:  Yuri Blackman RDCS  Primary Physician:  Elena Gao MD  Referring Physician:  REGLA Conway  Group:  [facility]  Interpreting Physician:  David Mcdonald DO    SUMMARY    LEFT VENTRICLE:  Systolic function was normal  Ejection fraction was estimated to be 60 %  There was hypokinesis of the basal-mid inferior wall(s)  Wall thickness was mildly increased  The changes were consistent with concentric remodeling (increased wall thickness with normal wall mass)  Doppler parameters were consistent with abnormal left ventricular relaxation (grade 1 diastolic dysfunction)  RIGHT VENTRICLE:  The size was normal   Systolic function was normal     LEFT ATRIUM:  The atrium was mildly dilated  MITRAL VALVE:  There was mild regurgitation  HISTORY: PRIOR HISTORY: hypertension, CP    PROCEDURE: The procedure was performed at the bedside  This was a routine study  The transthoracic approach was used  The study included complete 2D imaging, M-mode, complete spectral Doppler, and color Doppler  The heart rate was 54 bpm,  at the start of the study  Images were obtained from the parasternal, apical, subcostal, and suprasternal notch acoustic windows  Image quality was adequate  LEFT VENTRICLE: Size was normal  Systolic function was normal  Ejection fraction was estimated to be 60 %  There was hypokinesis of the basal-mid inferior wall(s)  Wall thickness was mildly increased  The changes were consistent with  concentric remodeling (increased wall thickness with normal wall mass)  DOPPLER: Doppler parameters were consistent with abnormal left ventricular relaxation (grade 1 diastolic dysfunction)  There was no evidence of elevated ventricular  filling pressure by Doppler parameters      RIGHT VENTRICLE: The size was normal  Systolic function was normal  Wall thickness was normal     LEFT ATRIUM: The atrium was mildly dilated  RIGHT ATRIUM: Size was normal     MITRAL VALVE: Valve structure was normal  There was normal leaflet separation  DOPPLER: The transmitral velocity was within the normal range  There was no evidence for stenosis  There was mild regurgitation  AORTIC VALVE: The valve was trileaflet  Leaflets exhibited normal thickness and normal cuspal separation  DOPPLER: Transaortic velocity was within the normal range  There was no evidence for stenosis  There was no regurgitation  TRICUSPID VALVE: The valve structure was normal  There was normal leaflet separation  DOPPLER: The transtricuspid velocity was within the normal range  There was no evidence for stenosis  There was trace regurgitation  The tricuspid jet  envelope definition was inadequate for estimation of RV systolic pressure  There are no indirect findings (abnormal RV volume or geometry, altered pulmonary flow velocity profile, or leftward septal displacement) which would suggest  moderate or severe pulmonary hypertension  PULMONIC VALVE: Leaflets exhibited normal thickness, no calcification, and normal cuspal separation  DOPPLER: The transpulmonic velocity was within the normal range  There was trace regurgitation  PERICARDIUM: There was no pericardial effusion  The pericardium was normal in appearance  AORTA: The root exhibited normal size  SYSTEMIC VEINS: IVC: The inferior vena cava was not well visualized      SYSTEM MEASUREMENT TABLES    2D  %FS: 27 42 %  Ao Diam: 3 5 cm  EDV(Teich): 106 68 ml  EF(Teich): 53 22 %  ESV(Teich): 49 91 ml  IVSd: 1 23 cm  LA Area: 19 6 cm2  LA Diam: 4 5 cm  LVEDV MOD A4C: 180 5 ml  LVEF MOD A4C: 65 88 %  LVESV MOD A4C: 61 59 ml  LVIDd: 4 78 cm  LVIDs: 3 47 cm  LVLd A4C: 9 17 cm  LVLs A4C: 7 45 cm  LVPWd: 1 21 cm  RA Area: 14 45 cm2  RVIDd: 3 32 cm  SV MOD A4C: 118 9 ml  SV(Teich): 56 77 ml    MM  TAPSE: 3 01 cm    PW  E': 0 06 m/s  E/E': 9 06  MV A Martín: 0 78 m/s  MV Dec Lucas: 1 87 m/s2  MV DecT: 295 74 ms  MV E Martín: 0 55 m/s  MV E/A Ratio: 0 71  MV PHT: 85 76 ms  MVA By PHT: 2 57 cm2    [employer] Accredited Echocardiography [facility]    Prepared and electronically signed by    Jose E Santos DO  Signed 18-Aug-2019 11:49:47       No results found for this or any previous visit  This note was completed in part utilizing [employer] fluency direct voice recognition software  Grammatical errors, random word insertion, spelling mistakes, and incomplete sentences may be an occasional consequence of the system secondary to software limitations, ambient noise and hardware issues  At the time of dictation, efforts were made to edit, clarify and /or correct errors  Please read the chart carefully and recognize, using context, where substitutions have occurred    If you have any questions or concerns about the context, text or information contained within the body of this dictation, please contact myself, the provider, for further clarification

## 2019-08-30 ENCOUNTER — OFFICE VISIT (OUTPATIENT)
Dept: FAMILY MEDICINE CLINIC | Facility: CLINIC | Age: 69
End: 2019-08-30
Payer: MEDICARE

## 2019-08-30 VITALS
WEIGHT: 213 LBS | TEMPERATURE: 96.5 F | RESPIRATION RATE: 16 BRPM | SYSTOLIC BLOOD PRESSURE: 128 MMHG | HEART RATE: 56 BPM | BODY MASS INDEX: 32.28 KG/M2 | HEIGHT: 68 IN | DIASTOLIC BLOOD PRESSURE: 66 MMHG

## 2019-08-30 DIAGNOSIS — K76.0 FATTY INFILTRATION OF LIVER: ICD-10-CM

## 2019-08-30 DIAGNOSIS — J45.20 MILD INTERMITTENT ASTHMA WITHOUT COMPLICATION: ICD-10-CM

## 2019-08-30 DIAGNOSIS — E79.0 HYPERURICEMIA WITHOUT SIGNS INFLAMMATORY ARTHRITIS/TOPHACEOUS DISEASE: ICD-10-CM

## 2019-08-30 DIAGNOSIS — I65.23 BILATERAL CAROTID ARTERY STENOSIS: ICD-10-CM

## 2019-08-30 DIAGNOSIS — I10 BENIGN ESSENTIAL HYPERTENSION: ICD-10-CM

## 2019-08-30 DIAGNOSIS — I21.4 NSTEMI (NON-ST ELEVATED MYOCARDIAL INFARCTION) (HCC): Primary | ICD-10-CM

## 2019-08-30 DIAGNOSIS — I25.10 CORONARY ARTERY DISEASE INVOLVING NATIVE CORONARY ARTERY OF NATIVE HEART WITHOUT ANGINA PECTORIS: ICD-10-CM

## 2019-08-30 DIAGNOSIS — K21.9 GASTROESOPHAGEAL REFLUX DISEASE WITHOUT ESOPHAGITIS: ICD-10-CM

## 2019-08-30 DIAGNOSIS — E78.2 MIXED HYPERLIPIDEMIA: ICD-10-CM

## 2019-08-30 DIAGNOSIS — Z23 NEED FOR TD VACCINE: ICD-10-CM

## 2019-08-30 DIAGNOSIS — R73.01 IMPAIRED FASTING GLUCOSE: ICD-10-CM

## 2019-08-30 DIAGNOSIS — K22.70 BARRETT'S ESOPHAGUS WITHOUT DYSPLASIA: ICD-10-CM

## 2019-08-30 DIAGNOSIS — Z23 NEED FOR PNEUMOCOCCAL VACCINATION: ICD-10-CM

## 2019-08-30 PROBLEM — R10.13 EPIGASTRIC PAIN: Status: RESOLVED | Noted: 2019-07-29 | Resolved: 2019-08-30

## 2019-08-30 PROBLEM — E78.5 DYSLIPIDEMIA: Status: RESOLVED | Noted: 2019-08-19 | Resolved: 2019-08-30

## 2019-08-30 PROBLEM — R11.10 VOMITING: Status: RESOLVED | Noted: 2019-07-29 | Resolved: 2019-08-30

## 2019-08-30 PROCEDURE — 90471 IMMUNIZATION ADMIN: CPT | Performed by: FAMILY MEDICINE

## 2019-08-30 PROCEDURE — G0009 ADMIN PNEUMOCOCCAL VACCINE: HCPCS | Performed by: FAMILY MEDICINE

## 2019-08-30 PROCEDURE — 90670 PCV13 VACCINE IM: CPT | Performed by: FAMILY MEDICINE

## 2019-08-30 PROCEDURE — 90714 TD VACC NO PRESV 7 YRS+ IM: CPT | Performed by: FAMILY MEDICINE

## 2019-08-30 PROCEDURE — 99495 TRANSJ CARE MGMT MOD F2F 14D: CPT | Performed by: FAMILY MEDICINE

## 2019-08-30 RX ORDER — LISINOPRIL 10 MG/1
10 TABLET ORAL DAILY
Qty: 30 TABLET | Refills: 0 | Status: SHIPPED | OUTPATIENT
Start: 2019-08-30 | End: 2019-09-23 | Stop reason: SDUPTHER

## 2019-08-30 RX ORDER — ATORVASTATIN CALCIUM 40 MG/1
40 TABLET, FILM COATED ORAL EVERY EVENING
Qty: 30 TABLET | Refills: 0 | Status: SHIPPED | OUTPATIENT
Start: 2019-08-30 | End: 2019-09-23 | Stop reason: SDUPTHER

## 2019-08-30 NOTE — PROGRESS NOTES
TCM Call (since 7/30/2019)     Date and time call was made  8/20/2019  3:35 PM    Hospital care reviewed  Records reviewed    Patient was hospitialized at  [facility]    Date of Admission  08/17/19    Date of discharge  08/20/19    Diagnosis  NSTEMI    Disposition  Home    Were the patients medications reviewed and updated  Yes    Current Symptoms  None      TCM Call (since 7/30/2019)     Post hospital issues  None    Should patient be enrolled in anticoag monitoring? No    Scheduled for follow up? Yes    Did you obtain your prescribed medications  Yes    Do you need help managing your prescriptions or medications  No    Is transportation to your appointment needed  No    I have advised the patient to call PCP with any new or worsening symptoms  DEION MCADAMS [facility] LUNA OGROBERTS [facility]    Living Arrangements  Spouse or Significiant other    Support System  Spouse    The type of support provided  Emotional; Financial; Physical    Do you have social support  Yes, as much as I need    Are you recieving any outpatient services  Yes    What type of services  CARDIAC REHAB    Are you recieving home care services  No    Are you using any community resources  No    Current waiver services  No    Have you fallen in the last 12 months  No    Interperter language line needed  No    Counseling  Family    Counseling topics  Prognosis; Importance of RX compliance            Assessment/Plan:       Diagnoses and all orders for this visit:    NSTEMI (non-ST elevated myocardial infarction) ([facility] )  -     ticagrelor (BRILINTA) 90 MG; Take 1 tablet (90 mg total) by mouth every 12 (twelve) hours    Coronary artery disease involving native coronary artery of native heart without angina pectoris    Benign essential hypertension  -     lisinopril (ZESTRIL) 10 mg tablet; Take 1 tablet (10 mg total) by mouth daily  -     Comprehensive metabolic panel    Mixed hyperlipidemia  -     atorvastatin (LIPITOR) 40 mg tablet;  Take 1 tablet (40 mg total) by mouth every evening    Impaired fasting glucose    Bilateral carotid artery stenosis    Mild intermittent asthma without complication    Fatty infiltration of liver    Gastroesophageal reflux disease without esophagitis    Genao's esophagus without dysplasia    Hyperuricemia without signs inflammatory arthritis/tophaceous disease    Need for Td vaccine  -     TD VACCINE GREATER THAN OR EQUAL TO 8YO PRESERVATIVE FREE IM    Need for pneumococcal vaccination  -     PNEUMOCOCCAL CONJUGATE VACCINE 13-VALENT GREATER THAN 6 MONTHS    Other orders  -     Cancel: TD VACCINE GREATER THAN OR EQUAL TO 8YO PRESERVATIVE FREE IM        Continue with current medications  Patient is scheduled for repeat lipid profile  he will be starting cardiac rehab  Follow up with Cardiology and GI  No NSAIDs  Flu vaccine in the fall     Patient ID: Radha Snider is a 81 y o  male  Follow-up visit  TCM s/p admission for NSTEMI  Patient presented with exertional chest pain  Elevated troponin peaking at 4 61  Cardiac catheterization 95% lesion in the distal RCA  Status post RENE x2  Mid circumflex 65% stenosis  Proximal right coronary artery 50% stenosis  Echocardiogram normal left ventricular systolic function  EF 60%  Hypokinesis of the basal mid inferior walls  Grade 1 diastolic dysfunction  Normal right ventricle  Mildly dilated left atrium  Mild MR  No pericardial effusion  Aortic root normal size Patient was discharged on dual platelet inhibition Aspirin/Brilinta along with Atorvastatin,  beta-blocker and ACE-inhibitor  Beta blocker has been discontinued  08/2019 lipid profile during admission cholesterol 202  Triglycerides 48  HDL 45    A1c 5 6  LFTs dylan  Longstanding history of hypertension with intolerance to multiple blood pressure agents  Labs 08/27/2019 creatinine 1 25 electrolytes normal  Hgb 15 4  08/24/2019 creatinine 1 03    Hyperlipidemia mixed type with prior intolerance to statin fibrates and fish oils         The following portions of the patient's history were reviewed and updated as appropriate: allergies, current medications, past family history, past medical history, past social history, past surgical history and problem list     Review of Systems   Constitutional: Positive for appetite change  Negative for chills, fatigue, fever and unexpected weight change  46 lb weight loss from 01/2019 with dieting  HENT: Negative for congestion, ear pain, rhinorrhea, sinus pressure, sore throat and trouble swallowing  Eyes: Negative for visual disturbance  Respiratory: Negative for cough, shortness of breath and wheezing  History of asthma he uses infrequent Albuterol MDI or Xopenex via nebulizer  No orthopnea or PND   Cardiovascular: Negative for chest pain, palpitations and leg swelling  Carotid artery stenosis  Status post right CEA  Carotid artery Dopplers 12/2018 widely patent internal carotid artery and endarterectomy site  Left ICA < 50% stenosis  03/2017 nuclear stress test normal    Gastrointestinal: Negative for blood in stool, constipation, diarrhea, nausea and vomiting  GERD status post fundoplication  08/2019 EGD Genao's esophagus  Multiple small superficial ulcer craters in gastric antrum  He is currently on Nexium 20 mg b i d  History of fatty liver ultrasound 06/2019 08/2019 alpha 1 antitrypsin level normal   Celiac antibodies negative  Iron 84  Ferritin 349  Hepatitis B surface antigen negative  Hepatitis-C antibody negative  Anti smooth muscle antibody negative  Ceruloplasmin level normal   RED negative  SPEP no monoclonal bands  PEREZ Fibroscore minimal to moderate steatosis  No fibrosis  Borderline to probable PEREZ   Endocrine: Negative for polydipsia and polyuria  Genitourinary: Negative for difficulty urinating  Musculoskeletal: Positive for arthralgias  Negative for myalgias          Rheumatologic studies 09/2017 uric acid level elevated at 9 4  TSH 1 220  Sed rate 15  Rheumatoid factor negative  RED negative  Lyme titer negative  Babesia negative  Anaplasma negative  Ehrlichia antibody panel HGE IgM titer borderline abnormal at 1:64  He was treated with antibiotics at that time  Skin: Negative for rash  Allergic/Immunologic: Positive for environmental allergies  Neurological: Negative for dizziness and headaches  Hematological: Negative for adenopathy  Bruises/bleeds easily (on ASA and Brilinta )  Psychiatric/Behavioral: Negative for dysphoric mood and sleep disturbance  Objective:      /66   Pulse 56   Temp (!) 96 5 °F (35 8 °C)   Resp 16   Ht 5' 7 5" (1 715 m)   Wt 96 6 kg (213 lb)   BMI 32 87 kg/m²          Physical Exam   Constitutional: He is oriented to person, place, and time  He appears well-developed and well-nourished  No distress  HENT:   Right Ear: Tympanic membrane normal    Left Ear: Tympanic membrane normal    Mouth/Throat: Oropharynx is clear and moist    Eyes: Pupils are equal, round, and reactive to light  Conjunctivae and EOM are normal  No scleral icterus  Neck: No JVD present  Carotid bruit is not present  No tracheal deviation present  No thyroid mass and no thyromegaly present  Cardiovascular: Normal rate, regular rhythm and normal heart sounds  Exam reveals no gallop  No murmur heard  Pulses:       Carotid pulses are 2+ on the right side, and 2+ on the left side  Pulmonary/Chest: Effort normal and breath sounds normal  No respiratory distress  He has no wheezes  He has no rales  Abdominal: Soft  Bowel sounds are normal  He exhibits no distension, no abdominal bruit and no mass  There is no hepatosplenomegaly  There is no tenderness  There is no rebound and no guarding  Musculoskeletal: Normal range of motion  He exhibits no edema  Lymphadenopathy:     He has no cervical adenopathy  Neurological: He is alert and oriented to person, place, and time   No cranial nerve deficit  Skin: No rash noted  No cyanosis  Nails show no clubbing  Psychiatric: He has a normal mood and affect  Nursing note and vitals reviewed  Procedure: Ct Abdomen Pelvis Wo Contrast    Result Date: 8/27/2019  Narrative: CT ABDOMEN AND PELVIS WITHOUT IV CONTRAST INDICATION:   epigastric TTP  Left-sided abdominal pain radiating into the back since this morning  History of ulcer disease  Nausea  History of GERD, prior Nissen fundoplication  Prior partial sigmoid resection for diverticulitis  Prior appendectomy  COMPARISON:  January 24, 2019  TECHNIQUE:  CT examination of the abdomen and pelvis was performed without intravenous contrast   Axial, sagittal, and coronal 2D reformatted images were created from the source data and submitted for interpretation  Radiation dose length product (DLP) for this visit:  925 mGy-cm   This examination, like all CT scans performed in the [facility], was performed utilizing techniques to minimize radiation dose exposure, including the use of iterative reconstruction and automated exposure control  Enteric contrast was not administered  FINDINGS: ABDOMEN LOWER CHEST:  No clinically significant abnormality identified in the visualized lower chest  LIVER/BILIARY TREE:  Unremarkable  GALLBLADDER:  No calcified gallstones  No pericholecystic inflammatory change  SPLEEN:  Unremarkable  PANCREAS:  Unremarkable  ADRENAL GLANDS:  Unremarkable  KIDNEYS/URETERS:  Mild cortical thinning unchanged  No hydronephrosis  STOMACH AND BOWEL:  Postoperative changes are present in the left upper quadrant consistent with the surgical history provided  There are surgical sutures in the region of the sigmoid  There are also small bowel surgical sutures  These postoperative changes all appear similar to the prior study  There is no evidence of bowel obstruction  There is colonic diverticulosis without evidence of acute diverticulitis   APPENDIX:  The appendix is not definitely identified  Reportedly, the patient has had prior appendectomy  The fat adjacent to the cecum appears unremarkable  ABDOMINOPELVIC CAVITY:  No ascites or free intraperitoneal air  No lymphadenopathy  VESSELS:  There is atherosclerosis  There is no abdominal aortic aneurysm  PELVIS REPRODUCTIVE ORGANS:  Unremarkable for patient's age  URINARY BLADDER:  Unremarkable  ABDOMINAL WALL/INGUINAL REGIONS:  Postoperative changes involving the anterior abdominal wall appear similar to the prior study  There are small bilateral fat-containing inguinal hernias  OSSEOUS STRUCTURES:  No acute fracture or destructive osseous lesion  There is multilevel degenerative change of the spine  Impression: No evidence of bowel obstruction  There is colonic diverticulosis without evidence of acute diverticulitis  Atherosclerosis  No abdominal aortic aneurysm  Workstation performed: [id number]     Procedure: Xr Chest 1 View Portable    Result Date: 8/18/2019  Narrative: CHEST INDICATION:   Chest pain  COMPARISON:  May 24, 2018 EXAM PERFORMED/VIEWS:  XR CHEST PORTABLE FINDINGS: Cardiomediastinal silhouette appears unremarkable  The lungs are clear  No pneumothorax or pleural effusion  Osseous structures appear within normal limits for patient age  Impression: No acute cardiopulmonary disease  Workstation performed: [id number]     Procedure: Xr Chest 2 Views    Result Date: 8/27/2019  Narrative: CHEST INDICATION:   epigastric pain  COMPARISON:  August 17, 2019 EXAM PERFORMED/VIEWS:  XR CHEST PA & LATERAL FINDINGS: Cardiomediastinal silhouette appears unremarkable  The lungs are clear  No pneumothorax or pleural effusion  Degenerative changes are noted at the acromioclavicular joints  No fractures are appreciated  Impression: No acute cardiopulmonary disease   Workstation performed: [id number]       Recent Results (from the past 504 hour(s))   Tissue Exam    Collection Time: 08/12/19  3:41 PM Result Value Ref Range    Case Report       Surgical Pathology Report                         Case: [id number]                                   Authorizing Provider:  Alyse Huggins MD            Collected:           08/12/2019 1541              Ordering Location:     [address] [place] [address]      Received:            08/13/2019 [facility]                                                                                  Laboratory                                                                   Pathologist:           Nghia Reis MD                                                              Specimens:   A) - Stomach, stomach antrum                                                                        B) - Stomach, stomach fundus                                                                        C) - Esophagus, distal esophagus                                                           Addendum       -No H Pylori organisms identified on stomach biopsies ( specimens A,B)  by immunohistochemical stained slide  Control reacted appropriately  Final Diagnosis       A  Stomach, stomach antrum Biopsy:  -Benign gastric- oxyntoantral type mucosa with mild chronic inflammation and reactive surface foveolar epithelium consistent with reactive gastritis   -No evidence of ulceration   -No evidence of dysplasia or malignancy  H Pylori  stain will be ordered and the result will be issued in the supplemental report    B  Stomach, stomach fundus biopsy:    -Benign gastric- type mucosa with vascular congestion, mild chronic inactive gastritis and intramucosal lymphoid follicle  -No evidence of Paneth cell metaplasia or atrophic gastritis   -No evidence of dysplasia or malignancy  H Pylori  stain will be ordered and the result will be issued in the supplemental report    C   Esophagus, distal esophagus biopsy:    -Gastroesophageal junction mucosa with cardiac type glands present in-between squamous mucosa (esophageal columnar metaplasia)  -Moderate acute & chronic inflammation with reactive epithelial changes   -No evidence of Goblet cell metaplasia seen   -No evidence of glandular dysplasia or malignancy  Note       Interpretation performed at [address]      Additional Information       All controls performed with the immunohistochemical stains reported above reacted appropriately  These tests were developed and their performance characteristics determined by [facility] or [facility]  They may not be cleared or approved by the [place]  Food and Drug Administration  The FDA has determined that such clearance or approval is not necessary  These tests are used for clinical purposes  They should not be regarded as investigational or for research  This laboratory has been approved by [facility], designated as a high-complexity laboratory and is qualified to perform these tests  Gross Description       A  The specimen is received in formalin, labeled with the patient's name and hospital number, and is designated "stomach antrum biopsy  The specimen consists of 2 tan-pink soft tissue fragments measuring 0 2 cm and 0 4 cm in greatest dimension  Entirely submitted  One screened cassette  B  The specimen is received in formalin, labeled with the patient's name and hospital number, and is designated "stomach fundus  The specimen consists of 2 tan-pink soft tissue fragments measuring 0 2 cm and 0 4 cm in greatest dimension  Entirely submitted  One screened cassette  C  The specimen is received in formalin, labeled with the patient's name and hospital number, and is designated "distal esophagus  The specimen consists of 5 white pink soft tissue fragments ranging from 0 3 cm to 0 4 cm in greatest dimension  Entirely submitted  One screened cassette      Note: The estimated total formalin fixation time based upon information provided by the submitting clinician and the standard processing schedule is under 72 hours    Kristine           PEREZ Fibrosure    Collection Time: 08/17/19 10:18 AM   Result Value Ref Range    Glucose, Random 113 (H) 65 - 99 mg/dL    Cholesterol, Total 208 (H) 100 - 199 mg/dL    BILIRUBIN, TOTAL 0 3 0 0 - 1 2 mg/dL    AST (SGOT) P5P 37 0 - 40 IU/L    Triglycerides 173 (H) 0 - 149 mg/dL    ALT (SGPT) 30 0 - 55 IU/L    Haptoglobin 110 34 - 200 mg/dL    GGT 14 0 - 65 IU/L    Apolipoprotein A-1 120 101 - 178 mg/dL    Alpha 2-Macroglobulins, Qn 320 (H) 110 - 276 mg/dL    Comments Comment     Fibrosis Scoring: Comment     Necroinflammat Activity Grade Comment     Interpretation: Comment     PEREZ Scoring Comment     Fibrosis Score 0 46 (H) 0 00 - 0 21    Fibrosis Stage F1-F2     Steatosis Score 0 50 (H) 0 00 - 0 30    Steatosis Grade Comment     Height 67 in    Weight 211 LBS    Necroinflammat Activity Score 0 50 (H) 0 25    Limitations: Comment     Steatosis Grading Comment    ECG 12 lead    Collection Time: 08/17/19  9:19 PM   Result Value Ref Range    Ventricular Rate 55 BPM    Atrial Rate 150 BPM    WA Interval 174 ms    QRSD Interval 114 ms    QT Interval 446 ms    QTC Interval 426 ms    P Axis 62 degrees    QRS Axis 54 degrees    T Wave Axis 49 degrees   CBC and differential    Collection Time: 08/17/19 10:11 PM   Result Value Ref Range    WBC 9 78 4 31 - 10 16 Thousand/uL    RBC 4 47 3 88 - 5 62 Million/uL    Hemoglobin 14 0 12 0 - 17 0 g/dL    Hematocrit 41 3 36 5 - 49 3 %    MCV 92 82 - 98 fL    MCH 31 3 26 8 - 34 3 pg    MCHC 33 9 31 4 - 37 4 g/dL    RDW 13 0 11 6 - 15 1 %    MPV 11 4 8 9 - 12 7 fL    Platelets 046 321 - 359 Thousands/uL    nRBC 0 /100 WBCs    Neutrophils Relative 82 (H) 43 - 75 %    Immat GRANS % 0 0 - 2 %    Lymphocytes Relative 11 (L) 14 - 44 %    Monocytes Relative 6 4 - 12 %    Eosinophils Relative 1 0 - 6 %    Basophils Relative 0 0 - 1 %    Neutrophils Absolute 7 95 (H) 1 85 - 7 62 Thousands/µL    Immature Grans Absolute 0 04 0 00 - 0 20 Thousand/uL    Lymphocytes Absolute 1 09 0 60 - 4 47 Thousands/µL    Monocytes Absolute 0 60 0 17 - 1 22 Thousand/µL    Eosinophils Absolute 0 06 0 00 - 0 61 Thousand/µL    Basophils Absolute 0 04 0 00 - 0 10 Thousands/µL   Comprehensive metabolic panel    Collection Time: 08/17/19 10:11 PM   Result Value Ref Range    Sodium 140 136 - 145 mmol/L    Potassium 3 6 3 5 - 5 3 mmol/L    Chloride 102 100 - 108 mmol/L    CO2 23 21 - 32 mmol/L    ANION GAP 15 (H) 4 - 13 mmol/L    BUN 16 5 - 25 mg/dL    Creatinine 1 33 (H) 0 60 - 1 30 mg/dL    Glucose 143 (H) 65 - 140 mg/dL    Calcium 8 9 8 3 - 10 1 mg/dL    AST 31 5 - 45 U/L    ALT 34 12 - 78 U/L    Alkaline Phosphatase 52 46 - 116 U/L    Total Protein 7 4 6 4 - 8 2 g/dL    Albumin 3 8 3 5 - 5 0 g/dL    Total Bilirubin 0 60 0 20 - 1 00 mg/dL    eGFR 55 ml/min/1 73sq m   Troponin I    Collection Time: 08/17/19 10:11 PM   Result Value Ref Range    Troponin I 0 02 <=0 04 ng/mL   NT-BNP PRO    Collection Time: 08/17/19 10:20 PM   Result Value Ref Range    NT-proBNP 193 (H) <125 pg/mL   Troponin I    Collection Time: 08/18/19  1:16 AM   Result Value Ref Range    Troponin I 1 53 (H) <=0 04 ng/mL   ECG 12 lead    Collection Time: 08/18/19  1:26 AM   Result Value Ref Range    Ventricular Rate 57 BPM    Atrial Rate 57 BPM    HI Interval 182 ms    QRSD Interval 114 ms    QT Interval 454 ms    QTC Interval 441 ms    P Axis 14 degrees    QRS Axis 35 degrees    T Wave Axis 8 degrees   Troponin I    Collection Time: 08/18/19  4:11 AM   Result Value Ref Range    Troponin I 3 07 (H) <=0 04 ng/mL   Basic metabolic panel    Collection Time: 08/18/19  4:11 AM   Result Value Ref Range    Sodium 139 136 - 145 mmol/L    Potassium 3 3 (L) 3 5 - 5 3 mmol/L    Chloride 103 100 - 108 mmol/L    CO2 25 21 - 32 mmol/L    ANION GAP 11 4 - 13 mmol/L    BUN 14 5 - 25 mg/dL    Creatinine 1 07 0 60 - 1 30 mg/dL    Glucose 109 65 - 140 mg/dL    Glucose, Fasting 109 (H) 65 - 99 mg/dL    Calcium 9 0 8 3 - 10 1 mg/dL    eGFR 71 ml/min/1 73sq m   ECG 12 lead    Collection Time: 08/18/19  4:18 AM   Result Value Ref Range    Ventricular Rate 60 BPM    Atrial Rate 60 BPM    AZ Interval 190 ms    QRSD Interval 114 ms    QT Interval 450 ms    QTC Interval 450 ms    P [place] 19 degrees    QRS Axis 36 degrees    T Wave Axis 4 degrees   ECG 12 lead    Collection Time: 08/18/19  7:12 AM   Result Value Ref Range    Ventricular Rate 54 BPM    Atrial Rate 54 BPM    AZ Interval 188 ms    QRSD Interval 112 ms    QT Interval 456 ms    QTC Interval 432 ms    P Axis 0 degrees    QRS Axis 28 degrees    T Wave Axis 2 degrees   Troponin I    Collection Time: 08/18/19  7:54 AM   Result Value Ref Range    Troponin I 4 61 (H) <=0 04 ng/mL   CBC    Collection Time: 08/18/19  9:52 AM   Result Value Ref Range    WBC 8 03 4 31 - 10 16 Thousand/uL    RBC 4 58 3 88 - 5 62 Million/uL    Hemoglobin 14 3 12 0 - 17 0 g/dL    Hematocrit 43 1 36 5 - 49 3 %    MCV 94 82 - 98 fL    MCH 31 2 26 8 - 34 3 pg    MCHC 33 2 31 4 - 37 4 g/dL    RDW 13 1 11 6 - 15 1 %    Platelets 875 256 - 457 Thousands/uL    MPV 11 2 8 9 - 12 7 fL   APTT six (6) hours after Heparin bolus/drip initiation or dosing change    Collection Time: 08/18/19  9:54 AM   Result Value Ref Range    PTT 30 23 - 37 seconds   Protime-INR    Collection Time: 08/18/19  9:54 AM   Result Value Ref Range    Protime 14 1 11 6 - 14 5 seconds    INR 1 15 0 84 - 1 19   Troponin I    Collection Time: 08/18/19  1:40 PM   Result Value Ref Range    Troponin I 3 82 (H) <=0 04 ng/mL   APTT    Collection Time: 08/18/19  1:40 PM   Result Value Ref Range    PTT 41 (H) 23 - 37 seconds   APTT    Collection Time: 08/18/19  4:21 PM   Result Value Ref Range    PTT 49 (H) 23 - 37 seconds   APTT    Collection Time: 08/18/19 11:13 PM   Result Value Ref Range    PTT 76 (H) 23 - 37 seconds   APTT    Collection Time: 08/19/19  4:29 AM   Result Value Ref Range    PTT 73 (H) 23 - 37 seconds   Lipid Panel with Direct LDL reflex    Collection Time: 08/19/19  4:29 AM   Result Value Ref Range    Cholesterol 202 (H) 50 - 200 mg/dL    Triglycerides 48 <=150 mg/dL    HDL, Direct 45 40 - 60 mg/dL    LDL Calculated 147 (H) 0 - 100 mg/dL   Basic metabolic panel    Collection Time: 08/19/19  4:29 AM   Result Value Ref Range    Sodium 138 136 - 145 mmol/L    Potassium 3 8 3 5 - 5 3 mmol/L    Chloride 103 100 - 108 mmol/L    CO2 25 21 - 32 mmol/L    ANION GAP 10 4 - 13 mmol/L    BUN 14 5 - 25 mg/dL    Creatinine 1 12 0 60 - 1 30 mg/dL    Glucose 164 (H) 65 - 140 mg/dL    Calcium 9 2 8 3 - 10 1 mg/dL    eGFR 68 ml/min/1 73sq m   CBC and Platelet    Collection Time: 08/19/19  4:29 AM   Result Value Ref Range    WBC 7 98 4 31 - 10 16 Thousand/uL    RBC 4 85 3 88 - 5 62 Million/uL    Hemoglobin 14 8 12 0 - 17 0 g/dL    Hematocrit 44 9 36 5 - 49 3 %    MCV 93 82 - 98 fL    MCH 30 5 26 8 - 34 3 pg    MCHC 33 0 31 4 - 37 4 g/dL    RDW 13 2 11 6 - 15 1 %    Platelets 943 749 - 853 Thousands/uL    MPV 11 1 8 9 - 12 7 fL   ECG 12 lead    Collection Time: 08/19/19  7:06 AM   Result Value Ref Range    Ventricular Rate 58 BPM    Atrial Rate 58 BPM    TX Interval 206 ms    QRSD Interval 116 ms    QT Interval 454 ms    QTC Interval 445 ms    P [place] 32 degrees    QRS Axis 61 degrees    T Wave Axis -11 degrees   ECG 12 lead    Collection Time: 08/19/19  7:06 AM   Result Value Ref Range    Ventricular Rate 55 BPM    Atrial Rate 55 BPM    TX Interval 196 ms    QRSD Interval 112 ms    QT Interval 464 ms    QTC Interval 443 ms    P [place] 36 degrees    QRS Axis 64 degrees    T Wave Axis -12 degrees   POCT activated clotting time    Collection Time: 08/19/19 10:48 AM   Result Value Ref Range    Activated Clotting Time, i-STAT 285 (H) 89 - 137 sec    Specimen Type VENOUS    APTT    Collection Time: 08/20/19  4:57 AM   Result Value Ref Range    PTT 27 23 - 37 seconds   Hemoglobin A1C    Collection Time: 08/20/19  4:57 AM   Result Value Ref Range    Hemoglobin A1C 5 6 4 2 - 6 3 %     mg/dl   Basic metabolic panel    Collection Time: 08/24/19  8:37 AM   Result Value Ref Range    Sodium 137 136 - 145 mmol/L    Potassium 3 8 3 5 - 5 3 mmol/L    Chloride 104 100 - 108 mmol/L    CO2 26 21 - 32 mmol/L    ANION GAP 7 4 - 13 mmol/L    BUN 20 5 - 25 mg/dL    Creatinine 1 03 0 60 - 1 30 mg/dL    Glucose, Fasting 104 (H) 65 - 99 mg/dL    Calcium 9 0 8 3 - 10 1 mg/dL    eGFR 75 ml/min/1 73sq m   CBC and Platelet    Collection Time: 08/24/19  8:37 AM   Result Value Ref Range    WBC 10 14 4 31 - 10 16 Thousand/uL    RBC 4 81 3 88 - 5 62 Million/uL    Hemoglobin 14 9 12 0 - 17 0 g/dL    Hematocrit 45 5 36 5 - 49 3 %    MCV 95 82 - 98 fL    MCH 31 0 26 8 - 34 3 pg    MCHC 32 7 31 4 - 37 4 g/dL    RDW 13 2 11 6 - 15 1 %    Platelets 391 145 - 503 Thousands/uL    MPV 11 9 8 9 - 12 7 fL   ECG 12 lead    Collection Time: 08/27/19  1:51 AM   Result Value Ref Range    Ventricular Rate 48 BPM    Atrial Rate 48 BPM    CO Interval 176 ms    QRSD Interval 104 ms    QT Interval 444 ms    QTC Interval 396 ms    P [place] 42 degrees    QRS Axis 66 degrees    T Wave Axis 20 degrees   CBC and differential    Collection Time: 08/27/19  1:58 AM   Result Value Ref Range    WBC 10 21 (H) 4 31 - 10 16 Thousand/uL    RBC 4 84 3 88 - 5 62 Million/uL    Hemoglobin 15 4 12 0 - 17 0 g/dL    Hematocrit 45 5 36 5 - 49 3 %    MCV 94 82 - 98 fL    MCH 31 8 26 8 - 34 3 pg    MCHC 33 8 31 4 - 37 4 g/dL    RDW 13 3 11 6 - 15 1 %    MPV 11 6 8 9 - 12 7 fL    Platelets 426 302 - 141 Thousands/uL    nRBC 0 /100 WBCs    Neutrophils Relative 66 43 - 75 %    Immat GRANS % 0 0 - 2 %    Lymphocytes Relative 21 14 - 44 %    Monocytes Relative 10 4 - 12 %    Eosinophils Relative 3 0 - 6 %    Basophils Relative 0 0 - 1 %    Neutrophils Absolute 6 71 1 85 - 7 62 Thousands/µL    Immature Grans Absolute 0 04 0 00 - 0 20 Thousand/uL    Lymphocytes Absolute 2 15 0 60 - 4 47 Thousands/µL    Monocytes Absolute 0 97 0 17 - 1 22 Thousand/µL    Eosinophils Absolute 0 30 0 00 - 0 61 Thousand/µL    Basophils Absolute 0 04 0 00 - 0 10 Thousands/µL   Comprehensive metabolic panel    Collection Time: 08/27/19  1:58 AM   Result Value Ref Range    Sodium 136 136 - 145 mmol/L    Potassium 4 0 3 5 - 5 3 mmol/L    Chloride 100 100 - 108 mmol/L    CO2 26 21 - 32 mmol/L    ANION GAP 10 4 - 13 mmol/L    BUN 22 5 - 25 mg/dL    Creatinine 1 25 0 60 - 1 30 mg/dL    Glucose 134 65 - 140 mg/dL    Calcium 9 6 8 3 - 10 1 mg/dL    AST 33 5 - 45 U/L    ALT 38 12 - 78 U/L    Alkaline Phosphatase 65 46 - 116 U/L    Total Protein 7 9 6 4 - 8 2 g/dL    Albumin 4 0 3 5 - 5 0 g/dL    Total Bilirubin 1 00 0 20 - 1 00 mg/dL    eGFR 59 ml/min/1 73sq m   Lipase    Collection Time: 08/27/19  1:58 AM   Result Value Ref Range    Lipase 192 73 - 393 u/L   Troponin I    Collection Time: 08/27/19  1:58 AM   Result Value Ref Range    Troponin I 0 02 <=0 04 ng/mL   Lactic acid, plasma    Collection Time: 08/27/19  1:58 AM   Result Value Ref Range    LACTIC ACID 1 8 0 5 - 2 0 mmol/L   Protime-INR    Collection Time: 08/27/19  1:58 AM   Result Value Ref Range    Protime 14 1 11 6 - 14 5 seconds    INR 1 15 0 84 - 1 19   APTT    Collection Time: 08/27/19  1:58 AM   Result Value Ref Range    PTT 29 23 - 37 seconds   ECG 12 lead    Collection Time: 08/27/19  2:53 AM   Result Value Ref Range    Ventricular Rate 42 BPM    Atrial Rate 42 BPM    OK Interval 200 ms    QRSD Interval 106 ms    QT Interval 512 ms    QTC Interval 427 ms    P [place] 27 degrees    QRS Axis 70 degrees    T Wave [place] 23 degrees   ED Urine Macroscopic    Collection Time: 08/27/19  3:44 AM   Result Value Ref Range    Color, UA Yellow     Clarity, UA Clear     pH, UA 5 5 4 5 - 8 0    Leukocytes, UA Negative Negative    Nitrite, UA Negative Negative    Protein, UA Trace (A) Negative mg/dl    Glucose, UA Negative Negative mg/dl    Ketones, UA Negative Negative mg/dl Urobilinogen, UA 0 2 0 2, 1 0 E U /dl E U /dl    Bilirubin, UA Negative Negative    Blood, UA Negative Negative    Specific Gravity, UA 1 020 1 003 - 1 030   Urine Microscopic    Collection Time: 08/27/19  3:44 AM   Result Value Ref Range    RBC, UA 1-2 (A) None Seen, 0-5 /hpf    WBC, UA 0-1 (A) None Seen, 0-5, 5-55, 5-65 /hpf    Epithelial Cells None Seen None Seen, Occasional /hpf    Bacteria, UA None Seen None Seen, Occasional /hpf    MUCUS THREADS Occasional (A) None Seen

## 2019-09-09 ENCOUNTER — CLINICAL SUPPORT (OUTPATIENT)
Dept: CARDIAC REHAB | Facility: CLINIC | Age: 69
End: 2019-09-09
Payer: MEDICARE

## 2019-09-09 DIAGNOSIS — I21.4 NSTEMI (NON-ST ELEVATED MYOCARDIAL INFARCTION) (HCC): ICD-10-CM

## 2019-09-09 PROCEDURE — 93797 PHYS/QHP OP CAR RHAB WO ECG: CPT

## 2019-09-09 NOTE — PROGRESS NOTES
CARDIAC REHAB ASSESSMENT    Today's date: 2019  Patient name: Obie Stearns     : 1950       MRN: [id number]  PCP: Adelia Carl MD  Referring Physician: Jackie Rivas MD  Cardiologist: Santos Linares DO    Surgeon: Fely Burnham DO   Dx:   Encounter Diagnosis   Name Primary?  NSTEMI (non-ST elevated [facility]) [facility] )        Date of onset: 2019  Cultural needs: None     Height:    Wt Readings from Last 1 Encounters:   19 96 6 kg (213 lb)      Weight:   Ht Readings from Last 1 Encounters:   19 5' 7 5" (1 715 m)     Medical History:   Past Medical History:   Diagnosis Date    Allergic reaction     LAST ASSESSED: [id number]    Asthma     Bursitis of heel     LAST ASSESSED: [id number]    Derangement of meniscus of left knee due to old injury     LAST ASSESSED: [id number]    Diverticulitis of colon     LAST ASSESSED: [id number]    Dizziness     LAST ASSESSED: [id number]    GERD (gastroesophageal reflux disease)     History of colon polyps     Hypertension     Malignant hypertension     LAST ASSESSED: [id number]    NSTEMI [facility]non-ST elevated myocardial infarction) ([facility])     Plantar fasciitis     Seasonal allergies     Shoulder impingement     LAST ASSESSED: [id number]    Stroke ([facility] )          Physical Limitations: Heal spur L foot     Risk Factors   Cholesterol: Yes  Smoking: Never used  HTN: Yes  DM: No  Obesity: Yes   Inactivity: Yes  Stress:  perceived  stress: 2/10   Stressors: Pain from ulcers, getting back to activities    Goals for Stress Management: Relaxation when pain hurts     Family History:  Family History   Problem Relation Age of Onset    Cancer Paternal Grandfather     Hypertension Mother     Hyperlipidemia Mother     Hypertension Father     Hyperlipidemia Father        Allergies: Depo-medrol [methylprednisolone];  Iv contrast [iodinated diagnostic agents]; and Sulfa antibiotics  ETOH:   Social History     Substance and Sexual Activity Alcohol Use No         Current Medications:   Current Outpatient Medications   Medication Sig Dispense Refill    acetaminophen (TYLENOL) 325 mg tablet Take 2 tablets (650 mg total) by mouth every 6 (six) hours as needed for mild pain, headaches or fever 30 tablet 0    albuterol (PROVENTIL HFA,VENTOLIN HFA) 90 mcg/act inhaler INHALE 2 PUFFS BY MOUTH EVERY 6 HOURS AS NEEDED FOR WHEEZING 18 g 3    aspirin 81 mg chewable tablet Chew 1 tablet (81 mg total) daily 30 tablet 0    atorvastatin (LIPITOR) 40 mg tablet Take 1 tablet (40 mg total) by mouth every evening 30 tablet 0    esomeprazole (NexIUM) 20 mg capsule Take 20 mg by mouth 2 (two) times a day before meals       fluticasone (FLONASE) 50 mcg/act nasal spray 1 spray into each nostril daily      levalbuterol (XOPENEX) 1 25 mg/3 mL nebulizer solution INHALE 1 VIAL BY NEBULIZER EVERY 8 HOURS AS NEEDED FOR WHEEZING 72 vial 3    lisinopril (ZESTRIL) 10 mg tablet Take 1 tablet (10 mg total) by mouth daily 30 tablet 0    multivitamin (THERAGRAN) TABS Take 1 tablet by mouth daily      ticagrelor (BRILINTA) 90 MG Take 1 tablet (90 mg total) by mouth every 12 (twelve) hours 60 tablet 0     No current facility-administered medications for this visit  Functional Status Prior to Diagnosis for Treatment   Occupation: retired - in May   Recreation: [address] [employer], [employer], [employer]   ADLs: Capable of performing light to moderate ADLs  [place]: able to perform self-care resumed driving  Exercise: No formal exercise   Other: None     Current Functional Status  Occupation: retired - in May   Recreation: [address], [employer], [employer], electric   ADLs:Capable of performing light to moderate ADLs - not lifting heavy due to hernia   [place]: able to perform self-care resumed driving  Exercise: none   Other: None     Patient Specific Goals:  Increase activity everyday, formal exercise     Short Term Program Goals: dietary modifications increased strength improved energy/stamina with ADLs    Long Term Goals: increased maximal walking duration  increased intial training workload  Improved [facility] Activity Status score  Improved functional capacity  Improved Quality of Life - [facility] score reduced  Improved lipid profile  Reduced body fat%  Reduced waist circumference  Reduced stress  improved Rate Your Plate Score    Ability to reach goals/rehabilitation potential:  Very Good     Projected return to function: 12 weeks  Objective tests: 6 MWT      Nutritional   Reviewed details of Rate your Plate  Discussed key elements of heart healthy eating  Reviewed patient goals for dietary modifications and their clinical implications  Pt has to watch specific foods due to ulcers, red meats and harder fruits/veggies  Eats farm raised foods from the [employer]  Reviewed most recent lipid profile  Goals for dietary modification: choose lean cuts of meat  poultry without the skin  low fat ground meat and poultry  eliminate processed meats  reduce portions of meat to 3 oz  increase fish intake  more meatless meals  low fat dairy   reduced fat cheese  low sodium  improved snack choices  more nuts/seeds  reduce sweets/frozen desserts  heathier choices while dining out      Emotional/Social  Patient reports he/she is coping well with good social support and denies depression or anxiety    SOCIAL SUPPORT NETWORK    Marital status:     Rate 1-5:    Marriage: 5   Family: 5   Financial: 5   Relationships: 5   Spirituality: 5   Intellectual: 5      Domestic Violence Screening: No    Comments: Presented with lower chest pressure but could not differentiate if ulcer pain or not  SBP 150s at the time of MI  8 Surgeries in abdominals for ulcers, current Hernia, heal spurs, asthma  Got carotid cleaned out (5-6 years ago)  Pt stated he has white coat syndrome and BP does elevate regularly  Two current blockages: Mid circumflex 65% and RCA 50%

## 2019-09-09 NOTE — PROGRESS NOTES
Cardiac Rehabilitation Plan of Care   Care Plan       Today's date: 2019   Visits: 1  Patient name: Mari Bearden      : 1950  Age: 81 y o  MRN: [id number]  Referring Physician: Lor Quinonez MD  Cardiologist: Jez Morrow DO  Provider: [facility] [address]  Clinician: Дмитрий Quevedo, [place], [facility] [place] [facility]    Dx:   Encounter Diagnosis   Name Primary?  NSTEMI (non-ST elevated myocardial infarction) [facility])      Date of onset: 2019 DESx2      SUMMARY OF PROGRESS:  This is Jimmy's initial evaluation for CR, he completed a 6MWT during his time with us today in which he walked 765ft at 2 1 METs  He complained of no cardiac complications, had hemodynamic response to exercise, and had minimal orthopedic limitations  He was experiencing pain in his back and had heel spur pain in his L foot which excluded him from performing a submax ETT  Jimmy's gait was normal but slowed due to the limitation  On telemetry he was NSR khushbu with occ PACs during rest and with exercise  Kathie Durbin is currently not doing any formal exercise but casually walks and does a lot of yard/house work  He states to be fairly active with hunting, fishing, and carpentry  Kathie Durbin has a lot of ulcer complications and many surgeries has been done as well  He is limited with what he eats however sticks to a hearth healthy diet as much as possible  He has cut out a lot of processed meats, sodium, and refined grains  Kathie Durbin reports he/she is coping well with good social support and denies depression or anxiety   Kathie Durbin will start rehab on 2019        Medication compliance: Yes   Comments: None   Fall Risk: Low   Comments: None     EKG changes: NSR with occ PACs       EXERCISE ASSESSMENT and PLAN    Current Exercise Program in Rehab:       Frequency: 3 days/week        Minutes: 30-35         METS: 2-3            HR:    RPE: 4-6         Modalities: Treadmill, UBE, NuStep and Recumbent bike      Exercise Progression 30 Day Goals :    Frequency: 3-4 days/week   Minutes: 40-45   METS: 3-4   HR:     RPE: 4-6   Modalities: Treadmill, Airdyne bike, UBE, NuStep and Recumbent bike    Strength training: Will be added following 2-3 weeks of monitored exercise sessions   Modalities: Chest Press, Pull Downs, Arm Curl, Seated Row and Sit to [place]    Progressing: In Progress    Home Exercise: None    Goals: 10% improvement in functional capacity, improved DASI score by 10%, Increase in peak CR METs by 40%, Improved 6MWT distance by 10%, Resume ADLs with increased strength and Exercise 5 days/wk, >150mins/wk  Education: Benefit of exercise for CAD risk factors, signs and sxs and RPE scale   Plan:home exercise 30+ mins 2 days opposite CR and Education class: Risk Factors for Heart Disease  Readiness to change: Preparation      NUTRITION ASSESSMENT AND PLAN    Weight control:    Starting weight: 209 0 Lbs    Current weight:     Waist circumference:    Startin in   Current:    Diabetes: N/A  Lipid management: Last lipid profile 2019  Chol 202  TRG 48  HDL 45    Goals:reduced BMI to < 25, LDL <100, CHOL <200, decreased body fat% <25%   , reduced waist circumference <40 inches, fasting BG  and 2 5-5%  wt loss  Education: heart healthy eating  low sodium diet  hydration  healthy choices while dining out  Progressing: In Progress  Plan: Education class: Reading Food Labels, Education Class: Heart Healthy Eating, Increase PUFA and MUFA, Decrease SFA, Increase whole grains, increase fruits/vegs, eliminate processed meats and Reduce added sugars <25g/day  Readiness to change: Preparation      PSYCHOSOCIAL ASSESSMENT AND PLAN    Emotional: Patient reports he/she is coping well with good social support and denies depression or anxiety   PHQ-9 =1  1-4 = Minimal Depression  Self-reported stress level: 2   Social support: Excellent  Goals:  Physical Fitness in [facility] Score < 3, Pain in [facility] Score < 3, Overall Health in [facility] Score < 3, improved positive thoughts of well being and increased energy  Education: benefits of positive support system and depression and CAD  Progressing: In Progress  Plan: Class: Stress and Your Health, Class: Relaxation and Practice relaxation techniques  Readiness to change: Preparation      OTHER CORE COMPONENTS     Tobacco:   Social History     Tobacco Use   Smoking Status Former Smoker   Smokeless Tobacco Never Used       Tobacco Use Intervention: Referral to tobacco expert:   Brief counseling by cardiac rehab professional  Date: 2019    Blood pressure:    Restin/80   Exercise: 152/86    Goals: consistent BP < 130/80, reduced dietary sodium <2300mg and consistent exercise >150 mins/wk  Education:  understanding HTN and CAD and low sodium diet and HTN  Progressing: In Progress  Plan: Class: Understanding Heart Disease and Class: Common Heart Medications  Readiness to change: Preparation

## 2019-09-12 ENCOUNTER — HOSPITAL ENCOUNTER (OUTPATIENT)
Dept: NON INVASIVE DIAGNOSTICS | Facility: CLINIC | Age: 69
Discharge: HOME | End: 2019-09-12
Payer: MEDICARE

## 2019-09-12 ENCOUNTER — CLINICAL SUPPORT (OUTPATIENT)
Dept: CARDIAC REHAB | Facility: CLINIC | Age: 69
End: 2019-09-12
Payer: MEDICARE

## 2019-09-12 DIAGNOSIS — I21.4 NSTEMI (NON-ST ELEVATED MYOCARDIAL INFARCTION) (HCC): ICD-10-CM

## 2019-09-12 DIAGNOSIS — R00.1 BRADYCARDIA: ICD-10-CM

## 2019-09-12 PROCEDURE — 93798 PHYS/QHP OP CAR RHAB W/ECG: CPT

## 2019-09-12 PROCEDURE — 93225 XTRNL ECG REC<48 HRS REC: CPT

## 2019-09-12 PROCEDURE — 93226 XTRNL ECG REC<48 HR SCAN A/R: CPT

## 2019-09-13 ENCOUNTER — CLINICAL SUPPORT (OUTPATIENT)
Dept: CARDIAC REHAB | Facility: CLINIC | Age: 69
End: 2019-09-13
Payer: MEDICARE

## 2019-09-13 DIAGNOSIS — I21.4 NSTEMI (NON-ST ELEVATED MYOCARDIAL INFARCTION) (HCC): ICD-10-CM

## 2019-09-13 PROCEDURE — 93798 PHYS/QHP OP CAR RHAB W/ECG: CPT

## 2019-09-16 ENCOUNTER — APPOINTMENT (OUTPATIENT)
Dept: CARDIAC REHAB | Facility: CLINIC | Age: 69
End: 2019-09-16
Payer: MEDICARE

## 2019-09-16 PROCEDURE — 93227 XTRNL ECG REC<48 HR R&I: CPT | Performed by: INTERNAL MEDICINE

## 2019-09-17 ENCOUNTER — CLINICAL SUPPORT (OUTPATIENT)
Dept: CARDIAC REHAB | Facility: CLINIC | Age: 69
End: 2019-09-17
Payer: MEDICARE

## 2019-09-17 DIAGNOSIS — I21.4 NSTEMI (NON-ST ELEVATED MYOCARDIAL INFARCTION) (HCC): ICD-10-CM

## 2019-09-17 PROCEDURE — 93798 PHYS/QHP OP CAR RHAB W/ECG: CPT

## 2019-09-20 ENCOUNTER — CLINICAL SUPPORT (OUTPATIENT)
Dept: CARDIAC REHAB | Facility: CLINIC | Age: 69
End: 2019-09-20
Payer: MEDICARE

## 2019-09-20 DIAGNOSIS — I21.4 NSTEMI (NON-ST ELEVATED MYOCARDIAL INFARCTION) (HCC): ICD-10-CM

## 2019-09-20 PROCEDURE — 93798 PHYS/QHP OP CAR RHAB W/ECG: CPT

## 2019-09-23 ENCOUNTER — TELEPHONE (OUTPATIENT)
Dept: FAMILY MEDICINE CLINIC | Facility: CLINIC | Age: 69
End: 2019-09-23

## 2019-09-23 DIAGNOSIS — I21.4 NSTEMI (NON-ST ELEVATED MYOCARDIAL INFARCTION) (HCC): ICD-10-CM

## 2019-09-23 DIAGNOSIS — E78.2 MIXED HYPERLIPIDEMIA: ICD-10-CM

## 2019-09-23 DIAGNOSIS — M10.9 ACUTE GOUTY ARTHRITIS: Primary | ICD-10-CM

## 2019-09-23 DIAGNOSIS — I10 BENIGN ESSENTIAL HYPERTENSION: ICD-10-CM

## 2019-09-23 RX ORDER — LISINOPRIL 10 MG/1
10 TABLET ORAL DAILY
Qty: 30 TABLET | Refills: 5 | Status: SHIPPED | OUTPATIENT
Start: 2019-09-23 | End: 2020-03-12

## 2019-09-23 RX ORDER — CELECOXIB 200 MG/1
200 CAPSULE ORAL DAILY
Qty: 5 CAPSULE | Refills: 0 | Status: SHIPPED | OUTPATIENT
Start: 2019-09-23 | End: 2019-10-02 | Stop reason: ALTCHOICE

## 2019-09-23 RX ORDER — ATORVASTATIN CALCIUM 40 MG/1
40 TABLET, FILM COATED ORAL EVERY EVENING
Qty: 30 TABLET | Refills: 5 | Status: SHIPPED | OUTPATIENT
Start: 2019-09-23 | End: 2019-11-12 | Stop reason: SDUPTHER

## 2019-09-23 NOTE — TELEPHONE ENCOUNTER
rx sent to dr Jenny Frank for approval   (can you review 2nd part of message, patient looking for an rx for gout flare up)

## 2019-09-23 NOTE — TELEPHONE ENCOUNTER
Call I sent in his prescriptions  Typically for an acute gout episode I would prescribe Prednisone  He had prior allergic reaction to Methyl prednisolone  He had recent cardiac stents placed and is on ASA and Brilinta so typically patients are advised not to take NSAIDs due to risk of bleeding  He could try a short course of Celebrex this would be the only non steroid anti inflammatory I would try  I will send in a 5 day supply

## 2019-09-23 NOTE — TELEPHONE ENCOUNTER
Refill request for Ticagrelor 90 mg 1 tab every 12 hrs #60, Atorvastatin 40 mg 1 tab daily #30, and Lisinopril 10 mg 1 tab daily #30  Asking for refills on all scripts RA-Vanessa    Patient is also having a flare up of Gout due to the baby aspirin he is on  Cardiologist does not want him going off that  Please call something in for that also  Lurdes

## 2019-09-24 ENCOUNTER — CLINICAL SUPPORT (OUTPATIENT)
Dept: CARDIAC REHAB | Facility: CLINIC | Age: 69
End: 2019-09-24
Payer: MEDICARE

## 2019-09-24 DIAGNOSIS — I21.4 NSTEMI (NON-ST ELEVATED MYOCARDIAL INFARCTION) (HCC): ICD-10-CM

## 2019-09-24 PROBLEM — Z87.19 HISTORY OF COLONIC DIVERTICULITIS: Status: ACTIVE | Noted: 2019-09-24

## 2019-09-24 PROCEDURE — 93798 PHYS/QHP OP CAR RHAB W/ECG: CPT

## 2019-09-26 ENCOUNTER — CLINICAL SUPPORT (OUTPATIENT)
Dept: CARDIAC REHAB | Facility: CLINIC | Age: 69
End: 2019-09-26
Payer: MEDICARE

## 2019-09-26 DIAGNOSIS — I21.4 NSTEMI (NON-ST ELEVATED MYOCARDIAL INFARCTION) (HCC): ICD-10-CM

## 2019-09-26 PROCEDURE — 93798 PHYS/QHP OP CAR RHAB W/ECG: CPT

## 2019-09-27 ENCOUNTER — CLINICAL SUPPORT (OUTPATIENT)
Dept: CARDIAC REHAB | Facility: CLINIC | Age: 69
End: 2019-09-27
Payer: MEDICARE

## 2019-09-27 DIAGNOSIS — I21.4 NSTEMI (NON-ST ELEVATED MYOCARDIAL INFARCTION) (HCC): ICD-10-CM

## 2019-09-27 PROCEDURE — 93798 PHYS/QHP OP CAR RHAB W/ECG: CPT

## 2019-09-30 ENCOUNTER — TELEPHONE (OUTPATIENT)
Dept: FAMILY MEDICINE CLINIC | Facility: CLINIC | Age: 69
End: 2019-09-30

## 2019-09-30 NOTE — TELEPHONE ENCOUNTER
Call I would taking Zyrtec 10 mg daily in AM he can use Benadryl during the day and at HS  He could try using prn OTC Hydrocortisone 1% cream to affected areas

## 2019-09-30 NOTE — TELEPHONE ENCOUNTER
Patient's wife called  You sent in 5 pills for celebrex 200 mg for gouty arthritis  Patient took 3 pills and developed hives on his stomach  She had him stop the med and has been giving him benadryl  The hives are not really going away  Should she continue giving him the benadryl or should he be seen?

## 2019-10-01 ENCOUNTER — APPOINTMENT (OUTPATIENT)
Dept: CARDIAC REHAB | Facility: CLINIC | Age: 69
End: 2019-10-01
Payer: MEDICARE

## 2019-10-02 ENCOUNTER — OFFICE VISIT (OUTPATIENT)
Dept: FAMILY MEDICINE CLINIC | Facility: CLINIC | Age: 69
End: 2019-10-02
Payer: MEDICARE

## 2019-10-02 VITALS
SYSTOLIC BLOOD PRESSURE: 128 MMHG | DIASTOLIC BLOOD PRESSURE: 62 MMHG | TEMPERATURE: 97.7 F | HEIGHT: 68 IN | BODY MASS INDEX: 31.98 KG/M2 | RESPIRATION RATE: 16 BRPM | WEIGHT: 211 LBS | HEART RATE: 64 BPM

## 2019-10-02 DIAGNOSIS — L50.9 URTICARIA: Primary | ICD-10-CM

## 2019-10-02 PROCEDURE — 99212 OFFICE O/P EST SF 10 MIN: CPT | Performed by: FAMILY MEDICINE

## 2019-10-02 RX ORDER — PREDNISONE 20 MG/1
20 TABLET ORAL 2 TIMES DAILY WITH MEALS
Qty: 10 TABLET | Refills: 0 | Status: SHIPPED | OUTPATIENT
Start: 2019-10-02 | End: 2019-12-19 | Stop reason: ALTCHOICE

## 2019-10-02 NOTE — PROGRESS NOTES
Assessment/Plan:         Diagnoses and all orders for this visit:    Urticaria  -     predniSONE 20 mg tablet; Take 1 tablet (20 mg total) by mouth 2 (two) times a day with meals        Script for Prednisone  Past history of allergy reaction to Methylprednisolone injection with Marcaine for rotator cuff tendonitis  He has taken Prednisone in the past without reaction  Continue with Zyrtec and prn Benadryl  Recheck prn  I did not stop any of his current medications  Patient ID: Omer Mclaughlin is a 81 y o  male  Follow up visit  Patient presents with persistent extensive hives on trunk, arms and legs  He is currently on Zyrtec 10 mg in AM and prn Benadryl  Current medications reviewed  He was given a script for Celebrex for gout but never took the medication  No recent illnesses or antibiotics  The following portions of the patient's history were reviewed and updated as appropriate: allergies, current medications, past family history, past medical history, past social history, past surgical history and problem list     Review of Systems   Constitutional: Negative for chills and fever  Respiratory: Negative for cough, shortness of breath and wheezing  Cardiovascular: Negative for chest pain, palpitations and leg swelling  Gastrointestinal: Negative for diarrhea, nausea and vomiting  Skin: Positive for rash  Hematological: Negative for adenopathy  Bruises/bleeds easily (on ASA and Brilinta )  Objective:      /62   Pulse 64   Temp 97 7 °F (36 5 °C)   Resp 16   Ht 5' 7 5" (1 715 m)   Wt 95 7 kg (211 lb)   BMI 32 56 kg/m²          Physical Exam   Constitutional: He appears well-developed and well-nourished  No distress  Skin: Rash noted  Extensive hives trunk, arms and legs

## 2019-10-03 ENCOUNTER — APPOINTMENT (OUTPATIENT)
Dept: CARDIAC REHAB | Facility: CLINIC | Age: 69
End: 2019-10-03
Payer: MEDICARE

## 2019-10-03 ENCOUNTER — TELEPHONE (OUTPATIENT)
Dept: FAMILY MEDICINE CLINIC | Facility: CLINIC | Age: 69
End: 2019-10-03

## 2019-10-03 NOTE — TELEPHONE ENCOUNTER
Patient's wife called stating yesterday patient was asked if he took Celebrex and he said no  They went home and doubled checked and yes he did take three doses of Celebrex  Just An FYI  If any changes need to be made patient would like a return call

## 2019-10-04 ENCOUNTER — APPOINTMENT (OUTPATIENT)
Dept: CARDIAC REHAB | Facility: CLINIC | Age: 69
End: 2019-10-04
Payer: MEDICARE

## 2019-10-08 ENCOUNTER — CLINICAL SUPPORT (OUTPATIENT)
Dept: CARDIAC REHAB | Facility: CLINIC | Age: 69
End: 2019-10-08
Payer: MEDICARE

## 2019-10-08 DIAGNOSIS — I21.4 NSTEMI (NON-ST ELEVATED MYOCARDIAL INFARCTION) (HCC): ICD-10-CM

## 2019-10-08 PROCEDURE — 93798 PHYS/QHP OP CAR RHAB W/ECG: CPT

## 2019-10-10 ENCOUNTER — CLINICAL SUPPORT (OUTPATIENT)
Dept: CARDIAC REHAB | Facility: CLINIC | Age: 69
End: 2019-10-10
Payer: MEDICARE

## 2019-10-10 DIAGNOSIS — I21.4 NSTEMI (NON-ST ELEVATED MYOCARDIAL INFARCTION) (HCC): ICD-10-CM

## 2019-10-10 PROCEDURE — 93798 PHYS/QHP OP CAR RHAB W/ECG: CPT

## 2019-10-10 NOTE — PROGRESS NOTES
Cardiac Rehabilitation Plan of Care   30 day       Today's date: 10/10/2019   Visits: 10  Patient name: Carrie Harding      : 1950  Age: 81 y o  MRN: [id number]  Referring Physician: Chi Lee PA-C  Cardiologist: Larry Cochran DO  Provider: [facility] [address] [place] [address] Avenue  Clinician: Anupama Patel, MS, [facility] [place] [facility]    Dx:   Encounter Diagnosis   Name Primary?  NSTEMI (non-ST elevated myocardial infarction) [facility])      Date of onset: 2019 DESx2      SUMMARY OF PROGRESS:  This is Jimmy's 30 day note for CR, he has attended 10 sessions including his initial evaluation  Zoie Browning has progressed to 40 min of cardio at 2-2 5 METs  He usually has normal hemodynamic response to exercise with occ drops in BP during exercise  His SBP ranges 120-150 and DBP 60-80  No cardiac complications at home or at CR were reported  On telemetry he is NSR khushbu with occ PACs and peaked T waves during rest and with exercise  Zoie Browning is limited due to gout in his R foot, bilateral meniscus tears on top of joint arthritis, and heel spurs  He recently has been dealing with a gout exacerbation that may be caused due to this Aspirin  This has happened to him in the past when he took Aspirin  Another complication that arose was an allergic reaction to the gout meds he was taking that ended up causing hives  He has been on prednisone for the past week to help with the hives  Zoie Browning would like to find out there are alternatives to aspirin that he could take as it increases his gout pain  Other then orthopedic limitations, Zoie Browning is capable of keeping up with his ADL such as yard/house work and carpentry  He is limited with what he eats however sticks to a hearth healthy diet as much as possible  He has cut out a lot of processed meats, sodium, and refined grains  Zoie Browning reports he/she is coping well with good social support and denies depression or anxiety   He does get frustrated that he is in pain all the time however he expresses that he does not stress about it  [place] will continue to progress as tolerable and within his limitations         Medication compliance: Yes   Comments: None   Fall Risk: Low   Comments: None     EKG changes: NSR with occ PACs       EXERCISE ASSESSMENT and PLAN    Current Exercise Program in Rehab:       Frequency: 3 days/week        Minutes: 40        METS: 2-2 5            HR:    RPE: 4-6         Modalities: Airdyne bike, UBE, NuStep and Recumbent bike      Exercise Progression 30 Day Goals :    Frequency: 3-4 days/week   Minutes: 40-45   METS: 3-4   HR:     RPE: 4-6   Modalities: Treadmill, Airdyne bike, UBE, NuStep and Recumbent bike    Strength trainin-3 days / week  12-15 repetitions  1-2 sets per modality    Modalities: Chest Press, Pull Downs, Arm Curl, Seated Row and Sit to [employer]    Progressing: Yes - increases within limitations     Home Exercise: None    Goals: 10% improvement in functional capacity, improved DASI score by 10%, Increase in peak CR METs by 40%, Improved 6MWT distance by 10%, Resume ADLs with increased strength and Exercise 5 days/wk, >150mins/wk  Education: Benefit of exercise for CAD risk factors, signs and sxs, RPE scale and class: Risk Factors for Heart Disease   Plan:home exercise 30+ mins 2 days opposite CR  Readiness to change: Action      NUTRITION ASSESSMENT AND PLAN    Weight control:    Starting weight: 209 0 Lbs    Current weight:  210 lbs    Waist circumference:    Startin in   Current:    Diabetes: N/A  Lipid management: Last lipid profile 2019  Chol 202  TRG 48  HDL 45    Goals:reduced BMI to < 25, LDL <100, CHOL <200, decreased body fat% <25%   , reduced waist circumference <40 inches, fasting BG  and 2 5-5%  wt loss  Education: heart healthy eating  low sodium diet  hydration  healthy choices while dining out  Education class: Reading Food Labels   Education Class: Heart Healthy Eating  Progressing: Yes - has maintained a healthy diet, increased fruits   Plan: Increase PUFA and MUFA, Decrease SFA, Increase whole grains, increase fruits/vegs, eliminate processed meats and Reduce added sugars <25g/day  Readiness to change: Action      PSYCHOSOCIAL ASSESSMENT AND PLAN    Emotional: Patient reports he/she is coping well with good social support and denies depression or anxiety   PHQ-9 =1  1-4 = Minimal Depression  Self-reported stress level: 2   Social support: Excellent  Goals:  Physical Fitness in [facility] [place] Score < 3, Pain in [facility] [place] Score < 3, Overall Health in [facility] [place] Score < 3, improved positive thoughts of well being and increased energy  Education: benefits of positive support system and depression and CAD  Progressing: Yes - has minimal stress   Plan: Class: Stress and Your Health, Class: Relaxation and Practice relaxation techniques  Readiness to change: Action      OTHER CORE COMPONENTS     Tobacco:   Social History     Tobacco Use   Smoking Status Former Smoker   Smokeless Tobacco Never Used       Tobacco Use Intervention: Referral to tobacco expert:   Brief counseling by cardiac rehab professional  Date: 2019    Blood pressure:    Restin/62   Exercise: 146/60    Goals: consistent BP < 130/80, reduced dietary sodium <2300mg and consistent exercise >150 mins/wk  Education:  understanding HTN and CAD and low sodium diet and HTN  Progressing: No - BP elevated and sometimes drops during exercise   Plan: Class: Understanding Heart Disease and Class: Common Heart Medications  Readiness to change: Action

## 2019-10-11 ENCOUNTER — CLINICAL SUPPORT (OUTPATIENT)
Dept: CARDIAC REHAB | Facility: CLINIC | Age: 69
End: 2019-10-11
Payer: MEDICARE

## 2019-10-11 DIAGNOSIS — I21.4 NSTEMI (NON-ST ELEVATED MYOCARDIAL INFARCTION) (HCC): ICD-10-CM

## 2019-10-11 DIAGNOSIS — L50.9 URTICARIA: Primary | ICD-10-CM

## 2019-10-11 PROCEDURE — 93798 PHYS/QHP OP CAR RHAB W/ECG: CPT

## 2019-10-11 RX ORDER — PREDNISONE 20 MG/1
20 TABLET ORAL 2 TIMES DAILY WITH MEALS
Qty: 10 TABLET | Refills: 0 | Status: SHIPPED | OUTPATIENT
Start: 2019-10-11 | End: 2019-10-16

## 2019-10-11 NOTE — TELEPHONE ENCOUNTER
Patient's wife called stating patient broke out in hives again and is requesting another dose of the steroids that were sent in for him  She stated it helped the last time  [address]

## 2019-10-15 ENCOUNTER — CLINICAL SUPPORT (OUTPATIENT)
Dept: CARDIAC REHAB | Facility: CLINIC | Age: 69
End: 2019-10-15
Payer: MEDICARE

## 2019-10-15 DIAGNOSIS — I21.4 NSTEMI (NON-ST ELEVATED MYOCARDIAL INFARCTION) (HCC): ICD-10-CM

## 2019-10-15 PROCEDURE — 93798 PHYS/QHP OP CAR RHAB W/ECG: CPT

## 2019-10-17 ENCOUNTER — CLINICAL SUPPORT (OUTPATIENT)
Dept: CARDIAC REHAB | Facility: CLINIC | Age: 69
End: 2019-10-17
Payer: MEDICARE

## 2019-10-17 DIAGNOSIS — I21.4 NSTEMI (NON-ST ELEVATED MYOCARDIAL INFARCTION) (HCC): ICD-10-CM

## 2019-10-17 PROCEDURE — 93798 PHYS/QHP OP CAR RHAB W/ECG: CPT

## 2019-10-18 ENCOUNTER — APPOINTMENT (OUTPATIENT)
Dept: CARDIAC REHAB | Facility: CLINIC | Age: 69
End: 2019-10-18
Payer: MEDICARE

## 2019-10-21 DIAGNOSIS — M10.9 ACUTE GOUTY ARTHRITIS: Primary | ICD-10-CM

## 2019-10-21 RX ORDER — PREDNISONE 10 MG/1
10 TABLET ORAL 2 TIMES DAILY WITH MEALS
Qty: 20 TABLET | Refills: 0 | Status: SHIPPED | OUTPATIENT
Start: 2019-10-21 | End: 2019-10-31

## 2019-10-21 NOTE — TELEPHONE ENCOUNTER
Patient 's wife called requesting a prescription for predinsone to take along on vacation just in case  gets a gout flare up while they are away  They will be going away for 3 weeks on Sat       [facility]

## 2019-10-22 ENCOUNTER — CLINICAL SUPPORT (OUTPATIENT)
Dept: CARDIAC REHAB | Facility: CLINIC | Age: 69
End: 2019-10-22
Payer: MEDICARE

## 2019-10-22 DIAGNOSIS — I21.4 NSTEMI (NON-ST ELEVATED MYOCARDIAL INFARCTION) (HCC): ICD-10-CM

## 2019-10-22 PROCEDURE — 93798 PHYS/QHP OP CAR RHAB W/ECG: CPT

## 2019-10-24 ENCOUNTER — CLINICAL SUPPORT (OUTPATIENT)
Dept: CARDIAC REHAB | Facility: CLINIC | Age: 69
End: 2019-10-24
Payer: MEDICARE

## 2019-10-24 DIAGNOSIS — I21.4 NSTEMI (NON-ST ELEVATED MYOCARDIAL INFARCTION) (HCC): ICD-10-CM

## 2019-10-24 PROCEDURE — 93798 PHYS/QHP OP CAR RHAB W/ECG: CPT

## 2019-10-26 ENCOUNTER — APPOINTMENT (OUTPATIENT)
Dept: LAB | Facility: MEDICAL CENTER | Age: 69
End: 2019-10-26
Payer: MEDICARE

## 2019-10-26 DIAGNOSIS — I25.10 CORONARY ARTERY DISEASE INVOLVING NATIVE HEART WITHOUT ANGINA PECTORIS, UNSPECIFIED VESSEL OR LESION TYPE: ICD-10-CM

## 2019-10-26 DIAGNOSIS — E78.5 DYSLIPIDEMIA: ICD-10-CM

## 2019-10-26 LAB
ALBUMIN SERPL BCP-MCNC: 4 G/DL (ref 3.5–5)
ALP SERPL-CCNC: 67 U/L (ref 46–116)
ALT SERPL W P-5'-P-CCNC: 25 U/L (ref 12–78)
ANION GAP SERPL CALCULATED.3IONS-SCNC: 7 MMOL/L (ref 4–13)
AST SERPL W P-5'-P-CCNC: 10 U/L (ref 5–45)
BILIRUB SERPL-MCNC: 1.25 MG/DL (ref 0.2–1)
BUN SERPL-MCNC: 13 MG/DL (ref 5–25)
CALCIUM SERPL-MCNC: 8.7 MG/DL (ref 8.3–10.1)
CHLORIDE SERPL-SCNC: 108 MMOL/L (ref 100–108)
CHOLEST SERPL-MCNC: 152 MG/DL (ref 50–200)
CO2 SERPL-SCNC: 25 MMOL/L (ref 21–32)
CREAT SERPL-MCNC: 0.94 MG/DL (ref 0.6–1.3)
GFR SERPL CREATININE-BSD FRML MDRD: 84 ML/MIN/1.73SQ M
GLUCOSE P FAST SERPL-MCNC: 90 MG/DL (ref 65–99)
HDLC SERPL-MCNC: 47 MG/DL
LDLC SERPL CALC-MCNC: 89 MG/DL (ref 0–100)
POTASSIUM SERPL-SCNC: 3.8 MMOL/L (ref 3.5–5.3)
PROT SERPL-MCNC: 7.3 G/DL (ref 6.4–8.2)
SODIUM SERPL-SCNC: 140 MMOL/L (ref 136–145)
TRIGL SERPL-MCNC: 79 MG/DL

## 2019-10-26 PROCEDURE — 36415 COLL VENOUS BLD VENIPUNCTURE: CPT | Performed by: FAMILY MEDICINE

## 2019-10-26 PROCEDURE — 80053 COMPREHEN METABOLIC PANEL: CPT | Performed by: FAMILY MEDICINE

## 2019-10-26 PROCEDURE — 80061 LIPID PANEL: CPT

## 2019-10-29 ENCOUNTER — APPOINTMENT (OUTPATIENT)
Dept: CARDIAC REHAB | Facility: CLINIC | Age: 69
End: 2019-10-29
Payer: MEDICARE

## 2019-10-31 ENCOUNTER — APPOINTMENT (OUTPATIENT)
Dept: CARDIAC REHAB | Facility: CLINIC | Age: 69
End: 2019-10-31
Payer: MEDICARE

## 2019-11-01 ENCOUNTER — APPOINTMENT (OUTPATIENT)
Dept: CARDIAC REHAB | Facility: CLINIC | Age: 69
End: 2019-11-01
Payer: MEDICARE

## 2019-11-01 RX ORDER — ESOMEPRAZOLE MAGNESIUM 40 MG/1
CAPSULE, DELAYED RELEASE ORAL
Refills: 0 | COMMUNITY
Start: 2019-10-21 | End: 2019-12-19 | Stop reason: ALTCHOICE

## 2019-11-05 ENCOUNTER — APPOINTMENT (OUTPATIENT)
Dept: CARDIAC REHAB | Facility: CLINIC | Age: 69
End: 2019-11-05
Payer: MEDICARE

## 2019-11-05 NOTE — PROGRESS NOTES
Cardiac Rehabilitation Plan of Care   60 day      Today's date: 2019   Visits: 15  Patient name: Luz Tapia      : 1950  Age: 81 y o  MRN: [id number]  Referring Physician: Darnell Panda PA-C  Cardiologist: Rossana Del Castillo DO  Provider: [facility] [address]  Clinician: Moni Alcantara, MS, CEP, [facility]    Dx:   Encounter Diagnosis   Name Primary?  NSTEMI (non-ST elevated myocardial infarction) [facility])      Date of onset: 2019 DESx2      SUMMARY OF PROGRESS:  This is Jimmy's 60 day note for CR, he has attended 15 sessions including his initial evaluation  Hayden Ordaz has progressed to 50 min of cardio at 2-2 5 METs  He usually has normal hemodynamic response to exercise with occ drops in BP during exercise  His SBP ranges 120-150 and DBP 60-80  No cardiac complications at home or at CR were reported  On telemetry he is NSR khushbu at rest with occ PACs and peaked T waves during rest and with exercise  Hayden Ordaz is limited due to gout in his R foot, bilateral meniscus tears on top of joint arthritis, and heel spurs  Other then orthopedic limitations, Hayden Ordaz is capable of keeping up with his ADL such as yard/house work and carpentry  One of his downfalls includes him pushing himself too much at home  He tends to not listen to suggestions of sticking to moderate work and will continue to lift heavy  Hayden Ordaz is limited with what he eats however sticks to a hearth healthy diet as much as possible  He has cut out a lot of processed meats, sodium, and refined grains  Hayden Ordaz reports he/she is coping well with good social support and denies depression or anxiety  He does get frustrated that he is in pain all the time however he expresses that he does not stress about it  Hayden Ordaz will continue to progress as tolerable and within his limitations         Medication compliance: Yes   Comments: None   Fall Risk: Low   Comments: None     EKG changes: NSR with occ PACs       EXERCISE ASSESSMENT and PLAN    Current Exercise Program in Rehab:       Frequency: 3 days/week        Minutes: 50        METS: 2-2 5            HR:    RPE: 4-6         Modalities: Airdyne bike, UBE, NuStep and Recumbent bike      Exercise Progression 30 Day Goals :    Frequency: 3-4 days/week   Minutes: 40-45   METS: 3-4   HR:     RPE: 4-6   Modalities: Treadmill, Airdyne bike, UBE, NuStep and Recumbent bike    Strength trainin-3 days / week  12-15 repetitions  1-2 sets per modality    Modalities: Chest Press, Pull Downs, Arm Curl, Seated Row and Sit to [employer]    Progressing: Yes - increased duration     Home Exercise: None    Goals: 10% improvement in functional capacity, improved DASI score by 10%, Increase in peak CR METs by 40%, Improved 6MWT distance by 10%, Resume ADLs with increased strength and Exercise 5 days/wk, >150mins/wk  Education: Benefit of exercise for CAD risk factors, signs and sxs, RPE scale and class: Risk Factors for Heart Disease   Plan:home exercise 30+ mins 2 days opposite CR  Readiness to change: Action      NUTRITION ASSESSMENT AND PLAN    Weight control:    Starting weight: 209 0 Lbs    Current weight:  204 6 lbs    Waist circumference:    Startin in   Current:    Diabetes: N/A  Lipid management: Last lipid profile 2019  Chol 202  TRG 48  HDL 45    Goals:reduced BMI to < 25, LDL <100, CHOL <200, decreased body fat% <25%   , reduced waist circumference <40 inches, fasting BG  and 2 5-5%  wt loss  Education: heart healthy eating  low sodium diet  hydration  healthy choices while dining out  Education class: Reading Food Labels   Education Class: Heart Healthy Eating  Progressing: Yes - has maintained a healthy diet, increased fruits   Plan: Increase PUFA and MUFA, Decrease SFA, Increase whole grains, increase fruits/vegs, eliminate processed meats and Reduce added sugars <25g/day  Readiness to change: Action      PSYCHOSOCIAL ASSESSMENT AND PLAN    Emotional: Patient reports he/she is coping well with good social support and denies depression or anxiety   PHQ-9 =1  1-4 = Minimal Depression  Self-reported stress level: 2   Social support: Excellent  Goals:  Physical Fitness in [facility] Score < 3, Pain in [facility] Score < 3, Overall Health in [facility] Score < 3, improved positive thoughts of well being and increased energy  Education: benefits of positive support system and depression and CAD  Progressing: Yes - has minimal stress   Plan: Class: Stress and Your Health, Class: Relaxation and Practice relaxation techniques  Readiness to change: Action      OTHER CORE COMPONENTS     Tobacco:   Social History     Tobacco Use   Smoking Status Former Smoker   Smokeless Tobacco Never Used       Tobacco Use Intervention: Referral to tobacco expert:   Brief counseling by cardiac rehab professional  Date: 2019    Blood pressure:    Restin/60   Exercise: 132/60    Goals: consistent BP < 130/80, reduced dietary sodium <2300mg and consistent exercise >150 mins/wk  Education:  understanding HTN and CAD and low sodium diet and HTN  Progressing: No - BP elevated and sometimes drops during exercise   Plan: Class: Understanding Heart Disease and Class: Common Heart Medications  Readiness to change: Action

## 2019-11-07 ENCOUNTER — APPOINTMENT (OUTPATIENT)
Dept: CARDIAC REHAB | Facility: CLINIC | Age: 69
End: 2019-11-07
Payer: MEDICARE

## 2019-11-08 ENCOUNTER — APPOINTMENT (OUTPATIENT)
Dept: CARDIAC REHAB | Facility: CLINIC | Age: 69
End: 2019-11-08
Payer: MEDICARE

## 2019-11-12 ENCOUNTER — TRANSCRIBE ORDERS (OUTPATIENT)
Dept: VASCULAR SURGERY | Facility: CLINIC | Age: 69
End: 2019-11-12

## 2019-11-12 DIAGNOSIS — I65.23 BILATERAL CAROTID ARTERY STENOSIS: Primary | ICD-10-CM

## 2019-11-12 DIAGNOSIS — E78.2 MIXED HYPERLIPIDEMIA: ICD-10-CM

## 2019-11-12 RX ORDER — ATORVASTATIN CALCIUM 40 MG/1
40 TABLET, FILM COATED ORAL EVERY EVENING
Qty: 90 TABLET | Refills: 3 | Status: SHIPPED | OUTPATIENT
Start: 2019-11-12 | End: 2020-01-09 | Stop reason: ALTCHOICE

## 2019-11-12 RX ORDER — AMITRIPTYLINE HYDROCHLORIDE 25 MG/1
25 TABLET, FILM COATED ORAL
Refills: 0 | COMMUNITY
Start: 2019-10-25 | End: 2019-12-19 | Stop reason: ALTCHOICE

## 2019-11-12 NOTE — TELEPHONE ENCOUNTER
Patient is asking for a new script for Lipitor 40 mg #90  This prescription is good until March 2020 but it's only a 30 day with refills  Patient is asking for 90 days   Ace Blancas

## 2019-11-14 ENCOUNTER — APPOINTMENT (OUTPATIENT)
Dept: CARDIAC REHAB | Facility: CLINIC | Age: 69
End: 2019-11-14
Payer: MEDICARE

## 2019-11-15 ENCOUNTER — APPOINTMENT (OUTPATIENT)
Dept: CARDIAC REHAB | Facility: CLINIC | Age: 69
End: 2019-11-15
Payer: MEDICARE

## 2019-11-19 ENCOUNTER — CLINICAL SUPPORT (OUTPATIENT)
Dept: CARDIAC REHAB | Facility: CLINIC | Age: 69
End: 2019-11-19
Payer: MEDICARE

## 2019-11-19 DIAGNOSIS — I21.4 NSTEMI (NON-ST ELEVATED MYOCARDIAL INFARCTION) (HCC): ICD-10-CM

## 2019-11-19 PROCEDURE — 93798 PHYS/QHP OP CAR RHAB W/ECG: CPT

## 2019-11-21 ENCOUNTER — CLINICAL SUPPORT (OUTPATIENT)
Dept: CARDIAC REHAB | Facility: CLINIC | Age: 69
End: 2019-11-21
Payer: MEDICARE

## 2019-11-21 DIAGNOSIS — I21.4 NSTEMI (NON-ST ELEVATED MYOCARDIAL INFARCTION) (HCC): ICD-10-CM

## 2019-11-21 PROCEDURE — 93798 PHYS/QHP OP CAR RHAB W/ECG: CPT

## 2019-11-22 ENCOUNTER — APPOINTMENT (OUTPATIENT)
Dept: CARDIAC REHAB | Facility: CLINIC | Age: 69
End: 2019-11-22
Payer: MEDICARE

## 2019-11-25 ENCOUNTER — APPOINTMENT (OUTPATIENT)
Dept: CARDIAC REHAB | Facility: CLINIC | Age: 69
End: 2019-11-25
Payer: MEDICARE

## 2019-11-26 ENCOUNTER — APPOINTMENT (OUTPATIENT)
Dept: CARDIAC REHAB | Facility: CLINIC | Age: 69
End: 2019-11-26
Payer: MEDICARE

## 2019-11-29 ENCOUNTER — APPOINTMENT (OUTPATIENT)
Dept: CARDIAC REHAB | Facility: CLINIC | Age: 69
End: 2019-11-29
Payer: MEDICARE

## 2019-12-03 NOTE — PROGRESS NOTES
Cardiac Rehabilitation Plan of Care   90 day       Today's date: 12/3/2019   Visits: 17  Patient name: Steven Brar      : 1950  Age: 81 y o  MRN: [id number]  Referring Physician: Eliana Levi PA-C  Cardiologist: Shantell Galeas DO  Provider: [facility] [address] [place] [address] Avenue  Clinician: Keith Alexander, MS, [facility], [facility]    Dx:   Encounter Diagnosis   Name Primary?  NSTEMI (non-ST elevated myocardial infarction) [facility])      Date of onset: 2019 DESx2      SUMMARY OF PROGRESS:  This is Jimmy's 90 day note for CR, he has attended 17 sessions including his initial evaluation  Viridiana Pinedo has progressed to 50 min of cardio at 2-2 5 METs  He usually has normal hemodynamic response to exercise with occ drops in BP during exercise  His SBP ranges 120-150 and DBP 60-80  No cardiac complications at home or at CR were reported  On telemetry he is NSR khushbu at rest with occ PACs and peaked T waves during rest and with exercise  Viridiana Pinedo reported having another gout attack and also threw out his back  Due to this limitation, Viridiana Pinedo will be put on medical hold until further notice  Viridiana Pinedo is typically limited due to gout in his R foot, bilateral meniscus tears on top of joint arthritis, and heel spurs  Other then orthopedic limitations, Viridiana Pinedo is capable of keeping up with his ADL such as yard/house work and carpentry  One of his downfalls includes him pushing himself too much at home that leads to these exasterbations  Viridiana Pinedo is limited with what he eats however sticks to a heart healthy diet as much as possible  He has cut out a lot of processed meats, sodium, and refined grains  Viridiana Pinedo reports he/she is coping well with good social support and denies depression or anxiety  Viridiana Pinedo will return when his gout gets better         Medication compliance: Yes   Comments: None   Fall Risk: Low   Comments: None     EKG changes: NSR with occ PACs       EXERCISE ASSESSMENT and PLAN    Current Exercise Program in Rehab:       Frequency: 3 days/week        Minutes: 50        METS: 2-2 5            HR:    RPE: 4-6         Modalities: Airdyne bike, UBE, NuStep and Recumbent bike      Exercise Progression 30 Day Goals :    Frequency: 3-4 days/week   Minutes: 40-45   METS: 3-4   HR:     RPE: 4-6   Modalities: Treadmill, Airdyne bike, UBE, NuStep and Recumbent bike    Strength trainin-3 days / week  12-15 repetitions  1-2 sets per modality    Modalities: Chest Press, Pull Downs, Arm Curl, Seated Row and Sit to [employer]    Progressing: Yes - increased duration     Home Exercise: None    Goals: 10% improvement in functional capacity, improved DASI score by 10%, Increase in peak CR METs by 40%, Improved 6MWT distance by 10%, Resume ADLs with increased strength and Exercise 5 days/wk, >150mins/wk  Education: Benefit of exercise for CAD risk factors, signs and sxs, RPE scale and class: Risk Factors for Heart Disease   Plan:home exercise 30+ mins 2 days opposite CR  Readiness to change: Action      NUTRITION ASSESSMENT AND PLAN    Weight control:    Starting weight: 209 0 Lbs    Current weight:  204 6 lbs    Waist circumference:    Startin in   Current:    Diabetes: N/A  Lipid management: Last lipid profile 2019  Chol 202  TRG 48  HDL 45    Goals:reduced BMI to < 25, LDL <100, CHOL <200, decreased body fat% <25%   , reduced waist circumference <40 inches, fasting BG  and 2 5-5%  wt loss  Education: heart healthy eating  low sodium diet  hydration  healthy choices while dining out  Education class: Reading Food Labels   Education Class: Heart Healthy Eating  Progressing: Yes - has maintained a healthy diet, increased fruits   Plan: Increase PUFA and MUFA, Decrease SFA, Increase whole grains, increase fruits/vegs, eliminate processed meats and Reduce added sugars <25g/day  Readiness to change: Action      PSYCHOSOCIAL ASSESSMENT AND PLAN    Emotional: Patient reports he/she is coping well with good social support and denies depression or anxiety   PHQ-9 =1  1-4 = Minimal Depression  Self-reported stress level: 2   Social support: Excellent  Goals:  Physical Fitness in [facility] Score < 3, Pain in [facility] Score < 3, Overall Health in [facility] Score < 3, improved positive thoughts of well being and increased energy  Education: benefits of positive support system and depression and CAD  Progressing: Yes - has minimal stress   Plan: Class: Stress and Your Health, Class: Relaxation and Practice relaxation techniques  Readiness to change: Action      OTHER CORE COMPONENTS     Tobacco:   Social History     Tobacco Use   Smoking Status Former Smoker   Smokeless Tobacco Never Used       Tobacco Use Intervention: Referral to tobacco expert:   Brief counseling by cardiac rehab professional  Date: 2019    Blood pressure:    Restin/70   Exercise: 158/74    Goals: consistent BP < 130/80, reduced dietary sodium <2300mg and consistent exercise >150 mins/wk  Education:  understanding HTN and CAD and low sodium diet and HTN  Progressing: No - BP elevated and sometimes drops during exercise   Plan: Class: Understanding Heart Disease and Class: Common Heart Medications  Readiness to change: Action

## 2019-12-18 ENCOUNTER — HOSPITAL ENCOUNTER (OUTPATIENT)
Dept: NON INVASIVE DIAGNOSTICS | Facility: CLINIC | Age: 69
Discharge: HOME | End: 2019-12-18
Payer: MEDICARE

## 2019-12-18 DIAGNOSIS — I65.23 BILATERAL CAROTID ARTERY STENOSIS: ICD-10-CM

## 2019-12-18 PROCEDURE — 93880 EXTRACRANIAL BILAT STUDY: CPT | Performed by: SURGERY

## 2019-12-18 PROCEDURE — 93880 EXTRACRANIAL BILAT STUDY: CPT

## 2019-12-19 ENCOUNTER — OFFICE VISIT (OUTPATIENT)
Dept: VASCULAR SURGERY | Facility: CLINIC | Age: 69
End: 2019-12-19
Payer: MEDICARE

## 2019-12-19 VITALS
HEART RATE: 68 BPM | DIASTOLIC BLOOD PRESSURE: 72 MMHG | SYSTOLIC BLOOD PRESSURE: 126 MMHG | HEIGHT: 68 IN | TEMPERATURE: 97.6 F | WEIGHT: 196 LBS | BODY MASS INDEX: 29.7 KG/M2

## 2019-12-19 DIAGNOSIS — E78.2 MIXED HYPERLIPIDEMIA: ICD-10-CM

## 2019-12-19 DIAGNOSIS — Z98.890 HISTORY OF RIGHT-SIDED CAROTID ENDARTERECTOMY: ICD-10-CM

## 2019-12-19 DIAGNOSIS — I10 BENIGN ESSENTIAL HYPERTENSION: ICD-10-CM

## 2019-12-19 DIAGNOSIS — I65.22 ASYMPTOMATIC STENOSIS OF LEFT CAROTID ARTERY: Primary | ICD-10-CM

## 2019-12-19 PROCEDURE — 99204 OFFICE O/P NEW MOD 45 MIN: CPT | Performed by: NURSE PRACTITIONER

## 2019-12-19 RX ORDER — OMEPRAZOLE 40 MG/1
CAPSULE, DELAYED RELEASE ORAL
Refills: 0 | COMMUNITY
Start: 2019-12-11 | End: 2020-03-02

## 2019-12-19 RX ORDER — FAMOTIDINE 20 MG/1
20 TABLET, FILM COATED ORAL
COMMUNITY
End: 2020-03-02

## 2019-12-19 NOTE — PROGRESS NOTES
Assessment/Plan:    82 y/o M with HTN, HLD, CAD s/p stents, and carotid stenosis s/p right CEA (2012), seen for annual risk factor modification visit  Asymptomatic stenosis of left carotid artery  -CV duplex- Right carotid widely patent s/p endarterectomy, left carotid mild less than 50% stenosis  -Continue aspirin and atorvastatin  -We will continue to monitor for any disease progression with annual duplex imaging    Mixed hyperlipidemia  -Continue atorvastatin    Benign essential hypertension  -Blood pressure within normal limits  -Continue current antihypertensive regimen  -Management per PCP       Diagnoses and all orders for this visit:    Asymptomatic stenosis of left carotid artery  -     VAS carotid complete study; Future    History of right-sided carotid endarterectomy  -     VAS carotid complete study; Future    Mixed hyperlipidemia    Benign essential hypertension    Other orders  -     omeprazole (PriLOSEC) 40 MG capsule; take 1 capsule by mouth once daily 1/2 HOUR BREFORE BREAKFAST  -     famotidine (PEPCID) 20 mg tablet; Take 20 mg by mouth daily at bedtime          Subjective:      Patient ID: Luz Tapia is a 81 y o  male  Patient with known carotid artery stenosis, s/p right carotid endarterectomy in 2012  He has been monitored through the vascular lab with annual CV duplex  He is seen in office today for annual risk factor modification visit  He had CV duplex done 12/18/19  He denies any TIA/CVA symptoms; denies amaurosis fugax, lateralizing weakness or numbness, slurred speech , facial droop, or visual disturbances  He reports that he had cardiac stents placed in August 2019 and is now on Brilinta and aspirin therapy  He is a non-smoker  He reports an intentional 65 lb weight loss since his group home with change in diet and regular walking routine  Patient presents in office to review the results of his CV done on 12/18/2019  Patient denies any TIA stroke symptoms   Patient is taking aspirin and atorvastatin  The following portions of the patient's history were reviewed and updated as appropriate: allergies, current medications, past family history, past medical history, past social history, past surgical history and problem list     Review of Systems   Constitutional: Negative  HENT: Negative  Eyes: Negative  Negative for visual disturbance  Respiratory: Negative  Cardiovascular: Negative  Gastrointestinal: Positive for abdominal pain (after eating)  Endocrine: Positive for cold intolerance (hands)  Genitourinary: Negative  Musculoskeletal: Negative  Skin: Negative  Negative for wound  Allergic/Immunologic: Negative  Neurological: Negative  Negative for speech difficulty, weakness and headaches  Hematological: Bruises/bleeds easily  Psychiatric/Behavioral: Negative  Objective:       Physical Exam   Constitutional: He is oriented to person, place, and time  He appears well-nourished  No distress  HENT:   Head: Normocephalic and atraumatic  Eyes: No scleral icterus  Neck: Neck supple  No JVD present  No carotid bruit   Cardiovascular: Normal rate and regular rhythm  No murmur heard  Pulmonary/Chest: Effort normal and breath sounds normal    Abdominal: Soft  He exhibits no distension  Musculoskeletal: He exhibits no edema  Neurological: He is alert and oriented to person, place, and time  Skin: Skin is warm and dry  Psychiatric: He has a normal mood and affect  I have reviewed and made appropriate changes to the review of systems input by the medical assistant      Vitals:    12/19/19 0912 12/19/19 0913   BP: 124/70 126/72   BP Location: Left arm Right arm   Patient Position: Sitting Sitting   Cuff Size: Adult Adult   Pulse: 68    Temp: 97 6 °F (36 4 °C)    TempSrc: Tympanic    Weight: 88 9 kg (196 lb)    Height: 5' 7 5" (1 715 m)        Patient Active Problem List   Diagnosis    Asthma    Genao esophagus    Benign essential hypertension    Asymptomatic stenosis of left carotid artery    Diverticulosis    Fatty infiltration of liver    Gastroesophageal reflux disease without esophagitis    Hyperuricemia without signs inflammatory arthritis/tophaceous disease    Impaired fasting glucose    Mixed hyperlipidemia    Drug-induced erectile dysfunction    Non-allergic rhinitis    Osteoarthritis of knee    Vitamin B12 deficiency    Irritable bowel syndrome with diarrhea    NSTEMI (non-ST elevated myocardial infarction) ([facility] [address] )    History of colonic diverticulitis    History of right-sided carotid endarterectomy       Past Surgical History:   Procedure Laterality Date    APPENDECTOMY      CAROTID ENDARTARECTOMY      COLECTOMY Left     PARTIAL - SIGMOID; HEMICOLECTOMY FOR DIVERTICULITIS; ONSET: 1987    COLON SURGERY      COLONOSCOPY N/A 12/9/2016    Procedure: COLONOSCOPY;  Surgeon: Brianna Brandon MD;  [facility]; Service:     HERNIA REPAIR      VENTRAL    NISSEN FUNDOPLICATION      ESOPHAGOGASTRIC FUNDOPLASTY LAST ASSESSED: [id number]    SINUS SURGERY      THROMBOENDARTERECTOMY      CAROTID; ONSET; MAY 2012       Family History   Problem Relation Age of Onset    Cancer Paternal Grandfather     Hypertension Mother     Hyperlipidemia Mother     Hypertension Father     Hyperlipidemia Father        Social History     Socioeconomic History    Marital status: /Civil Union     Spouse name: Not on file    Number of children: Not on file    Years of education: Not on file    Highest education level: Not on file   Occupational History    Not on file   Social Needs    Financial resource strain: Not on file    Food insecurity:     Worry: Not on file     Inability: Not on file    Transportation needs:     Medical: Not on file     Non-medical: Not on file   Tobacco Use    Smoking status: Former Smoker    Smokeless tobacco: Never Used   Substance and Sexual Activity    Alcohol use:  No  Drug use: No    Sexual activity: Not on file   Lifestyle    Physical activity:     Days per week: Not on file     Minutes per session: Not on file    Stress: Not on file   Relationships    Social connections:     Talks on phone: Not on file     Gets together: Not on file     Attends Restorationism service: Not on file     Active member of club or organization: Not on file     Attends meetings of clubs or organizations: Not on file     Relationship status: Not on file    Intimate partner violence:     Fear of current or ex partner: Not on file     Emotionally abused: Not on file     Physically abused: Not on file     Forced sexual activity: Not on file   Other Topics Concern    Not on file   Social History Narrative    Not on file       Allergies   Allergen Reactions    Depo-Medrol [Methylprednisolone] Anaphylaxis     Severe Vagal Reaction    Iv Contrast [Iodinated Diagnostic Agents] Angioedema     Pt states itchy/swollen eyes, trouble breathing (years ago)    Sulfa Antibiotics          Current Outpatient Medications:     acetaminophen (TYLENOL) 325 mg tablet, Take 2 tablets (650 mg total) by mouth every 6 (six) hours as needed for mild pain, headaches or fever, Disp: 30 tablet, Rfl: 0    albuterol (PROVENTIL HFA,VENTOLIN HFA) 90 mcg/act inhaler, INHALE 2 PUFFS BY MOUTH EVERY 6 HOURS AS NEEDED FOR WHEEZING, Disp: 18 g, Rfl: 3    aspirin 81 mg chewable tablet, Chew 1 tablet (81 mg total) daily, Disp: 30 tablet, Rfl: 0    atorvastatin (LIPITOR) 40 mg tablet, Take 1 tablet (40 mg total) by mouth every evening, Disp: 90 tablet, Rfl: 3    famotidine (PEPCID) 20 mg tablet, Take 20 mg by mouth daily at bedtime, Disp: , Rfl:     fluticasone (FLONASE) 50 mcg/act nasal spray, 1 spray into each nostril daily, Disp: , Rfl:     levalbuterol (XOPENEX) 1 25 mg/3 mL nebulizer solution, INHALE 1 VIAL BY NEBULIZER EVERY 8 HOURS AS NEEDED FOR WHEEZING, Disp: 72 vial, Rfl: 3    lisinopril (ZESTRIL) 10 mg tablet, Take 1 tablet (10 mg total) by mouth daily, Disp: 30 tablet, Rfl: 5    multivitamin (THERAGRAN) TABS, Take 1 tablet by mouth daily, Disp: , Rfl:     omeprazole (PriLOSEC) 40 MG capsule, take 1 capsule by mouth once daily 1/2 HOUR BREFORE BREAKFAST, Disp: , Rfl: 0    ticagrelor (BRILINTA) 90 MG, Take 1 tablet (90 mg total) by mouth every 12 (twelve) hours, Disp: 60 tablet, Rfl: 5

## 2019-12-19 NOTE — ASSESSMENT & PLAN NOTE
-CV duplex- Right carotid widely patent s/p endarterectomy, left carotid mild less than 50% stenosis  -Continue aspirin and atorvastatin  -We will continue to monitor for any disease progression with annual duplex imaging

## 2019-12-19 NOTE — PATIENT INSTRUCTIONS
Carotid artery stenosis  History of right carotid endarterectomy in 2012  -I reviewed the results of your recent carotid duplex  Your right carotid artery is widely patent/open post surgery and you have mild less than 50% stenosis to the left carotid artery  -continue best medical management with daily aspirin and atorvastatin  -we will continue to monitor for any disease progression with a carotid duplex once per year  -you have minimized your risk factors for disease progression by maintaining good blood pressure control, being a nonsmoker, being on the appropriate medical therapy and by losing 65 lbs  -follow-up in office in 1 year    Notify us prior with any questions/concerns

## 2019-12-19 NOTE — ASSESSMENT & PLAN NOTE
-Blood pressure within normal limits  -Continue current antihypertensive regimen  -Management per PCP

## 2019-12-30 ENCOUNTER — TELEPHONE (OUTPATIENT)
Dept: CARDIAC REHAB | Facility: CLINIC | Age: 69
End: 2019-12-30

## 2020-01-09 ENCOUNTER — OFFICE VISIT (OUTPATIENT)
Dept: FAMILY MEDICINE CLINIC | Facility: CLINIC | Age: 70
End: 2020-01-09
Payer: MEDICARE

## 2020-01-09 VITALS
HEART RATE: 64 BPM | BODY MASS INDEX: 30.62 KG/M2 | WEIGHT: 202 LBS | DIASTOLIC BLOOD PRESSURE: 70 MMHG | RESPIRATION RATE: 16 BRPM | TEMPERATURE: 96.6 F | SYSTOLIC BLOOD PRESSURE: 162 MMHG | HEIGHT: 68 IN

## 2020-01-09 DIAGNOSIS — Z23 NEED FOR INFLUENZA VACCINATION: ICD-10-CM

## 2020-01-09 DIAGNOSIS — E78.2 MIXED HYPERLIPIDEMIA: Primary | ICD-10-CM

## 2020-01-09 DIAGNOSIS — I10 BENIGN ESSENTIAL HYPERTENSION: ICD-10-CM

## 2020-01-09 DIAGNOSIS — I25.10 CORONARY ARTERY DISEASE INVOLVING NATIVE CORONARY ARTERY OF NATIVE HEART WITHOUT ANGINA PECTORIS: ICD-10-CM

## 2020-01-09 DIAGNOSIS — R73.01 IMPAIRED FASTING GLUCOSE: ICD-10-CM

## 2020-01-09 DIAGNOSIS — Z12.5 SCREENING FOR PROSTATE CANCER: ICD-10-CM

## 2020-01-09 DIAGNOSIS — K58.0 IRRITABLE BOWEL SYNDROME WITH DIARRHEA: ICD-10-CM

## 2020-01-09 DIAGNOSIS — K21.9 GASTROESOPHAGEAL REFLUX DISEASE WITHOUT ESOPHAGITIS: ICD-10-CM

## 2020-01-09 DIAGNOSIS — K22.70 BARRETT'S ESOPHAGUS WITHOUT DYSPLASIA: ICD-10-CM

## 2020-01-09 PROBLEM — I21.4 NSTEMI (NON-ST ELEVATED MYOCARDIAL INFARCTION) (HCC): Status: RESOLVED | Noted: 2019-08-17 | Resolved: 2020-01-09

## 2020-01-09 PROCEDURE — 99213 OFFICE O/P EST LOW 20 MIN: CPT | Performed by: FAMILY MEDICINE

## 2020-01-09 PROCEDURE — G0008 ADMIN INFLUENZA VIRUS VAC: HCPCS

## 2020-01-09 PROCEDURE — 90662 IIV NO PRSV INCREASED AG IM: CPT

## 2020-01-09 RX ORDER — ROSUVASTATIN CALCIUM 20 MG/1
20 TABLET, COATED ORAL DAILY
Qty: 30 TABLET | Refills: 5 | Status: SHIPPED | OUTPATIENT
Start: 2020-01-09 | End: 2020-03-02

## 2020-01-09 NOTE — PROGRESS NOTES
Assessment/Plan:     Diagnoses and all orders for this visit:    Mixed hyperlipidemia  -     rosuvastatin (CRESTOR) 20 MG tablet; Take 1 tablet (20 mg total) by mouth daily  -     Lipid panel    Benign essential hypertension  -     CBC and differential  -     Comprehensive metabolic panel    Impaired fasting glucose  -     Hemoglobin A1C    Coronary artery disease involving native coronary artery of native heart without angina pectoris    Irritable bowel syndrome with diarrhea    Gastroesophageal reflux disease without esophagitis    Genao's esophagus without dysplasia    Need for influenza vaccination  -     influenza vaccine, [id number], high-dose, PF 0 5 mL (FLUZONE HIGH-DOSE)    Screening for prostate cancer  -     PSA, Total Screen; Future          I discusses treatment options  I stressed the importance of statin therapy given his cardiovascular history  D/C Atorvastatin  Start generic Crestor 20 mg daily  Repeat labs prior to next visit 03/2020  Advised to call if any changes  Monitor blood pressures at home  BMI Counseling: Body mass index is 31 17 kg/m²  The BMI is above normal  Nutrition recommendations include reducing portion sizes, decreasing overall calorie intake, consuming healthier snacks, moderation in carbohydrate intake, reducing intake of saturated fat and trans fat and reducing intake of cholesterol  Exercise recommendations include exercising 3-5 times per week  Patient ID: Jaden Garcia is a 81 y o  male  Follow-up visit to discuss cholesterol medication  Hyperlipidemia mixed type and CAD with prior intolerance to a number of statins, fibrates and fish oils  He is currently on Atorvastatin 40 mg daily  He is concerned about side effects  + intermittent abdominal pain with loose stools/diarrhea  Some of these symptoms pre dated starting Atorvastatin  No myalgias  08/2019  s/p admission for NSTEMI  Elevated troponin peaking at 4 61    Cardiac catheterization 95% lesion in the distal RCA   Status post RENE x2  Mid circumflex 65% stenosis  Proximal right coronary artery 50% stenosis  Echocardiogram normal left ventricular systolic function  EF 60%  Hypokinesis of the basal mid inferior walls  Grade 1 diastolic dysfunction  Normal right ventricle  Mildly dilated left atrium  Mild MR  No pericardial effusion  Aortic root normal size Patient is on dual platelet inhibition with Aspirin and Brilinta  Last lipid profile 10/2019 cholesterol 152  Triglycerides 79  HDL 47  LDL 89  LFTs normal except for total bilirubin 1 25   08/2019  A1c 5 6  Longstanding history of hypertension with intolerance to multiple blood pressure agents  Element of white coat syndrome  Blood pressures at home have been stable  10/27/2019 creatinine 0 94  Electrolytes normal  08/2019 Hgb 14 9  The following portions of the patient's history were reviewed and updated as appropriate: allergies, current medications, past family history, past medical history, past social history, past surgical history and problem list     Review of Systems   Constitutional: Negative for appetite change, fatigue and unexpected weight change  40 + lb weight loss from 01/2019 with dieting  HENT: Negative for voice change  Eyes: Negative for visual disturbance  Respiratory: Negative for cough, shortness of breath and wheezing  History of asthma he uses infrequent Albuterol MDI or Xopenex via nebulizer  No orthopnea or PND   Cardiovascular: Negative for chest pain, palpitations and leg swelling  See HPI  Carotid artery stenosis  Status post right CEA  Carotid artery Dopplers 12/2019 widely patent internal carotid artery and endarterectomy site  Left ICA < 50% stenosis  03/2017 nuclear stress test normal    Gastrointestinal: Negative for abdominal pain, blood in stool, constipation, diarrhea, nausea and vomiting  GERD status post fundoplication  08/2019 EGD Genao's esophagus    Multiple small superficial ulcer craters in gastric antrum  He is currently on Nexium 20 mg b i d  History of fatty liver ultrasound 06/2019 08/2019 alpha 1 antitrypsin level normal   Celiac antibodies negative  Iron 84  Ferritin 349  Hepatitis B surface antigen negative  Hepatitis-C antibody negative  Anti smooth muscle antibody negative  Ceruloplasmin level normal   RED negative  SPEP no monoclonal bands  PEREZ Fibroscore minimal to moderate steatosis  No fibrosis  Borderline to probable PEREZ   Endocrine: Negative for polydipsia and polyuria  Genitourinary: Negative for difficulty urinating  Musculoskeletal: Negative for arthralgias and myalgias  Rheumatologic studies 09/2017 uric acid level elevated at 9 4  TSH 1 220  Sed rate 15  Rheumatoid factor negative  RED negative  Lyme titer negative  Babesia negative  Anaplasma negative  Ehrlichia antibody panel HGE IgM titer borderline abnormal at 1:64  He was treated with antibiotics at that time  Skin: Negative for rash  Allergic/Immunologic: Negative for environmental allergies  Neurological: Negative for dizziness and headaches  Hematological: Negative for adenopathy  Does not bruise/bleed easily  Psychiatric/Behavioral: Negative for dysphoric mood and sleep disturbance  Objective:      /70   Pulse 64   Temp (!) 96 6 °F (35 9 °C)   Resp 16   Ht 5' 7 5" (1 715 m)   Wt 91 6 kg (202 lb)   BMI 31 17 kg/m²     Wt Readings from Last 3 Encounters:   01/09/20 91 6 kg (202 lb)   12/19/19 88 9 kg (196 lb)   10/02/19 95 7 kg (211 lb)        Physical Exam   Constitutional: He appears well-developed and well-nourished  No distress  Psychiatric: He has a normal mood and affect   His behavior is normal          Lab Results   Component Value Date    CHOLESTEROL 152 10/26/2019    CHOLESTEROL 202 (H) 08/19/2019    CHOLESTEROL 208 (H) 08/17/2019     Lab Results   Component Value Date    HDL 47 10/26/2019    HDL 45 08/19/2019    HDL 30 (L) 09/01/2018     Lab Results   Component Value Date    TRIG 79 10/26/2019    TRIG 48 08/19/2019    TRIG 173 (H) 08/17/2019     Lab Results   Component Value Date    NONHDLC 173 09/01/2018       Creatinine (mg/dL)   Date Value   10/26/2019 0 94   08/27/2019 1 25   08/24/2019 1 03   12/10/2015 1 14   11/11/2015 1 04   12/10/2014 0 97     Hemoglobin A1C (%)   Date Value   08/20/2019 5 6   09/01/2018 5 7   09/30/2017 5 7   11/11/2015 5 5

## 2020-01-24 ENCOUNTER — TELEPHONE (OUTPATIENT)
Dept: FAMILY MEDICINE CLINIC | Facility: CLINIC | Age: 70
End: 2020-01-24

## 2020-01-24 DIAGNOSIS — M54.50 LUMBAR PAIN: Primary | ICD-10-CM

## 2020-01-24 NOTE — TELEPHONE ENCOUNTER
Patient saw his chiropractor, Dr Castro Larsen for his ongoing back issues  He recommended that he contact PCP to get order for xray of his lower back  Call patient when order is in his chart

## 2020-01-25 ENCOUNTER — APPOINTMENT (OUTPATIENT)
Dept: RADIOLOGY | Facility: MEDICAL CENTER | Age: 70
End: 2020-01-25
Payer: MEDICARE

## 2020-01-25 DIAGNOSIS — M54.50 LUMBAR PAIN: ICD-10-CM

## 2020-01-25 PROCEDURE — 72110 X-RAY EXAM L-2 SPINE 4/>VWS: CPT

## 2020-02-03 ENCOUNTER — TELEPHONE (OUTPATIENT)
Dept: FAMILY MEDICINE CLINIC | Facility: CLINIC | Age: 70
End: 2020-02-03

## 2020-02-03 NOTE — TELEPHONE ENCOUNTER
Call re x rays + for arthritis changes send patient a copy of report to take to his chiropractor       Procedure: Xr Spine Lumbar Minimum 4 Views Non Injury    Result Date: 2/1/2020  Narrative: LUMBAR SPINE INDICATION:   M54 5: Low back pain  COMPARISON:  February 25, 2008 VIEWS:  XR SPINE LUMBAR MINIMUM 4 VIEWS NON INJURY FINDINGS: There are 5 non rib bearing lumbar vertebral bodies  Mild loss of disc at L5-S1  Anterior osteophytes are seen at nearly every lumbar level  Slight levoscoliosis apex L4-5  Facet degenerative changes particularly L3-4 through L5-S1  The pedicles appear intact  Soft tissues are unremarkable  Impression: No acute osseous abnormality  Degenerative changes as described  Findings similar to the prior study   Workstation performed: [id number]

## 2020-02-17 DIAGNOSIS — M10.9 ACUTE GOUTY ARTHRITIS: Primary | ICD-10-CM

## 2020-02-17 RX ORDER — PREDNISONE 10 MG/1
10 TABLET ORAL DAILY
Qty: 7 TABLET | Refills: 0 | Status: SHIPPED | OUTPATIENT
Start: 2020-02-17 | End: 2020-02-24

## 2020-02-17 NOTE — TELEPHONE ENCOUNTER
Patient's wife called stating he is having issues with gout  He is requesting a refill on Prednisone 10 mg one tab daily with meals   Patient would like it to go to [facility] [address]

## 2020-02-24 ENCOUNTER — APPOINTMENT (OUTPATIENT)
Dept: LAB | Facility: CLINIC | Age: 70
End: 2020-02-24
Payer: MEDICARE

## 2020-02-24 DIAGNOSIS — Z12.5 SCREENING FOR PROSTATE CANCER: ICD-10-CM

## 2020-02-24 LAB
ALBUMIN SERPL BCP-MCNC: 4.1 G/DL (ref 3.5–5)
ALP SERPL-CCNC: 62 U/L (ref 46–116)
ALT SERPL W P-5'-P-CCNC: 43 U/L (ref 12–78)
ANION GAP SERPL CALCULATED.3IONS-SCNC: 5 MMOL/L (ref 4–13)
AST SERPL W P-5'-P-CCNC: 16 U/L (ref 5–45)
BASOPHILS # BLD AUTO: 0.04 THOUSANDS/ΜL (ref 0–0.1)
BASOPHILS NFR BLD AUTO: 1 % (ref 0–1)
BILIRUB SERPL-MCNC: 0.64 MG/DL (ref 0.2–1)
BUN SERPL-MCNC: 17 MG/DL (ref 5–25)
CALCIUM SERPL-MCNC: 9.3 MG/DL (ref 8.3–10.1)
CHLORIDE SERPL-SCNC: 106 MMOL/L (ref 100–108)
CHOLEST SERPL-MCNC: 138 MG/DL (ref 50–200)
CO2 SERPL-SCNC: 27 MMOL/L (ref 21–32)
CREAT SERPL-MCNC: 0.94 MG/DL (ref 0.6–1.3)
EOSINOPHIL # BLD AUTO: 0.2 THOUSAND/ΜL (ref 0–0.61)
EOSINOPHIL NFR BLD AUTO: 2 % (ref 0–6)
ERYTHROCYTE [DISTWIDTH] IN BLOOD BY AUTOMATED COUNT: 13.4 % (ref 11.6–15.1)
EST. AVERAGE GLUCOSE BLD GHB EST-MCNC: 103 MG/DL
GFR SERPL CREATININE-BSD FRML MDRD: 84 ML/MIN/1.73SQ M
GLUCOSE SERPL-MCNC: 76 MG/DL (ref 65–140)
HBA1C MFR BLD: 5.2 %
HCT VFR BLD AUTO: 46.1 % (ref 36.5–49.3)
HDLC SERPL-MCNC: 53 MG/DL
HGB BLD-MCNC: 15.1 G/DL (ref 12–17)
IMM GRANULOCYTES # BLD AUTO: 0.03 THOUSAND/UL (ref 0–0.2)
IMM GRANULOCYTES NFR BLD AUTO: 0 % (ref 0–2)
LDLC SERPL CALC-MCNC: 69 MG/DL (ref 0–100)
LYMPHOCYTES # BLD AUTO: 3.02 THOUSANDS/ΜL (ref 0.6–4.47)
LYMPHOCYTES NFR BLD AUTO: 35 % (ref 14–44)
MCH RBC QN AUTO: 31.1 PG (ref 26.8–34.3)
MCHC RBC AUTO-ENTMCNC: 32.8 G/DL (ref 31.4–37.4)
MCV RBC AUTO: 95 FL (ref 82–98)
MONOCYTES # BLD AUTO: 0.64 THOUSAND/ΜL (ref 0.17–1.22)
MONOCYTES NFR BLD AUTO: 8 % (ref 4–12)
NEUTROPHILS # BLD AUTO: 4.61 THOUSANDS/ΜL (ref 1.85–7.62)
NEUTS SEG NFR BLD AUTO: 54 % (ref 43–75)
NONHDLC SERPL-MCNC: 85 MG/DL
NRBC BLD AUTO-RTO: 0 /100 WBCS
PLATELET # BLD AUTO: 230 THOUSANDS/UL (ref 149–390)
PMV BLD AUTO: 11.3 FL (ref 8.9–12.7)
POTASSIUM SERPL-SCNC: 3.6 MMOL/L (ref 3.5–5.3)
PROT SERPL-MCNC: 7.7 G/DL (ref 6.4–8.2)
PSA SERPL-MCNC: 0.7 NG/ML (ref 0–4)
RBC # BLD AUTO: 4.85 MILLION/UL (ref 3.88–5.62)
SODIUM SERPL-SCNC: 138 MMOL/L (ref 136–145)
TRIGL SERPL-MCNC: 81 MG/DL
WBC # BLD AUTO: 8.54 THOUSAND/UL (ref 4.31–10.16)

## 2020-02-24 PROCEDURE — 36415 COLL VENOUS BLD VENIPUNCTURE: CPT | Performed by: FAMILY MEDICINE

## 2020-02-24 PROCEDURE — G0103 PSA SCREENING: HCPCS

## 2020-02-24 PROCEDURE — 80061 LIPID PANEL: CPT | Performed by: FAMILY MEDICINE

## 2020-02-24 PROCEDURE — 83036 HEMOGLOBIN GLYCOSYLATED A1C: CPT | Performed by: FAMILY MEDICINE

## 2020-02-24 PROCEDURE — 80053 COMPREHEN METABOLIC PANEL: CPT | Performed by: FAMILY MEDICINE

## 2020-02-24 PROCEDURE — 85025 COMPLETE CBC W/AUTO DIFF WBC: CPT | Performed by: FAMILY MEDICINE

## 2020-02-25 ENCOUNTER — TELEPHONE (OUTPATIENT)
Dept: CARDIOLOGY CLINIC | Facility: CLINIC | Age: 70
End: 2020-02-25

## 2020-02-25 NOTE — TELEPHONE ENCOUNTER
Received a call from spouse asking if pt can hold Brilinta for a dental extraction? Cath 8/19/19    Pt was to see Dr Ignacio Moulton, but it looks like he was never scheduled an appt  Pt is currently attending cardiac rehab

## 2020-02-25 NOTE — TELEPHONE ENCOUNTER
He had an MI and stent just 6 months ago, as well as other issues  He needs to be seen by his cardiologist before any recommendations be made  Thank you

## 2020-03-02 ENCOUNTER — OFFICE VISIT (OUTPATIENT)
Dept: CARDIOLOGY CLINIC | Facility: CLINIC | Age: 70
End: 2020-03-02
Payer: MEDICARE

## 2020-03-02 VITALS
HEIGHT: 68 IN | HEART RATE: 52 BPM | WEIGHT: 193 LBS | DIASTOLIC BLOOD PRESSURE: 60 MMHG | BODY MASS INDEX: 29.25 KG/M2 | SYSTOLIC BLOOD PRESSURE: 140 MMHG

## 2020-03-02 DIAGNOSIS — I10 BENIGN ESSENTIAL HYPERTENSION: ICD-10-CM

## 2020-03-02 DIAGNOSIS — I21.4 NON-ST ELEVATED MYOCARDIAL INFARCTION (HCC): ICD-10-CM

## 2020-03-02 DIAGNOSIS — Z01.810 PRE-OPERATIVE CARDIOVASCULAR EXAMINATION: Primary | ICD-10-CM

## 2020-03-02 DIAGNOSIS — I25.10 CORONARY ARTERY DISEASE INVOLVING NATIVE CORONARY ARTERY OF NATIVE HEART WITHOUT ANGINA PECTORIS: ICD-10-CM

## 2020-03-02 DIAGNOSIS — E78.2 MIXED HYPERLIPIDEMIA: ICD-10-CM

## 2020-03-02 PROBLEM — I21.9: Status: RESOLVED | Noted: 2020-03-02 | Resolved: 2020-03-02

## 2020-03-02 PROBLEM — I21.9: Status: ACTIVE | Noted: 2020-03-02

## 2020-03-02 PROCEDURE — 3078F DIAST BP <80 MM HG: CPT | Performed by: INTERNAL MEDICINE

## 2020-03-02 PROCEDURE — 1036F TOBACCO NON-USER: CPT | Performed by: INTERNAL MEDICINE

## 2020-03-02 PROCEDURE — 3077F SYST BP >= 140 MM HG: CPT | Performed by: INTERNAL MEDICINE

## 2020-03-02 PROCEDURE — 1160F RVW MEDS BY RX/DR IN RCRD: CPT | Performed by: INTERNAL MEDICINE

## 2020-03-02 PROCEDURE — 99214 OFFICE O/P EST MOD 30 MIN: CPT | Performed by: INTERNAL MEDICINE

## 2020-03-02 PROCEDURE — 4040F PNEUMOC VAC/ADMIN/RCVD: CPT | Performed by: INTERNAL MEDICINE

## 2020-03-02 PROCEDURE — 93000 ELECTROCARDIOGRAM COMPLETE: CPT | Performed by: INTERNAL MEDICINE

## 2020-03-02 PROCEDURE — 3008F BODY MASS INDEX DOCD: CPT | Performed by: INTERNAL MEDICINE

## 2020-03-02 RX ORDER — RABEPRAZOLE SODIUM 20 MG/1
TABLET, DELAYED RELEASE ORAL
COMMUNITY
Start: 2020-02-18 | End: 2020-09-04 | Stop reason: ALTCHOICE

## 2020-03-03 ENCOUNTER — OFFICE VISIT (OUTPATIENT)
Dept: PODIATRY | Facility: CLINIC | Age: 70
End: 2020-03-03
Payer: MEDICARE

## 2020-03-03 VITALS
BODY MASS INDEX: 29.25 KG/M2 | WEIGHT: 193 LBS | SYSTOLIC BLOOD PRESSURE: 150 MMHG | HEIGHT: 68 IN | HEART RATE: 65 BPM | DIASTOLIC BLOOD PRESSURE: 73 MMHG

## 2020-03-03 DIAGNOSIS — M10.9 GOUT OF LEFT FOOT, UNSPECIFIED CAUSE, UNSPECIFIED CHRONICITY: Primary | ICD-10-CM

## 2020-03-03 PROCEDURE — 3078F DIAST BP <80 MM HG: CPT | Performed by: PODIATRIST

## 2020-03-03 PROCEDURE — 1036F TOBACCO NON-USER: CPT | Performed by: PODIATRIST

## 2020-03-03 PROCEDURE — 3008F BODY MASS INDEX DOCD: CPT | Performed by: PODIATRIST

## 2020-03-03 PROCEDURE — 1160F RVW MEDS BY RX/DR IN RCRD: CPT | Performed by: PODIATRIST

## 2020-03-03 PROCEDURE — 3077F SYST BP >= 140 MM HG: CPT | Performed by: PODIATRIST

## 2020-03-03 PROCEDURE — 99213 OFFICE O/P EST LOW 20 MIN: CPT | Performed by: PODIATRIST

## 2020-03-03 PROCEDURE — 4040F PNEUMOC VAC/ADMIN/RCVD: CPT | Performed by: PODIATRIST

## 2020-03-03 NOTE — PROGRESS NOTES
Cardiology Follow Up    Tanya Spencer  1950  [id number]  [address]  [address]  [place]  [place] [address]  [phone]    1  Pre-operative cardiovascular examination  POCT ECG   2  Coronary artery disease involving native coronary artery of native heart without angina pectoris     3  Benign essential hypertension     4  Mixed hyperlipidemia     5  Non-ST elevated myocardial infarction [facility])           Discussion/Summary: All of his assessed cardiac problems are stable  I feel that his cardiac risk for tooth extraction is low  He can hold his ASA and Brilinta for 7 days prior and restart ASAP after extraction when his bleeding risk is acceptable  No cardiac testing is ordered  I will see if he can tolerate Crestor 10 mg QOD  He had stopped taking Crestor because of myalgias  Interval History: He has not had any cardiac problems since his NSTEMI with RCA stenting in 8/2019  He is active and denies CP, SOB, palpitations  ECG today - SB, HR 53 BPM  Normal ECG  He has an infected tooth which needs fairly urgent extraction  EF was 60% in 8/2019      Patient Active Problem List   Diagnosis    Asthma    Genao esophagus    Benign essential hypertension    Asymptomatic stenosis of left carotid artery    Diverticulosis    Fatty infiltration of liver    Gastroesophageal reflux disease without esophagitis    Hyperuricemia without signs inflammatory arthritis/tophaceous disease    Impaired fasting glucose    Mixed hyperlipidemia    Drug-induced erectile dysfunction    Non-allergic rhinitis    Osteoarthritis of knee    Vitamin B12 deficiency    Irritable bowel syndrome with diarrhea    History of colonic diverticulitis    History of right-sided carotid endarterectomy    Coronary artery disease involving native coronary artery of native heart without angina pectoris    Non-ST elevated myocardial infarction [facility])     Past Medical History:   Diagnosis Date    Allergic reaction     LAST ASSESSED: [id number]    Asthma     Bursitis of heel     LAST ASSESSED: [id number]    Derangement of meniscus of left knee due to old injury     LAST ASSESSED: [id number]    Diverticulitis of colon     LAST ASSESSED: 2013    Dizziness     LAST ASSESSED: [id number]    GERD (gastroesophageal reflux disease)     History of colon polyps     Hypertension     Malignant hypertension     LAST ASSESSED: [id number]    [facility]non[facility]     Plantar fasciitis     Seasonal allergies     Shoulder impingement     LAST ASSESSED: [id number]    Stroke ([facility] [address] )      Social History     Socioeconomic History    Marital status: /Civil Union     Spouse name: Not on file    Number of children: Not on file    Years of education: Not on file    Highest education level: Not on file   Occupational History    Not on file   Social Needs    Financial resource strain: Not on file    Food insecurity:     Worry: Not on file     Inability: Not on file    Transportation needs:     Medical: Not on file     Non-medical: Not on file   Tobacco Use    Smoking status: Former Smoker     Types: Cigarettes     Last attempt to quit: 1960     Years since quittin 2    Smokeless tobacco: Never Used    Tobacco comment: was very rare   Substance and Sexual Activity    Alcohol use: No    Drug use: No    Sexual activity: Not on file   Lifestyle    Physical activity:     Days per week: Not on file     Minutes per session: Not on file    Stress: Not on file   Relationships    Social connections:     Talks on phone: Not on file     Gets together: Not on file     Attends Faith service: Not on file     Active member of club or organization: Not on file     Attends meetings of clubs or organizations: Not on file     Relationship status: Not on file    Intimate partner violence:     Fear of current or ex partner: Not on file     Emotionally abused: Not on file     Physically abused: Not on file     Forced sexual activity: Not on file   Other Topics Concern    Not on file   Social History Narrative    Not on file      Family History   Problem Relation Age of Onset    Cancer Paternal Grandfather     Hypertension Mother     Hyperlipidemia Mother     Hypertension Father     Hyperlipidemia Father      Past Surgical History:   Procedure Laterality Date    APPENDECTOMY      CAROTID ENDARTARECTOMY      COLECTOMY Left     PARTIAL - SIGMOID; HEMICOLECTOMY FOR DIVERTICULITIS; ONSET: 1987    COLON SURGERY      COLONOSCOPY N/A 12/9/2016    Procedure: COLONOSCOPY;  Surgeon: Eleni Han MD;  [facility];   Service:     HERNIA REPAIR      VENTRAL    NISSEN FUNDOPLICATION      ESOPHAGOGASTRIC FUNDOPLASTY LAST ASSESSED: [id number]    SINUS SURGERY      THROMBOENDARTERECTOMY      CAROTID; ONSET; MAY 2012       Current Outpatient Medications:     acetaminophen (TYLENOL) 325 mg tablet, Take 2 tablets (650 mg total) by mouth every 6 (six) hours as needed for mild pain, headaches or fever, Disp: 30 tablet, Rfl: 0    albuterol (PROVENTIL HFA,VENTOLIN HFA) 90 mcg/act inhaler, INHALE 2 PUFFS BY MOUTH EVERY 6 HOURS AS NEEDED FOR WHEEZING, Disp: 18 g, Rfl: 3    aspirin 81 mg chewable tablet, Chew 1 tablet (81 mg total) daily, Disp: 30 tablet, Rfl: 0    levalbuterol (XOPENEX) 1 25 mg/3 mL nebulizer solution, INHALE 1 VIAL BY NEBULIZER EVERY 8 HOURS AS NEEDED FOR WHEEZING, Disp: 72 vial, Rfl: 3    lisinopril (ZESTRIL) 10 mg tablet, Take 1 tablet (10 mg total) by mouth daily, Disp: 30 tablet, Rfl: 5    multivitamin (THERAGRAN) TABS, Take 1 tablet by mouth daily, Disp: , Rfl:     RABEprazole (ACIPHEX) 20 MG tablet, , Disp: , Rfl:     ticagrelor (BRILINTA) 90 MG, Take 1 tablet (90 mg total) by mouth every 12 (twelve) hours, Disp: 60 tablet, Rfl: 5    fluticasone (FLONASE) 50 mcg/act nasal spray, 1 spray into each nostril daily, Disp: , Rfl:   Allergies   Allergen Reactions    Depo-Medrol [Methylprednisolone] Anaphylaxis     Severe Vagal Reaction    Iv Contrast [Iodinated Diagnostic Agents] Angioedema     Pt states itchy/swollen eyes, trouble breathing (years ago)    Rosuvastatin Headache and Confusion    Sulfa Antibiotics      Vitals:    03/02/20 1553   BP: 140/60   BP Location: Left arm   Cuff Size: Standard   Pulse: (!) 52   Weight: 87 5 kg (193 lb)   Height: 5' 7 5" (1 715 m)     Weight (last 2 days)     Date/Time   Weight    03/02/20 1553   87 5 (193)             Blood pressure 140/60, pulse (!) 52, height 5' 7 5" (1 715 m), weight 87 5 kg (193 lb)  , Body mass index is 29 78 kg/m²      Labs:  Appointment on 02/24/2020   Component Date Value    PSA 02/24/2020 0 7    Office Visit on 01/09/2020   Component Date Value    WBC 02/24/2020 8 54     RBC 02/24/2020 4 85     Hemoglobin 02/24/2020 15 1     Hematocrit 02/24/2020 46 1     MCV 02/24/2020 95     MCH 02/24/2020 31 1     MCHC 02/24/2020 32 8     RDW 02/24/2020 13 4     MPV 02/24/2020 11 3     Platelets 27/77/1893 230     nRBC 02/24/2020 0     Neutrophils Relative 02/24/2020 54     Immat GRANS % 02/24/2020 0     Lymphocytes Relative 02/24/2020 35     Monocytes Relative 02/24/2020 8     Eosinophils Relative 02/24/2020 2     Basophils Relative 02/24/2020 1     Neutrophils Absolute 02/24/2020 4 61     Immature Grans Absolute 02/24/2020 0 03     Lymphocytes Absolute 02/24/2020 3 02     Monocytes Absolute 02/24/2020 0 64     Eosinophils Absolute 02/24/2020 0 20     Basophils Absolute 02/24/2020 0 04     Sodium 02/24/2020 138     Potassium 02/24/2020 3 6     Chloride 02/24/2020 106     CO2 02/24/2020 27     ANION GAP 02/24/2020 5     BUN 02/24/2020 17     Creatinine 02/24/2020 0 94     Glucose 02/24/2020 76     Calcium 02/24/2020 9 3     AST 02/24/2020 16     ALT 02/24/2020 43     Alkaline Phosphatase 02/24/2020 62     Total Protein 02/24/2020 7 7     Albumin 02/24/2020 4 1     Total Bilirubin 02/24/2020 0 64     eGFR 02/24/2020 84     Cholesterol 02/24/2020 138     Triglycerides 02/24/2020 81     HDL, Direct 02/24/2020 53     LDL Calculated 02/24/2020 69     Non-HDL-Chol (CHOL-HDL) 02/24/2020 85     Hemoglobin A1C 02/24/2020 5 2     EAG 02/24/2020 103    Appointment on 10/26/2019   Component Date Value    Cholesterol 10/26/2019 152     Triglycerides 10/26/2019 79     HDL, Direct 10/26/2019 47     LDL Calculated 10/26/2019 89      Imaging: No results found  Review of Systems:  Review of Systems   Constitutional: Negative for diaphoresis, fatigue, fever and unexpected weight change  HENT: Positive for dental problem  Respiratory: Negative for cough, shortness of breath and wheezing  Cardiovascular: Negative for chest pain, palpitations and leg swelling  Gastrointestinal: Negative for abdominal pain, diarrhea and nausea  Musculoskeletal: Negative for gait problem and myalgias  Skin: Negative for rash  Neurological: Negative for dizziness and numbness  Psychiatric/Behavioral: Negative  Physical Exam:  Physical Exam   Constitutional: He is oriented to person, place, and time  He appears well-developed and well-nourished  HENT:   Head: Normocephalic and atraumatic  Eyes: Pupils are equal, round, and reactive to light  Neck: Normal range of motion  Neck supple  No JVD present  Cardiovascular: Regular rhythm, S1 normal, S2 normal and normal pulses  Pulses:       Carotid pulses are 2+ on the right side, and 2+ on the left side  Pulmonary/Chest: Effort normal and breath sounds normal  He has no wheezes  He has no rales  Abdominal: Soft  Bowel sounds are normal  There is no tenderness  Musculoskeletal: Normal range of motion  He exhibits no edema or tenderness  Neurological: He is alert and oriented to person, place, and time  He has normal reflexes  No cranial nerve deficit     Skin: Skin is warm    Psychiatric: He has a normal mood and affect

## 2020-03-03 NOTE — PROGRESS NOTES
Assessment/Plan:    Explained the patient that his symptoms appear consistent with acute gout attacks  As he is having difficulty with most oral medication to combat an acute attack, cortisone injection recommended should he have a no other flare  Patient was referred for uric acid testing  Patient is seeing his medical doctor tomorrow  He was advised to discuss with him the possibility of going on a medications such as allopurinol or Uloric  Patient will be contacted with results of the uric acid studies  No problem-specific Assessment & Plan notes found for this encounter  Diagnoses and all orders for this visit:    Gout of left foot, unspecified cause, unspecified chronicity  -     Uric acid; Future          Subjective:      Patient ID: Yogi Gustafson is a 81 y o  male  HPI     Patient, a 51-year-old male presents with his wife  Patient states that for the past 3 months he has been developing gout attacks in both feet  He states that he had a heart attack and was placed on blood thinners approximately 3 months ago and since that time he has had multiple gout attacks  He states that he is generally placed on prednisone when he has the attack but it is causing stomach problems  He has a history of stomach ulcers  Patient states that I treated him for gout many years ago and he has been comfortable until recently when his drug regimen changed  Patient states that 2 weeks ago he the barely walk due to pain in the left heel and left great toe  In recent days this pain has dissipated  Patient states that he is seeing his medical doctor tomorrow    He has not had a recent uric acid test    The following portions of the patient's history were reviewed and updated as appropriate: allergies, current medications, past family history, past medical history, past social history, past surgical history and problem list     Review of Systems   Cardiovascular:        History of myocardial infarction Gastrointestinal:        History of GI ulceration   Hematological: Bruises/bleeds easily  Psychiatric/Behavioral: Negative  Objective:      /73   Pulse 65   Ht 5' 7 5" (1 715 m)   Wt 87 5 kg (193 lb)   BMI 29 78 kg/m²          Physical Exam   Constitutional: He is oriented to person, place, and time  Cardiovascular: Regular rhythm and intact distal pulses  Musculoskeletal: He exhibits tenderness and deformity  Mild pain with palpation of left heel  No edema or erythema noted  Hallux hammertoe is developing left great toe  Tailor's bunion right foot  Neurological: He is alert and oriented to person, place, and time  No sensory deficit  He exhibits normal muscle tone  Skin: Skin is warm  No rash noted  No erythema

## 2020-03-04 ENCOUNTER — APPOINTMENT (OUTPATIENT)
Dept: LAB | Facility: MEDICAL CENTER | Age: 70
End: 2020-03-04
Payer: MEDICARE

## 2020-03-04 ENCOUNTER — OFFICE VISIT (OUTPATIENT)
Dept: FAMILY MEDICINE CLINIC | Facility: CLINIC | Age: 70
End: 2020-03-04
Payer: MEDICARE

## 2020-03-04 VITALS
BODY MASS INDEX: 29.25 KG/M2 | RESPIRATION RATE: 16 BRPM | DIASTOLIC BLOOD PRESSURE: 72 MMHG | TEMPERATURE: 96.1 F | HEIGHT: 68 IN | WEIGHT: 193 LBS | HEART RATE: 82 BPM | SYSTOLIC BLOOD PRESSURE: 138 MMHG

## 2020-03-04 DIAGNOSIS — K21.9 GASTROESOPHAGEAL REFLUX DISEASE WITHOUT ESOPHAGITIS: ICD-10-CM

## 2020-03-04 DIAGNOSIS — I10 BENIGN ESSENTIAL HYPERTENSION: Primary | ICD-10-CM

## 2020-03-04 DIAGNOSIS — K76.0 FATTY INFILTRATION OF LIVER: ICD-10-CM

## 2020-03-04 DIAGNOSIS — R73.01 IMPAIRED FASTING GLUCOSE: ICD-10-CM

## 2020-03-04 DIAGNOSIS — Z00.00 MEDICARE ANNUAL WELLNESS VISIT, SUBSEQUENT: ICD-10-CM

## 2020-03-04 DIAGNOSIS — I21.4 NSTEMI (NON-ST ELEVATED MYOCARDIAL INFARCTION) (HCC): ICD-10-CM

## 2020-03-04 DIAGNOSIS — I25.10 CORONARY ARTERY DISEASE INVOLVING NATIVE CORONARY ARTERY OF NATIVE HEART WITHOUT ANGINA PECTORIS: ICD-10-CM

## 2020-03-04 DIAGNOSIS — Z98.890 HISTORY OF RIGHT-SIDED CAROTID ENDARTERECTOMY: ICD-10-CM

## 2020-03-04 DIAGNOSIS — E79.0 HYPERURICEMIA: ICD-10-CM

## 2020-03-04 DIAGNOSIS — K58.0 IRRITABLE BOWEL SYNDROME WITH DIARRHEA: ICD-10-CM

## 2020-03-04 DIAGNOSIS — K04.7 DENTAL INFECTION: ICD-10-CM

## 2020-03-04 DIAGNOSIS — K22.70 BARRETT'S ESOPHAGUS WITHOUT DYSPLASIA: ICD-10-CM

## 2020-03-04 DIAGNOSIS — M10.9 GOUT OF LEFT FOOT, UNSPECIFIED CAUSE, UNSPECIFIED CHRONICITY: ICD-10-CM

## 2020-03-04 DIAGNOSIS — E78.2 MIXED HYPERLIPIDEMIA: ICD-10-CM

## 2020-03-04 DIAGNOSIS — I65.22 ASYMPTOMATIC STENOSIS OF LEFT CAROTID ARTERY: ICD-10-CM

## 2020-03-04 LAB — URATE SERPL-MCNC: 7.3 MG/DL (ref 4.2–8)

## 2020-03-04 PROCEDURE — 36415 COLL VENOUS BLD VENIPUNCTURE: CPT

## 2020-03-04 PROCEDURE — 3078F DIAST BP <80 MM HG: CPT | Performed by: FAMILY MEDICINE

## 2020-03-04 PROCEDURE — 4040F PNEUMOC VAC/ADMIN/RCVD: CPT | Performed by: FAMILY MEDICINE

## 2020-03-04 PROCEDURE — G0402 INITIAL PREVENTIVE EXAM: HCPCS | Performed by: FAMILY MEDICINE

## 2020-03-04 PROCEDURE — 1170F FXNL STATUS ASSESSED: CPT | Performed by: FAMILY MEDICINE

## 2020-03-04 PROCEDURE — 1125F AMNT PAIN NOTED PAIN PRSNT: CPT | Performed by: FAMILY MEDICINE

## 2020-03-04 PROCEDURE — 3008F BODY MASS INDEX DOCD: CPT | Performed by: FAMILY MEDICINE

## 2020-03-04 PROCEDURE — 1036F TOBACCO NON-USER: CPT | Performed by: FAMILY MEDICINE

## 2020-03-04 PROCEDURE — 99214 OFFICE O/P EST MOD 30 MIN: CPT | Performed by: FAMILY MEDICINE

## 2020-03-04 PROCEDURE — 84550 ASSAY OF BLOOD/URIC ACID: CPT

## 2020-03-04 PROCEDURE — 3075F SYST BP GE 130 - 139MM HG: CPT | Performed by: FAMILY MEDICINE

## 2020-03-04 PROCEDURE — 1160F RVW MEDS BY RX/DR IN RCRD: CPT | Performed by: FAMILY MEDICINE

## 2020-03-04 RX ORDER — ROSUVASTATIN CALCIUM 10 MG/1
10 TABLET, COATED ORAL EVERY OTHER DAY
COMMUNITY
End: 2020-09-04 | Stop reason: ALTCHOICE

## 2020-03-04 RX ORDER — TICAGRELOR 90 MG/1
TABLET ORAL
Qty: 60 TABLET | Refills: 5 | Status: SHIPPED | OUTPATIENT
Start: 2020-03-04 | End: 2020-03-27

## 2020-03-04 RX ORDER — COLCHICINE 0.6 MG/1
0.6 TABLET ORAL DAILY
Qty: 30 TABLET | Refills: 5 | Status: SHIPPED | OUTPATIENT
Start: 2020-03-04 | End: 2020-09-04 | Stop reason: ALTCHOICE

## 2020-03-04 RX ORDER — ALLOPURINOL 100 MG/1
100 TABLET ORAL DAILY
Qty: 30 TABLET | Refills: 5 | Status: SHIPPED | OUTPATIENT
Start: 2020-03-04 | End: 2020-09-04 | Stop reason: ALTCHOICE

## 2020-03-04 RX ORDER — CLINDAMYCIN HYDROCHLORIDE 300 MG/1
300 CAPSULE ORAL 3 TIMES DAILY
Qty: 21 CAPSULE | Refills: 0 | Status: SHIPPED | OUTPATIENT
Start: 2020-03-04 | End: 2020-03-11

## 2020-03-04 NOTE — PROGRESS NOTES
Assessment/Plan:       Diagnoses and all orders for this visit:    Benign essential hypertension    Impaired fasting glucose    Coronary artery disease involving native coronary artery of native heart without angina pectoris    Mixed hyperlipidemia    Hyperuricemia  -     Uric acid; Future  -     allopurinol (ZYLOPRIM) 100 mg tablet; Take 1 tablet (100 mg total) by mouth daily  -     colchicine (COLCRYS) 0 6 mg tablet; Take 1 tablet (0 6 mg total) by mouth daily    Dental infection  -     clindamycin (CLEOCIN) 300 MG capsule; Take 1 capsule (300 mg total) by mouth 3 (three) times a day for 7 days    Asymptomatic stenosis of left carotid artery    History of right-sided carotid endarterectomy    Gastroesophageal reflux disease without esophagitis    Irritable bowel syndrome with diarrhea    Fatty infiltration of liver    Genao's esophagus without dysplasia    Medicare annual wellness visit, subsequent    Other orders  -     rosuvastatin (CRESTOR) 10 MG tablet; Take 10 mg by mouth every other day          Script for Clindamycin to start 1 week prior to dental extraction  He was given instructions by cardiology re ASA and Brilinta  Once he has completed dental work start Allopurinol 100 mg daily with Colchicine 0 6 mg daily  Repeat uric acid level 6-8 weeks  OV 6 months  Patient ID: Nasra Contreras is a 81 y o  male  Follow-up visit  Medications reviewed  Hyperlipidemia mixed type and CAD with prior intolerance to a number of statins, fibrates and fish oils  He was on Crestor 20 mg daily but recently decreased to 10 mg QOD due to side effects  He is concerned about side effects  08/2019  s/p admission for NSTEMI  Elevated troponin peaking at 4 61  Cardiac catheterization 95% lesion in the distal RCA  Status post RENE x2  Mid circumflex 65% stenosis  Proximal right coronary artery 50% stenosis  Echocardiogram normal left ventricular systolic function  EF 60%  Hypokinesis of the basal mid inferior walls  Grade 1 diastolic dysfunction  Normal right ventricle  Mildly dilated left atrium  Mild MR  No pericardial effusion  Aortic root normal size  He is on dual platelet inhibition with Aspirin and Brilinta  Lipid profile 02/2020 cholesterol 138  Triglycerides 81  HDL 53  LDL 69  LFTs normal  02/2020 FBS 76  A1c 5 2  Longstanding history of hypertension with intolerance to multiple blood pressure agents  Element of white coat syndrome  He is on Lisinopril 10 mg daily  Blood pressures have been stable  02/2020 creatinine 0 94  Electrolytes normal  Hgb 15 1  The following portions of the patient's history were reviewed and updated as appropriate: allergies, current medications, past family history, past medical history, past social history, past surgical history and problem list     Review of Systems   Constitutional: Positive for unexpected weight change  Negative for appetite change, chills, fatigue and fever  40 + lb weight loss from 01/2019 with dieting  HENT: Positive for dental problem  Negative for congestion, ear pain, rhinorrhea, sore throat and trouble swallowing  Patient is scheduled for dental extraction  Eyes: Negative for visual disturbance  Respiratory: Negative for cough, shortness of breath and wheezing  History of asthma he uses infrequent Albuterol MDI or Xopenex via nebulizer  No orthopnea or PND   Cardiovascular: Negative for chest pain, palpitations and leg swelling  See HPI  Carotid artery stenosis  Status post right CEA  Carotid artery Dopplers 12/2019 widely patent internal carotid artery and endarterectomy site  Left ICA < 50% stenosis  03/2017 nuclear stress test normal    Gastrointestinal: Negative for abdominal pain, blood in stool, constipation, diarrhea, nausea and vomiting  GERD status post fundoplication  08/2019 EGD Genao's esophagus  Multiple small superficial ulcer craters in gastric antrum    He is on generic Aciphex 20 mg daily  Fatty liver  02/2020 LFTs normal   ultrasound 06/2019 08/2019 alpha 1 antitrypsin level normal   Celiac antibodies negative  Iron 84  Ferritin 349  Hepatitis B surface antigen negative  Hepatitis-C antibody negative  Anti smooth muscle antibody negative  Ceruloplasmin level normal   RED negative  SPEP no monoclonal bands  PEREZ Fibroscore minimal to moderate steatosis  No fibrosis  Borderline to probable PEREZ   Endocrine: Negative for polydipsia and polyuria  Genitourinary: Negative for difficulty urinating  Musculoskeletal: Positive for arthralgias  Negative for myalgias  Recurrent gout right foot  Uric acid level done today pending  Rheumatologic studies 09/2017 uric acid level elevated at 9 4  TSH 1 220  Sed rate 15  Rheumatoid factor negative  RED negative  Lyme titer negative  Babesia negative  Anaplasma negative  Ehrlichia antibody panel HGE IgM titer borderline abnormal at 1:64  He was treated with antibiotics at that time  Skin: Negative for rash  Allergic/Immunologic: Negative for environmental allergies  Neurological: Negative for dizziness and headaches  Hematological: Negative for adenopathy  Bruises/bleeds easily (on ASA and Brilinta  )  Psychiatric/Behavioral: Negative for dysphoric mood and sleep disturbance  Objective:      /72   Pulse 82   Temp (!) 96 1 °F (35 6 °C)   Resp 16   Ht 5' 7 5" (1 715 m)   Wt 87 5 kg (193 lb)   BMI 29 78 kg/m²     Wt Readings from Last 3 Encounters:   03/04/20 87 5 kg (193 lb)   03/03/20 87 5 kg (193 lb)   03/02/20 87 5 kg (193 lb)        Physical Exam   Constitutional: He is oriented to person, place, and time  He appears well-developed and well-nourished  No distress  HENT:   Right Ear: Tympanic membrane normal    Left Ear: Tympanic membrane normal    Mouth/Throat: Oropharynx is clear and moist    Eyes: Pupils are equal, round, and reactive to light   Conjunctivae and EOM are normal  No scleral icterus  Neck: No JVD present  Carotid bruit is not present  No tracheal deviation present  No thyroid mass and no thyromegaly present  Cardiovascular: Normal rate, regular rhythm and normal heart sounds  Exam reveals no gallop  No murmur heard  Pulses:       Carotid pulses are 2+ on the right side, and 2+ on the left side  Pulmonary/Chest: Effort normal and breath sounds normal  No respiratory distress  He has no wheezes  He has no rales  Abdominal: Soft  Bowel sounds are normal  He exhibits no distension, no abdominal bruit and no mass  There is no hepatosplenomegaly  There is no tenderness  There is no rebound and no guarding  Musculoskeletal: Normal range of motion  He exhibits no edema  Lymphadenopathy:     He has no cervical adenopathy  Neurological: He is alert and oriented to person, place, and time  No cranial nerve deficit  Skin: No rash noted  No cyanosis  Nails show no clubbing  Psychiatric: He has a normal mood and affect  His behavior is normal  Cognition and memory are normal    Nursing note and vitals reviewed            Lab Results   Component Value Date    CHOLESTEROL 138 02/24/2020    CHOLESTEROL 152 10/26/2019    CHOLESTEROL 202 (H) 08/19/2019     Lab Results   Component Value Date    HDL 53 02/24/2020    HDL 47 10/26/2019    HDL 45 08/19/2019     Lab Results   Component Value Date    TRIG 81 02/24/2020    TRIG 79 10/26/2019    TRIG 48 08/19/2019     Lab Results   Component Value Date    [place] 85 02/24/2020    [place] 173 09/01/2018       Recent Results (from the past 672 hour(s))   CBC and differential    Collection Time: 02/24/20  7:54 AM   Result Value Ref Range    WBC 8 54 4 31 - 10 16 Thousand/uL    RBC 4 85 3 88 - 5 62 Million/uL    Hemoglobin 15 1 12 0 - 17 0 g/dL    Hematocrit 46 1 36 5 - 49 3 %    MCV 95 82 - 98 fL    MCH 31 1 26 8 - 34 3 pg    MCHC 32 8 31 4 - 37 4 g/dL    RDW 13 4 11 6 - 15 1 %    MPV 11 3 8 9 - 12 7 fL    Platelets 765 581 - 067 Thousands/uL nRBC 0 /100 WBCs    Neutrophils Relative 54 43 - 75 %    Immat GRANS % 0 0 - 2 %    Lymphocytes Relative 35 14 - 44 %    Monocytes Relative 8 4 - 12 %    Eosinophils Relative 2 0 - 6 %    Basophils Relative 1 0 - 1 %    Neutrophils Absolute 4 61 1 85 - 7 62 Thousands/µL    Immature Grans Absolute 0 03 0 00 - 0 20 Thousand/uL    Lymphocytes Absolute 3 02 0 60 - 4 47 Thousands/µL    Monocytes Absolute 0 64 0 17 - 1 22 Thousand/µL    Eosinophils Absolute 0 20 0 00 - 0 61 Thousand/µL    Basophils Absolute 0 04 0 00 - 0 10 Thousands/µL   Comprehensive metabolic panel    Collection Time: 02/24/20  7:54 AM   Result Value Ref Range    Sodium 138 136 - 145 mmol/L    Potassium 3 6 3 5 - 5 3 mmol/L    Chloride 106 100 - 108 mmol/L    CO2 27 21 - 32 mmol/L    ANION GAP 5 4 - 13 mmol/L    BUN 17 5 - 25 mg/dL    Creatinine 0 94 0 60 - 1 30 mg/dL    Glucose 76 65 - 140 mg/dL    Calcium 9 3 8 3 - 10 1 mg/dL    AST 16 5 - 45 U/L    ALT 43 12 - 78 U/L    Alkaline Phosphatase 62 46 - 116 U/L    Total Protein 7 7 6 4 - 8 2 g/dL    Albumin 4 1 3 5 - 5 0 g/dL    Total Bilirubin 0 64 0 20 - 1 00 mg/dL    eGFR 84 ml/min/1 73sq m   Lipid panel    Collection Time: 02/24/20  7:54 AM   Result Value Ref Range    Cholesterol 138 50 - 200 mg/dL    Triglycerides 81 <=150 mg/dL    HDL, Direct 53 >=40 mg/dL    LDL Calculated 69 0 - 100 mg/dL    Non-HDL-Chol (CHOL-HDL) 85 mg/dl   Hemoglobin A1C    Collection Time: 02/24/20  7:54 AM   Result Value Ref Range    Hemoglobin A1C 5 2 Normal 3 8-5 6%; PreDiabetic 5 7-6 4%;  Diabetic >=6 5%; Glycemic control for adults with diabetes <7 0% %     mg/dl   PSA, Total Screen    Collection Time: 02/24/20  7:54 AM   Result Value Ref Range    PSA 0 7 0 0 - 4 0 ng/mL   POCT ECG    Collection Time: 03/03/20 12:58 PM   Result Value Ref Range    Interpretation

## 2020-03-04 NOTE — PATIENT INSTRUCTIONS
Medicare Preventive Visit Patient Instructions  Thank you for completing your Welcome to Medicare Visit or Medicare Annual Wellness Visit today  Your next wellness visit will be due in one year (3/4/2021)  The screening/preventive services that you may require over the next 5-10 years are detailed below  Some tests may not apply to you based off risk factors and/or age  Screening tests ordered at today's visit but not completed yet may show as past due  Also, please note that scanned in results may not display below  Preventive Screenings:  Service Recommendations Previous Testing/Comments   Colorectal Cancer Screening  · Colonoscopy    · Fecal Occult Blood Test (FOBT)/Fecal Immunochemical Test (FIT)  · Fecal DNA/Cologuard Test  · Flexible Sigmoidoscopy Age: 54-65 years old   Colonoscopy: every 10 years (May be performed more frequently if at higher risk)  OR  FOBT/FIT: every 1 year  OR  Cologuard: every 3 years  OR  Sigmoidoscopy: every 5 years  Screening may be recommended earlier than age 48 if at higher risk for colorectal cancer  Also, an individualized decision between you and your healthcare provider will decide whether screening between the ages of 74-80 would be appropriate   Colonoscopy: 12/09/2016  FOBT/FIT: Not on file  Cologuard: Not on file  Sigmoidoscopy: Not on file    Screening Current     Prostate Cancer Screening Individualized decision between patient and health care provider in men between ages of 53-78   Medicare will cover every 12 months beginning on the day after your 50th birthday PSA: 0 7 ng/mL     Screening Current     Hepatitis C Screening Once for adults born between 1945 and 1965  More frequently in patients at high risk for Hepatitis C Hep C Antibody: 08/06/2019    Screening Current   Diabetes Screening 1-2 times per year if you're at risk for diabetes or have pre-diabetes Fasting glucose: 90 mg/dL   A1C: 5 2 %    Screening Current   Cholesterol Screening Once every 5 years if you don't have a lipid disorder  May order more often based on risk factors  Lipid panel: 02/24/2020    Screening Not Indicated  History Lipid Disorder      Other Preventive Screenings Covered by Medicare:  1  Abdominal Aortic Aneurysm (AAA) Screening: covered once if your at risk  You're considered to be at risk if you have a family history of AAA or a male between the age of 73-68 who smoking at least 100 cigarettes in your lifetime  2  Lung Cancer Screening: covers low dose CT scan once per year if you meet all of the following conditions: (1) Age 50-69; (2) No signs or symptoms of lung cancer; (3) Current smoker or have quit smoking within the last 15 years; (4) You have a tobacco smoking history of at least 30 pack years (packs per day x number of years you smoked); (5) You get a written order from a healthcare provider  3  Glaucoma Screening: covered annually if you're considered high risk: (1) You have diabetes OR (2) Family history of glaucoma OR (3)  aged 48 and older OR (3)  American aged 72 and older  3  Osteoporosis Screening: covered every 2 years if you meet one of the following conditions: (1) Have a vertebral abnormality; (2) On glucocorticoid therapy for more than 3 months; (3) Have primary hyperparathyroidism; (4) On osteoporosis medications and need to assess response to drug therapy  5  HIV Screening: covered annually if you're between the age of 12-76  Also covered annually if you are younger than 13 and older than 72 with risk factors for HIV infection  For pregnant patients, it is covered up to 3 times per pregnancy      Immunizations:  Immunization Recommendations   Influenza Vaccine Annual influenza vaccination during flu season is recommended for all persons aged >= 6 months who do not have contraindications   Pneumococcal Vaccine (Prevnar and Pneumovax)  * Prevnar = PCV13  * Pneumovax = PPSV23 Adults 25-60 years old: 1-3 doses may be recommended based on certain risk factors  Adults 72 years old: Prevnar (PCV13) vaccine recommended followed by Pneumovax (PPSV23) vaccine  If already received PPSV23 since turning 65, then PCV13 recommended at least one year after PPSV23 dose  Hepatitis B Vaccine 3 dose series if at intermediate or high risk (ex: diabetes, end stage renal disease, liver disease)   Tetanus (Td) Vaccine - COST NOT COVERED BY MEDICARE PART B Following completion of primary series, a booster dose should be given every 10 years to maintain immunity against tetanus  Td may also be given as tetanus wound prophylaxis  Tdap Vaccine - COST NOT COVERED BY MEDICARE PART B Recommended at least once for all adults  For pregnant patients, recommended with each pregnancy  Shingles Vaccine (Shingrix) - COST NOT COVERED BY MEDICARE PART B  2 shot series recommended in those aged 48 and above     Health Maintenance Due:      Topic Date Due    CRC Screening: Colonoscopy  12/09/2020    Hepatitis C Screening  Completed     Immunizations Due:  There are no preventive care reminders to display for this patient  Advance Directives   What are advance directives? Advance directives are legal documents that state your wishes and plans for medical care  These plans are made ahead of time in case you lose your ability to make decisions for yourself  Advance directives can apply to any medical decision, such as the treatments you want, and if you want to donate organs  What are the types of advance directives? There are many types of advance directives, and each state has rules about how to use them  You may choose a combination of any of the following:  · Living will: This is a written record of the treatment you want  You can also choose which treatments you do not want, which to limit, and which to stop at a certain time  This includes surgery, medicine, IV fluid, and tube feedings  · Durable power of  for healthcare [facility]):   This is a written record that states who you want to make healthcare choices for you when you are unable to make them for yourself  This person, called a proxy, is usually a family member or a friend  You may choose more than 1 proxy  · Do not resuscitate (DNR) order:  A DNR order is used in case your heart stops beating or you stop breathing  It is a request not to have certain forms of treatment, such as CPR  A DNR order may be included in other types of advance directives  · Medical directive: This covers the care that you want if you are in a coma, near death, or unable to make decisions for yourself  You can list the treatments you want for each condition  Treatment may include pain medicine, surgery, blood transfusions, dialysis, IV or tube feedings, and a ventilator (breathing machine)  · Values history: This document has questions about your views, beliefs, and how you feel and think about life  This information can help others choose the care that you would choose  Why are advance directives important? An advance directive helps you control your care  Although spoken wishes may be used, it is better to have your wishes written down  Spoken wishes can be misunderstood, or not followed  Treatments may be given even if you do not want them  An advance directive may make it easier for your family to make difficult choices about your care  Fall Prevention    Fall prevention  includes ways to make your home and other areas safer  It also includes ways you can move more carefully to prevent a fall  Health conditions that cause changes in your blood pressure, vision, or muscle strength and coordination may increase your risk for falls  Medicines may also increase your risk for falls if they make you dizzy, weak, or sleepy  Fall prevention tips:   · Stand or sit up slowly  · Use assistive devices as directed  · Wear shoes that fit well and have soles that   · Wear a personal alarm  · Stay active      · Manage your medical conditions  Home Safety Tips:  · Add items to prevent falls in the bathroom  · Keep paths clear  · Install bright lights in your home  · Keep items you use often on shelves within reach  · Paint or place reflective tape on the edges of your stairs  Weight Management   Why it is important to manage your weight:  Being overweight increases your risk of health conditions such as heart disease, high blood pressure, type 2 diabetes, and certain types of cancer  It can also increase your risk for osteoarthritis, sleep apnea, and other respiratory problems  Aim for a slow, steady weight loss  Even a small amount of weight loss can lower your risk of health problems  How to lose weight safely:  A safe and healthy way to lose weight is to eat fewer calories and get regular exercise  You can lose up about 1 pound a week by decreasing the number of calories you eat by 500 calories each day  Healthy meal plan for weight management:  A healthy meal plan includes a variety of foods, contains fewer calories, and helps you stay healthy  A healthy meal plan includes the following:  · Eat whole-grain foods more often  A healthy meal plan should contain fiber  Fiber is the part of grains, fruits, and vegetables that is not broken down by your body  Whole-grain foods are healthy and provide extra fiber in your diet  Some examples of whole-grain foods are whole-wheat breads and pastas, oatmeal, brown rice, and bulgur  · Eat a variety of vegetables every day  Include dark, leafy greens such as spinach, kale, gladis greens, and mustard greens  Eat yellow and orange vegetables such as carrots, sweet potatoes, and winter squash  · Eat a variety of fruits every day  Choose fresh or canned fruit (canned in its own juice or light syrup) instead of juice  Fruit juice has very little or no fiber  · Eat low-fat dairy foods  Drink fat-free (skim) milk or 1% milk  Eat fat-free yogurt and low-fat cottage cheese   Try low-fat cheeses such as mozzarella and other reduced-fat cheeses  · Choose meat and other protein foods that are low in fat  Choose beans or other legumes such as split peas or lentils  Choose fish, skinless poultry (chicken or turkey), or lean cuts of red meat (beef or pork)  Before you cook meat or poultry, cut off any visible fat  · Use less fat and oil  Try baking foods instead of frying them  Add less fat, such as margarine, sour cream, regular salad dressing and mayonnaise to foods  Eat fewer high-fat foods  Some examples of high-fat foods include french fries, doughnuts, ice cream, and cakes  · Eat fewer sweets  Limit foods and drinks that are high in sugar  This includes candy, cookies, regular soda, and sweetened drinks  Exercise:  Exercise at least 30 minutes per day on most days of the week  Some examples of exercise include walking, biking, dancing, and swimming  You can also fit in more physical activity by taking the stairs instead of the elevator or parking farther away from stores  Ask your healthcare provider about the best exercise plan for you  © Copyright [employer] 2018 Information is for End User's use only and may not be sold, redistributed or otherwise used for commercial purposes   All illustrations and images included in CareNotes® are the copyrighted property of [employer]  or [address] [place] [address]

## 2020-03-04 NOTE — PROGRESS NOTES
Assessment and Plan:     Problem List Items Addressed This Visit        Digestive    Genao esophagus    Fatty infiltration of liver    Gastroesophageal reflux disease without esophagitis    Irritable bowel syndrome with diarrhea       Endocrine    Impaired fasting glucose       Cardiovascular and Mediastinum    Benign essential hypertension - Primary    Asymptomatic stenosis of left carotid artery    Coronary artery disease involving native coronary artery of native heart without angina pectoris       Other    Mixed hyperlipidemia    Relevant Medications    rosuvastatin (CRESTOR) 10 MG tablet    History of right-sided carotid endarterectomy      Other Visit Diagnoses     Hyperuricemia        Relevant Medications    allopurinol (ZYLOPRIM) 100 mg tablet    colchicine (COLCRYS) 0 6 mg tablet    Other Relevant Orders    Uric acid    Dental infection        Relevant Medications    clindamycin (CLEOCIN) 300 MG capsule    Medicare annual wellness visit, subsequent               Preventive health issues were discussed with patient, and age appropriate screening tests were ordered as noted in patient's After Visit Summary  Personalized health advice and appropriate referrals for health education or preventive services given if needed, as noted in patient's After Visit Summary       History of Present Illness:     Patient presents for Medicare Annual Wellness visit    Patient Care Team:  Hugh Skiff, MD as PCP - Scott Bautista MD as Endoscopist  Catrina Reeves DO (Cardiology)     Problem List:     Patient Active Problem List   Diagnosis    Asthma    Genao esophagus    Benign essential hypertension    Asymptomatic stenosis of left carotid artery    Diverticulosis    Fatty infiltration of liver    Gastroesophageal reflux disease without esophagitis    Hyperuricemia without signs inflammatory arthritis/tophaceous disease    Impaired fasting glucose    Mixed hyperlipidemia    Drug-induced erectile dysfunction    Non-allergic rhinitis    Osteoarthritis of knee    Vitamin B12 deficiency    Irritable bowel syndrome with diarrhea    History of colonic diverticulitis    History of right-sided carotid endarterectomy    Coronary artery disease involving native coronary artery of native heart without angina pectoris      Past Medical and Surgical History:     Past Medical History:   Diagnosis Date    Allergic reaction     LAST ASSESSED: [id number]    Asthma     Bursitis of heel     LAST ASSESSED: [id number]    Derangement of meniscus of left knee due to old injury     LAST ASSESSED: [id number]    Diverticulitis of colon     LAST ASSESSED: [id number]    Dizziness     LAST ASSESSED: [id number]    GERD (gastroesophageal reflux disease)     History of colon polyps     Hypertension     Malignant hypertension     LAST ASSESSED: [id number]    Non-ST elevated myocardial infarction ([facility] ) 3/2/2020    [facility]     Plantar fasciitis     Seasonal allergies     Shoulder impingement     LAST ASSESSED: [id number]    Stroke ([facility] )      Past Surgical History:   Procedure Laterality Date    APPENDECTOMY      CAROTID ENDARTARECTOMY      COLECTOMY Left     PARTIAL - SIGMOID; HEMICOLECTOMY FOR DIVERTICULITIS; ONSET: 1987    COLON SURGERY      COLONOSCOPY N/A 12/9/2016    Procedure: COLONOSCOPY;  Surgeon: Brianna Brandon MD;  [facility];   Service:     HERNIA REPAIR      VENTRAL    NISSEN FUNDOPLICATION      ESOPHAGOGASTRIC FUNDOPLASTY LAST ASSESSED: [id number]    SINUS SURGERY      THROMBOENDARTERECTOMY      CAROTID; ONSET; MAY 2012      Family History:     Family History   Problem Relation Age of Onset    Cancer Paternal Grandfather     Hypertension Mother     Hyperlipidemia Mother     Hypertension Father     Hyperlipidemia Father       Social History:     E-Cigarette/Vaping    E-Cigarette Use Never User      E-Cigarette/Vaping Substances    Nicotine No  THC No     CBD No     Flavoring No     Other No     Unknown No      Social History     Socioeconomic History    Marital status: /Civil Union     Spouse name: None    Number of children: None    Years of education: None    Highest education level: None   Occupational History    None   Social Needs    Financial resource strain: None    Food insecurity:     Worry: None     Inability: None    Transportation needs:     Medical: None     Non-medical: None   Tobacco Use    Smoking status: Former Smoker     Types: Cigarettes     Last attempt to quit: 1960     Years since quittin 2    Smokeless tobacco: Never Used   Substance and Sexual Activity    Alcohol use: No    Drug use: No    Sexual activity: None   Lifestyle    Physical activity:     Days per week: None     Minutes per session: None    Stress: None   Relationships    Social connections:     Talks on phone: None     Gets together: None     Attends Oriental orthodox service: None     Active member of club or organization: None     Attends meetings of clubs or organizations: None     Relationship status: None    Intimate partner violence:     Fear of current or ex partner: None     Emotionally abused: None     Physically abused: None     Forced sexual activity: None   Other Topics Concern    None   Social History Narrative    None      Medications and Allergies:     Current Outpatient Medications   Medication Sig Dispense Refill    acetaminophen (TYLENOL) 325 mg tablet Take 2 tablets (650 mg total) by mouth every 6 (six) hours as needed for mild pain, headaches or fever 30 tablet 0    albuterol (PROVENTIL HFA,VENTOLIN HFA) 90 mcg/act inhaler INHALE 2 PUFFS BY MOUTH EVERY 6 HOURS AS NEEDED FOR WHEEZING 18 g 3    aspirin 81 mg chewable tablet Chew 1 tablet (81 mg total) daily 30 tablet 0    fluticasone (FLONASE) 50 mcg/act nasal spray 1 spray into each nostril daily      lisinopril (ZESTRIL) 10 mg tablet Take 1 tablet (10 mg total) by mouth daily 30 tablet 5    multivitamin (THERAGRAN) TABS Take 1 tablet by mouth daily      RABEprazole (ACIPHEX) 20 MG tablet       rosuvastatin (CRESTOR) 10 MG tablet Take 10 mg by mouth every other day      allopurinol (ZYLOPRIM) 100 mg tablet Take 1 tablet (100 mg total) by mouth daily 30 tablet 5    BRILINTA 90 MG take 1 tablet by mouth every 12 hours 60 tablet 5    clindamycin (CLEOCIN) 300 MG capsule Take 1 capsule (300 mg total) by mouth 3 (three) times a day for 7 days 21 capsule 0    colchicine (COLCRYS) 0 6 mg tablet Take 1 tablet (0 6 mg total) by mouth daily 30 tablet 5     No current facility-administered medications for this visit  Allergies   Allergen Reactions    Depo-Medrol [Methylprednisolone] Anaphylaxis     Severe Vagal Reaction    Iv Contrast [Iodinated Diagnostic Agents] Angioedema     Pt states itchy/swollen eyes, trouble breathing (years ago)    Rosuvastatin Headache and Confusion    Sulfa Antibiotics       Immunizations:     Immunization History   Administered Date(s) Administered    INFLUENZA 12/19/2016, 09/25/2017, 09/18/2018    Influenza Quadrivalent Preservative Free 3 years and older IM 09/25/2017    Influenza Quadrivalent, 6-35 Months IM 12/19/2016    Influenza TIV (IM) 11/14/2012, 11/12/2013    Influenza, high dose seasonal 0 5 mL 01/09/2020    Pneumococcal Conjugate 13-Valent 08/30/2019    Pneumococcal Polysaccharide PPV23 12/17/2007, 01/26/2018    TD (adult) Preservative Free 08/30/2019    Td (adult), adsorbed 08/30/2019      Health Maintenance:         Topic Date Due    CRC Screening: Colonoscopy  12/09/2020    Hepatitis C Screening  Completed     There are no preventive care reminders to display for this patient  Medicare Health Risk Assessment:     /72   Pulse 82   Temp (!) 96 1 °F (35 6 °C)   Resp 16   Ht 5' 7 5" (1 715 m)   Wt 87 5 kg (193 lb)   BMI 29 78 kg/m²      Ritesh Cast is here for his Subsequent Wellness visit   Last Medicare Wellness visit information reviewed, patient interviewed and updates made to the record today  Health Risk Assessment:   Patient rates overall health as very good  Patient feels that their physical health rating is slightly better  Eyesight was rated as same  Hearing was rated as slightly worse  Patient feels that their emotional and mental health rating is same  Pain experienced in the last 7 days has been some  Patient's pain rating has been 3/10  Patient states that he has experienced no weight loss or gain in last 6 months  Depression Screening:   PHQ-2 Score: 0      Fall Risk Screening: In the past year, patient has experienced: history of falling in past year    Number of falls: 1  Injured during fall?: No    Feels unsteady when standing or walking?: No    Worried about falling?: No      Home Safety:  Patient does not have trouble with stairs inside or outside of their home  Patient has working smoke alarms and has working carbon monoxide detector  Home safety hazards include: none  Nutrition:   Current diet is Regular, Low Cholesterol and Low Carb  Medications:   Patient is currently taking over-the-counter supplements  OTC medications include: see medication list  Patient is able to manage medications  Activities of Daily Living (ADLs)/Instrumental Activities of Daily Living (IADLs):   Walk and transfer into and out of bed and chair?: Yes  Dress and groom yourself?: Yes    Bathe or shower yourself?: Yes    Feed yourself?  Yes  Do your laundry/housekeeping?: Yes  Manage your money, pay your bills and track your expenses?: Yes  Make your own meals?: Yes    Do your own shopping?: Yes    Previous Hospitalizations:   Any hospitalizations or ED visits within the last 12 months?: Yes    How many hospitalizations have you had in the last year?: 3-4    Advance Care Planning:   Living will: Yes    Advanced directive: Yes      Cognitive Screening:   Provider or family/friend/caregiver concerned regarding cognition?: No    PREVENTIVE SCREENINGS      Cardiovascular Screening:    General: Screening Not Indicated and History Lipid Disorder      Diabetes Screening:     General: Screening Current      Colorectal Cancer Screening:     General: Screening Current      Prostate Cancer Screening:    General: Screening Current      Osteoporosis Screening:    General: Screening Not Indicated      Abdominal Aortic Aneurysm (AAA) Screening:    Risk factors include: age between 73-67 yo and tobacco use        General: Screening Not Indicated      Lung Cancer Screening:     General: Screening Not Indicated      Hepatitis C Screening:    General: Screening Current    Other Counseling Topics:   Calcium and vitamin D intake and regular weightbearing exercise         Sofiya Bautista MD

## 2020-03-05 ENCOUNTER — TELEPHONE (OUTPATIENT)
Dept: PODIATRY | Facility: CLINIC | Age: 70
End: 2020-03-05

## 2020-03-05 NOTE — TELEPHONE ENCOUNTER
Patient saw Dr Stephen Clemens gave me a gmail address  I told her I cannot send a document there, it is not secure  I faxed the ekg, pre-op form and office note to [phone] on Monday and [phone] on Thurs    I called and advised Corey Clemens

## 2020-03-10 ENCOUNTER — CLINICAL SUPPORT (OUTPATIENT)
Dept: CARDIAC REHAB | Facility: CLINIC | Age: 70
End: 2020-03-10
Payer: MEDICARE

## 2020-03-10 DIAGNOSIS — I21.4 NSTEMI (NON-ST ELEVATION MYOCARDIAL INFARCTION) (HCC): ICD-10-CM

## 2020-03-10 PROCEDURE — 93798 PHYS/QHP OP CAR RHAB W/ECG: CPT

## 2020-03-12 ENCOUNTER — APPOINTMENT (OUTPATIENT)
Dept: CARDIAC REHAB | Facility: CLINIC | Age: 70
End: 2020-03-12
Payer: MEDICARE

## 2020-03-12 DIAGNOSIS — I10 BENIGN ESSENTIAL HYPERTENSION: ICD-10-CM

## 2020-03-12 RX ORDER — LISINOPRIL 10 MG/1
TABLET ORAL
Qty: 90 TABLET | Refills: 3 | Status: SHIPPED | OUTPATIENT
Start: 2020-03-12 | End: 2020-09-04 | Stop reason: ALTCHOICE

## 2020-03-12 NOTE — PROGRESS NOTES
Cardiac Rehabilitation Plan of Care   Discharge Note      Today's date: 3/12/2020   Visits: 18  Patient name: Prema Stuart      : 1950  Age: 81 y o  MRN: [id number]  Referring Physician: Roge Ann PA-C  Cardiologist: Nicolette Biswas DO  Provider: [facility] [address] [place] [address]  Clinician: Rony Hester MS, CEP     Dx:   Encounter Diagnosis   Name Primary?  NSTEMI (non-ST elevation myocardial infarction) [facility])      Date of onset: 2019 DESx2      SUMMARY OF PROGRESS:  Today is Jimmy's final note for cardiac rehab and was only able to complete 18 sessions  Amado Jordan reports that he would like to discontinue cardiac rehab due to reoccurring problems with gout and back  Amado Jordan came back from medical hold on 3/10 did 30 minutes on the NuStep and the entire next day he was struggling in pain  Darnellparth Jordan believes that it is in his best interest to not continue with exercise  Darnellparth Jordan was able to progress to 40 minutes of cardio but declined in his MET level  Darnellparth Jordan reports that he is currently off all his medications due to the reoccurring gout and his BP was elevated coming in today at 178/88  Darnellparth Jordan was educated on monitoring his BP due to not being on any medications to make sure that it does not continue to stay elevated  Amado Jordan had normal hemodynamic response to exercise and his telemetry read NSR with occ PACs  Amado Jordan reports that he is following the heart healthy diet but reports that he also ate a can of soup before coming in the other day  Darnellparth Jordan was educated on the heart healthy diet today and remind how important it is to decrease his sodium intake, saturated fat intake, and added sugar intake  Amado Jordan reports to have excellent social support from friends and family and denies any depression, anxiety, or added stress  Amado Jordan is going to continue on his own         Medication compliance: Yes   Comments: None   Fall Risk: Low   Comments: None     EKG changes: NSR with occ PACs       EXERCISE ASSESSMENT and PLAN    Current Exercise Program in Rehab:       Frequency: 3 days/week        Minutes: 50        METS: 2-2 5            HR:    RPE: 4-6         Modalities: Airdyne bike, UBE, NuStep and Recumbent bike      Exercise Progression 30 Day Goals :    Frequency: 3-4 days/week   Minutes: 40-45   METS: 3-4   HR:     RPE: 4-6   Modalities: Treadmill, Airdyne bike, UBE, NuStep and Recumbent bike    Strength trainin-3 days / week  12-15 repetitions  1-2 sets per modality    Modalities: Chest Press, Pull Downs, Arm Curl, Seated Row and Sit to [employer]    Progressing: Yes - increased duration     Home Exercise: None    Goals: 10% improvement in functional capacity, improved DASI score by 10%, Increase in peak CR METs by 40%, Improved 6MWT distance by 10%, Resume ADLs with increased strength and Exercise 5 days/wk, >150mins/wk  Education: Benefit of exercise for CAD risk factors, signs and sxs, RPE scale and class: Risk Factors for Heart Disease   Plan:home exercise 30+ mins 2 days opposite CR  Readiness to change: Action:  (Changing behavior)      NUTRITION ASSESSMENT AND PLAN    Weight control:    Starting weight: 209 0 Lbs    Current weight: 192 lbs    Waist circumference:    Startin in   Current:    Diabetes: N/A  Lipid management: Last lipid profile 20  Chol 138  TRG 81  HDL 53  LDL 69 and A1c%: 5 2, Fasting B   Goals:reduced BMI to < 25, LDL <100, CHOL <200, decreased body fat% <25%   , reduced waist circumference <40 inches, fasting BG  and 2 5-5%  wt loss  Education: heart healthy eating  low sodium diet  hydration  healthy choices while dining out  Education class: Reading Food Labels   Education Class: Heart Healthy Eating  Progressing: Yes - has maintained a healthy diet, increased fruits   Plan: Increase PUFA and MUFA, Decrease SFA, Increase whole grains, increase fruits/vegs, eliminate processed meats and Reduce added sugars <25g/day  Readiness to change: Action: (Changing behavior)      PSYCHOSOCIAL ASSESSMENT AND PLAN    Emotional: Patient reports he/she is coping well with good social support and denies depression or anxiety   PHQ-9 =1  1-4 = Minimal Depression  Self-reported stress level: 2   Social support: Excellent  Goals:  Physical Fitness in [place] Score < 3, Pain in [place] Score < 3, Overall Health in [place] Score < 3, improved positive thoughts of well being and increased energy  Education: benefits of positive support system and depression and CAD  Progressing: Yes - has minimal stress   Plan: Class: Stress and Your Health, Class: Relaxation and Practice relaxation techniques  Readiness to change: Maintenance: (Maintaining the behavior change)      OTHER CORE COMPONENTS     Tobacco:   Social History     Tobacco Use   Smoking Status Former Smoker    Types: Cigarettes    Last attempt to quit: [place] Years since quittin 2   Smokeless Tobacco Never Used       Tobacco Use Intervention: Referral to tobacco expert:   Brief counseling by cardiac rehab professional  Date: 2019    Blood pressure:    Restin/70 -178/88    Exercise: 158/74 - 182/84    Goals: consistent BP < 130/80, reduced dietary sodium <2300mg and consistent exercise >150 mins/wk  Education:  understanding HTN and CAD and low sodium diet and HTN  Progressing: No - BP elevated and sometimes drops during exercise   Plan: Class: Understanding Heart Disease and Class: Common Heart Medications  Readiness to change: Maintenance: (Maintaining the behavior change)

## 2020-03-13 ENCOUNTER — APPOINTMENT (OUTPATIENT)
Dept: CARDIAC REHAB | Facility: CLINIC | Age: 70
End: 2020-03-13
Payer: MEDICARE

## 2020-03-27 DIAGNOSIS — I21.4 NSTEMI (NON-ST ELEVATED MYOCARDIAL INFARCTION) (HCC): ICD-10-CM

## 2020-03-27 RX ORDER — TICAGRELOR 90 MG/1
TABLET ORAL
Qty: 60 TABLET | Refills: 5 | Status: SHIPPED | OUTPATIENT
Start: 2020-03-27 | End: 2020-04-17 | Stop reason: ALTCHOICE

## 2020-04-15 ENCOUNTER — TELEPHONE (OUTPATIENT)
Dept: CARDIOLOGY CLINIC | Facility: CLINIC | Age: 70
End: 2020-04-15

## 2020-04-17 DIAGNOSIS — I21.4 NON-ST ELEVATED MYOCARDIAL INFARCTION (HCC): Primary | ICD-10-CM

## 2020-04-20 RX ORDER — CLOPIDOGREL BISULFATE 75 MG/1
75 TABLET ORAL DAILY
Qty: 90 TABLET | Refills: 4 | OUTPATIENT
Start: 2020-04-20 | End: 2020-05-13

## 2020-05-11 ENCOUNTER — TELEPHONE (OUTPATIENT)
Dept: CARDIOLOGY CLINIC | Facility: CLINIC | Age: 70
End: 2020-05-11

## 2020-05-13 RX ORDER — ASPIRIN 325 MG
325 TABLET ORAL DAILY
COMMUNITY
End: 2021-03-22 | Stop reason: ALTCHOICE

## 2020-05-26 DIAGNOSIS — N52.9 ERECTILE DYSFUNCTION, UNSPECIFIED ERECTILE DYSFUNCTION TYPE: Primary | ICD-10-CM

## 2020-05-26 RX ORDER — SILDENAFIL 50 MG/1
50 TABLET, FILM COATED ORAL DAILY PRN
Qty: 6 TABLET | Refills: 5 | Status: SHIPPED | OUTPATIENT
Start: 2020-05-26 | End: 2020-09-04 | Stop reason: ALTCHOICE

## 2020-06-05 ENCOUNTER — TELEMEDICINE (OUTPATIENT)
Dept: FAMILY MEDICINE CLINIC | Facility: CLINIC | Age: 70
End: 2020-06-05
Payer: MEDICARE

## 2020-06-05 DIAGNOSIS — E79.0 HYPERURICEMIA: ICD-10-CM

## 2020-06-05 DIAGNOSIS — M10.9 ACUTE GOUTY ARTHRITIS: Primary | ICD-10-CM

## 2020-06-05 PROCEDURE — 99214 OFFICE O/P EST MOD 30 MIN: CPT | Performed by: FAMILY MEDICINE

## 2020-06-05 RX ORDER — PREDNISONE 20 MG/1
20 TABLET ORAL 2 TIMES DAILY WITH MEALS
Qty: 10 TABLET | Refills: 0 | Status: SHIPPED | OUTPATIENT
Start: 2020-06-05 | End: 2020-09-04 | Stop reason: ALTCHOICE

## 2020-07-02 ENCOUNTER — TELEPHONE (OUTPATIENT)
Dept: CARDIOLOGY CLINIC | Facility: CLINIC | Age: 70
End: 2020-07-02

## 2020-07-02 NOTE — TELEPHONE ENCOUNTER
OK to stop Lisinopril and see if his symptoms improve  Tell him that these are not common side effects from Lisinopril and if his symptoms do not improve, I recommend that he go back on the Lisinopril

## 2020-07-02 NOTE — TELEPHONE ENCOUNTER
Mr Evaristo Eubanks called the nurse line, asking if ok to stop Lisinopril 10 mg daily  He has called before about side effects to meds which he said has been a problem all his life  He is c/o:  Confusion, fatigue, weakness, nausea, diarrhea, joint pain, sour stomach  He has other meds listed in chart, but he said the only one he continues to take is the lisinopril  BP has been 115-140/ 60s while on the lisinopril  I also advised him to call his PCP with his symptoms  Please advise

## 2020-07-14 PROBLEM — K43.2 INCISIONAL HERNIA: Status: ACTIVE | Noted: 2020-07-14

## 2020-08-25 ENCOUNTER — APPOINTMENT (OUTPATIENT)
Dept: LAB | Facility: CLINIC | Age: 70
End: 2020-08-25
Payer: MEDICARE

## 2020-08-25 ENCOUNTER — TRANSCRIBE ORDERS (OUTPATIENT)
Dept: LAB | Facility: CLINIC | Age: 70
End: 2020-08-25

## 2020-08-25 DIAGNOSIS — E79.0 HYPERURICEMIA: ICD-10-CM

## 2020-08-25 DIAGNOSIS — K75.81 NONALCOHOLIC STEATOHEPATITIS: Primary | ICD-10-CM

## 2020-08-25 LAB
ALBUMIN SERPL BCP-MCNC: 4.1 G/DL (ref 3.5–5)
ALP SERPL-CCNC: 59 U/L (ref 46–116)
ALT SERPL W P-5'-P-CCNC: 18 U/L (ref 12–78)
ANION GAP SERPL CALCULATED.3IONS-SCNC: 8 MMOL/L (ref 4–13)
AST SERPL W P-5'-P-CCNC: 12 U/L (ref 5–45)
BASOPHILS # BLD AUTO: 0.03 THOUSANDS/ΜL (ref 0–0.1)
BASOPHILS NFR BLD AUTO: 0 % (ref 0–1)
BILIRUB SERPL-MCNC: 0.79 MG/DL (ref 0.2–1)
BUN SERPL-MCNC: 19 MG/DL (ref 5–25)
CALCIUM SERPL-MCNC: 9.2 MG/DL (ref 8.3–10.1)
CHLORIDE SERPL-SCNC: 108 MMOL/L (ref 100–108)
CO2 SERPL-SCNC: 24 MMOL/L (ref 21–32)
CREAT SERPL-MCNC: 0.88 MG/DL (ref 0.6–1.3)
EOSINOPHIL # BLD AUTO: 0.15 THOUSAND/ΜL (ref 0–0.61)
EOSINOPHIL NFR BLD AUTO: 2 % (ref 0–6)
ERYTHROCYTE [DISTWIDTH] IN BLOOD BY AUTOMATED COUNT: 13.3 % (ref 11.6–15.1)
GFR SERPL CREATININE-BSD FRML MDRD: 88 ML/MIN/1.73SQ M
GLUCOSE P FAST SERPL-MCNC: 82 MG/DL (ref 65–99)
HCT VFR BLD AUTO: 45.1 % (ref 36.5–49.3)
HGB BLD-MCNC: 14.6 G/DL (ref 12–17)
IMM GRANULOCYTES # BLD AUTO: 0.01 THOUSAND/UL (ref 0–0.2)
IMM GRANULOCYTES NFR BLD AUTO: 0 % (ref 0–2)
LYMPHOCYTES # BLD AUTO: 1.56 THOUSANDS/ΜL (ref 0.6–4.47)
LYMPHOCYTES NFR BLD AUTO: 23 % (ref 14–44)
MCH RBC QN AUTO: 30.8 PG (ref 26.8–34.3)
MCHC RBC AUTO-ENTMCNC: 32.4 G/DL (ref 31.4–37.4)
MCV RBC AUTO: 95 FL (ref 82–98)
MONOCYTES # BLD AUTO: 0.5 THOUSAND/ΜL (ref 0.17–1.22)
MONOCYTES NFR BLD AUTO: 7 % (ref 4–12)
NEUTROPHILS # BLD AUTO: 4.68 THOUSANDS/ΜL (ref 1.85–7.62)
NEUTS SEG NFR BLD AUTO: 68 % (ref 43–75)
NRBC BLD AUTO-RTO: 0 /100 WBCS
PLATELET # BLD AUTO: 196 THOUSANDS/UL (ref 149–390)
PMV BLD AUTO: 11.6 FL (ref 8.9–12.7)
POTASSIUM SERPL-SCNC: 3.8 MMOL/L (ref 3.5–5.3)
PROT SERPL-MCNC: 7.7 G/DL (ref 6.4–8.2)
RBC # BLD AUTO: 4.74 MILLION/UL (ref 3.88–5.62)
SODIUM SERPL-SCNC: 140 MMOL/L (ref 136–145)
URATE SERPL-MCNC: 6.3 MG/DL (ref 4.2–8)
WBC # BLD AUTO: 6.93 THOUSAND/UL (ref 4.31–10.16)

## 2020-08-25 PROCEDURE — 80053 COMPREHEN METABOLIC PANEL: CPT

## 2020-08-25 PROCEDURE — 36415 COLL VENOUS BLD VENIPUNCTURE: CPT

## 2020-08-25 PROCEDURE — 85025 COMPLETE CBC W/AUTO DIFF WBC: CPT

## 2020-08-25 PROCEDURE — 84550 ASSAY OF BLOOD/URIC ACID: CPT

## 2020-09-04 ENCOUNTER — OFFICE VISIT (OUTPATIENT)
Dept: FAMILY MEDICINE CLINIC | Facility: CLINIC | Age: 70
End: 2020-09-04
Payer: MEDICARE

## 2020-09-04 VITALS
RESPIRATION RATE: 18 BRPM | WEIGHT: 182 LBS | BODY MASS INDEX: 27.58 KG/M2 | HEART RATE: 70 BPM | OXYGEN SATURATION: 98 % | TEMPERATURE: 97 F | SYSTOLIC BLOOD PRESSURE: 132 MMHG | HEIGHT: 68 IN | DIASTOLIC BLOOD PRESSURE: 80 MMHG

## 2020-09-04 DIAGNOSIS — I10 BENIGN ESSENTIAL HYPERTENSION: Primary | ICD-10-CM

## 2020-09-04 DIAGNOSIS — K22.70 BARRETT'S ESOPHAGUS WITHOUT DYSPLASIA: ICD-10-CM

## 2020-09-04 DIAGNOSIS — E78.2 MIXED HYPERLIPIDEMIA: ICD-10-CM

## 2020-09-04 DIAGNOSIS — Z86.010 ENCOUNTER FOR COLONOSCOPY DUE TO HISTORY OF ADENOMATOUS COLONIC POLYPS: ICD-10-CM

## 2020-09-04 DIAGNOSIS — Z23 ENCOUNTER FOR IMMUNIZATION: ICD-10-CM

## 2020-09-04 DIAGNOSIS — I65.22 ASYMPTOMATIC STENOSIS OF LEFT CAROTID ARTERY: ICD-10-CM

## 2020-09-04 DIAGNOSIS — Z12.11 ENCOUNTER FOR COLONOSCOPY DUE TO HISTORY OF ADENOMATOUS COLONIC POLYPS: ICD-10-CM

## 2020-09-04 DIAGNOSIS — J45.20 MILD INTERMITTENT ASTHMA WITHOUT COMPLICATION: ICD-10-CM

## 2020-09-04 DIAGNOSIS — K21.9 GASTROESOPHAGEAL REFLUX DISEASE WITHOUT ESOPHAGITIS: ICD-10-CM

## 2020-09-04 DIAGNOSIS — R73.01 IMPAIRED FASTING GLUCOSE: ICD-10-CM

## 2020-09-04 DIAGNOSIS — I25.10 CORONARY ARTERY DISEASE INVOLVING NATIVE CORONARY ARTERY OF NATIVE HEART WITHOUT ANGINA PECTORIS: ICD-10-CM

## 2020-09-04 DIAGNOSIS — K76.0 FATTY INFILTRATION OF LIVER: ICD-10-CM

## 2020-09-04 PROCEDURE — 99214 OFFICE O/P EST MOD 30 MIN: CPT | Performed by: FAMILY MEDICINE

## 2020-09-04 PROCEDURE — G0008 ADMIN INFLUENZA VIRUS VAC: HCPCS

## 2020-09-04 PROCEDURE — 90662 IIV NO PRSV INCREASED AG IM: CPT

## 2020-09-04 RX ORDER — ALBUTEROL SULFATE 2.5 MG/3ML
2.5 SOLUTION RESPIRATORY (INHALATION) EVERY 6 HOURS PRN
Qty: 25 VIAL | Refills: 3 | Status: SHIPPED | OUTPATIENT
Start: 2020-09-04 | End: 2020-09-04 | Stop reason: ALTCHOICE

## 2020-09-04 RX ORDER — ALBUTEROL SULFATE 90 UG/1
2 AEROSOL, METERED RESPIRATORY (INHALATION) EVERY 6 HOURS PRN
Qty: 18 G | Refills: 3 | Status: SHIPPED | OUTPATIENT
Start: 2020-09-04 | End: 2021-07-29

## 2020-09-04 RX ORDER — LEVALBUTEROL INHALATION SOLUTION 1.25 MG/3ML
1.25 SOLUTION RESPIRATORY (INHALATION) EVERY 8 HOURS PRN
Qty: 25 VIAL | Refills: 3 | Status: SHIPPED | OUTPATIENT
Start: 2020-09-04 | End: 2021-10-13 | Stop reason: SDUPTHER

## 2020-09-04 NOTE — PROGRESS NOTES
Assessment/Plan:     Diagnoses and all orders for this visit:    Benign essential hypertension    Mixed hyperlipidemia    Coronary artery disease involving native coronary artery of native heart without angina pectoris    Asymptomatic stenosis of left carotid artery    Impaired fasting glucose    Mild intermittent asthma without complication  -     Discontinue: albuterol (2 5 mg/3 mL) 0 083 % nebulizer solution; Take 1 vial (2 5 mg total) by nebulization every 6 (six) hours as needed for wheezing or shortness of breath  -     albuterol (PROVENTIL HFA,VENTOLIN HFA) 90 mcg/act inhaler; Inhale 2 puffs every 6 (six) hours as needed for wheezing  -     levalbuterol (XOPENEX) 1 25 mg/3 mL nebulizer solution; Take 1 vial (1 25 mg total) by nebulization every 8 (eight) hours as needed for wheezing or shortness of breath    Gastroesophageal reflux disease without esophagitis    Genao's esophagus without dysplasia    Fatty infiltration of liver    Encounter for colonoscopy due to history of adenomatous colonic polyps  -     Ambulatory referral to Gastroenterology; Future    Encounter for immunization  -     influenza vaccine, high-dose, PF 0 7 mL (FLUZONE HIGH-DOSE)          Continue with current medications  Monitor blood pressures at home  Follow-up with cardiology next month  Flu vaccine today  Office visit 6 months     BMI Counseling: Body mass index is 28 08 kg/m²  The BMI is above normal  Nutrition recommendations include reducing portion sizes, decreasing overall calorie intake, consuming healthier snacks, moderation in carbohydrate intake, reducing intake of saturated fat and trans fat and reducing intake of cholesterol  Exercise recommendations include exercising 3-5 times per week  Patient ID: José Powell is a 78 y o  male  Follow-up visit  Medications reviewed  Hyperlipidemia mixed type and CAD with prior intolerance to a number of statins, fibrates and fish oils   He was most recently on Crestor 10 mg QOD but stopped due to side effects  08/2019  s/p admission for NSTEMI  Elevated troponin peaking at 4 61  Cardiac catheterization 95% lesion in the distal RCA  Status post RENE x2  Mid circumflex 65% stenosis  Proximal right coronary artery 50% stenosis  Echocardiogram normal left ventricular systolic function  EF 60%  Hypokinesis of the basal mid inferior walls  Grade 1 diastolic dysfunction  Normal right ventricle  Mildly dilated left atrium  Mild MR  No pericardial effusion  Aortic root normal size  He was on dual platelet inhibition with Aspirin and Brilinta-now on ASA 81 mg daily  Last lipid profile 02/2020 cholesterol 138  Triglycerides 81  HDL 53  LDL 69  08/2020 LFTs normal  08/2020 FBS 82  02/2020 A1c 5 2  Longstanding history of hypertension with intolerance to multiple blood pressure agents  Element of white coat syndrome  He uses prn Lisinopril 10 mg Blood pressures at home have been 120-130/60 range  08/2020 creatinine 0 88  Electrolytes normal  Hgb 14 6      Recent Results (from the past 1008 hour(s))   Uric acid    Collection Time: 08/25/20  8:52 AM   Result Value Ref Range    Uric Acid 6 3 4 2 - 8 0 mg/dL   CBC and differential    Collection Time: 08/25/20  8:52 AM   Result Value Ref Range    WBC 6 93 4 31 - 10 16 Thousand/uL    RBC 4 74 3 88 - 5 62 Million/uL    Hemoglobin 14 6 12 0 - 17 0 g/dL    Hematocrit 45 1 36 5 - 49 3 %    MCV 95 82 - 98 fL    MCH 30 8 26 8 - 34 3 pg    MCHC 32 4 31 4 - 37 4 g/dL    RDW 13 3 11 6 - 15 1 %    MPV 11 6 8 9 - 12 7 fL    Platelets 234 861 - 486 Thousands/uL    nRBC 0 /100 WBCs    Neutrophils Relative 68 43 - 75 %    Immat GRANS % 0 0 - 2 %    Lymphocytes Relative 23 14 - 44 %    Monocytes Relative 7 4 - 12 %    Eosinophils Relative 2 0 - 6 %    Basophils Relative 0 0 - 1 %    Neutrophils Absolute 4 68 1 85 - 7 62 Thousands/µL    Immature Grans Absolute 0 01 0 00 - 0 20 Thousand/uL    Lymphocytes Absolute 1 56 0 60 - 4 47 Thousands/µL    Monocytes Absolute 0 50 0 17 - 1 22 Thousand/µL    Eosinophils Absolute 0 15 0 00 - 0 61 Thousand/µL    Basophils Absolute 0 03 0 00 - 0 10 Thousands/µL   Comprehensive metabolic panel    Collection Time: 08/25/20  8:52 AM   Result Value Ref Range    Sodium 140 136 - 145 mmol/L    Potassium 3 8 3 5 - 5 3 mmol/L    Chloride 108 100 - 108 mmol/L    CO2 24 21 - 32 mmol/L    ANION GAP 8 4 - 13 mmol/L    BUN 19 5 - 25 mg/dL    Creatinine 0 88 0 60 - 1 30 mg/dL    Glucose, Fasting 82 65 - 99 mg/dL    Calcium 9 2 8 3 - 10 1 mg/dL    AST 12 5 - 45 U/L    ALT 18 12 - 78 U/L    Alkaline Phosphatase 59 46 - 116 U/L    Total Protein 7 7 6 4 - 8 2 g/dL    Albumin 4 1 3 5 - 5 0 g/dL    Total Bilirubin 0 79 0 20 - 1 00 mg/dL    eGFR 88 ml/min/1 73sq m     Lab Results   Component Value Date    HGBA1C 5 2 02/24/2020       Lab Results   Component Value Date    CHOLESTEROL 138 02/24/2020    CHOLESTEROL 152 10/26/2019    CHOLESTEROL 202 (H) 08/19/2019     Lab Results   Component Value Date    HDL 53 02/24/2020    HDL 47 10/26/2019    HDL 45 08/19/2019     Lab Results   Component Value Date    TRIG 81 02/24/2020    TRIG 79 10/26/2019    TRIG 48 08/19/2019     Lab Results   Component Value Date    LDLCALC 69 02/24/2020           The following portions of the patient's history were reviewed and updated as appropriate: allergies, current medications, past family history, past medical history, past social history, past surgical history and problem list     Review of Systems   Constitutional: Positive for unexpected weight change  Negative for appetite change, chills, fatigue and fever  77 lb weight loss from 01/2019 with dieting  HENT: Negative for congestion, ear pain, rhinorrhea, sore throat and trouble swallowing  Eyes: Negative for visual disturbance  Respiratory: Negative for cough, shortness of breath and wheezing  History of asthma he uses infrequent Albuterol MDI    No orthopnea or PND   Cardiovascular: Negative for chest pain, palpitations and leg swelling  See HPI  Carotid artery stenosis  Status post right CEA  Carotid artery Dopplers 12/2019 widely patent internal carotid artery and endarterectomy site  Left ICA < 50% stenosis  03/2017 nuclear stress test normal    Gastrointestinal: Negative for abdominal pain, blood in stool, constipation, diarrhea, nausea and vomiting  GERD status post fundoplication  08/2019 EGD Genao's esophagus  Multiple small superficial ulcer craters in gastric antrum  No longer on PPI  Fatty liver  08/2020 LFTs normal   ultrasound 06/2019 08/2019 alpha 1 antitrypsin level normal   Celiac antibodies negative  Iron 84  Ferritin 349  Hepatitis B surface antigen negative  Hepatitis-C antibody negative  Anti smooth muscle antibody negative  Ceruloplasmin level normal   RED negative  SPEP no monoclonal bands  PEREZ Fibroscore minimal to moderate steatosis  No fibrosis  Borderline to probable PEREZ   Endocrine: Negative for polydipsia and polyuria  Genitourinary: Negative for difficulty urinating  Musculoskeletal: Positive for back pain  Negative for arthralgias and myalgias  History of gout/hyperuricemia with flare-up 06/2020  He is no longer taking Allopurinol or Colchicine due to GI side effects  08/2020 uric acid level 6 3 improved from 9 7 in 2014  He has a history of multiple drug allergies/sensitivities  He is on low-dose aspirin 81 mg daily  01/2020 x-rays lumbar spine mild loss of disc at L5-S1  Facet degenerative changes particularly L3-L4 through L5-S1  Skin: Negative for rash  Allergic/Immunologic: Negative for environmental allergies  Neurological: Negative for dizziness and headaches  Hematological: Negative for adenopathy  Does not bruise/bleed easily  Psychiatric/Behavioral: Negative for dysphoric mood and sleep disturbance           Objective:      /80 (BP Location: Left arm, Patient Position: Sitting, Cuff Size: Large) Pulse 70   Temp (!) 97 °F (36 1 °C)   Resp 18   Ht 5' 7 5" (1 715 m)   Wt 82 6 kg (182 lb)   SpO2 98%   BMI 28 08 kg/m²     Wt Readings from Last 3 Encounters:   09/04/20 82 6 kg (182 lb)   07/14/20 83 9 kg (185 lb)   03/04/20 87 5 kg (193 lb)            Physical Exam  Vitals signs and nursing note reviewed  Constitutional:       General: He is not in acute distress  Appearance: He is well-developed  HENT:      Right Ear: Tympanic membrane normal       Left Ear: Tympanic membrane normal    Eyes:      General: No scleral icterus  Conjunctiva/sclera: Conjunctivae normal       Pupils: Pupils are equal, round, and reactive to light  Neck:      Thyroid: No thyroid mass or thyromegaly  Vascular: No carotid bruit or JVD  Trachea: No tracheal deviation  Cardiovascular:      Rate and Rhythm: Normal rate and regular rhythm  Pulses:           Carotid pulses are 2+ on the right side and 2+ on the left side  Heart sounds: Normal heart sounds  No murmur  No gallop  Pulmonary:      Effort: Pulmonary effort is normal  No respiratory distress  Breath sounds: Normal breath sounds  No wheezing or rales  Abdominal:      General: A surgical scar is present  Bowel sounds are normal  There is no distension or abdominal bruit  Palpations: Abdomen is soft  There is no mass  Tenderness: There is no abdominal tenderness  There is no guarding or rebound  Musculoskeletal: Normal range of motion  Lymphadenopathy:      Cervical: No cervical adenopathy  Skin:     Findings: No rash  Nails: There is no clubbing  Neurological:      Mental Status: He is alert and oriented to person, place, and time  Cranial Nerves: No cranial nerve deficit     Psychiatric:         Mood and Affect: Mood normal

## 2020-09-30 ENCOUNTER — PROCEDURE VISIT (OUTPATIENT)
Dept: FAMILY MEDICINE CLINIC | Facility: CLINIC | Age: 70
End: 2020-09-30
Payer: MEDICARE

## 2020-09-30 VITALS
DIASTOLIC BLOOD PRESSURE: 60 MMHG | TEMPERATURE: 97.3 F | SYSTOLIC BLOOD PRESSURE: 132 MMHG | HEART RATE: 76 BPM | WEIGHT: 189 LBS | RESPIRATION RATE: 16 BRPM | BODY MASS INDEX: 29.16 KG/M2

## 2020-09-30 DIAGNOSIS — L98.9 BENIGN SKIN LESION OF FACE: Primary | ICD-10-CM

## 2020-09-30 PROCEDURE — 88304 TISSUE EXAM BY PATHOLOGIST: CPT | Performed by: PATHOLOGY

## 2020-09-30 PROCEDURE — 11440 EXC FACE-MM B9+MARG 0.5 CM/<: CPT | Performed by: FAMILY MEDICINE

## 2020-09-30 NOTE — PROGRESS NOTES
Skin excision    Date/Time: 9/30/2020 5:01 PM  Performed by: Loraine Dhillon MD  Authorized by: Loraine Dhillon MD     Procedure Details - Skin Excision:     Number of Lesions:  1  Lesion 1:     Body area:  [address]    Head/neck location:  R cheek    Initial size (mm):  5       Final defect size (mm):  5    Malignancy: benign lesion    Lesion 6:          5 mm pedunculated skin lesion under right eye  Intermittent bleeding on ASA -CAD  Using aseptic technique base of lesion infiltrated with Lidocaine 1% 0 2 ML  Lesion removed by shave excision with 15 blade  Light curettage/electrodesication  Compression dressing applied  Specimen sent for biopsy  Wound care instructions reviewed

## 2020-10-09 ENCOUNTER — OFFICE VISIT (OUTPATIENT)
Dept: CARDIOLOGY CLINIC | Facility: CLINIC | Age: 70
End: 2020-10-09
Payer: MEDICARE

## 2020-10-09 VITALS
BODY MASS INDEX: 29.03 KG/M2 | WEIGHT: 185 LBS | HEIGHT: 67 IN | OXYGEN SATURATION: 99 % | SYSTOLIC BLOOD PRESSURE: 160 MMHG | HEART RATE: 60 BPM | DIASTOLIC BLOOD PRESSURE: 72 MMHG

## 2020-10-09 DIAGNOSIS — I10 BENIGN ESSENTIAL HYPERTENSION: ICD-10-CM

## 2020-10-09 DIAGNOSIS — I25.10 CORONARY ARTERY DISEASE INVOLVING NATIVE CORONARY ARTERY OF NATIVE HEART WITHOUT ANGINA PECTORIS: Primary | ICD-10-CM

## 2020-10-09 DIAGNOSIS — E78.2 MIXED HYPERLIPIDEMIA: ICD-10-CM

## 2020-10-09 PROCEDURE — 99213 OFFICE O/P EST LOW 20 MIN: CPT | Performed by: INTERNAL MEDICINE

## 2020-10-13 ENCOUNTER — TELEPHONE (OUTPATIENT)
Dept: FAMILY MEDICINE CLINIC | Facility: CLINIC | Age: 70
End: 2020-10-13

## 2020-10-13 DIAGNOSIS — N52.9 MALE ERECTILE DISORDER: Primary | ICD-10-CM

## 2020-10-13 RX ORDER — SILDENAFIL 50 MG/1
50 TABLET, FILM COATED ORAL DAILY PRN
Qty: 30 TABLET | Refills: 3 | Status: SHIPPED | OUTPATIENT
Start: 2020-10-13 | End: 2022-04-14 | Stop reason: SDUPTHER

## 2020-10-19 ENCOUNTER — TELEMEDICINE (OUTPATIENT)
Dept: FAMILY MEDICINE CLINIC | Facility: CLINIC | Age: 70
End: 2020-10-19
Payer: MEDICARE

## 2020-10-19 DIAGNOSIS — M79.10 MYALGIA: Primary | ICD-10-CM

## 2020-10-19 PROCEDURE — 99214 OFFICE O/P EST MOD 30 MIN: CPT | Performed by: PHYSICIAN ASSISTANT

## 2020-10-21 ENCOUNTER — LAB (OUTPATIENT)
Dept: LAB | Facility: CLINIC | Age: 70
End: 2020-10-21
Payer: MEDICARE

## 2020-10-21 DIAGNOSIS — M79.10 MYALGIA: ICD-10-CM

## 2020-10-21 LAB
ALBUMIN SERPL BCP-MCNC: 4.3 G/DL (ref 3.5–5)
ALP SERPL-CCNC: 65 U/L (ref 46–116)
ALT SERPL W P-5'-P-CCNC: 24 U/L (ref 12–78)
ANION GAP SERPL CALCULATED.3IONS-SCNC: 5 MMOL/L (ref 4–13)
AST SERPL W P-5'-P-CCNC: 16 U/L (ref 5–45)
BASOPHILS # BLD AUTO: 0.03 THOUSANDS/ΜL (ref 0–0.1)
BASOPHILS NFR BLD AUTO: 1 % (ref 0–1)
BILIRUB SERPL-MCNC: 0.92 MG/DL (ref 0.2–1)
BUN SERPL-MCNC: 18 MG/DL (ref 5–25)
CALCIUM SERPL-MCNC: 8.9 MG/DL (ref 8.3–10.1)
CHLORIDE SERPL-SCNC: 105 MMOL/L (ref 100–108)
CO2 SERPL-SCNC: 28 MMOL/L (ref 21–32)
CREAT SERPL-MCNC: 0.9 MG/DL (ref 0.6–1.3)
CRP SERPL QL: <3 MG/L
EOSINOPHIL # BLD AUTO: 0.21 THOUSAND/ΜL (ref 0–0.61)
EOSINOPHIL NFR BLD AUTO: 4 % (ref 0–6)
ERYTHROCYTE [DISTWIDTH] IN BLOOD BY AUTOMATED COUNT: 13.4 % (ref 11.6–15.1)
GFR SERPL CREATININE-BSD FRML MDRD: 87 ML/MIN/1.73SQ M
GLUCOSE P FAST SERPL-MCNC: 86 MG/DL (ref 65–99)
HCT VFR BLD AUTO: 42.9 % (ref 36.5–49.3)
HGB BLD-MCNC: 14.3 G/DL (ref 12–17)
IMM GRANULOCYTES # BLD AUTO: 0.01 THOUSAND/UL (ref 0–0.2)
IMM GRANULOCYTES NFR BLD AUTO: 0 % (ref 0–2)
LYMPHOCYTES # BLD AUTO: 1.93 THOUSANDS/ΜL (ref 0.6–4.47)
LYMPHOCYTES NFR BLD AUTO: 35 % (ref 14–44)
MCH RBC QN AUTO: 31.2 PG (ref 26.8–34.3)
MCHC RBC AUTO-ENTMCNC: 33.3 G/DL (ref 31.4–37.4)
MCV RBC AUTO: 94 FL (ref 82–98)
MONOCYTES # BLD AUTO: 0.46 THOUSAND/ΜL (ref 0.17–1.22)
MONOCYTES NFR BLD AUTO: 8 % (ref 4–12)
NEUTROPHILS # BLD AUTO: 2.95 THOUSANDS/ΜL (ref 1.85–7.62)
NEUTS SEG NFR BLD AUTO: 52 % (ref 43–75)
NRBC BLD AUTO-RTO: 0 /100 WBCS
PLATELET # BLD AUTO: 219 THOUSANDS/UL (ref 149–390)
PMV BLD AUTO: 11.5 FL (ref 8.9–12.7)
POTASSIUM SERPL-SCNC: 3.7 MMOL/L (ref 3.5–5.3)
PROT SERPL-MCNC: 8.2 G/DL (ref 6.4–8.2)
RBC # BLD AUTO: 4.59 MILLION/UL (ref 3.88–5.62)
SODIUM SERPL-SCNC: 138 MMOL/L (ref 136–145)
WBC # BLD AUTO: 5.59 THOUSAND/UL (ref 4.31–10.16)

## 2020-10-21 PROCEDURE — 36415 COLL VENOUS BLD VENIPUNCTURE: CPT

## 2020-10-21 PROCEDURE — 80053 COMPREHEN METABOLIC PANEL: CPT

## 2020-10-21 PROCEDURE — 87798 DETECT AGENT NOS DNA AMP: CPT

## 2020-10-21 PROCEDURE — 87801 DETECT AGNT MULT DNA AMPLI: CPT

## 2020-10-21 PROCEDURE — 85025 COMPLETE CBC W/AUTO DIFF WBC: CPT

## 2020-10-21 PROCEDURE — 86140 C-REACTIVE PROTEIN: CPT

## 2020-10-27 ENCOUNTER — TRANSCRIBE ORDERS (OUTPATIENT)
Dept: ADMINISTRATIVE | Facility: HOSPITAL | Age: 70
End: 2020-10-27

## 2020-10-27 DIAGNOSIS — K75.81 NONALCOHOLIC STEATOHEPATITIS (NASH): Primary | ICD-10-CM

## 2020-10-27 LAB — MISCELLANEOUS LAB TEST RESULT: NORMAL

## 2020-11-19 ENCOUNTER — OFFICE VISIT (OUTPATIENT)
Dept: FAMILY MEDICINE CLINIC | Facility: CLINIC | Age: 70
End: 2020-11-19
Payer: MEDICARE

## 2020-11-19 VITALS
DIASTOLIC BLOOD PRESSURE: 82 MMHG | TEMPERATURE: 96.4 F | SYSTOLIC BLOOD PRESSURE: 158 MMHG | HEART RATE: 64 BPM | RESPIRATION RATE: 16 BRPM | BODY MASS INDEX: 29.6 KG/M2 | WEIGHT: 189 LBS

## 2020-11-19 DIAGNOSIS — S40.011A CONTUSION OF RIGHT SHOULDER, INITIAL ENCOUNTER: ICD-10-CM
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
## 2020-11-19 DIAGNOSIS — G89.29 CHRONIC LEFT SHOULDER PAIN: ICD-10-CM

## 2020-11-19 DIAGNOSIS — M25.512 CHRONIC LEFT SHOULDER PAIN: ICD-10-CM

## 2020-11-19 DIAGNOSIS — S46.211S BICEPS TENDON RUPTURE, RIGHT, SEQUELA: ICD-10-CM

## 2020-11-19 DIAGNOSIS — M19.011 ARTHRITIS OF RIGHT ACROMIOCLAVICULAR JOINT: ICD-10-CM

## 2020-11-19 PROCEDURE — 99213 OFFICE O/P EST LOW 20 MIN: CPT | Performed by: FAMILY MEDICINE

## 2020-11-20 ENCOUNTER — APPOINTMENT (OUTPATIENT)
Dept: RADIOLOGY | Facility: MEDICAL CENTER | Age: 70
End: 2020-11-20
Payer: MEDICARE

## 2020-11-20 DIAGNOSIS — S40.011A CONTUSION OF RIGHT SHOULDER, INITIAL ENCOUNTER: ICD-10-CM

## 2020-11-20 DIAGNOSIS — M25.512 CHRONIC LEFT SHOULDER PAIN: ICD-10-CM

## 2020-11-20 DIAGNOSIS — G89.29 CHRONIC LEFT SHOULDER PAIN: ICD-10-CM

## 2020-11-20 PROCEDURE — 73030 X-RAY EXAM OF SHOULDER: CPT

## 2020-11-27 ENCOUNTER — TELEPHONE (OUTPATIENT)
Dept: FAMILY MEDICINE CLINIC | Facility: CLINIC | Age: 70
End: 2020-11-27

## 2020-11-27 DIAGNOSIS — M25.512 ACUTE PAIN OF BOTH SHOULDERS: Primary | ICD-10-CM

## 2020-11-27 DIAGNOSIS — M19.011 ARTHRITIS OF BOTH GLENOHUMERAL JOINTS: ICD-10-CM

## 2020-11-27 DIAGNOSIS — M25.511 ACUTE PAIN OF BOTH SHOULDERS: Primary | ICD-10-CM

## 2020-11-27 DIAGNOSIS — M19.012 ARTHRITIS OF BOTH GLENOHUMERAL JOINTS: ICD-10-CM

## 2020-12-21 ENCOUNTER — HOSPITAL ENCOUNTER (OUTPATIENT)
Dept: NON INVASIVE DIAGNOSTICS | Facility: CLINIC | Age: 70
Discharge: HOME | End: 2020-12-21
Payer: MEDICARE

## 2020-12-21 DIAGNOSIS — Z98.890 HISTORY OF RIGHT-SIDED CAROTID ENDARTERECTOMY: ICD-10-CM

## 2020-12-21 DIAGNOSIS — I65.22 ASYMPTOMATIC STENOSIS OF LEFT CAROTID ARTERY: ICD-10-CM

## 2020-12-21 PROCEDURE — 93880 EXTRACRANIAL BILAT STUDY: CPT | Performed by: SURGERY

## 2020-12-21 PROCEDURE — 93880 EXTRACRANIAL BILAT STUDY: CPT

## 2020-12-29 ENCOUNTER — TELEMEDICINE (OUTPATIENT)
Dept: VASCULAR SURGERY | Facility: CLINIC | Age: 70
End: 2020-12-29
Payer: MEDICARE

## 2020-12-29 VITALS
HEIGHT: 68 IN | DIASTOLIC BLOOD PRESSURE: 68 MMHG | HEART RATE: 66 BPM | WEIGHT: 182 LBS | SYSTOLIC BLOOD PRESSURE: 125 MMHG | BODY MASS INDEX: 27.58 KG/M2

## 2020-12-29 DIAGNOSIS — I65.22 CAROTID STENOSIS, ASYMPTOMATIC, LEFT: Primary | ICD-10-CM

## 2020-12-29 PROCEDURE — 99213 OFFICE O/P EST LOW 20 MIN: CPT | Performed by: SURGERY

## 2020-12-29 RX ORDER — ASPIRIN 81 MG/1
81 TABLET ORAL EVERY OTHER DAY
COMMUNITY

## 2021-02-12 PROBLEM — R19.8 CHANGE IN BOWEL FUNCTION: Status: ACTIVE | Noted: 2021-02-12

## 2021-02-13 DIAGNOSIS — Z23 ENCOUNTER FOR IMMUNIZATION: ICD-10-CM

## 2021-02-16 ENCOUNTER — PROCEDURE VISIT (OUTPATIENT)
Dept: FAMILY MEDICINE CLINIC | Facility: CLINIC | Age: 71
End: 2021-02-16
Payer: MEDICARE

## 2021-02-16 DIAGNOSIS — M25.511 CHRONIC PAIN IN RIGHT SHOULDER: Primary | ICD-10-CM

## 2021-02-16 DIAGNOSIS — G89.29 CHRONIC PAIN IN RIGHT SHOULDER: Primary | ICD-10-CM

## 2021-02-16 PROCEDURE — 97813 ACUP 1/> W/ESTIM 1ST 15 MIN: CPT | Performed by: FAMILY MEDICINE

## 2021-02-16 NOTE — PROGRESS NOTES
Follow-up visit for acupuncture  Dx chronic right shoulder pain  Right handed  X rays right shoulder  11/2020 no acute fracture or dislocation  Similar to prior study, separate ossific/calcific densities are seen about the shoulder joint, noted lateral to the acromion process and also above the [facility] joint  The changes are chronic  There is mild osteoarthritis of the glenohumeral joint  Subacromial spurring suspected  No osteolytic lesions  He has been using prn ES Tylenol for pain  GI intolerance to NSAIDs  Prior allergic reaction to steroids  OV 11/2020 with history fall/injury to right shoulder 09/2020  PE Inspection right shoulder deformity right AC joint  Mj deformity right biceps  No effusion  No warmth or redness  + tenderness over right AC joint, right subacromial area and right anterior shoulder  Restricted ROM with abduction, internal and external rotation  + cross arm test  + Speed test  + impingement sign  + empty can sign  Negative sulcus sign  Negative [place]      Electro acupuncture     SI 9-SI 10 @ 30 Hz x 20 minutes  LI 15-CHANDNI 2 @ 30 Hz x 20 minutes    Right   GB 21, TH 15, SI 13, SI 11, LI 16      Diagnoses and all orders for this visit:    Chronic pain in right shoulder       Follow-up visit in 1-2 weeks    Patient is waiting until he completes his COVID 19 vaccine series before contemplating PT

## 2021-02-23 ENCOUNTER — PROCEDURE VISIT (OUTPATIENT)
Dept: FAMILY MEDICINE CLINIC | Facility: CLINIC | Age: 71
End: 2021-02-23
Payer: MEDICARE

## 2021-02-23 DIAGNOSIS — M25.511 CHRONIC RIGHT SHOULDER PAIN: Primary | ICD-10-CM

## 2021-02-23 DIAGNOSIS — G89.29 CHRONIC RIGHT SHOULDER PAIN: Primary | ICD-10-CM

## 2021-02-23 PROCEDURE — 97813 ACUP 1/> W/ESTIM 1ST 15 MIN: CPT | Performed by: FAMILY MEDICINE

## 2021-02-23 NOTE — PROGRESS NOTES
Follow-up visit for acupuncture  Patient reports pain relief and improved ROM with his 1st treatment  Mild flare up of his pain after shoveling snow  Dx chronic right shoulder pain  Right handed  X rays right shoulder  11/2020 no acute fracture or dislocation  Similar to prior study, separate ossific/calcific densities are seen about the shoulder joint, noted lateral to the acromion process and also above the [facility] joint   The changes are chronic   There is mild osteoarthritis of the glenohumeral joint  Subacromial spurring suspected  No osteolytic lesions  He has been using prn ES Tylenol for pain  GI intolerance to NSAIDs  Prior allergic reaction to steroids  OV 11/2020 with history fall/injury to right shoulder 09/2020          Electro acupuncture right shoulder       SI 9-SI 10 @ 30 Hz x 20 minutes    LI 15-CHANDNI 2 @ 30 Hz x 20 minutes     Right   GB 21, TH 15, SI 13, SI 11, LI 16      Diagnoses and all orders for this visit:    Chronic right shoulder pain       Follow-up visit for acupuncture in 3 weeks

## 2021-03-01 ENCOUNTER — LAB (OUTPATIENT)
Dept: LAB | Facility: MEDICAL CENTER | Age: 71
End: 2021-03-01
Payer: MEDICARE

## 2021-03-01 ENCOUNTER — TRANSCRIBE ORDERS (OUTPATIENT)
Dept: ADMINISTRATIVE | Facility: HOSPITAL | Age: 71
End: 2021-03-01

## 2021-03-01 DIAGNOSIS — K75.81 NONALCOHOLIC STEATOHEPATITIS: Primary | ICD-10-CM

## 2021-03-01 LAB
ALBUMIN SERPL BCP-MCNC: 3.7 G/DL (ref 3.5–5)
ALP SERPL-CCNC: 64 U/L (ref 46–116)
ALT SERPL W P-5'-P-CCNC: 16 U/L (ref 12–78)
ANION GAP SERPL CALCULATED.3IONS-SCNC: 3 MMOL/L (ref 4–13)
AST SERPL W P-5'-P-CCNC: 13 U/L (ref 5–45)
BASOPHILS # BLD AUTO: 0.04 THOUSANDS/ΜL (ref 0–0.1)
BASOPHILS NFR BLD AUTO: 1 % (ref 0–1)
BILIRUB SERPL-MCNC: 0.5 MG/DL (ref 0.2–1)
BUN SERPL-MCNC: 17 MG/DL (ref 5–25)
CALCIUM SERPL-MCNC: 9.2 MG/DL (ref 8.3–10.1)
CHLORIDE SERPL-SCNC: 106 MMOL/L (ref 100–108)
CO2 SERPL-SCNC: 30 MMOL/L (ref 21–32)
CREAT SERPL-MCNC: 0.96 MG/DL (ref 0.6–1.3)
EOSINOPHIL # BLD AUTO: 0.31 THOUSAND/ΜL (ref 0–0.61)
EOSINOPHIL NFR BLD AUTO: 4 % (ref 0–6)
ERYTHROCYTE [DISTWIDTH] IN BLOOD BY AUTOMATED COUNT: 13.5 % (ref 11.6–15.1)
GFR SERPL CREATININE-BSD FRML MDRD: 81 ML/MIN/1.73SQ M
GLUCOSE P FAST SERPL-MCNC: 100 MG/DL (ref 65–99)
HCT VFR BLD AUTO: 42.9 % (ref 36.5–49.3)
HGB BLD-MCNC: 13.8 G/DL (ref 12–17)
IMM GRANULOCYTES # BLD AUTO: 0.01 THOUSAND/UL (ref 0–0.2)
IMM GRANULOCYTES NFR BLD AUTO: 0 % (ref 0–2)
LYMPHOCYTES # BLD AUTO: 1.64 THOUSANDS/ΜL (ref 0.6–4.47)
LYMPHOCYTES NFR BLD AUTO: 23 % (ref 14–44)
MCH RBC QN AUTO: 30.5 PG (ref 26.8–34.3)
MCHC RBC AUTO-ENTMCNC: 32.2 G/DL (ref 31.4–37.4)
MCV RBC AUTO: 95 FL (ref 82–98)
MONOCYTES # BLD AUTO: 0.5 THOUSAND/ΜL (ref 0.17–1.22)
MONOCYTES NFR BLD AUTO: 7 % (ref 4–12)
NEUTROPHILS # BLD AUTO: 4.73 THOUSANDS/ΜL (ref 1.85–7.62)
NEUTS SEG NFR BLD AUTO: 65 % (ref 43–75)
NRBC BLD AUTO-RTO: 0 /100 WBCS
PLATELET # BLD AUTO: 198 THOUSANDS/UL (ref 149–390)
PMV BLD AUTO: 11.4 FL (ref 8.9–12.7)
POTASSIUM SERPL-SCNC: 4.4 MMOL/L (ref 3.5–5.3)
PROT SERPL-MCNC: 7.3 G/DL (ref 6.4–8.2)
RBC # BLD AUTO: 4.52 MILLION/UL (ref 3.88–5.62)
SODIUM SERPL-SCNC: 139 MMOL/L (ref 136–145)
WBC # BLD AUTO: 7.23 THOUSAND/UL (ref 4.31–10.16)

## 2021-03-01 PROCEDURE — 80053 COMPREHEN METABOLIC PANEL: CPT | Performed by: INTERNAL MEDICINE

## 2021-03-01 PROCEDURE — 85025 COMPLETE CBC W/AUTO DIFF WBC: CPT | Performed by: INTERNAL MEDICINE

## 2021-03-01 PROCEDURE — 36415 COLL VENOUS BLD VENIPUNCTURE: CPT | Performed by: INTERNAL MEDICINE

## 2021-03-17 ENCOUNTER — LAB REQUISITION (OUTPATIENT)
Dept: LAB | Facility: HOSPITAL | Age: 71
End: 2021-03-17
Payer: MEDICARE

## 2021-03-17 DIAGNOSIS — K57.30 DIVERTICULOSIS OF LARGE INTESTINE WITHOUT PERFORATION OR ABSCESS WITHOUT BLEEDING: ICD-10-CM

## 2021-03-17 DIAGNOSIS — K22.70 BARRETT'S ESOPHAGUS WITHOUT DYSPLASIA: ICD-10-CM

## 2021-03-17 DIAGNOSIS — R19.4 CHANGE IN BOWEL HABIT: ICD-10-CM

## 2021-03-17 DIAGNOSIS — Z12.11 ENCOUNTER FOR SCREENING FOR MALIGNANT NEOPLASM OF COLON: ICD-10-CM

## 2021-03-17 DIAGNOSIS — Z48.815 ENCOUNTER FOR SURGICAL AFTERCARE FOLLOWING SURGERY ON THE DIGESTIVE SYSTEM: ICD-10-CM

## 2021-03-17 DIAGNOSIS — R10.13 EPIGASTRIC PAIN: ICD-10-CM

## 2021-03-17 DIAGNOSIS — K63.5 POLYP OF COLON: ICD-10-CM

## 2021-03-17 PROCEDURE — 88305 TISSUE EXAM BY PATHOLOGIST: CPT | Performed by: PATHOLOGY

## 2021-03-22 ENCOUNTER — OFFICE VISIT (OUTPATIENT)
Dept: FAMILY MEDICINE CLINIC | Facility: CLINIC | Age: 71
End: 2021-03-22
Payer: MEDICARE

## 2021-03-22 VITALS
WEIGHT: 191 LBS | HEART RATE: 62 BPM | TEMPERATURE: 97.2 F | BODY MASS INDEX: 28.95 KG/M2 | HEIGHT: 68 IN | DIASTOLIC BLOOD PRESSURE: 72 MMHG | SYSTOLIC BLOOD PRESSURE: 136 MMHG | RESPIRATION RATE: 16 BRPM

## 2021-03-22 DIAGNOSIS — I25.10 CORONARY ARTERY DISEASE INVOLVING NATIVE CORONARY ARTERY OF NATIVE HEART WITHOUT ANGINA PECTORIS: ICD-10-CM

## 2021-03-22 DIAGNOSIS — E79.0 HYPERURICEMIA: ICD-10-CM

## 2021-03-22 DIAGNOSIS — G89.29 CHRONIC RIGHT SHOULDER PAIN: ICD-10-CM

## 2021-03-22 DIAGNOSIS — E78.2 MIXED HYPERLIPIDEMIA: ICD-10-CM

## 2021-03-22 DIAGNOSIS — I65.22 CAROTID STENOSIS, ASYMPTOMATIC, LEFT: ICD-10-CM

## 2021-03-22 DIAGNOSIS — M19.011 OSTEOARTHRITIS OF RIGHT GLENOHUMERAL JOINT: ICD-10-CM

## 2021-03-22 DIAGNOSIS — M25.511 CHRONIC RIGHT SHOULDER PAIN: ICD-10-CM

## 2021-03-22 DIAGNOSIS — Z98.890 HISTORY OF RIGHT-SIDED CAROTID ENDARTERECTOMY: ICD-10-CM

## 2021-03-22 DIAGNOSIS — Z00.00 MEDICARE ANNUAL WELLNESS VISIT, SUBSEQUENT: ICD-10-CM

## 2021-03-22 DIAGNOSIS — K22.70 BARRETT'S ESOPHAGUS WITHOUT DYSPLASIA: ICD-10-CM

## 2021-03-22 DIAGNOSIS — K76.0 FATTY INFILTRATION OF LIVER: ICD-10-CM

## 2021-03-22 DIAGNOSIS — I10 BENIGN ESSENTIAL HYPERTENSION: Primary | ICD-10-CM

## 2021-03-22 DIAGNOSIS — K58.0 IRRITABLE BOWEL SYNDROME WITH DIARRHEA: ICD-10-CM

## 2021-03-22 DIAGNOSIS — K21.9 GASTROESOPHAGEAL REFLUX DISEASE WITHOUT ESOPHAGITIS: ICD-10-CM

## 2021-03-22 DIAGNOSIS — R73.01 IMPAIRED FASTING GLUCOSE: ICD-10-CM

## 2021-03-22 PROBLEM — R19.8 CHANGE IN BOWEL FUNCTION: Status: RESOLVED | Noted: 2021-02-12 | Resolved: 2021-03-22

## 2021-03-22 PROCEDURE — G0439 PPPS, SUBSEQ VISIT: HCPCS | Performed by: FAMILY MEDICINE

## 2021-03-22 PROCEDURE — 1123F ACP DISCUSS/DSCN MKR DOCD: CPT | Performed by: FAMILY MEDICINE

## 2021-03-22 PROCEDURE — 99214 OFFICE O/P EST MOD 30 MIN: CPT | Performed by: FAMILY MEDICINE

## 2021-03-22 NOTE — PROGRESS NOTES
Assessment and Plan:     Problem List Items Addressed This Visit        Digestive    Genao esophagus    Fatty infiltration of liver    Gastroesophageal reflux disease without esophagitis    Irritable bowel syndrome with diarrhea       Endocrine    Impaired fasting glucose       Cardiovascular and Mediastinum    Benign essential hypertension - Primary    Coronary artery disease involving native coronary artery of native heart without angina pectoris    Carotid stenosis, asymptomatic, left       Musculoskeletal and Integument    Osteoarthritis of right glenohumeral joint       Other    Hyperuricemia    Mixed hyperlipidemia    History of right-sided carotid endarterectomy      Other Visit Diagnoses     Chronic right shoulder pain        Medicare annual wellness visit, subsequent            BMI Counseling: Body mass index is 29 04 kg/m²  The BMI is above normal  Nutrition recommendations include decreasing portion sizes, consuming healthier snacks, moderation in carbohydrate intake, reducing intake of saturated and trans fat and reducing intake of cholesterol  Exercise recommendations include exercising 3-5 times per week  No pharmacotherapy was ordered  Falls Plan of Care: Balance, strength, and gait training instructions were provided  Preventive health issues were discussed with patient, and age appropriate screening tests were ordered as noted in patient's After Visit Summary  Personalized health advice and appropriate referrals for health education or preventive services given if needed, as noted in patient's After Visit Summary       History of Present Illness:     Patient presents for Medicare Annual Wellness visit    Patient Care Team:  Juice Roe MD as PCP - General  Rishi Prather MD as Endoscopist  Rossana Del Castillo DO (Cardiology)     Problem List:     Patient Active Problem List   Diagnosis    Asthma    Genao esophagus    Benign essential hypertension    Diverticulosis    Fatty infiltration of liver    Gastroesophageal reflux disease without esophagitis    Hyperuricemia    Impaired fasting glucose    Mixed hyperlipidemia    Drug-induced erectile dysfunction    Non-allergic rhinitis    Osteoarthritis of knee    Vitamin B12 deficiency    Irritable bowel syndrome with diarrhea    History of colonic diverticulitis    History of right-sided carotid endarterectomy    Coronary artery disease involving native coronary artery of native heart without angina pectoris    Incisional hernia    Arthritis of right acromioclavicular joint    Carotid stenosis, asymptomatic, left    Osteoarthritis of right glenohumeral joint      Past Medical and Surgical History:     Past Medical History:   Diagnosis Date    Allergic reaction     LAST ASSESSED: [id number]    Asthma     Bursitis of heel     LAST ASSESSED: [id number]    Derangement of meniscus of left knee due to old injury     LAST ASSESSED: [id number]    Diverticulitis of colon     LAST ASSESSED: [id number]    Dizziness     LAST ASSESSED: [id number]    GERD (gastroesophageal reflux disease)     History of colon polyps     Hypertension     Malignant hypertension     LAST ASSESSED: [id number]    Non-ST elevated myocardial infarction ([facility] [address] ) 3/2/2020    NSTEMI (non-ST elevated myocardial infarction) ([facility] [address] )     Plantar fasciitis     Seasonal allergies     Shoulder impingement     LAST ASSESSED: [id number]    Stroke ([facility] [address] )      Past Surgical History:   Procedure Laterality Date    APPENDECTOMY      CAROTID ENDARTARECTOMY      COLECTOMY Left     PARTIAL - SIGMOID; HEMICOLECTOMY FOR DIVERTICULITIS; ONSET: 1987    COLON SURGERY      COLONOSCOPY N/A 12/9/2016    Procedure: COLONOSCOPY;  Surgeon: Omer Cerna MD;  [facility];   Service:     HERNIA REPAIR      VENTRAL    NISSEN FUNDOPLICATION      ESOPHAGOGASTRIC FUNDOPLASTY LAST ASSESSED: [id number]    SINUS SURGERY      THROMBOENDARTERECTOMY      CAROTID; ONSET; MAY 2012 Family History:     Family History   Problem Relation Age of Onset    Cancer Paternal Grandfather     Hypertension Mother    [place] Hyperlipidemia Mother     Hypertension Father     Hyperlipidemia Father     Colon cancer Neg Hx       Social History:     E-Cigarette/Vaping    E-Cigarette Use Never User      E-Cigarette/Vaping Substances    Nicotine No     THC No     CBD No     Flavoring No     Other No     Unknown No      Social History     Socioeconomic History    Marital status: /Civil Union     Spouse name: None    Number of children: None    Years of education: None    Highest education level: None   Occupational History    None   Social Needs    Financial resource strain: None    Food insecurity     Worry: None     Inability: None    Transportation needs     Medical: None     Non-medical: None   Tobacco Use    Smoking status: Former Smoker     Packs/day: 0 25     Years: 5 00     Pack years: 1 25     Types: Cigarettes     Quit date:      Years since quittin 2    Smokeless tobacco: Never Used   Substance and Sexual Activity    Alcohol use: No    Drug use: No    Sexual activity: None   Lifestyle    Physical activity     Days per week: None     Minutes per session: None    Stress: None   Relationships    Social connections     Talks on phone: None     Gets together: None     Attends Nondenominational service: None     Active member of club or organization: None     Attends meetings of clubs or organizations: None     Relationship status: None    Intimate partner violence     Fear of current or ex partner: None     Emotionally abused: None     Physically abused: None     Forced sexual activity: None   Other Topics Concern    None   Social History Narrative    None      Medications and Allergies:     Current Outpatient Medications   Medication Sig Dispense Refill    acetaminophen (TYLENOL) 325 mg tablet Take 2 tablets (650 mg total) by mouth every 6 (six) hours as needed for mild pain, headaches or fever 30 tablet 0    albuterol (PROVENTIL HFA,VENTOLIN HFA) 90 mcg/act inhaler Inhale 2 puffs every 6 (six) hours as needed for wheezing 18 g 3    aspirin (ECOTRIN LOW STRENGTH) 81 mg EC tablet Take 81 mg by mouth daily      fluticasone (FLONASE) 50 mcg/act nasal spray 1 spray into each nostril daily      levalbuterol (XOPENEX) 1 25 mg/3 mL nebulizer solution Take 1 vial (1 25 mg total) by nebulization every 8 (eight) hours as needed for wheezing or shortness of breath 25 vial 3    multivitamin (THERAGRAN) TABS Take 1 tablet by mouth daily      sildenafil (VIAGRA) 50 MG tablet Take 1 tablet (50 mg total) by mouth daily as needed for erectile dysfunction 30 tablet 3     No current facility-administered medications for this visit        Allergies   Allergen Reactions    Depo-Medrol [Methylprednisolone] Anaphylaxis     Severe Vagal Reaction    Iv Contrast [Iodinated Diagnostic Agents] Angioedema     Pt states itchy/swollen eyes, trouble breathing (years ago)    Rosuvastatin Headache and Confusion    Sulfa Antibiotics       Immunizations:     Immunization History   Administered Date(s) Administered    INFLUENZA 12/19/2016, 09/25/2017, 09/18/2018    Influenza Quadrivalent Preservative Free 3 years and older IM 09/25/2017    Influenza Quadrivalent, 6-35 Months IM 12/19/2016    Influenza, high dose seasonal 0 7 mL 01/09/2020, 09/04/2020    Influenza, seasonal, injectable 11/14/2012, 11/12/2013    Pneumococcal Conjugate 13-Valent 08/30/2019    Pneumococcal Polysaccharide PPV23 12/17/2007, 01/26/2018    TD (adult) Preservative Free 08/30/2019    Td (adult), adsorbed 08/30/2019      Health Maintenance:         Topic Date Due    Colonoscopy Surveillance  03/17/2026    Colorectal Cancer Screening  03/17/2031    Hepatitis C Screening  Completed         Topic Date Due    COVID-19 Vaccine (1) Never done      Medicare Health Risk Assessment:     /72   Pulse 62   Temp (!) 97 2 °F (36 2 °C)   Resp 16   Ht 5' 8" (1 727 m)   Wt 86 6 kg (191 lb)   BMI 29 04 kg/m²      Guero Valadez is here for his Subsequent Wellness visit  Last Medicare Wellness visit information reviewed, patient interviewed and updates made to the record today  Health Risk Assessment:   Patient rates overall health as very good  Patient feels that their physical health rating is much better  Patient is satisfied with their life  Eyesight was rated as same  Hearing was rated as slightly worse  Patient feels that their emotional and mental health rating is same  Patients states they are never, rarely angry  Patient states they are never, rarely unusually tired/fatigued  Pain experienced in the last 7 days has been some  Patient's pain rating has been 8/10  Patient states that he has experienced no weight loss or gain in last 6 months  Depression Screening:   PHQ-2 Score: 0      Fall Risk Screening: In the past year, patient has experienced: history of falling in past year    Number of falls: 1  Injured during fall?: Yes    Feels unsteady when standing or walking?: No    Worried about falling?: No      Home Safety:  Patient does not have trouble with stairs inside or outside of their home  Patient has working smoke alarms and has working carbon monoxide detector  Home safety hazards include: none  Nutrition:   Current diet is Low Saturated Fat, Limited junk food and No Added Salt  Medications:   Patient is currently taking over-the-counter supplements  OTC medications include: see medication list  Patient is able to manage medications  Activities of Daily Living (ADLs)/Instrumental Activities of Daily Living (IADLs):   Walk and transfer into and out of bed and chair?: Yes  Dress and groom yourself?: Yes    Bathe or shower yourself?: Yes    Feed yourself?  Yes  Do your laundry/housekeeping?: Yes  Manage your money, pay your bills and track your expenses?: Yes  Make your own meals?: Yes    Do your own shopping?: Yes    Previous Hospitalizations:   Any hospitalizations or ED visits within the last 12 months?: No      Advance Care Planning:   Living will: Yes    Advanced directive: Yes      Cognitive Screening:   Provider or family/friend/caregiver concerned regarding cognition?: No    PREVENTIVE SCREENINGS      Cardiovascular Screening:    General: Screening Not Indicated and History Lipid Disorder      Diabetes Screening:     General: Screening Current      Colorectal Cancer Screening:     General: Screening Current      Prostate Cancer Screening:    General: Screening Current      Osteoporosis Screening:    General: Screening Not Indicated      Abdominal Aortic Aneurysm (AAA) Screening:    Risk factors include: age between 73-69 yo and tobacco use        General: Screening Not Indicated      Lung Cancer Screening:     General: Screening Not Indicated      Hepatitis C Screening:    General: Screening Current    Screening, Brief Intervention, and Referral to Treatment (SBIRT)    Screening  Typical number of drinks in a day: 0  Typical number of drinks in a week: 0  Interpretation: Low risk drinking behavior  Other Counseling Topics:   Calcium and vitamin D intake and regular weightbearing exercise         Marquise Santiago MD

## 2021-03-22 NOTE — PROGRESS NOTES
Assessment/Plan:     Diagnoses and all orders for this visit:    Benign essential hypertension    Mixed hyperlipidemia    Impaired fasting glucose    Coronary artery disease involving native coronary artery of native heart without angina pectoris    Carotid stenosis, asymptomatic, left    History of right-sided carotid endarterectomy    Irritable bowel syndrome with diarrhea    Fatty infiltration of liver    Gastroesophageal reflux disease without esophagitis    Genao's esophagus without dysplasia    Hyperuricemia    Chronic right shoulder pain    Osteoarthritis of right glenohumeral joint    Medicare annual wellness visit, subsequent           Continue with current medications  Monitor blood pressures at home  PT referral recommended for recurrent right shoulder pain-consider MRI right shoulder for persistent symptoms  Patient has completed COVID-19 vaccine series     Patient ID: Main Chi is a 78 y o  male  Follow up visit  Medications reviewed  Medications reviewed  Longstanding history of hypertension with intolerance to multiple blood pressure agents  Element of white coat syndrome  He uses prn Lisinopril 10 mg  03/2021 creatinine 0 96  Electrolytes normal  Hgb 13 8  Hyperlipidemia mixed type and CAD with prior intolerance to a number of statins, fibrates and fish oils  He was most recently on Crestor 10 mg QOD but stopped due to side effects  08/2019  s/p admission for NSTEMI  Elevated troponin peaking at 4 61  Cardiac catheterization 95% lesion in the distal RCA  Status post RENE x2  Mid circumflex 65% stenosis  Proximal right coronary artery 50% stenosis  Echocardiogram normal left ventricular systolic function  EF 60%  Hypokinesis of the basal mid inferior walls  Grade 1 diastolic dysfunction  Normal right ventricle  Mildly dilated left atrium  Mild MR  No pericardial effusion  Aortic root normal size   He was on dual platelet inhibition with Aspirin and Brilinta-now on ASA 81 mg daily  Last lipid profile 02/2020 cholesterol 138  Triglycerides 81  HDL 53  LDL 69  03/2021 LFTs normal  Non fasting   Lab Results   Component Value Date    WBC 7 23 03/01/2021    HGB 13 8 03/01/2021    HCT 42 9 03/01/2021    MCV 95 03/01/2021     03/01/2021     Lab Results   Component Value Date     12/10/2015    SODIUM 139 03/01/2021    K 4 4 03/01/2021     03/01/2021    CO2 30 03/01/2021    ANIONGAP 11 12/10/2015    AGAP 3 (L) 03/01/2021    BUN 17 03/01/2021    CREATININE 0 96 03/01/2021    GLUC 76 02/24/2020    GLUF 100 (H) 03/01/2021    CALCIUM 9 2 03/01/2021    AST 13 03/01/2021    ALT 16 03/01/2021    ALKPHOS 64 03/01/2021    PROT 7 6 11/11/2015    TP 7 3 03/01/2021    BILITOT 0 47 11/11/2015    TBILI 0 50 03/01/2021    EGFR 81 03/01/2021     Lab Results   Component Value Date    CHOLESTEROL 138 02/24/2020    CHOLESTEROL 152 10/26/2019    CHOLESTEROL 202 (H) 08/19/2019     Lab Results   Component Value Date    HDL 53 02/24/2020    HDL 47 10/26/2019    HDL 45 08/19/2019     Lab Results   Component Value Date    TRIG 81 02/24/2020    TRIG 79 10/26/2019    TRIG 48 08/19/2019     Lab Results   Component Value Date    LDLCALC 69 02/24/2020     Lab Results   Component Value Date    [id number] 1 446 01/24/2019         The following portions of the patient's history were reviewed and updated as appropriate: allergies, current medications, past family history, past medical history, past social history, past surgical history and problem list     Review of Systems   Constitutional: Negative for appetite change, chills, fatigue, fever and unexpected weight change  70+ lb weight loss from 01/2019 with dieting  HENT: Negative for congestion, ear pain, rhinorrhea, sore throat and trouble swallowing  Eyes: Negative for visual disturbance  Respiratory: Negative for cough, shortness of breath and wheezing  History of asthma he uses infrequent Albuterol MDI    No orthopnea or PND   Cardiovascular: Negative for chest pain, palpitations and leg swelling  See HPI  Carotid artery stenosis  Status post right CEA  Carotid artery Dopplers 12/2019 widely patent internal carotid artery and endarterectomy site  Left ICA < 50% stenosis  03/2017 nuclear stress test normal    Gastrointestinal: Positive for abdominal distention, abdominal pain and diarrhea  Negative for blood in stool, constipation, nausea and vomiting  IBS with diarrhea  Episodes of postprandial abdominal bloating  Multiple abdominal surgeries  03/2021 EGD normal esophagus  Status post fundoplication  Biopsies no dysplasia or malignancy  08/2019 EGD Genao's esophagus  Multiple small superficial ulcer craters in gastric antrum  No longer on PPI  03/2021 colonoscopy mild diverticulosis left colon  Evidence of previous end to end sigmoid resection  Four sessile polyps  Biopsies no microscopic colitis  Polyps benign-tubular adenomas  Fatty liver  03/2021 LFTs normal   ultrasound 06/2019 08/2019 alpha 1 antitrypsin level normal   Celiac antibodies negative  Iron 84  Ferritin 349  Hepatitis B surface antigen negative  Hepatitis-C antibody negative  Anti smooth muscle antibody negative  Ceruloplasmin level normal   RED negative  SPEP no monoclonal bands  PEREZ Fibroscore minimal to moderate steatosis  No fibrosis  Borderline to probable PEREZ   Endocrine: Negative for polydipsia and polyuria  Genitourinary: Negative for difficulty urinating  Lab Results       Component                Value               Date                       PSA                      0 7                 02/24/2020                 PSA                      0 5                 03/11/2017                 PSA                      0 4                 11/11/2015                    '   Musculoskeletal: Positive for arthralgias and back pain  Negative for myalgias          Status post fall 09/2020 landing on his right shoulder and right elbow  Persistent pain right shoulder  Range of motion has slowly improved but still somewhat limited  Right handed  He has been using as needed Extra-strength Tylenol for pain  10/2020 CRP < 3 0  11/2020 x-rays of right shoulder separate os septic/calcific densities seen about the shoulder joint noted lateral to the acromion process and above AC joint  Mild osteoarthritis of glenohumeral joint  Subacromial spurring  History of gout/hyperuricemia with flare-up 06/2020  He is no longer taking Allopurinol or Colchicine due to GI side effects  08/2020 uric acid level 6 3 improved from 9 7 in 2014  He has a history of multiple drug allergies/sensitivities  He is on low-dose Aspirin 81 mg daily  01/2020 x-rays lumbar spine mild loss of disc at L5-S1  Facet degenerative changes particularly L3-L4 through L5-S1  Lab Results       Component                Value               Date                       URICACID                 6 3                 08/25/2020               Skin: Negative for rash  Allergic/Immunologic: Negative for environmental allergies  Neurological: Negative for dizziness and headaches  Hematological: Negative for adenopathy  Does not bruise/bleed easily  Psychiatric/Behavioral: Negative for dysphoric mood and sleep disturbance  Objective:      /72   Pulse 62   Temp (!) 97 2 °F (36 2 °C)   Resp 16   Ht 5' 8" (1 727 m)   Wt 86 6 kg (191 lb)   BMI 29 04 kg/m²     BP Readings from Last 3 Encounters:   03/22/21 136/72   12/29/20 125/68   11/19/20 158/82     Wt Readings from Last 3 Encounters:   03/22/21 86 6 kg (191 lb)   02/12/21 86 2 kg (190 lb)   12/29/20 82 6 kg (182 lb)          Physical Exam  Vitals signs and nursing note reviewed  Constitutional:       General: He is not in acute distress  Appearance: He is well-developed     HENT:      Right Ear: Tympanic membrane normal       Left Ear: Tympanic membrane normal    Eyes:      General: No scleral icterus  Extraocular Movements: Extraocular movements intact  Conjunctiva/sclera: Conjunctivae normal       Pupils: Pupils are equal, round, and reactive to light  Neck:      Thyroid: No thyroid mass or thyromegaly  Vascular: No carotid bruit or JVD  Trachea: No tracheal deviation  Cardiovascular:      Rate and Rhythm: Normal rate and regular rhythm  Pulses:           Carotid pulses are 2+ on the right side and 2+ on the left side  Heart sounds: Normal heart sounds  No murmur  No gallop  Pulmonary:      Effort: Pulmonary effort is normal  No respiratory distress  Breath sounds: Normal breath sounds  No wheezing or rales  Abdominal:      General: Bowel sounds are normal  There is no distension or abdominal bruit  Palpations: Abdomen is soft  There is no hepatomegaly, splenomegaly or mass  Tenderness: There is no abdominal tenderness  There is no guarding or rebound  Musculoskeletal:      Right lower leg: No edema  Left lower leg: No edema  Comments: Decreased ROM with abduction/external rotation right shoulder  + Impingement  Lymphadenopathy:      Cervical: No cervical adenopathy  Upper Body:      Right upper body: No supraclavicular adenopathy  Left upper body: No supraclavicular adenopathy  Skin:     Findings: No rash  Nails: There is no clubbing  Neurological:      General: No focal deficit present  Mental Status: He is alert and oriented to person, place, and time  Motor: No weakness     Psychiatric:         Mood and Affect: Mood normal          Behavior: Behavior normal          Cognition and Memory: Cognition normal

## 2021-03-22 NOTE — PATIENT INSTRUCTIONS
Medicare Preventive Visit Patient Instructions  Thank you for completing your Welcome to Medicare Visit or Medicare Annual Wellness Visit today  Your next wellness visit will be due in one year (3/23/2022)  The screening/preventive services that you may require over the next 5-10 years are detailed below  Some tests may not apply to you based off risk factors and/or age  Screening tests ordered at today's visit but not completed yet may show as past due  Also, please note that scanned in results may not display below  Preventive Screenings:  Service Recommendations Previous Testing/Comments   Colorectal Cancer Screening  · Colonoscopy    · Fecal Occult Blood Test (FOBT)/Fecal Immunochemical Test (FIT)  · Fecal DNA/Cologuard Test  · Flexible Sigmoidoscopy Age: 54-65 years old   Colonoscopy: every 10 years (May be performed more frequently if at higher risk)  OR  FOBT/FIT: every 1 year  OR  Cologuard: every 3 years  OR  Sigmoidoscopy: every 5 years  Screening may be recommended earlier than age 48 if at higher risk for colorectal cancer  Also, an individualized decision between you and your healthcare provider will decide whether screening between the ages of 74-80 would be appropriate   Colonoscopy: 03/17/2021  FOBT/FIT: Not on file  Cologuard: Not on file  Sigmoidoscopy: Not on file    Screening Current     Prostate Cancer Screening Individualized decision between patient and health care provider in men between ages of 53-78   Medicare will cover every 12 months beginning on the day after your 50th birthday PSA: 0 7 ng/mL           Hepatitis C Screening Once for adults born between 1945 and 1965  More frequently in patients at high risk for Hepatitis C Hep C Antibody: 08/06/2019    Screening Current   Diabetes Screening 1-2 times per year if you're at risk for diabetes or have pre-diabetes Fasting glucose: 100 mg/dL   A1C: 5 2 %    Screening Current   Cholesterol Screening Once every 5 years if you don't have a lipid disorder  May order more often based on risk factors  Lipid panel: 02/24/2020    Screening Not Indicated  History Lipid Disorder      Other Preventive Screenings Covered by Medicare:  1  Abdominal Aortic Aneurysm (AAA) Screening: covered once if your at risk  You're considered to be at risk if you have a family history of AAA or a male between the age of 73-68 who smoking at least 100 cigarettes in your lifetime  2  Lung Cancer Screening: covers low dose CT scan once per year if you meet all of the following conditions: (1) Age 50-69; (2) No signs or symptoms of lung cancer; (3) Current smoker or have quit smoking within the last 15 years; (4) You have a tobacco smoking history of at least 30 pack years (packs per day x number of years you smoked); (5) You get a written order from a healthcare provider  3  Glaucoma Screening: covered annually if you're considered high risk: (1) You have diabetes OR (2) Family history of glaucoma OR (3)  aged 48 and older OR (3)  American aged 72 and older  3  Osteoporosis Screening: covered every 2 years if you meet one of the following conditions: (1) Have a vertebral abnormality; (2) On glucocorticoid therapy for more than 3 months; (3) Have primary hyperparathyroidism; (4) On osteoporosis medications and need to assess response to drug therapy  5  HIV Screening: covered annually if you're between the age of 12-76  Also covered annually if you are younger than 13 and older than 72 with risk factors for HIV infection  For pregnant patients, it is covered up to 3 times per pregnancy      Immunizations:  Immunization Recommendations   Influenza Vaccine Annual influenza vaccination during flu season is recommended for all persons aged >= 6 months who do not have contraindications   Pneumococcal Vaccine (Prevnar and Pneumovax)  * Prevnar = PCV13  * Pneumovax = PPSV23 Adults 25-60 years old: 1-3 doses may be recommended based on certain risk factors  Adults 72 years old: Prevnar (PCV13) vaccine recommended followed by Pneumovax (PPSV23) vaccine  If already received PPSV23 since turning 65, then PCV13 recommended at least one year after PPSV23 dose  Hepatitis B Vaccine 3 dose series if at intermediate or high risk (ex: diabetes, end stage renal disease, liver disease)   Tetanus (Td) Vaccine - COST NOT COVERED BY MEDICARE PART B Following completion of primary series, a booster dose should be given every 10 years to maintain immunity against tetanus  Td may also be given as tetanus wound prophylaxis  Tdap Vaccine - COST NOT COVERED BY MEDICARE PART B Recommended at least once for all adults  For pregnant patients, recommended with each pregnancy  Shingles Vaccine (Shingrix) - COST NOT COVERED BY MEDICARE PART B  2 shot series recommended in those aged 48 and above     Health Maintenance Due:      Topic Date Due    Colonoscopy Surveillance  03/17/2026    Colorectal Cancer Screening  03/17/2031    Hepatitis C Screening  Completed     Immunizations Due:      Topic Date Due    COVID-19 Vaccine (1) Never done     Advance Directives   What are advance directives? Advance directives are legal documents that state your wishes and plans for medical care  These plans are made ahead of time in case you lose your ability to make decisions for yourself  Advance directives can apply to any medical decision, such as the treatments you want, and if you want to donate organs  What are the types of advance directives? There are many types of advance directives, and each state has rules about how to use them  You may choose a combination of any of the following:  · Living will: This is a written record of the treatment you want  You can also choose which treatments you do not want, which to limit, and which to stop at a certain time  This includes surgery, medicine, IV fluid, and tube feedings  · Durable power of  for healthcare [facility]):   This is a written record that states who you want to make healthcare choices for you when you are unable to make them for yourself  This person, called a proxy, is usually a family member or a friend  You may choose more than 1 proxy  · Do not resuscitate (DNR) order:  A DNR order is used in case your heart stops beating or you stop breathing  It is a request not to have certain forms of treatment, such as CPR  A DNR order may be included in other types of advance directives  · Medical directive: This covers the care that you want if you are in a coma, near death, or unable to make decisions for yourself  You can list the treatments you want for each condition  Treatment may include pain medicine, surgery, blood transfusions, dialysis, IV or tube feedings, and a ventilator (breathing machine)  · Values history: This document has questions about your views, beliefs, and how you feel and think about life  This information can help others choose the care that you would choose  Why are advance directives important? An advance directive helps you control your care  Although spoken wishes may be used, it is better to have your wishes written down  Spoken wishes can be misunderstood, or not followed  Treatments may be given even if you do not want them  An advance directive may make it easier for your family to make difficult choices about your care  Fall Prevention    Fall prevention  includes ways to make your home and other areas safer  It also includes ways you can move more carefully to prevent a fall  Health conditions that cause changes in your blood pressure, vision, or muscle strength and coordination may increase your risk for falls  Medicines may also increase your risk for falls if they make you dizzy, weak, or sleepy  Fall prevention tips:   · Stand or sit up slowly  · Use assistive devices as directed  · Wear shoes that fit well and have soles that   · Wear a personal alarm  · Stay active  · Manage your medical conditions  Home Safety Tips:  · Add items to prevent falls in the bathroom  · Keep paths clear  · Install bright lights in your home  · Keep items you use often on shelves within reach  · Paint or place reflective tape on the edges of your stairs  Weight Management   Why it is important to manage your weight:  Being overweight increases your risk of health conditions such as heart disease, high blood pressure, type 2 diabetes, and certain types of cancer  It can also increase your risk for osteoarthritis, sleep apnea, and other respiratory problems  Aim for a slow, steady weight loss  Even a small amount of weight loss can lower your risk of health problems  How to lose weight safely:  A safe and healthy way to lose weight is to eat fewer calories and get regular exercise  You can lose up about 1 pound a week by decreasing the number of calories you eat by 500 calories each day  Healthy meal plan for weight management:  A healthy meal plan includes a variety of foods, contains fewer calories, and helps you stay healthy  A healthy meal plan includes the following:  · Eat whole-grain foods more often  A healthy meal plan should contain fiber  Fiber is the part of grains, fruits, and vegetables that is not broken down by your body  Whole-grain foods are healthy and provide extra fiber in your diet  Some examples of whole-grain foods are whole-wheat breads and pastas, oatmeal, brown rice, and bulgur  · Eat a variety of vegetables every day  Include dark, leafy greens such as spinach, kale, gladis greens, and mustard greens  Eat yellow and orange vegetables such as carrots, sweet potatoes, and winter squash  · Eat a variety of fruits every day  Choose fresh or canned fruit (canned in its own juice or light syrup) instead of juice  Fruit juice has very little or no fiber  · Eat low-fat dairy foods  Drink fat-free (skim) milk or 1% milk   Eat fat-free yogurt and low-fat cottage cheese  Try low-fat cheeses such as mozzarella and other reduced-fat cheeses  · Choose meat and other protein foods that are low in fat  Choose beans or other legumes such as split peas or lentils  Choose fish, skinless poultry (chicken or turkey), or lean cuts of red meat (beef or pork)  Before you cook meat or poultry, cut off any visible fat  · Use less fat and oil  Try baking foods instead of frying them  Add less fat, such as margarine, sour cream, regular salad dressing and mayonnaise to foods  Eat fewer high-fat foods  Some examples of high-fat foods include french fries, doughnuts, ice cream, and cakes  · Eat fewer sweets  Limit foods and drinks that are high in sugar  This includes candy, cookies, regular soda, and sweetened drinks  Exercise:  Exercise at least 30 minutes per day on most days of the week  Some examples of exercise include walking, biking, dancing, and swimming  You can also fit in more physical activity by taking the stairs instead of the elevator or parking farther away from stores  Ask your healthcare provider about the best exercise plan for you  © Copyright [employer] 2018 Information is for End User's use only and may not be sold, redistributed or otherwise used for commercial purposes   All illustrations and images included in CareNotes® are the copyrighted property of A D A M , Inc  or [address] [place] [address]

## 2021-04-02 ENCOUNTER — HOSPITAL ENCOUNTER (OUTPATIENT)
Dept: ULTRASOUND IMAGING | Facility: HOSPITAL | Age: 71
Discharge: HOME | End: 2021-04-02
Payer: MEDICARE

## 2021-04-02 DIAGNOSIS — K75.81 NONALCOHOLIC STEATOHEPATITIS (NASH): ICD-10-CM

## 2021-04-02 PROCEDURE — 76981 USE PARENCHYMA: CPT

## 2021-04-13 ENCOUNTER — TRANSCRIBE ORDERS (OUTPATIENT)
Dept: LAB | Facility: CLINIC | Age: 71
End: 2021-04-13

## 2021-04-13 ENCOUNTER — APPOINTMENT (OUTPATIENT)
Dept: LAB | Facility: CLINIC | Age: 71
End: 2021-04-13
Payer: MEDICARE

## 2021-04-13 DIAGNOSIS — E78.5 HYPERLIPIDEMIA, UNSPECIFIED HYPERLIPIDEMIA TYPE: Primary | ICD-10-CM

## 2021-04-13 LAB
CHOLEST SERPL-MCNC: 202 MG/DL (ref 50–200)
HDLC SERPL-MCNC: 53 MG/DL
LDLC SERPL CALC-MCNC: 136 MG/DL (ref 0–100)
NONHDLC SERPL-MCNC: 149 MG/DL
TRIGL SERPL-MCNC: 66 MG/DL

## 2021-04-13 PROCEDURE — 36415 COLL VENOUS BLD VENIPUNCTURE: CPT

## 2021-04-13 PROCEDURE — 80061 LIPID PANEL: CPT

## 2021-05-03 ENCOUNTER — PROCEDURE VISIT (OUTPATIENT)
Dept: FAMILY MEDICINE CLINIC | Facility: CLINIC | Age: 71
End: 2021-05-03
Payer: MEDICARE

## 2021-05-03 DIAGNOSIS — M19.011 OSTEOARTHRITIS OF RIGHT GLENOHUMERAL JOINT: Primary | ICD-10-CM

## 2021-05-03 DIAGNOSIS — M25.511 CHRONIC RIGHT SHOULDER PAIN: ICD-10-CM

## 2021-05-03 DIAGNOSIS — G89.29 CHRONIC RIGHT SHOULDER PAIN: ICD-10-CM

## 2021-05-03 PROBLEM — K75.81 NONALCOHOLIC STEATOHEPATITIS: Status: ACTIVE | Noted: 2020-03-18

## 2021-05-03 PROCEDURE — 97813 ACUP 1/> W/ESTIM 1ST 15 MIN: CPT | Performed by: FAMILY MEDICINE

## 2021-05-03 NOTE — PROGRESS NOTES
Follow-up visit for acupuncture  Patient reports pain relief with acupuncture treatments  Last treatment date 02/23/2021  Dx chronic right shoulder pain-symptoms started a fall  Right handed  X rays right shoulder  11/2020 no acute fracture or dislocation  Similar to prior study, separate ossific/calcific densities are seen about the shoulder joint, noted lateral to the acromion process and also above the [facility] joint   The changes are chronic   There is mild osteoarthritis of the glenohumeral joint  Subacromial spurring suspected  No osteolytic lesions  He has been using prn ES Tylenol for pain  GI intolerance to NSAIDs  Prior allergic reaction to steroids  OV 11/2020 with history fall/injury to right shoulder 09/2020          Electro acupuncture right shoulder       SI 9-SI 10 @ 30 Hz x 20 minutes  LI 15-CHANDNI 2 @ 30 Hz x 20 minutes     Right   GB 21, TH 15, SI 13, SI 11, LI 16    Diagnoses and all orders for this visit:    Osteoarthritis of right glenohumeral joint  -     MRI shoulder right wo contrast; Future    Chronic right shoulder pain  -     MRI shoulder right wo contrast; Future      Follow up visit for acupuncture in 2 weeks    Consider MRI right shoulder for persistent symptoms

## 2021-05-17 ENCOUNTER — PROCEDURE VISIT (OUTPATIENT)
Dept: FAMILY MEDICINE CLINIC | Facility: CLINIC | Age: 71
End: 2021-05-17
Payer: MEDICARE

## 2021-05-17 DIAGNOSIS — G89.29 CHRONIC RIGHT SHOULDER PAIN: Primary | ICD-10-CM

## 2021-05-17 DIAGNOSIS — M25.511 CHRONIC RIGHT SHOULDER PAIN: Primary | ICD-10-CM

## 2021-05-17 PROCEDURE — 97813 ACUP 1/> W/ESTIM 1ST 15 MIN: CPT | Performed by: FAMILY MEDICINE

## 2021-05-17 NOTE — PROGRESS NOTES
Follow-up visit for acupuncture  Patient reports pain relief with acupuncture treatments  Last treatment date 05/03/2021    Dx chronic right shoulder pain-symptoms started a fall  Right handed  X rays right shoulder  11/2020 no acute fracture or dislocation  Similar to prior study, separate ossific/calcific densities are seen about the shoulder joint, noted lateral to the acromion process and also above the [facility] joint   The changes are chronic   There is mild osteoarthritis of the glenohumeral joint  Subacromial spurring suspected  No osteolytic lesions  He has been using prn ES Tylenol for pain  GI intolerance to NSAIDs  Prior allergic reaction to steroids  OV 11/2020 with history fall/injury to right shoulder 09/2020  Electro acupuncture right shoulder       SI 9-SI 10 @ 30 Hz x 20 minutes  LI 15-CHANDNI 2 @ 30 Hz x 20 minutes     Right   GB 21, TH 15, SI 13, SI 11, LI 16      Diagnoses and all orders for this visit:    Chronic right shoulder pain      Patient is scheduled for a MRI of right shoulder this week  Defer additional acupuncture treatments pending results of MRI

## 2021-05-21 ENCOUNTER — HOSPITAL ENCOUNTER (OUTPATIENT)
Dept: RADIOLOGY | Age: 71
Discharge: HOME | End: 2021-05-21
Payer: MEDICARE

## 2021-05-21 DIAGNOSIS — M25.511 CHRONIC RIGHT SHOULDER PAIN: ICD-10-CM

## 2021-05-21 DIAGNOSIS — G89.29 CHRONIC RIGHT SHOULDER PAIN: ICD-10-CM

## 2021-05-21 DIAGNOSIS — M19.011 OSTEOARTHRITIS OF RIGHT GLENOHUMERAL JOINT: ICD-10-CM

## 2021-05-21 PROCEDURE — G1004 CDSM NDSC: HCPCS

## 2021-05-21 PROCEDURE — 73221 MRI JOINT UPR EXTREM W/O DYE: CPT

## 2021-05-26 ENCOUNTER — TELEPHONE (OUTPATIENT)
Dept: FAMILY MEDICINE CLINIC | Facility: CLINIC | Age: 71
End: 2021-05-26

## 2021-05-26 DIAGNOSIS — S46.011A TRAUMATIC COMPLETE TEAR OF RIGHT ROTATOR CUFF, INITIAL ENCOUNTER: Primary | ICD-10-CM

## 2021-05-26 NOTE — TELEPHONE ENCOUNTER
Phone call I called and spoke with patient's wife regarding patient's MRI of right shoulder  Plan he is not interested in an orthopedic surgery opinion at this time I did recommend physical therapy- order placed    Procedure: Mri Shoulder Right Wo Contrast    Result Date: 5/24/2021  Narrative: MRI RIGHT SHOULDER INDICATION:   M19 011: Primary osteoarthritis, right shoulder M25 511: Pain in right shoulder G89 29: Other chronic pain  COMPARISON:  X-ray right shoulder dated November 20, 2020  TECHNIQUE:   The following MR sequences were obtained of the right shoulder: Localizer, axial GRE/PD fat sat, oblique coronal T2 fat sat, oblique sagittal T1/T2 fat sat  Imaging performed on 3 0T MRI Gadolinium was not used  FINDINGS: SUBCUTANEOUS TISSUES: Normal JOINT EFFUSION: There is a small glenohumeral joint effusion  ACROMION PROCESS: Normal  ROTATOR CUFF: Supraspinatus tendinosis with full-thickness anterior leading edge tear spanning 9 mm anteroposteriorly and the torn tendon edge proximally retracted by 7 mm (series 4 image 15, series 6 image 20)  Moderate infraspinatus tendinosis without  discrete tear  Moderate subscapularis tendinosis with partial-thickness midsubstance tear resulting in medial subluxation of long head of biceps which is perched over the greater tuberosity (series 3 images 14-16)  The teres minor is intact  No muscle atrophy or fatty infiltration  SUBACROMIAL/SUBDELTOID BURSA: Small bursal fluid  LONG HEAD OF BICEPS TENDON: Moderate tendinosis of long head of biceps with low-grade interstitial tear  The tendon is medially subluxed and perched over the lesser tuberosity due to the partial-thickness subscapularis tear  GLENOID LABRUM: Degenerative changes of the glenoid labrum  GLENOHUMERAL JOINT: Intact  ACROMIOCLAVICULAR JOINT:  Moderate degenerative arthropathy with a small joint fluid  There is a small ossific density superior to the joint, likely representing a detached osteophyte   BONES: No acute or suspicious osseous abnormality  1 4 cm benign chondroid lesion in the proximal humeral metaphysis with ring and arc calcifications  Impression: 1  Supraspinatus tendinosis with full-thickness anterior leading edge tear spanning 9 mm anteroposteriorly and the torn tendon edge proximally retracted by 7 mm  2  Moderate infraspinatus tendinosis without discrete tear  3   Moderate subscapularis tendinosis with partial-thickness midsubstance tear resulting in medial subluxation of long head of biceps which is perched over the greater tuberosity  4   Moderate long head of biceps tendinosis with low-grade interstitial tear  5   Moderate acromioclavicular degenerative arthropathy   Workstation performed: [id number]

## 2021-06-02 NOTE — PROGRESS NOTES
PT EVALUATION    Today's date: 21  Patient name: Adama Harmon  : 1950  MRN: [id number]  Referring provider: Ernie Worrell MD  Dx:   1  Traumatic complete tear of right rotator cuff, initial encounter        Adama Harmon is a 78 y o  male who presents with signs and symptoms consistent of chronic shoulder pain secondary previous fall injury  Patient presents with pain, decreased strength, decreased ROM and decreased joint mobility  Due to these impairments, Patient has difficulty performing a/iadls and recreational activities  Patient would benefit from skilled physical therapy to address the impairments, improve their level of function, and to improve their overall quality of life  Impairments:    restricted ROM    decreased strength   pain with function   activity intolerance   scapular dyskinesis     Prognosis:  Good  Positive and negative prognostic indicator(s):  positive attitude about recovery    Goals:    Short Term Goals: to be achieved by 4 weeks  1) Patient to be independent with basic HEP  2) Decrease pain to 1/10 at its worst   3) Increase shoulder ROM by 5-10 degrees in all planes  4) Increase shoulder strength by 1/2 MMT grade in all deficient planes  Long Term Goals: to be achieved by discharge  1) FOTO equal to or greater than target score indicating improvements with overall function  2) Patient to be independent with comprehensive HEP  3) Patient will demonstrate maximal over head reaching  4) Increase UE strength to 5/5 MMT grade in all planes to improve a/iadls  5) Patient to report no sleep interruption secondary to pain  Planned interventions:  home exercise program, patient education, manual therapy, flexibility, functional range of motion exercises and strengthening    Duration in visits:  8  Frequency: 2 visits per week  Duration in weeks:  4    History of Current Injury: Patient notes that he was walking down an embankment and lost his footing and fell  Patient notes that he fell and jammed his elbow and messed up his shoulder  Patient notes that he is able to do everything that he wants to do but he will get a occasional pain lifting his arm up and some weakness  Pain location:   Pain descriptors: superior anterior shoulder around the [facility] joint   Pain at Currently:  0/10  Pain at Best:  0/10  Pain at Worst:  3/10      Aggravating factors: overhead movements, lifting heavy objects   Easing factors: resting       Imaging: MRI: abnormal see results in imaging    Special Questions: Patient notes that the pain will wake him up if he rolls onto to but nothing terrible  Hobbies/Interests: staying active   Occupation: retired; doing work around the house   Patient goals: Patient reports goals for physical therapy would be decreased pain and increased strength          Objective     Postural Observations  Seated posture: fair  Standing posture: fair        Cervical/Thoracic Screen   Cervical range of motion within normal limits    Neurological Testing     Sensation     Shoulder   Left Shoulder   Intact: light touch    Right Shoulder   Intact: light touch    Active Range of Motion   Left Shoulder   Normal active range of motion    Right Shoulder   Flexion: Right shoulder active forward flexion: pain with lowering  WFL and with pain  Extension: 60 degrees   Abduction: 115 degrees   External rotation 0°: 55 degrees   External rotation 90°: 85 degrees  Internal rotation 0°: [facility]  Internal rotation 90°: 75 degrees     Scapular Mobility   Left Shoulder   Scapular mobility: WFL    Right Shoulder   Scapular mobility: fair    Strength/Myotome Testing     Left Shoulder   Normal muscle strength    Right Shoulder     Planes of Motion   Flexion: 4+   Extension: 4+   Abduction: 4+   External rotation at 0°: 4+   Internal rotation at 0°: 4+     Isolated Muscles   Lower trapezius: 4   Middle trapezius: 4   Rhomboids: 4   Serratus anterior: 4            Precautions: Cardiac history, previous stroke       Manuals             GH AP mobs              Scapular Mobs              Manual stretching             STM posterolateral shoulder              Neuro Re-Ed             Body blade              Prone scapular retractions              Wall ball circles                                                                  Ther Ex             Serratus wall slides              Serratus punches              Resisted rows              Resisted extensions              Sidelying ER             ER/IR resisted TB             Bicep curls                           Ther Activity             Lateral raises             Front raises              Scaption raises             Gait Training                                       HEP             See media

## 2021-06-03 ENCOUNTER — EVALUATION (OUTPATIENT)
Dept: PHYSICAL THERAPY | Facility: CLINIC | Age: 71
End: 2021-06-03
Payer: MEDICARE

## 2021-06-03 DIAGNOSIS — S46.011A TRAUMATIC COMPLETE TEAR OF RIGHT ROTATOR CUFF, INITIAL ENCOUNTER: ICD-10-CM

## 2021-06-03 PROCEDURE — 97161 PT EVAL LOW COMPLEX 20 MIN: CPT | Performed by: PHYSICAL THERAPIST

## 2021-06-03 PROCEDURE — 97110 THERAPEUTIC EXERCISES: CPT | Performed by: PHYSICAL THERAPIST

## 2021-06-07 ENCOUNTER — APPOINTMENT (OUTPATIENT)
Dept: PHYSICAL THERAPY | Facility: CLINIC | Age: 71
End: 2021-06-07
Payer: MEDICARE

## 2021-06-10 ENCOUNTER — APPOINTMENT (OUTPATIENT)
Dept: PHYSICAL THERAPY | Facility: CLINIC | Age: 71
End: 2021-06-10
Payer: MEDICARE

## 2021-06-10 DIAGNOSIS — R11.0 NAUSEA: Primary | ICD-10-CM

## 2021-06-10 RX ORDER — ONDANSETRON 4 MG/1
4 TABLET, FILM COATED ORAL EVERY 8 HOURS PRN
Qty: 20 TABLET | Refills: 0 | Status: SHIPPED | OUTPATIENT
Start: 2021-06-10 | End: 2022-01-17 | Stop reason: HOSPADM

## 2021-06-14 ENCOUNTER — APPOINTMENT (OUTPATIENT)
Dept: PHYSICAL THERAPY | Facility: CLINIC | Age: 71
End: 2021-06-14
Payer: MEDICARE

## 2021-06-17 ENCOUNTER — APPOINTMENT (OUTPATIENT)
Dept: PHYSICAL THERAPY | Facility: CLINIC | Age: 71
End: 2021-06-17
Payer: MEDICARE

## 2021-06-21 ENCOUNTER — OFFICE VISIT (OUTPATIENT)
Dept: PHYSICAL THERAPY | Facility: CLINIC | Age: 71
End: 2021-06-21
Payer: MEDICARE

## 2021-06-21 DIAGNOSIS — S46.011A TRAUMATIC COMPLETE TEAR OF RIGHT ROTATOR CUFF, INITIAL ENCOUNTER: Primary | ICD-10-CM

## 2021-06-21 PROCEDURE — 97530 THERAPEUTIC ACTIVITIES: CPT | Performed by: PHYSICAL THERAPIST

## 2021-06-21 PROCEDURE — 97110 THERAPEUTIC EXERCISES: CPT | Performed by: PHYSICAL THERAPIST

## 2021-06-21 PROCEDURE — 97112 NEUROMUSCULAR REEDUCATION: CPT | Performed by: PHYSICAL THERAPIST

## 2021-06-21 NOTE — PROGRESS NOTES
Daily Note     Today's date: 2021  Patient name: Emilia Carmen  : 1950  MRN: [id number]  Referring provider: Amy Arnett MD  Dx:   Encounter Diagnosis     ICD-10-CM    1  Traumatic complete tear of right rotator cuff, initial encounter  S46 011A        Start Time: 730  Stop Time: 0815  Total time in clinic (min): 45 minutes    Subjective: Patient reports, "My stomach starts to bother me a little when I do my exercises but it was already sore because I had the stomach bug and my stomach muscles were working  In between feeling sick I kept up with my normal activities  Objective: See treatment diary below      Assessment: Patient tolerated treatment well  Patient responded well to the introduction of exercise program this date  Focused on RTC and scapular stabilizer strengthening  Patient had the most pain with shoulder abduction secondary to pain  All other exercises patient able to tolerate with mild pain and fatigue  Patient would benefit from continued PT      Plan: Continue per plan of care  Precautions: Cardiac history, previous stroke       Manuals             [address] [facility] AP mobs              Scapular Mobs              Manual stretching             STM posterolateral shoulder              Neuro Re-Ed             Body blade  2x30"             Prone scapular retractions              Wall ball circles  Red ball   3x10 CW and CCW                                                                Ther Ex             Serratus wall slides  3x10 with bolster            Serratus punches   With TB  GTB  3x10            Resisted rows  GTB 3x10             Resisted extensions  GTB 3x10            Sidelying ER 3x10 1#             ER/IR resisted TB RTB 3x10 ea             Bicep curls                           Ther Activity             Lateral raises P!  Could not perform             Front raises  2x10 2#            Scaption raises 2x10 2#            Gait Training                                       HEP See media

## 2021-06-24 ENCOUNTER — OFFICE VISIT (OUTPATIENT)
Dept: PHYSICAL THERAPY | Facility: CLINIC | Age: 71
End: 2021-06-24
Payer: MEDICARE

## 2021-06-24 DIAGNOSIS — S46.011A TRAUMATIC COMPLETE TEAR OF RIGHT ROTATOR CUFF, INITIAL ENCOUNTER: Primary | ICD-10-CM

## 2021-06-24 PROCEDURE — 97110 THERAPEUTIC EXERCISES: CPT | Performed by: PHYSICAL THERAPIST

## 2021-06-24 PROCEDURE — 97112 NEUROMUSCULAR REEDUCATION: CPT | Performed by: PHYSICAL THERAPIST

## 2021-06-24 PROCEDURE — 97530 THERAPEUTIC ACTIVITIES: CPT | Performed by: PHYSICAL THERAPIST

## 2021-06-24 NOTE — PROGRESS NOTES
Daily Note     Today's date: 2021  Patient name: Kaden Rojas  : 1950  MRN: [id number]  Referring provider: Hiram Grajeda MD  Dx:   Encounter Diagnosis     ICD-10-CM    1  Traumatic complete tear of right rotator cuff, initial encounter  S46 011A        Start Time: 926  Stop Time:   Total time in clinic (min): 44 minutes    Subjective: Patient reports, "My shoulder is okay I couldn't tell if I was sore or not after last time cause I have been doing a lot in between  My stomach is so so "       Objective: See treatment diary below'      Assessment: Patient tolerated treatment well  Focused on RTC and scapular stabilizer strengthening  Patient responded well well to progressions and new exercises this date  Patient able to tolerate active assist abduction in order to start muscle activation in that plane  Patient required verbal cueing for speed and control during exercises in order to ensure quality technique  All exercises patient able to tolerate with mild pain and fatigue  Patient would benefit from continued PT      Plan: Continue per plan of care        Precautions: Cardiac history, previous stroke       Manuals            [address] [facility] AP mobs   ACL           Scapular Mobs              Manual stretching             STM posterolateral shoulder              Neuro Re-Ed             Body blade  2x30"  3x30"           Prone scapular retractions              Wall ball circles  Red ball   3x10 CW and CCW Red ball   3x10 CW and CCW                                                               Ther Ex             Serratus wall slides  3x10 with bolster 3x10 with bolster           Serratus punches   With TB  GTB  3x10 GTB  3x10           Resisted rows  GTB 3x10   GTB 3x10           Resisted extensions  GTB 3x10 GTB 3x10           Sidelying ER 3x10 1#  3x10 1#            ER/IR resisted TB RTB 3x10 ea  GTB 3x10 ea           Bicep curls              Active Assist ABD with cane   3x10            Supine serratus punches   3x10 2#            Ther Activity             Lateral raises P! Could not perform             Front raises  2x10 2# 2x10 3#           Scaption raises 2x10 2# 2x10 3#           Gait Training                                       HEP             See media

## 2021-06-28 ENCOUNTER — OFFICE VISIT (OUTPATIENT)
Dept: PHYSICAL THERAPY | Facility: CLINIC | Age: 71
End: 2021-06-28
Payer: MEDICARE

## 2021-06-28 DIAGNOSIS — S46.011A TRAUMATIC COMPLETE TEAR OF RIGHT ROTATOR CUFF, INITIAL ENCOUNTER: Primary | ICD-10-CM

## 2021-06-28 PROCEDURE — 97140 MANUAL THERAPY 1/> REGIONS: CPT | Performed by: PHYSICAL THERAPIST

## 2021-06-28 PROCEDURE — 97110 THERAPEUTIC EXERCISES: CPT | Performed by: PHYSICAL THERAPIST

## 2021-06-28 PROCEDURE — 97112 NEUROMUSCULAR REEDUCATION: CPT | Performed by: PHYSICAL THERAPIST

## 2021-06-29 ENCOUNTER — HOSPITAL ENCOUNTER (OUTPATIENT)
Dept: NON INVASIVE DIAGNOSTICS | Facility: CLINIC | Age: 71
Discharge: HOME | End: 2021-06-29
Payer: MEDICARE

## 2021-06-29 DIAGNOSIS — I65.22 CAROTID STENOSIS, ASYMPTOMATIC, LEFT: ICD-10-CM

## 2021-06-29 PROCEDURE — 93880 EXTRACRANIAL BILAT STUDY: CPT

## 2021-06-29 PROCEDURE — 93880 EXTRACRANIAL BILAT STUDY: CPT | Performed by: SURGERY

## 2021-07-01 ENCOUNTER — OFFICE VISIT (OUTPATIENT)
Dept: PHYSICAL THERAPY | Facility: CLINIC | Age: 71
End: 2021-07-01
Payer: MEDICARE

## 2021-07-01 DIAGNOSIS — S46.011A TRAUMATIC COMPLETE TEAR OF RIGHT ROTATOR CUFF, INITIAL ENCOUNTER: Primary | ICD-10-CM

## 2021-07-01 PROCEDURE — 97110 THERAPEUTIC EXERCISES: CPT | Performed by: PHYSICAL THERAPIST

## 2021-07-01 PROCEDURE — 97112 NEUROMUSCULAR REEDUCATION: CPT | Performed by: PHYSICAL THERAPIST

## 2021-07-01 PROCEDURE — 97530 THERAPEUTIC ACTIVITIES: CPT | Performed by: PHYSICAL THERAPIST

## 2021-07-01 NOTE — PROGRESS NOTES
Daily Note     Today's date: 2021  Patient name: Marizol Vee  : 1950  MRN: [id number]  Referring provider: Zuleyka Mcmillan MD  Dx:   Encounter Diagnosis     ICD-10-CM    1  Traumatic complete tear of right rotator cuff, initial encounter  S46 011A        Start Time: 0845  Stop Time: 09  Total time in clinic (min): 45 minutes    Subjective: Patient reports, "My shoulder is doing okay  I used it a lot yesterday so its a little sore "       Objective: See treatment diary below'      Assessment: Patient tolerated treatment well  Patient responded well to the progressions in exercises this date  Patient had minimal complaints of pain  Patient noted decreased pain and discomfort post manual treatment  Patient would benefit from continued PT      Plan: Continue per plan of care        Precautions: Cardiac history, previous stroke       Manuals          GH AP mobs   ACL           Scapular Mobs              Manual stretching             STM posterolateral shoulder              LLL   ACL ACL         Neuro Re-Ed             Body blade  2x30"  3x30" 2x30"  2x30"          Prone scapular retractions              Wall ball circles  Red ball   3x10 CW and CCW Red ball   3x10 CW and CCW Red ball   3x10 CW and CCW Red ball   3x10 CW and CCW                                                             Ther Ex             Serratus wall slides  3x10 with bolster 3x10 with bolster 3x10 with bolster 3x10 with bolster         Serratus punches   With TB  GTB  3x10 GTB  3x10 GTB  3x10 GTB  3x10         Resisted rows  GTB 3x10   GTB 3x10 GTB  3x10 GTB  3x10         Resisted extensions  GTB 3x10 GTB 3x10 GTB  3x10 GTB  3x10         Sidelying ER 3x10 1#  3x10 1#  3x10 1#  3x10 2#          ER/IR resisted TB RTB 3x10 ea  GTB 3x10 ea GTB 3x10 ea GTB  3x10         Bicep curls              Active Assist ABD with cane   3x10  3x10  3x10          Supine serratus punches   3x10 2#  3x10 2#  3x10 2#          Ther Activity Lateral raises P! Could not perform             Front raises  2x10 2# 2x10 3# 2x10 3# 2x10 3#         Scaption raises 2x10 2# 2x10 3# 2x10 3# 2x10 3#         Gait Training                                       HEP             See media

## 2021-07-06 NOTE — PROGRESS NOTES
PT EVALUATION    Today's date: 21  Patient name: Jean Durham  : 1950  MRN: [id number]  Referring provider: Chacho Ramirez MD  Dx:   1  Traumatic complete tear of right rotator cuff, initial encounter        Jean Durham is a 78 y o  male who presents with signs and symptoms consistent of chronic shoulder pain secondary previous fall injury  Upon evaluation, patient demonstrates improvements with with active range, strength, and overall pain levels  Patient has met all of his short term goals and is progressing appropriately toward long term goals  Patient scored a 87 on FOTO indicating improvements with overall function  Patient would benefit from continued skilled physical therapy to address the impairments, improve their level of function, and to improve their overall quality of life  Impairments:    restricted ROM    decreased strength   pain with function   activity intolerance   scapular dyskinesis     Prognosis:  Good  Positive and negative prognostic indicator(s):  positive attitude about recovery    Goals:    Short Term Goals: to be achieved by 4 weeks  1) Patient to be independent with basic HEP  MET  2) Decrease pain to 1/10 at its worst  MET  3) Increase shoulder ROM by 5-10 degrees in all planes  MET  4) Increase shoulder strength by 1/2 MMT grade in all deficient planes  MET    Long Term Goals: to be achieved by discharge  1) FOTO equal to or greater than target score indicating improvements with overall function  PARTIALLY MET   2) Patient to be independent with comprehensive HEP  PARTIALLY MET   3) Patient will demonstrate maximal over head reaching  PARTIALLY MET   4) Increase UE strength to 5/5 MMT grade in all planes to improve a/iadls  PARTIALLY MET   5) Patient to report no sleep interruption secondary to pain   PARTIALLY MET       Planned interventions:  home exercise program, patient education, manual therapy, flexibility, functional range of motion exercises and strengthening    Duration in visits:  6  Frequency: 2 visits per week  Duration in weeks:  3    History of Current Injury: Patient notes that he is definitelyimproving  Patient notes that he doesn't feel limited in doing work around the house  Patient notes that he has the most pain still when he lifts the arm all the over his head and some when he lowers it down  Pain location:   Pain descriptors: superior anterior shoulder around the [facility] joint   Pain at Currently:  0/10  Pain at Best:  0/10  Pain at Worst:  1/10      Aggravating factors: overhead movements, lifting heavy objects   Easing factors: resting       Imaging: MRI: abnormal see results in imaging    Special Questions: Patient notes that the pain will wake him up if he rolls onto to but nothing terrible  Hobbies/Interests: staying active   Occupation: retired; doing work around the house   Patient goals: Patient reports goals for physical therapy would be decreased pain and increased strength          Objective     Postural Observations  Seated posture: fair  Standing posture: fair        Cervical/Thoracic Screen   Cervical range of motion within normal limits    Neurological Testing     Sensation     Shoulder   Left Shoulder   Intact: light touch    Right Shoulder   Intact: light touch    Active Range of Motion   Left Shoulder   Normal active range of motion    Right Shoulder   Flexion: WFL  Extension: 65 degrees WFL  Abduction: 150 degrees   External rotation 0°: 55 degrees   External rotation 90°: 88 degrees  Internal rotation 0°: [facility]  Internal rotation 90°: 85 degrees     Scapular Mobility   Left Shoulder   Scapular mobility: WFL    Right Shoulder   Scapular mobility: fair    Strength/Myotome Testing     Left Shoulder   Normal muscle strength    Right Shoulder     Planes of Motion   Flexion: 4+   Extension: 4+   Abduction: 4+   External rotation at 0°: 4+   Internal rotation at 0°: 4+     Isolated Muscles   Lower trapezius: 4+   Middle trapezius: 4+   Rhomboids: 4+   Serratus anterior: 4+            Precautions: Cardiac history, previous stroke       Manuals 6/21 6/24 6/28 7/1 7/7        GH AP mobs   ACL           Scapular Mobs              Manual stretching             STM posterolateral shoulder              LLL   ACL ACL         Neuro Re-Ed             Body blade  2x30"  3x30" 2x30"  2x30"  2x30"        Prone scapular retractions              Wall ball circles  Red ball   3x10 CW and CCW Red ball   3x10 CW and CCW Red ball   3x10 CW and CCW Red ball   3x10 CW and CCW Yel ball   3x10 CW and CCW                                                            Ther Ex             Serratus wall slides  3x10 with bolster 3x10 with bolster 3x10 with bolster 3x10 with bolster 3x10 with pball        Serratus punches   With TB  GTB  3x10 GTB  3x10 GTB  3x10 GTB  3x10         Resisted rows  GTB 3x10   GTB 3x10 GTB  3x10 GTB  3x10 BTB  3x10        Resisted extensions  GTB 3x10 GTB 3x10 GTB  3x10 GTB  3x10 BTB  3x10        Sidelying ER 3x10 1#  3x10 1#  3x10 1#  3x10 2#  3x10 2#         ER/IR resisted TB RTB 3x10 ea  GTB 3x10 ea GTB 3x10 ea GTB  3x10 BTB  3x10 ea         Bicep curls              Active Assist ABD with cane   3x10  3x10  3x10  3x10        Supine serratus punches   3x10 2#  3x10 2#  3x10 2#          Ther Activity             Lateral raises P! Could not perform             Front raises  2x10 2# 2x10 3# 2x10 3# 2x10 3# 2x10 3#        Scaption raises 2x10 2# 2x10 3# 2x10 3# 2x10 3# 2x10 3#        Gait Training                                       HEP             See media

## 2021-07-07 ENCOUNTER — EVALUATION (OUTPATIENT)
Dept: PHYSICAL THERAPY | Facility: CLINIC | Age: 71
End: 2021-07-07
Payer: MEDICARE

## 2021-07-07 DIAGNOSIS — S46.011A TRAUMATIC COMPLETE TEAR OF RIGHT ROTATOR CUFF, INITIAL ENCOUNTER: Primary | ICD-10-CM

## 2021-07-07 PROCEDURE — 97530 THERAPEUTIC ACTIVITIES: CPT | Performed by: PHYSICAL THERAPIST

## 2021-07-07 PROCEDURE — 97110 THERAPEUTIC EXERCISES: CPT | Performed by: PHYSICAL THERAPIST

## 2021-07-07 PROCEDURE — 97112 NEUROMUSCULAR REEDUCATION: CPT | Performed by: PHYSICAL THERAPIST

## 2021-07-09 ENCOUNTER — OFFICE VISIT (OUTPATIENT)
Dept: PHYSICAL THERAPY | Facility: CLINIC | Age: 71
End: 2021-07-09
Payer: MEDICARE

## 2021-07-09 DIAGNOSIS — S46.011A TRAUMATIC COMPLETE TEAR OF RIGHT ROTATOR CUFF, INITIAL ENCOUNTER: Primary | ICD-10-CM

## 2021-07-09 PROCEDURE — 97140 MANUAL THERAPY 1/> REGIONS: CPT | Performed by: PHYSICAL THERAPIST

## 2021-07-09 PROCEDURE — 97110 THERAPEUTIC EXERCISES: CPT | Performed by: PHYSICAL THERAPIST

## 2021-07-09 PROCEDURE — 97112 NEUROMUSCULAR REEDUCATION: CPT | Performed by: PHYSICAL THERAPIST

## 2021-07-09 NOTE — PROGRESS NOTES
Daily Note     Today's date: 2021  Patient name: Tru Balderas  : 1950  MRN: [id number]  Referring provider: Shannon Cowan MD  Dx:   Encounter Diagnosis     ICD-10-CM    1  Traumatic complete tear of right rotator cuff, initial encounter  S46 011A        Start Time: 730  Stop Time: 0815  Total time in clinic (min): 45 minutes    Subjective: Patient reports, "My shoulder is a little sore today right on the bump  I don't know what its from probably from lifting my grandson a lot yesterday so it could be from that "       Objective: See treatment diary below'      Assessment: Patient tolerated treatment well  Patient responded well to the progressions in exercises this date  Patient had minimal complaints of pain  Patient noted decreased pain and discomfort post manual treatment  Patient would benefit from continued PT      Plan: Continue per plan of care        Precautions: Cardiac history, previous stroke       Manuals        GH AP mobs   ACL    ACL       Scapular Mobs              Manual stretching             STM posterolateral shoulder              LLL   ACL ACL         Neuro Re-Ed             Body blade  2x30"  3x30" 2x30"  2x30"  2x30" 2x30"       Prone scapular retractions              Wall ball circles  Red ball   3x10 CW and CCW Red ball   3x10 CW and CCW Red ball   3x10 CW and CCW Red ball   3x10 CW and CCW Yel ball   3x10 CW and CCW Yel ball   3x10 CW and CCW                                                           Ther Ex             Serratus wall slides  3x10 with bolster 3x10 with bolster 3x10 with bolster 3x10 with bolster 3x10 with pball 3x10 with pball       Serratus punches   With TB  GTB  3x10 GTB  3x10 GTB  3x10 GTB  3x10         Resisted rows  GTB 3x10   GTB 3x10 GTB  3x10 GTB  3x10 BTB  3x10 BTB  3x10       Resisted extensions  GTB 3x10 GTB 3x10 GTB  3x10 GTB  3x10 BTB  3x10 BTB  3x10       Sidelying ER 3x10 1#  3x10 1#  3x10 1#  3x10 2#  3x10 2#  3x10 2# ER/IR resisted TB RTB 3x10 ea  GTB 3x10 ea GTB 3x10 ea GTB  3x10 BTB  3x10 ea  BTB  3x10 ea       Bicep curls              Active Assist ABD with cane   3x10  3x10  3x10  3x10 3x10       Supine serratus punches   3x10 2#  3x10 2#  3x10 2#          Ther Activity             Lateral raises P! Could not perform      seated with arm on table at mid range and raise to 90 degrees     3x10       Front raises  2x10 2# 2x10 3# 2x10 3# 2x10 3# 2x10 3# 2x10 3#       Scaption raises 2x10 2# 2x10 3# 2x10 3# 2x10 3# 2x10 3# 2x10 3#       Gait Training                                       HEP             See media

## 2021-07-12 ENCOUNTER — OFFICE VISIT (OUTPATIENT)
Dept: PHYSICAL THERAPY | Facility: CLINIC | Age: 71
End: 2021-07-12
Payer: MEDICARE

## 2021-07-12 DIAGNOSIS — S46.011A TRAUMATIC COMPLETE TEAR OF RIGHT ROTATOR CUFF, INITIAL ENCOUNTER: Primary | ICD-10-CM

## 2021-07-12 PROCEDURE — 97110 THERAPEUTIC EXERCISES: CPT | Performed by: PHYSICAL THERAPIST

## 2021-07-12 PROCEDURE — 97112 NEUROMUSCULAR REEDUCATION: CPT | Performed by: PHYSICAL THERAPIST

## 2021-07-12 PROCEDURE — 97140 MANUAL THERAPY 1/> REGIONS: CPT | Performed by: PHYSICAL THERAPIST

## 2021-07-15 ENCOUNTER — APPOINTMENT (OUTPATIENT)
Dept: PHYSICAL THERAPY | Facility: CLINIC | Age: 71
End: 2021-07-15
Payer: MEDICARE

## 2021-07-19 ENCOUNTER — OFFICE VISIT (OUTPATIENT)
Dept: PHYSICAL THERAPY | Facility: CLINIC | Age: 71
End: 2021-07-19
Payer: MEDICARE

## 2021-07-19 DIAGNOSIS — S46.011A TRAUMATIC COMPLETE TEAR OF RIGHT ROTATOR CUFF, INITIAL ENCOUNTER: Primary | ICD-10-CM

## 2021-07-19 PROCEDURE — 97112 NEUROMUSCULAR REEDUCATION: CPT | Performed by: PHYSICAL THERAPIST

## 2021-07-19 PROCEDURE — 97530 THERAPEUTIC ACTIVITIES: CPT | Performed by: PHYSICAL THERAPIST

## 2021-07-19 PROCEDURE — 97110 THERAPEUTIC EXERCISES: CPT | Performed by: PHYSICAL THERAPIST

## 2021-07-19 NOTE — PROGRESS NOTES
Daily Note     Today's date: 2021  Patient name: Alessandra Cerna  : 1950  MRN: [id number]  Referring provider: Wynona Habermann, MD  Dx:   Encounter Diagnosis     ICD-10-CM    1  Traumatic complete tear of right rotator cuff, initial encounter  S46 011A        Start Time: 0900  Stop Time: 945  Total time in clinic (min): 45 minutes    Subjective: Patient reports, "My shoulder has been feeling pretty good  Just gets sore when I over do it but it is a lot better than it was  I feel like I am ready to be done on Thursday "       Objective: See treatment diary below'      Assessment: Patient tolerated treatment well  Patient able to complete all sets and reps  Patient demonstrating proper technique with all exercises that will carry over to comprehensive HEP  Patient fatigued post session  Plan for DC next visit  Patient would benefit from continued PT      Plan: Continue per plan of care        Precautions: Cardiac history, previous stroke       Manuals      [address] AP mobs   ACL    ACL ACL ACL     Scapular Mobs              Manual stretching             STM posterolateral shoulder              LLL   ACL ACL         Neuro Re-Ed             Body blade  2x30"  3x30" 2x30"  2x30"  2x30" 2x30" 2x30" 2x30"     Prone scapular retractions              Wall ball circles  Red ball   3x10 CW and CCW Red ball   3x10 CW and CCW Red ball   3x10 CW and CCW Red ball   3x10 CW and CCW Yel ball   3x10 CW and CCW Yel ball   3x10 CW and CCW Yel ball   3x10 CW and CCW Yel ball   3x10 CW and CCW                                                         Ther Ex             Serratus wall slides  3x10 with bolster 3x10 with bolster 3x10 with bolster 3x10 with bolster 3x10 with pball 3x10 with pball 3x10 with pball 3x10 with pball     Serratus punches   With TB  GTB  3x10 GTB  3x10 GTB  3x10 GTB  3x10         Resisted rows  GTB 3x10   GTB 3x10 GTB  3x10 GTB  3x10 BTB  3x10 BTB  3x10 15# 3x10  15# 3x10 Resisted extensions  GTB 3x10 GTB 3x10 GTB  3x10 GTB  3x10 BTB  3x10 BTB  3x10 10#   3x10 10#   3x10     Sidelying ER 3x10 1#  3x10 1#  3x10 1#  3x10 2#  3x10 2#  3x10 2#  3x10 2# 3x10 2#     ER/IR resisted TB RTB 3x10 ea  GTB 3x10 ea GTB 3x10 ea GTB  3x10 BTB  3x10 ea  BTB  3x10 ea [place] 3# ER  8# IR  3x10 ea [place] 3# ER  8# IR  3x10 ea     Bicep curls              Active Assist ABD with cane   3x10  3x10  3x10  3x10 3x10       Supine serratus punches   3x10 2#  3x10 2#  3x10 2#          Ther Activity             Lateral raises P! Could not perform      seated with arm on table at mid range and raise to 90 degrees     3x10 seated with arm on table at mid range and raise to 90 degrees   3x10 seated with arm on table at mid range and raise to 90 degrees   3x10     Front raises  2x10 2# 2x10 3# 2x10 3# 2x10 3# 2x10 3# 2x10 3# 2x10 3# 2x10 3#     Scaption raises 2x10 2# 2x10 3# 2x10 3# 2x10 3# 2x10 3# 2x10 3# 2x10 3# 2x10 3#     Gait Training                                       HEP             See media

## 2021-07-22 ENCOUNTER — OFFICE VISIT (OUTPATIENT)
Dept: PHYSICAL THERAPY | Facility: CLINIC | Age: 71
End: 2021-07-22
Payer: MEDICARE

## 2021-07-22 DIAGNOSIS — S46.011A TRAUMATIC COMPLETE TEAR OF RIGHT ROTATOR CUFF, INITIAL ENCOUNTER: Primary | ICD-10-CM

## 2021-07-22 PROCEDURE — 97112 NEUROMUSCULAR REEDUCATION: CPT | Performed by: PHYSICAL THERAPIST

## 2021-07-22 PROCEDURE — 97530 THERAPEUTIC ACTIVITIES: CPT | Performed by: PHYSICAL THERAPIST

## 2021-07-22 PROCEDURE — 97110 THERAPEUTIC EXERCISES: CPT | Performed by: PHYSICAL THERAPIST

## 2021-07-22 NOTE — PROGRESS NOTES
Daily Note     Today's date: 2021  Patient name: Dave Tobar  : 1950  MRN: [id number]  Referring provider: Di Plaza MD  Dx:   Encounter Diagnosis     ICD-10-CM    1  Traumatic complete tear of right rotator cuff, initial encounter  S46 011A        Start Time: 930  Stop Time: 1015  Total time in clinic (min): 45 minutes    ASSESSMENT: Dave Tobar is a 78 y o  male who presents with signs and symptoms consistent of chronic shoulder pain secondary previous fall injury  Upon evaluation, patient demonstrates improvements with with active range, strength, and overall pain levels  Patient has met all of his short term goals and long term goals  Patient scored a 87 on FOTO indicating improvements with overall function  Patient is competent in comprehensive HEP and had no further questions  Patient appropriate for DC this date  Impairments:    restricted ROM    decreased strength   pain with function   activity intolerance   scapular dyskinesis     Prognosis:  Good  Positive and negative prognostic indicator(s):  positive attitude about recovery    Goals:    Short Term Goals: to be achieved by 4 weeks  1) Patient to be independent with basic HEP  MET  2) Decrease pain to 1/10 at its worst  MET  3) Increase shoulder ROM by 5-10 degrees in all planes  MET  4) Increase shoulder strength by 1/2 MMT grade in all deficient planes  MET    Long Term Goals: to be achieved by discharge  1) FOTO equal to or greater than target score indicating improvements with overall function  MET   2) Patient to be independent with comprehensive HEP  MET   3) Patient will demonstrate maximal over head reaching  MET   4) Increase UE strength to 5/5 MMT grade in all planes to improve a/iadls  MET   5) Patient to report no sleep interruption secondary to pain  MET       Plan: Patient appropriate for DC this date to home exercise program    History of Current Injury: Patient reports that he feels like he has greatly improved since starting therapy  He feels like his motion and strength are a lot better and he is able to do all things he wants without restriction  Pain location:   Pain descriptors: superior anterior shoulder around the [facility] joint   Pain at Currently:  0/10  Pain at Best:  0/10  Pain at Worst:  1/10    Patient goals: Patient reports goals for physical therapy would be decreased pain and increased strength          Objective       Postural Observations  Seated posture: fair  Standing posture: fair        Cervical/Thoracic Screen   Cervical range of motion within normal limits    Neurological Testing     Sensation     Shoulder   Left Shoulder   Intact: light touch    Right Shoulder   Intact: light touch    Active Range of Motion   Left Shoulder   Normal active range of motion    Right Shoulder   Flexion: WFL  Extension: 65 degrees WFL  Abduction: 150 degrees   External rotation 0°: 55 degrees   External rotation 90°: 88 degrees  Internal rotation 0°: [facility]  Internal rotation 90°: 85 degrees     Scapular Mobility   Left Shoulder   Scapular mobility: WFL    Right Shoulder   Scapular mobility: fair    Strength/Myotome Testing     Left Shoulder   Normal muscle strength    Right Shoulder     Planes of Motion   Flexion: 4+   Extension: 4+   Abduction: 4+   External rotation at 0°: 4+   Internal rotation at 0°: 4+     Isolated Muscles   Lower trapezius: 4+   Middle trapezius: 4+   Rhomboids: 4+   Serratus anterior: 4+          Precautions: Cardiac history, previous stroke        Manuals 6/21 6/24 6/28 7/1 7/7 7/9 7/12 7/19 7/22    GH AP mobs   ACL    ACL ACL ACL     Scapular Mobs              Manual stretching             STM posterolateral shoulder              LLL   ACL ACL         Neuro Re-Ed             Body blade  2x30"  3x30" 2x30"  2x30"  2x30" 2x30" 2x30" 2x30" 2x30"    Prone scapular retractions              Wall ball circles  Red ball   3x10 CW and CCW Red ball   3x10 CW and CCW Red ball   3x10 CW and CCW Red ball   3x10 CW and CCW Yel ball   3x10 CW and CCW Yel ball   3x10 CW and CCW Yel ball   3x10 CW and CCW Yel ball   3x10 CW and CCW Yel ball   3x10 CW and CCW                                                        Ther Ex             Serratus wall slides  3x10 with bolster 3x10 with bolster 3x10 with bolster 3x10 with bolster 3x10 with pball 3x10 with pball 3x10 with pball 3x10 with pball 3x10 with pball    Serratus punches   With TB  GTB  3x10 GTB  3x10 GTB  3x10 GTB  3x10         Resisted rows  GTB 3x10   GTB 3x10 GTB  3x10 GTB  3x10 BTB  3x10 BTB  3x10 15# 3x10  15# 3x10  15# 3x10     Resisted extensions  GTB 3x10 GTB 3x10 GTB  3x10 GTB  3x10 BTB  3x10 BTB  3x10 10#   3x10 10#   3x10 10#   3x10    Sidelying ER 3x10 1#  3x10 1#  3x10 1#  3x10 2#  3x10 2#  3x10 2#  3x10 2# 3x10 2# 3x10 2#    ER/IR resisted TB RTB 3x10 ea  GTB 3x10 ea GTB 3x10 ea GTB  3x10 BTB  3x10 ea  BTB  3x10 ea [place] 3# ER  8# IR  3x10 ea [place] 3# ER  8# IR  3x10 ea [place] 3# ER  8# IR  3x10 ea    Bicep curls              Active Assist ABD with cane   3x10  3x10  3x10  3x10 3x10       Supine serratus punches   3x10 2#  3x10 2#  3x10 2#          Ther Activity             Lateral raises P! Could not perform      seated with arm on table at mid range and raise to 90 degrees     3x10 seated with arm on table at mid range and raise to 90 degrees   3x10 seated with arm on table at mid range and raise to 90 degrees   3x10     Front raises  2x10 2# 2x10 3# 2x10 3# 2x10 3# 2x10 3# 2x10 3# 2x10 3# 2x10 3#     Scaption raises 2x10 2# 2x10 3# 2x10 3# 2x10 3# 2x10 3# 2x10 3# 2x10 3# 2x10 3#     Gait Training                                       HEP             See media

## 2021-07-29 DIAGNOSIS — J45.20 MILD INTERMITTENT ASTHMA WITHOUT COMPLICATION: ICD-10-CM

## 2021-07-29 RX ORDER — ALBUTEROL SULFATE 90 UG/1
AEROSOL, METERED RESPIRATORY (INHALATION)
Qty: 18 G | Refills: 3 | Status: SHIPPED | OUTPATIENT
Start: 2021-07-29 | End: 2022-04-14 | Stop reason: SDUPTHER

## 2021-09-09 ENCOUNTER — OFFICE VISIT (OUTPATIENT)
Dept: FAMILY MEDICINE CLINIC | Facility: CLINIC | Age: 71
End: 2021-09-09
Payer: MEDICARE

## 2021-09-09 VITALS
DIASTOLIC BLOOD PRESSURE: 70 MMHG | WEIGHT: 198 LBS | HEIGHT: 68 IN | SYSTOLIC BLOOD PRESSURE: 146 MMHG | HEART RATE: 54 BPM | RESPIRATION RATE: 16 BRPM | BODY MASS INDEX: 30.01 KG/M2 | TEMPERATURE: 97.1 F

## 2021-09-09 DIAGNOSIS — H02.834 DERMATOCHALASIS OF BOTH UPPER EYELIDS: ICD-10-CM

## 2021-09-09 DIAGNOSIS — Z12.5 SCREENING FOR PROSTATE CANCER: ICD-10-CM

## 2021-09-09 DIAGNOSIS — Z98.890 HISTORY OF RIGHT-SIDED CAROTID ENDARTERECTOMY: ICD-10-CM

## 2021-09-09 DIAGNOSIS — I65.22 CAROTID STENOSIS, ASYMPTOMATIC, LEFT: ICD-10-CM

## 2021-09-09 DIAGNOSIS — K76.0 FATTY INFILTRATION OF LIVER: ICD-10-CM

## 2021-09-09 DIAGNOSIS — E78.2 MIXED HYPERLIPIDEMIA: ICD-10-CM

## 2021-09-09 DIAGNOSIS — H02.831 DERMATOCHALASIS OF BOTH UPPER EYELIDS: ICD-10-CM

## 2021-09-09 DIAGNOSIS — Z01.818 PRE-OPERATIVE CLEARANCE: Primary | ICD-10-CM

## 2021-09-09 DIAGNOSIS — J45.20 MILD INTERMITTENT ASTHMA WITHOUT COMPLICATION: ICD-10-CM

## 2021-09-09 DIAGNOSIS — I10 BENIGN ESSENTIAL HYPERTENSION: ICD-10-CM

## 2021-09-09 DIAGNOSIS — I25.10 CORONARY ARTERY DISEASE INVOLVING NATIVE CORONARY ARTERY OF NATIVE HEART WITHOUT ANGINA PECTORIS: ICD-10-CM

## 2021-09-09 PROCEDURE — 99214 OFFICE O/P EST MOD 30 MIN: CPT | Performed by: FAMILY MEDICINE

## 2021-09-09 NOTE — PROGRESS NOTES
Assessment/Plan:       Diagnoses and all orders for this visit:    Pre-operative clearance    Dermatochalasis of both upper eyelids    Benign essential hypertension  -     CBC and differential  -     Comprehensive metabolic panel    Mixed hyperlipidemia  -     Lipid panel    Fatty infiltration of liver    Coronary artery disease involving native coronary artery of native heart without angina pectoris    Carotid stenosis, asymptomatic, left    History of right-sided carotid endarterectomy    Screening for prostate cancer  -     PSA, Total Screen; Future            Medically cleared for eye surgery  Stop aspirin  Patient completed COV 19 vaccine series  Patient ID: Luca Amor is a 73 y o  male  73year old male here for pre operative evaluation  He is scheduled for bilateral upper eyelid surgery  Surgeon Dr Gumaro Looney  Date of procedure 09/13/2021    Medications reviewed  Longstanding history of hypertension with intolerance to multiple blood pressure agents  Element of white coat syndrome  03/2021 creatinine 0 96  Electrolytes normal  Hgb 13 8  Hyperlipidemia mixed type and CAD with prior intolerance to a number of statins, fibrates and fish oils  He was most recently on Crestor 10 mg QOD but stopped due to side effects  08/2019  s/p admission for NSTEMI  Elevated troponin peaking at 4 61  Cardiac catheterization 95% lesion in the distal RCA  Status post RENE x2  Mid circumflex 65% stenosis  Proximal right coronary artery 50% stenosis  Echocardiogram normal left ventricular systolic function  EF 60%  Hypokinesis of the basal mid inferior walls  Grade 1 diastolic dysfunction  Normal right ventricle  Mildly dilated left atrium  Mild MR  No pericardial effusion  Aortic root normal size  He was on dual platelet inhibition with Aspirin and Brilinta-now on ASA 81 mg daily  Last lipid profile 04/2021 cholesterol 202 increased from 138  Triglycerides 66  HDL 53     03/2021 LFTs normal  Non fasting   Lab Results   Component Value Date    WBC 7 23 03/01/2021    HGB 13 8 03/01/2021    HCT 42 9 03/01/2021    MCV 95 03/01/2021     03/01/2021     Lab Results   Component Value Date     12/10/2015    SODIUM 139 03/01/2021    K 4 4 03/01/2021     03/01/2021    CO2 30 03/01/2021    ANIONGAP 11 12/10/2015    AGAP 3 (L) 03/01/2021    BUN 17 03/01/2021    CREATININE 0 96 03/01/2021    GLUC 76 02/24/2020    GLUF 100 (H) 03/01/2021    CALCIUM 9 2 03/01/2021    AST 13 03/01/2021    ALT 16 03/01/2021    ALKPHOS 64 03/01/2021    PROT 7 6 11/11/2015    TP 7 3 03/01/2021    BILITOT 0 47 11/11/2015    TBILI 0 50 03/01/2021    EGFR 81 03/01/2021     Lab Results   Component Value Date    HGBA1C 5 2 02/24/2020       Lab Results   Component Value Date    CHOLESTEROL 202 (H) 04/13/2021    CHOLESTEROL 138 02/24/2020    CHOLESTEROL 152 10/26/2019     Lab Results   Component Value Date    HDL 53 04/13/2021    HDL 53 02/24/2020    HDL 47 10/26/2019     Lab Results   Component Value Date    TRIG 66 04/13/2021    TRIG 81 02/24/2020    TRIG 79 10/26/2019     Lab Results   Component Value Date    LDLCALC 136 (H) 04/13/2021               The following portions of the patient's history were reviewed and updated as appropriate: allergies, current medications, past family history, past medical history, past social history, past surgical history and problem list     Review of Systems   Constitutional: Negative for appetite change, chills, fatigue, fever and unexpected weight change  70+ lb weight loss from 01/2019 with dieting  HENT: Negative for congestion, ear pain, rhinorrhea, sore throat and trouble swallowing  Eyes: Negative for visual disturbance  Respiratory: Negative for cough, shortness of breath and wheezing  History of asthma he uses infrequent Albuterol MDI  No orthopnea or PND   Cardiovascular: Negative for chest pain, palpitations and leg swelling  See HPI  Carotid artery stenosis  Status post right CEA  Carotid artery Dopplers 06/2021 widely patent internal carotid artery and endarterectomy site  Left ICA  50-69% stenosis  03/2017 nuclear stress test normal    Gastrointestinal: Positive for abdominal distention  Negative for abdominal pain, blood in stool, constipation, diarrhea, nausea and vomiting  IBS with diarrhea  Episodes of postprandial abdominal bloating  Multiple abdominal surgeries  03/2021 EGD normal esophagus  Status post fundoplication  Biopsies no dysplasia or malignancy  08/2019 EGD Genao's esophagus  Multiple small superficial ulcer craters in gastric antrum  No longer on PPI  03/2021 colonoscopy mild diverticulosis left colon  Evidence of previous end to end sigmoid resection  Four sessile polyps  Biopsies no microscopic colitis  Polyps benign-tubular adenomas  Fatty liver  04/2021 u/s elastography Metavir score of F0 - F1, Absent or Mild Fibrosis  Liver steatosis grading:  S2 (33% - 66% steatosis)  03/2021 LFTs normal  Ultrasound 06/2019 + fatty liver  08/2019 alpha 1 antitrypsin level normal   Celiac antibodies negative  Iron 84  Ferritin 349  Hepatitis B surface antigen negative  Hepatitis-C antibody negative  Anti smooth muscle antibody negative  Ceruloplasmin level normal   RED negative  SPEP no monoclonal bands  PEREZ Fibroscore minimal to moderate steatosis  No fibrosis  Borderline to probable PEREZ   Endocrine: Negative for polydipsia and polyuria  Genitourinary: Negative for difficulty urinating  Lab Results       Component                Value               Date                       PSA                      0 7                 02/24/2020                 PSA                      0 5                 03/11/2017                 PSA                      0 4                 11/11/2015                    '   Musculoskeletal: Positive for arthralgias and back pain  Negative for myalgias          Status post fall 09/2020 landing on his right shoulder and right elbow   MRI right shoulder Supraspinatus tendinosis with full-thickness anterior leading edge tear spanning 9 mm anteroposteriorly and the torn tendon edge proximally retracted by 7 mm  Moderate infraspinatus tendinosis without discrete tear  Moderate subscapularis tendinosis with partial-thickness midsubstance tear resulting in medial subluxation of long head of biceps which is perched over the greater tuberosity  Moderate long head of biceps tendinosis with low-grade interstitial tear  Moderate acromioclavicular degenerative arthropathy  Symptom improvement with PT   10/2020 CRP < 3 0  11/2020 x-rays of right shoulder separate os septic/calcific densities seen about the shoulder joint noted lateral to the acromion process and above AC joint  Mild osteoarthritis of glenohumeral joint  Subacromial spurring  History of gout/hyperuricemia with flare-up 06/2020  He is no longer taking Allopurinol or Colchicine due to GI side effects  08/2020 uric acid level 6 3 improved from 9 7 in 2014  He has a history of multiple drug allergies/sensitivities  He is on low-dose Aspirin 81 mg daily  01/2020 x-rays lumbar spine mild loss of disc at L5-S1  Facet degenerative changes particularly L3-L4 through L5-S1  Lab Results       Component                Value               Date                       URICACID                 6 3                 08/25/2020               Skin: Negative for rash  Allergic/Immunologic: Negative for environmental allergies  Neurological: Negative for dizziness and headaches  Hematological: Negative for adenopathy  Does not bruise/bleed easily  Psychiatric/Behavioral: Negative for dysphoric mood and sleep disturbance           Objective:    /70   Pulse (!) 54   Temp (!) 97 1 °F (36 2 °C)   Resp 16   Ht 5' 8" (1 727 m)   Wt 89 8 kg (198 lb)   BMI 30 11 kg/m²     BP Readings from Last 3 Encounters:   09/09/21 146/70   03/22/21 136/72   12/29/20 125/68     Wt Readings from Last 3 Encounters:   09/09/21 89 8 kg (198 lb)   05/21/21 86 6 kg (191 lb)   03/22/21 86 6 kg (191 lb)          Physical Exam  Vitals and nursing note reviewed  Constitutional:       General: He is not in acute distress  Appearance: He is well-developed  HENT:      Right Ear: Tympanic membrane normal       Left Ear: Tympanic membrane normal    Eyes:      General: No scleral icterus  Extraocular Movements: Extraocular movements intact  Conjunctiva/sclera: Conjunctivae normal       Pupils: Pupils are equal, round, and reactive to light  Neck:      Thyroid: No thyroid mass or thyromegaly  Vascular: No carotid bruit or JVD  Trachea: No tracheal deviation  Cardiovascular:      Rate and Rhythm: Normal rate and regular rhythm  Pulses:           Carotid pulses are 2+ on the right side and 2+ on the left side  Heart sounds: Normal heart sounds  No murmur heard  No gallop  Pulmonary:      Effort: Pulmonary effort is normal  No respiratory distress  Breath sounds: Normal breath sounds  No wheezing or rales  Abdominal:      General: Bowel sounds are normal  There is no distension or abdominal bruit  Palpations: Abdomen is soft  There is no hepatomegaly, splenomegaly or mass  Tenderness: There is no abdominal tenderness  There is no guarding or rebound  Musculoskeletal:      Right lower leg: No edema  Left lower leg: No edema  Lymphadenopathy:      Cervical: No cervical adenopathy  Upper Body:      Right upper body: No supraclavicular adenopathy  Left upper body: No supraclavicular adenopathy  Skin:     Findings: No rash  Nails: There is no clubbing  Neurological:      General: No focal deficit present  Mental Status: He is alert and oriented to person, place, and time     Psychiatric:         Mood and Affect: Mood normal

## 2021-10-13 ENCOUNTER — APPOINTMENT (OUTPATIENT)
Dept: LAB | Facility: CLINIC | Age: 71
End: 2021-10-13
Payer: MEDICARE

## 2021-10-13 DIAGNOSIS — Z12.5 SCREENING FOR PROSTATE CANCER: ICD-10-CM

## 2021-10-13 DIAGNOSIS — J45.20 MILD INTERMITTENT ASTHMA WITHOUT COMPLICATION: ICD-10-CM

## 2021-10-13 LAB
ALBUMIN SERPL BCP-MCNC: 3.9 G/DL (ref 3.5–5)
ALP SERPL-CCNC: 56 U/L (ref 46–116)
ALT SERPL W P-5'-P-CCNC: 17 U/L (ref 12–78)
ANION GAP SERPL CALCULATED.3IONS-SCNC: 3 MMOL/L (ref 4–13)
AST SERPL W P-5'-P-CCNC: 15 U/L (ref 5–45)
BASOPHILS # BLD AUTO: 0.03 THOUSANDS/ΜL (ref 0–0.1)
BASOPHILS NFR BLD AUTO: 1 % (ref 0–1)
BILIRUB SERPL-MCNC: 0.55 MG/DL (ref 0.2–1)
BUN SERPL-MCNC: 15 MG/DL (ref 5–25)
CALCIUM SERPL-MCNC: 9.5 MG/DL (ref 8.3–10.1)
CHLORIDE SERPL-SCNC: 107 MMOL/L (ref 100–108)
CHOLEST SERPL-MCNC: 207 MG/DL (ref 50–200)
CO2 SERPL-SCNC: 27 MMOL/L (ref 21–32)
CREAT SERPL-MCNC: 0.89 MG/DL (ref 0.6–1.3)
EOSINOPHIL # BLD AUTO: 0.22 THOUSAND/ΜL (ref 0–0.61)
EOSINOPHIL NFR BLD AUTO: 4 % (ref 0–6)
ERYTHROCYTE [DISTWIDTH] IN BLOOD BY AUTOMATED COUNT: 13.1 % (ref 11.6–15.1)
GFR SERPL CREATININE-BSD FRML MDRD: 87 ML/MIN/1.73SQ M
GLUCOSE P FAST SERPL-MCNC: 94 MG/DL (ref 65–99)
HCT VFR BLD AUTO: 43.2 % (ref 36.5–49.3)
HDLC SERPL-MCNC: 49 MG/DL
HGB BLD-MCNC: 14.1 G/DL (ref 12–17)
IMM GRANULOCYTES # BLD AUTO: 0.02 THOUSAND/UL (ref 0–0.2)
IMM GRANULOCYTES NFR BLD AUTO: 0 % (ref 0–2)
LDLC SERPL CALC-MCNC: 145 MG/DL (ref 0–100)
LYMPHOCYTES # BLD AUTO: 1.71 THOUSANDS/ΜL (ref 0.6–4.47)
LYMPHOCYTES NFR BLD AUTO: 29 % (ref 14–44)
MCH RBC QN AUTO: 30.9 PG (ref 26.8–34.3)
MCHC RBC AUTO-ENTMCNC: 32.6 G/DL (ref 31.4–37.4)
MCV RBC AUTO: 95 FL (ref 82–98)
MONOCYTES # BLD AUTO: 0.55 THOUSAND/ΜL (ref 0.17–1.22)
MONOCYTES NFR BLD AUTO: 9 % (ref 4–12)
NEUTROPHILS # BLD AUTO: 3.39 THOUSANDS/ΜL (ref 1.85–7.62)
NEUTS SEG NFR BLD AUTO: 57 % (ref 43–75)
NONHDLC SERPL-MCNC: 158 MG/DL
NRBC BLD AUTO-RTO: 0 /100 WBCS
PLATELET # BLD AUTO: 198 THOUSANDS/UL (ref 149–390)
PMV BLD AUTO: 11.5 FL (ref 8.9–12.7)
POTASSIUM SERPL-SCNC: 4.6 MMOL/L (ref 3.5–5.3)
PROT SERPL-MCNC: 7.5 G/DL (ref 6.4–8.2)
PSA SERPL-MCNC: 0.9 NG/ML (ref 0–4)
RBC # BLD AUTO: 4.56 MILLION/UL (ref 3.88–5.62)
SODIUM SERPL-SCNC: 137 MMOL/L (ref 136–145)
TRIGL SERPL-MCNC: 66 MG/DL
WBC # BLD AUTO: 5.92 THOUSAND/UL (ref 4.31–10.16)

## 2021-10-13 PROCEDURE — 80061 LIPID PANEL: CPT | Performed by: FAMILY MEDICINE

## 2021-10-13 PROCEDURE — G0103 PSA SCREENING: HCPCS

## 2021-10-13 PROCEDURE — 85025 COMPLETE CBC W/AUTO DIFF WBC: CPT | Performed by: FAMILY MEDICINE

## 2021-10-13 PROCEDURE — 80053 COMPREHEN METABOLIC PANEL: CPT | Performed by: FAMILY MEDICINE

## 2021-10-13 PROCEDURE — 36415 COLL VENOUS BLD VENIPUNCTURE: CPT | Performed by: FAMILY MEDICINE

## 2021-10-13 RX ORDER — LEVALBUTEROL INHALATION SOLUTION 1.25 MG/3ML
1.25 SOLUTION RESPIRATORY (INHALATION) EVERY 8 HOURS PRN
Qty: 75 ML | Refills: 5 | Status: SHIPPED | OUTPATIENT
Start: 2021-10-13

## 2022-01-13 ENCOUNTER — HOSPITAL ENCOUNTER (OUTPATIENT)
Dept: NON INVASIVE DIAGNOSTICS | Facility: CLINIC | Age: 72
Discharge: HOME | End: 2022-01-13
Payer: COMMERCIAL

## 2022-01-13 DIAGNOSIS — I65.22 ASYMPTOMATIC STENOSIS OF LEFT CAROTID ARTERY: ICD-10-CM

## 2022-01-13 PROCEDURE — 93880 EXTRACRANIAL BILAT STUDY: CPT

## 2022-01-13 PROCEDURE — 93880 EXTRACRANIAL BILAT STUDY: CPT | Performed by: SURGERY

## 2022-01-17 ENCOUNTER — OFFICE VISIT (OUTPATIENT)
Dept: CARDIOLOGY CLINIC | Facility: CLINIC | Age: 72
End: 2022-01-17
Payer: COMMERCIAL

## 2022-01-17 VITALS
RESPIRATION RATE: 18 BRPM | HEART RATE: 60 BPM | OXYGEN SATURATION: 98 % | BODY MASS INDEX: 30.01 KG/M2 | HEIGHT: 68 IN | DIASTOLIC BLOOD PRESSURE: 92 MMHG | WEIGHT: 198 LBS | SYSTOLIC BLOOD PRESSURE: 164 MMHG

## 2022-01-17 DIAGNOSIS — I65.22 CAROTID STENOSIS, ASYMPTOMATIC, LEFT: ICD-10-CM

## 2022-01-17 DIAGNOSIS — I25.10 CORONARY ARTERY DISEASE INVOLVING NATIVE CORONARY ARTERY OF NATIVE HEART WITHOUT ANGINA PECTORIS: ICD-10-CM

## 2022-01-17 DIAGNOSIS — E78.2 MIXED HYPERLIPIDEMIA: ICD-10-CM

## 2022-01-17 DIAGNOSIS — I10 BENIGN ESSENTIAL HYPERTENSION: Primary | ICD-10-CM

## 2022-01-17 PROCEDURE — 93000 ELECTROCARDIOGRAM COMPLETE: CPT | Performed by: INTERNAL MEDICINE

## 2022-01-17 PROCEDURE — 99213 OFFICE O/P EST LOW 20 MIN: CPT | Performed by: INTERNAL MEDICINE

## 2022-01-17 RX ORDER — FLUTICASONE PROPIONATE AND SALMETEROL XINAFOATE 230; 21 UG/1; UG/1
AEROSOL, METERED RESPIRATORY (INHALATION)
COMMUNITY
End: 2022-05-24 | Stop reason: SDUPTHER

## 2022-01-17 NOTE — PROGRESS NOTES
Cardiology Follow Up    Kaden Rojas  1950  [id number]  [facility] [place]  [address]  [phone]    1  Benign essential hypertension  POCT ECG   2  Coronary artery disease involving native coronary artery of native heart without angina pectoris     3  Carotid stenosis, asymptomatic, left     4  Mixed hyperlipidemia           Discussion/Summary: All of his assessed cardiac problems are stable  I have reviewed his medications and made no changes  No cardiac testing is ordered  RTO 1 year  Interval History: He is very active and walks 5 - 10 miles regularly  He denies CP  He has asthma which is worse in the cold  He does not smoke  He takes his BP at home and SBP is 120 -130s  He cannot tolerate statins  LDL is 145  He is not taking his Lisinopril and taking his ASA 3 times a week  He has CAD with a NSTEMI and RCA stenting in 8/2019          Patient Active Problem List   Diagnosis    Asthma    Genao esophagus    Benign essential hypertension    Diverticulosis    Fatty infiltration of liver    Gastroesophageal reflux disease without esophagitis    Hyperuricemia    Impaired fasting glucose    Mixed hyperlipidemia    Drug-induced erectile dysfunction    Non-allergic rhinitis    Osteoarthritis of knee    Vitamin B12 deficiency    Irritable bowel syndrome with diarrhea    History of colonic diverticulitis    History of right-sided carotid endarterectomy    Coronary artery disease involving native coronary artery of native heart without angina pectoris    Incisional hernia    Arthritis of right acromioclavicular joint    Carotid stenosis, asymptomatic, left    Osteoarthritis of right glenohumeral joint    Nonalcoholic steatohepatitis     Past Medical History:   Diagnosis Date    Allergic reaction     LAST ASSESSED: [id number]    Asthma     Bursitis of heel LAST ASSESSED: [id number]    Derangement of meniscus of left knee due to old injury     LAST ASSESSED: [id number]    Diverticulitis of colon     LAST ASSESSED: [id number]    Dizziness     LAST ASSESSED: [id number]    GERD (gastroesophageal reflux disease)     History of colon polyps     Hypertension     Malignant hypertension     LAST ASSESSED: [id number]    [facility] 3/2/2020    [facility]STEMI (non-ST elevated [facility] infarction) [facility])     Plantar fasciitis     Seasonal allergies     Shoulder impingement     LAST ASSESSED: [id number]    Stroke ([facility] )      Social History     Socioeconomic History    Marital status: /Civil Union     Spouse name: Not on file    Number of children: Not on file    Years of education: Not on file    Highest education level: Not on file   Occupational History    Not on file   Tobacco Use    Smoking status: Former Smoker     Packs/day: 0 25     Years: 5 00     Pack years: 1 25     Types: Cigarettes     Quit date:      Years since quittin 0    Smokeless tobacco: Never Used   Vaping Use    Vaping Use: Never used   Substance and Sexual Activity    Alcohol use: No    Drug use: No    Sexual activity: Not on file   Other Topics Concern    Not on file   Social History Narrative    Not on file     Social Determinants of Health     Financial Resource Strain: Not on file   Food Insecurity: Not on file   Transportation Needs: Not on file   Physical Activity: Not on file   Stress: Not on file   Social Connections: Not on file   Intimate Partner Violence: Not on file   Housing Stability: Not on file      Family History   Problem Relation Age of Onset    Cancer Paternal Grandfather     Hypertension Mother     Hyperlipidemia Mother     Hypertension Father     Hyperlipidemia Father     Colon cancer Neg Hx      Past Surgical History:   Procedure Laterality Date    APPENDECTOMY      CAROTID ENDARTARECTOMY      COLECTOMY Left PARTIAL - SIGMOID; HEMICOLECTOMY FOR DIVERTICULITIS; ONSET: 1987    COLON SURGERY      COLONOSCOPY N/A 12/9/2016    Procedure: COLONOSCOPY;  Surgeon: Ca Clement MD;  [facility];   Service:     HERNIA REPAIR      VENTRAL    NISSEN FUNDOPLICATION      ESOPHAGOGASTRIC FUNDOPLASTY LAST ASSESSED: [id number]    SINUS SURGERY      THROMBOENDARTERECTOMY      CAROTID; ONSET; MAY 2012       Current Outpatient Medications:     acetaminophen (TYLENOL) 325 mg tablet, Take 2 tablets (650 mg total) by mouth every 6 (six) hours as needed for mild pain, headaches or fever, Disp: 30 tablet, Rfl: 0    albuterol (PROVENTIL HFA,VENTOLIN HFA) 90 mcg/act inhaler, inhale 2 puffs by mouth and INTO THE LUNGS every 6 hours if needed for wheezing, Disp: 18 g, Rfl: 3    aluminum-magnesium hydroxide 200-200 MG/5ML suspension, Take by mouth every 6 (six) hours as needed for heartburn, Disp: , Rfl:     aspirin (ECOTRIN LOW STRENGTH) 81 mg EC tablet, Take 81 mg by mouth every other day  , Disp: , Rfl:     fluticasone (FLONASE) 50 mcg/act nasal spray, 1 spray into each nostril daily, Disp: , Rfl:     fluticasone-salmeterol (Advair HFA) 230-21 MCG/ACT inhaler, , Disp: , Rfl:     levalbuterol (XOPENEX) 1 25 mg/3 mL nebulizer solution, Take 3 mL (1 25 mg total) by nebulization every 8 (eight) hours as needed for wheezing or shortness of breath, Disp: 75 mL, Rfl: 5    multivitamin (THERAGRAN) TABS, Take 1 tablet by mouth daily, Disp: , Rfl:     sildenafil (VIAGRA) 50 MG tablet, Take 1 tablet (50 mg total) by mouth daily as needed for erectile dysfunction, Disp: 30 tablet, Rfl: 3    LISINOPRIL PO, Take by mouth (Patient not taking: Reported on 1/17/2022 ), Disp: , Rfl:   Allergies   Allergen Reactions    Depo-Medrol [Methylprednisolone] Anaphylaxis     Severe Vagal Reaction    Iv Contrast [Iodinated Diagnostic Agents] Angioedema     Pt states itchy/swollen eyes, trouble breathing (years ago)    Rosuvastatin Headache and Confusion    Sulfa Antibiotics Hives     Vitals:    01/17/22 0743   BP: 164/92   BP Location: Left arm   Patient Position: Sitting   Cuff Size: Standard   Pulse: 60   Resp: 18   SpO2: 98%   Weight: 89 8 kg (198 lb)   Height: 5' 8" (1 727 m)     Weight (last 2 days)     Date/Time Weight    01/17/22 0743 89 8 (198)         Blood pressure 164/92, pulse 60, resp  rate 18, height 5' 8" (1 727 m), weight 89 8 kg (198 lb), SpO2 98 %  , Body mass index is 30 11 kg/m²      Labs:  Appointment on 10/13/2021   Component Date Value    PSA 10/13/2021 0 9    Office Visit on 09/09/2021   Component Date Value    WBC 10/13/2021 5 92     RBC 10/13/2021 4 56     Hemoglobin 10/13/2021 14 1     Hematocrit 10/13/2021 43 2     MCV 10/13/2021 95     MCH 10/13/2021 30 9     MCHC 10/13/2021 32 6     RDW 10/13/2021 13 1     MPV 10/13/2021 11 5     Platelets 48/06/0508 198     nRBC 10/13/2021 0     Neutrophils Relative 10/13/2021 57     Immat GRANS % 10/13/2021 0     Lymphocytes Relative 10/13/2021 29     Monocytes Relative 10/13/2021 9     Eosinophils Relative 10/13/2021 4     Basophils Relative 10/13/2021 1     Neutrophils Absolute 10/13/2021 3 39     Immature Grans Absolute 10/13/2021 0 02     Lymphocytes Absolute 10/13/2021 1 71     Monocytes Absolute 10/13/2021 0 55     Eosinophils Absolute 10/13/2021 0 22     Basophils Absolute 10/13/2021 0 03     Sodium 10/13/2021 137     Potassium 10/13/2021 4 6     Chloride 10/13/2021 107     CO2 10/13/2021 27     ANION GAP 10/13/2021 3*    BUN 10/13/2021 15     Creatinine 10/13/2021 0 89     Glucose, Fasting 10/13/2021 94     Calcium 10/13/2021 9 5     AST 10/13/2021 15     ALT 10/13/2021 17     Alkaline Phosphatase 10/13/2021 56     Total Protein 10/13/2021 7 5     Albumin 10/13/2021 3 9     Total Bilirubin 10/13/2021 0 55     eGFR 10/13/2021 87     Cholesterol 10/13/2021 207*    Triglycerides 10/13/2021 66     HDL, Direct 10/13/2021 49     LDL Calculated 10/13/2021 145*    Non-HDL-Chol (CHOL-HDL) 10/13/2021 158      Imaging: VAS carotid complete study    Result Date: 1/13/2022  Narrative:  [facility] REPORT CLINICAL: Indications:  6 month surveillance of carotid artery disease  Patient is asymptomatic at this time  Operative History: 2012-05-11 Right Standard (ICA, ECA and CCA) endarterectomy Bovine pericardial patch Heart Angioplasty stent Risk Factors The patient has history of HTN, Hyperlipidemia and CAD  Clinical Right Pressure:  169/ mm Hg, Left Pressure:  167/ mm Hg  FINDINGS:  Right        Impression  PSV  EDV (cm/s)  Direction of Flow  Ratio  Dist  ICA                 59          18                      0 70  Mid  ICA                  66          21                      0 77  Prox  ICA    1 - 49%      53          14                      0 62  Dist CCA                 116          20                            Mid CCA                   85          14                      0 98  Prox CCA                  87           8                      1 77  Ext Carotid               87          11                      1 02  Prox Vert                  0              Not Identified            Subclavian               148           0                            Innominate                49           0                             Left         Impression  PSV  EDV (cm/s)  Direction of Flow  Ratio  Dist  ICA                110          35                      1 15  Mid  ICA                 104          29                      1 09  Prox   ICA    50 - 69%    147          39                      1 55  Dist CCA                  84          19                            Mid CCA                   95          22                      1 31  Prox CCA                  73          16                            Ext Carotid              181          16                      1 90  Prox Vert                 61          18  Antegrade                 Subclavian               171 0                               CONCLUSION: Impression RIGHT: Patent internal carotid artery and endarterectomy site with some evidence of minimal homogenous plaque or intimal hyperplasia   The vertebral artery was not identified suggesting a hypoplastic versus diseased vessel  There is no significant subclavian artery disease  LEFT: There is 50-69% stenosis noted in the internal carotid artery  Plaque is heterogenous and irregular  Vertebral artery flow is antegrade  There is no significant subclavian artery disease  In comparison to the study of 06/29/2021, there is no significant change in the disease process  Recommend repeat testing in 6month as per protocol unless otherwise indicated  Internal carotid artery stenosis determination by consensus criteria from: Piotr Valencia et al  Carotid Artery Stenosis: Gray-Scale and Doppler US Diagnosis - Society of Radiologists in [facility] [address], Radiology 2003; [phone]  Vertebral artery flow is antegrade  SIGNATURE: Electronically Signed by: Regino Jeff MD, [id number]  on 2022-01-13 05:07:58 PM      Review of Systems:  Review of Systems   Constitutional: Negative for diaphoresis, fatigue, fever and unexpected weight change  HENT: Negative  Respiratory: Positive for wheezing  Negative for cough and shortness of breath  Cardiovascular: Negative for chest pain, palpitations and leg swelling  Gastrointestinal: Negative for abdominal pain, diarrhea and nausea  Musculoskeletal: Negative for gait problem and myalgias  Skin: Negative for rash  Neurological: Negative for dizziness and numbness  Psychiatric/Behavioral: Negative  Physical Exam:  Physical Exam  Constitutional:       Appearance: He is well-developed  HENT:      Head: Normocephalic and atraumatic  Eyes:      Pupils: Pupils are equal, round, and reactive to light  Neck:      Vascular: No JVD  Cardiovascular:      Rate and Rhythm: Regular rhythm        Pulses: Normal pulses  Carotid pulses are 2+ on the right side and 2+ on the left side  Heart sounds: S1 normal and S2 normal    Pulmonary:      Effort: Pulmonary effort is normal       Breath sounds: Normal breath sounds  No wheezing or rales  Comments: Slight wheeze  Abdominal:      General: Bowel sounds are normal       Palpations: Abdomen is soft  Tenderness: There is no abdominal tenderness  Musculoskeletal:         General: No tenderness  Normal range of motion  Cervical back: Normal range of motion and neck supple  Skin:     General: Skin is warm  Neurological:      Mental Status: He is alert and oriented to person, place, and time  Cranial Nerves: No cranial nerve deficit  Deep Tendon Reflexes: Reflexes are normal and symmetric

## 2022-01-20 ENCOUNTER — OFFICE VISIT (OUTPATIENT)
Dept: VASCULAR SURGERY | Facility: CLINIC | Age: 72
End: 2022-01-20
Payer: COMMERCIAL

## 2022-01-20 VITALS
SYSTOLIC BLOOD PRESSURE: 140 MMHG | TEMPERATURE: 98 F | BODY MASS INDEX: 30.01 KG/M2 | HEIGHT: 68 IN | HEART RATE: 51 BPM | DIASTOLIC BLOOD PRESSURE: 88 MMHG | WEIGHT: 198 LBS

## 2022-01-20 DIAGNOSIS — I65.22 CAROTID STENOSIS, ASYMPTOMATIC, LEFT: Primary | ICD-10-CM

## 2022-01-20 DIAGNOSIS — I65.22 ASYMPTOMATIC STENOSIS OF LEFT CAROTID ARTERY: ICD-10-CM

## 2022-01-20 PROCEDURE — 3079F DIAST BP 80-89 MM HG: CPT | Performed by: SURGERY

## 2022-01-20 PROCEDURE — 3077F SYST BP >= 140 MM HG: CPT | Performed by: SURGERY

## 2022-01-20 PROCEDURE — 1036F TOBACCO NON-USER: CPT | Performed by: SURGERY

## 2022-01-20 PROCEDURE — 1160F RVW MEDS BY RX/DR IN RCRD: CPT | Performed by: SURGERY

## 2022-01-20 PROCEDURE — 3008F BODY MASS INDEX DOCD: CPT | Performed by: SURGERY

## 2022-01-20 PROCEDURE — 99214 OFFICE O/P EST MOD 30 MIN: CPT | Performed by: SURGERY

## 2022-01-20 NOTE — ASSESSMENT & PLAN NOTE
Doing well overall status post right carotid endarterectomy in 2012  Recent carotid Doppler did show wide patency of the right carotid and stable left approximate 60% internal carotid artery stenosis  He remains completely asymptomatic no upper or lower extremity weakness no amaurosis fugax no problems with speech or swallowing  His endarterectomy in 2012 was indicated for amaurosis fugax of his right eye  We had a long discussion regarding potential symptoms should any of these occur he should call 911 or our office directly      Plan:  Follow-up carotid Doppler in 6 months

## 2022-01-20 NOTE — LETTER
January 20, 2022     Martha Medina MD  [address] [place] [address]    Patient: Kim Schwab   YOB: 1950   Date of Visit: 1/20/2022       Dear Dr Marilin Chou:    Thank you for referring Romayne Lennert to me for evaluation  Below are my notes for this consultation  If you have questions, please do not hesitate to call me  I look forward to following your patient along with you           Sincerely,        Ricarda Garvey MD        CC: No Recipients

## 2022-01-20 NOTE — PATIENT INSTRUCTIONS
Doing well overall status post right carotid endarterectomy in 2012  Recent carotid Doppler did show wide patency of the right carotid and stable left approximate 60% internal carotid artery stenosis  He remains completely asymptomatic no upper or lower extremity weakness no amaurosis fugax no problems with speech or swallowing  His endarterectomy in 2012 was indicated for amaurosis fugax of his right eye  We had a long discussion regarding potential symptoms should any of these occur he should call 911 or our office directly  He has a family history of AAA as mother had ruptured, he had CT of his abdomen in 2019 with no evidence of aneurysm    Plan:  Follow-up carotid Doppler in 6 months

## 2022-01-20 NOTE — PROGRESS NOTES
Assessment/Plan:    Doing well overall status post right carotid endarterectomy in 2012  Recent carotid Doppler did show wide patency of the right carotid and stable left approximate 60% internal carotid artery stenosis  He remains completely asymptomatic no upper or lower extremity weakness no amaurosis fugax no problems with speech or swallowing  His endarterectomy in 2012 was indicated for amaurosis fugax of his right eye  We had a long discussion regarding potential symptoms should any of these occur he should call 911 or our office directly  Plan:  Follow-up carotid Doppler in 6 months         Diagnoses and all orders for this visit:    Carotid stenosis, asymptomatic, left  -     VAS carotid complete study; Future    Asymptomatic stenosis of left carotid artery        Subjective:      Patient ID: Kathrin Ku is a 73 y o  male  Patient is here to rev CV done on 1/13/21  Patient is s/p R CEA done on 2012  Patient denies TIA or stroke like symptoms  HPI    The following portions of the patient's history were reviewed and updated as appropriate: allergies, current medications, past family history, past medical history, past social history, past surgical history and problem list     Review of Systems   Constitutional: Negative  HENT: Negative  Eyes: Negative  Respiratory: Negative  Cardiovascular: Negative  Gastrointestinal: Negative  Endocrine: Negative  Genitourinary: Negative  Musculoskeletal: Negative  Skin: Negative  Allergic/Immunologic: Negative  Neurological: Negative  Hematological: Negative  Psychiatric/Behavioral: Negative  Objective: There were no vitals taken for this visit  Physical Exam      Oriented x3 no evidence of clinical depression      Eyes:  Sclera non-icteric    Skin: normal without evidence of inflammation    Neck is supple carotid pulses equal bilaterally no bruits heard    Chest lungs clear, heart regular rhythm  Abdomen soft nontender no evidence of pulsatile masses  Pulses are palpable bilateral Femoral  Popliteal, DP and PT  No accentuation of popliteal or femoral pulses    Neurological exam intact cranial nerves 2-12 grossly intact no gross motor sensory deficits detected  Imaging viewed and reviewed with Patient    I have reviewed and made appropriate changes to the review of systems input by the medical assistant  Vitals:    01/20/22 0928   BP: 140/88   BP Location: Right arm   Patient Position: Sitting   Cuff Size: Standard   Pulse: (!) 51   Temp: 98 °F (36 7 °C)   TempSrc: Tympanic   Weight: 89 8 kg (198 lb)   Height: 5' 8" (1 727 m)       Patient Active Problem List   Diagnosis    Asthma    Genao esophagus    Benign essential hypertension    Diverticulosis    Fatty infiltration of liver    Gastroesophageal reflux disease without esophagitis    Hyperuricemia    Impaired fasting glucose    Mixed hyperlipidemia    Drug-induced erectile dysfunction    Non-allergic rhinitis    Osteoarthritis of knee    Vitamin B12 deficiency    Irritable bowel syndrome with diarrhea    History of colonic diverticulitis    History of right-sided carotid endarterectomy    Coronary artery disease involving native coronary artery of native heart without angina pectoris    Incisional hernia    Arthritis of right acromioclavicular joint    Carotid stenosis, asymptomatic, left    Osteoarthritis of right glenohumeral joint    Nonalcoholic steatohepatitis       Past Surgical History:   Procedure Laterality Date    APPENDECTOMY      CAROTID ENDARTARECTOMY      COLECTOMY Left     PARTIAL - SIGMOID; HEMICOLECTOMY FOR DIVERTICULITIS; ONSET: 1987    COLON SURGERY      COLONOSCOPY N/A 12/9/2016    Procedure: COLONOSCOPY;  Surgeon: Jon Abrams MD;  [facility];   Service:     HERNIA REPAIR      VENTRAL    NISSEN FUNDOPLICATION      ESOPHAGOGASTRIC FUNDOPLASTY LAST ASSESSED: [id number]  SINUS SURGERY      THROMBOENDARTERECTOMY      CAROTID; ONSET; MAY 2012       Family History   Problem Relation Age of Onset    Cancer Paternal Grandfather     Hypertension Mother     Hyperlipidemia Mother     Hypertension Father     Hyperlipidemia Father     Colon cancer Neg Hx        Social History     Socioeconomic History    Marital status: /Civil Union     Spouse name: Not on file    Number of children: Not on file    Years of education: Not on file    Highest education level: Not on file   Occupational History    Not on file   Tobacco Use    Smoking status: Former Smoker     Packs/day: 0 25     Years: 5 00     Pack years: 1 25     Types: Cigarettes     Quit date:      Years since quittin 0    Smokeless tobacco: Never Used   Vaping Use    Vaping Use: Never used   Substance and Sexual Activity    Alcohol use: No    Drug use: No    Sexual activity: Not on file   Other Topics Concern    Not on file   Social History Narrative    Not on file     Social Determinants of Health     Financial Resource Strain: Not on file   Food Insecurity: Not on file   Transportation Needs: Not on file   Physical Activity: Not on file   Stress: Not on file   Social Connections: Not on file   Intimate Partner Violence: Not on file   Housing Stability: Not on file       Allergies   Allergen Reactions    Depo-Medrol [Methylprednisolone] Anaphylaxis     Severe Vagal Reaction    Iv Contrast [Iodinated Diagnostic Agents] Angioedema     Pt states itchy/swollen eyes, trouble breathing (years ago)    Rosuvastatin Headache and Confusion    Sulfa Antibiotics Hives         Current Outpatient Medications:     acetaminophen (TYLENOL) 325 mg tablet, Take 2 tablets (650 mg total) by mouth every 6 (six) hours as needed for mild pain, headaches or fever, Disp: 30 tablet, Rfl: 0    albuterol (PROVENTIL HFA,VENTOLIN HFA) 90 mcg/act inhaler, inhale 2 puffs by mouth and INTO THE LUNGS every 6 hours if needed for wheezing, Disp: 18 g, Rfl: 3    aspirin (ECOTRIN LOW STRENGTH) 81 mg EC tablet, Take 81 mg by mouth every other day  , Disp: , Rfl:     fluticasone (FLONASE) 50 mcg/act nasal spray, 1 spray into each nostril daily, Disp: , Rfl:     fluticasone-salmeterol (Advair HFA) 230-21 MCG/ACT inhaler, , Disp: , Rfl:     levalbuterol (XOPENEX) 1 25 mg/3 mL nebulizer solution, Take 3 mL (1 25 mg total) by nebulization every 8 (eight) hours as needed for wheezing or shortness of breath, Disp: 75 mL, Rfl: 5    multivitamin (THERAGRAN) TABS, Take 1 tablet by mouth daily, Disp: , Rfl:     sildenafil (VIAGRA) 50 MG tablet, Take 1 tablet (50 mg total) by mouth daily as needed for erectile dysfunction, Disp: 30 tablet, Rfl: 3

## 2022-02-10 ENCOUNTER — TELEPHONE (OUTPATIENT)
Dept: FAMILY MEDICINE CLINIC | Facility: CLINIC | Age: 72
End: 2022-02-10

## 2022-02-10 NOTE — TELEPHONE ENCOUNTER
She is concerned that her  is starting to get  Alzheimers  Evelin Blend He is forgetting a lot and cant  remember one day to the next    She also is concern he has so many drug allergies that he is not sure what meds he can take  Evelin Blend       She would like a appointment for Yoanna Smith or do a virtual

## 2022-02-11 NOTE — TELEPHONE ENCOUNTER
I called and spoke with patient's wife  Several issues excessive belching and bloating  No reflux  On daily probiotics  Reactive airway disease with increased symptoms  He has been using his nebulizer with Xopenex  Concerns re memory at times forgetful still physically active  He is able to do mechanical work around the house  Plan trial of peppermint for GI symptoms  Start steroid daily inhaler  He is not in pursuing any type of work up at this time  Re medication he would like to try statin therapy again  He had headaches and memory issues while on Crestor-will try Lipitor 10 mg 2-3 weeks  Monitor for side effects

## 2022-02-17 ENCOUNTER — TELEPHONE (OUTPATIENT)
Dept: VASCULAR SURGERY | Facility: CLINIC | Age: 72
End: 2022-02-17

## 2022-02-17 NOTE — TELEPHONE ENCOUNTER
This is a reminder; patient is due for results review of CV scheduled for 07/14/22   Please call patient and schedule the following by the dates provided  Patient's appointment(s) are due on or after 07/14/22      Dopplers  [] Abdominal Aorta Iliac (AOIL)  [] Carotid (CV)   [] Celiac and/or Mesenteric  [] Endovascular Aortic Repair (EVAR)   [] Hemodialysis Access (HD)   [] Lower Limb Arterial (REA)  [] Lower Limb Venous Duplex with Reflux (LEVDR)  [] Renal Artery  [] Upper Limb (UEA)    Advanced Imaging   [] CTA abdomen    [] CTA abdomen & pelvis    [] CT abdomen with/ without contrast  [] CT abdomen with contrast  [] CT abdomen without contrast    [] CT abdomen & pelvis with/ without contrast  [] CT abdomen & pelvis with contrast  [] CT abdomen & pelvis without contrast    Office Visit   [] New patient, patient last seen over 3 years ago  [] Risk factor modification (RFM)   [x] Follow up

## 2022-03-03 ENCOUNTER — TELEPHONE (OUTPATIENT)
Dept: FAMILY MEDICINE CLINIC | Facility: CLINIC | Age: 72
End: 2022-03-03

## 2022-03-03 NOTE — TELEPHONE ENCOUNTER
Spoke to Jayne Guillaume from [place] for clarification  Patient took 10mg lisinopril @ 3:40pm   Patient's BPs at hour of sleep:    129/49- 3/2/22  142/67- 3/1/22  151/66- 22  144/69- 22  137/66- 22  129/73- 22    Patient took blood pressure while I was on the phone and it was 150/57  Advised patient that he should monitor BP every hour until he goes to sleep and if BP rises he can take an additional 10mg if needed and if any symptoms occur to go to the ER   Patient informed me that his lisinopril 10mg is  from two years ago  Patient is requesting refill to [facility]

## 2022-03-03 NOTE — TELEPHONE ENCOUNTER
Nurse practitioner from [facility] called to let Dr Yifan Mccall know that patients BP is running pretty high    220/90, 210/86,190/86    Please let her know if there is anything you would like her to do

## 2022-03-03 NOTE — TELEPHONE ENCOUNTER
Call take an additional dose of Lisinopril now  10 mg recheck BP in several hours  Options if still markedly elevated he could go to ER   If BP improves status report in AM

## 2022-03-04 DIAGNOSIS — I10 BENIGN ESSENTIAL HYPERTENSION: ICD-10-CM

## 2022-03-04 RX ORDER — LISINOPRIL 10 MG/1
10 TABLET ORAL DAILY
Qty: 30 TABLET | Refills: 0 | Status: SHIPPED | OUTPATIENT
Start: 2022-03-04 | End: 2022-03-27

## 2022-03-04 NOTE — TELEPHONE ENCOUNTER
Patient wife called for a refill on blood pressure  medication and to report blood pressure readings that Dr Gabbie Mendez requested      5:45pm            158/56  6:45pm            151/52  7:45pm            146/60  8:45pm  141/60  8:00 am  131/56

## 2022-03-27 DIAGNOSIS — I10 BENIGN ESSENTIAL HYPERTENSION: ICD-10-CM

## 2022-03-28 RX ORDER — LISINOPRIL 10 MG/1
10 TABLET ORAL DAILY
Qty: 30 TABLET | Refills: 2 | Status: SHIPPED | OUTPATIENT
Start: 2022-03-28 | End: 2022-04-14 | Stop reason: SDUPTHER

## 2022-04-14 ENCOUNTER — OFFICE VISIT (OUTPATIENT)
Dept: FAMILY MEDICINE CLINIC | Facility: CLINIC | Age: 72
End: 2022-04-14
Payer: COMMERCIAL

## 2022-04-14 VITALS
WEIGHT: 191 LBS | BODY MASS INDEX: 28.95 KG/M2 | SYSTOLIC BLOOD PRESSURE: 110 MMHG | HEIGHT: 68 IN | DIASTOLIC BLOOD PRESSURE: 70 MMHG | RESPIRATION RATE: 16 BRPM | HEART RATE: 80 BPM | TEMPERATURE: 95.7 F

## 2022-04-14 DIAGNOSIS — G31.84 MCI (MILD COGNITIVE IMPAIRMENT): ICD-10-CM

## 2022-04-14 DIAGNOSIS — I25.10 CORONARY ARTERY DISEASE INVOLVING NATIVE CORONARY ARTERY OF NATIVE HEART WITHOUT ANGINA PECTORIS: ICD-10-CM

## 2022-04-14 DIAGNOSIS — Z98.890 HISTORY OF RIGHT-SIDED CAROTID ENDARTERECTOMY: ICD-10-CM

## 2022-04-14 DIAGNOSIS — N52.9 MALE ERECTILE DISORDER: ICD-10-CM

## 2022-04-14 DIAGNOSIS — H61.21 IMPACTED CERUMEN OF RIGHT EAR: ICD-10-CM

## 2022-04-14 DIAGNOSIS — J45.20 MILD INTERMITTENT ASTHMA WITHOUT COMPLICATION: ICD-10-CM

## 2022-04-14 DIAGNOSIS — K58.0 IRRITABLE BOWEL SYNDROME WITH DIARRHEA: ICD-10-CM

## 2022-04-14 DIAGNOSIS — Z13.89 ENCOUNTER FOR SCREENING FOR OTHER DISORDER: ICD-10-CM

## 2022-04-14 DIAGNOSIS — R73.01 IMPAIRED FASTING GLUCOSE: ICD-10-CM

## 2022-04-14 DIAGNOSIS — Z00.00 MEDICARE ANNUAL WELLNESS VISIT, SUBSEQUENT: ICD-10-CM

## 2022-04-14 DIAGNOSIS — I10 BENIGN ESSENTIAL HYPERTENSION: Primary | ICD-10-CM

## 2022-04-14 DIAGNOSIS — I65.22 CAROTID STENOSIS, ASYMPTOMATIC, LEFT: ICD-10-CM

## 2022-04-14 DIAGNOSIS — E78.2 MIXED HYPERLIPIDEMIA: ICD-10-CM

## 2022-04-14 DIAGNOSIS — E79.0 HYPERURICEMIA: ICD-10-CM

## 2022-04-14 DIAGNOSIS — K75.81 NONALCOHOLIC STEATOHEPATITIS: ICD-10-CM

## 2022-04-14 PROCEDURE — 3008F BODY MASS INDEX DOCD: CPT | Performed by: FAMILY MEDICINE

## 2022-04-14 PROCEDURE — G0439 PPPS, SUBSEQ VISIT: HCPCS | Performed by: FAMILY MEDICINE

## 2022-04-14 PROCEDURE — 1160F RVW MEDS BY RX/DR IN RCRD: CPT | Performed by: FAMILY MEDICINE

## 2022-04-14 PROCEDURE — 1101F PT FALLS ASSESS-DOCD LE1/YR: CPT | Performed by: FAMILY MEDICINE

## 2022-04-14 PROCEDURE — 3074F SYST BP LT 130 MM HG: CPT | Performed by: FAMILY MEDICINE

## 2022-04-14 PROCEDURE — G0444 DEPRESSION SCREEN ANNUAL: HCPCS | Performed by: FAMILY MEDICINE

## 2022-04-14 PROCEDURE — 69210 REMOVE IMPACTED EAR WAX UNI: CPT | Performed by: FAMILY MEDICINE

## 2022-04-14 PROCEDURE — 3725F SCREEN DEPRESSION PERFORMED: CPT | Performed by: FAMILY MEDICINE

## 2022-04-14 PROCEDURE — 1170F FXNL STATUS ASSESSED: CPT | Performed by: FAMILY MEDICINE

## 2022-04-14 PROCEDURE — 1036F TOBACCO NON-USER: CPT | Performed by: FAMILY MEDICINE

## 2022-04-14 PROCEDURE — 1003F LEVEL OF ACTIVITY ASSESS: CPT | Performed by: FAMILY MEDICINE

## 2022-04-14 PROCEDURE — 3288F FALL RISK ASSESSMENT DOCD: CPT | Performed by: FAMILY MEDICINE

## 2022-04-14 PROCEDURE — 99214 OFFICE O/P EST MOD 30 MIN: CPT | Performed by: FAMILY MEDICINE

## 2022-04-14 PROCEDURE — 1125F AMNT PAIN NOTED PAIN PRSNT: CPT | Performed by: FAMILY MEDICINE

## 2022-04-14 RX ORDER — SILDENAFIL 50 MG/1
50 TABLET, FILM COATED ORAL DAILY PRN
Qty: 10 TABLET | Refills: 5 | Status: SHIPPED | OUTPATIENT
Start: 2022-04-14

## 2022-04-14 RX ORDER — ALBUTEROL SULFATE 90 UG/1
2 AEROSOL, METERED RESPIRATORY (INHALATION) EVERY 6 HOURS PRN
Qty: 54 G | Refills: 3 | Status: SHIPPED | OUTPATIENT
Start: 2022-04-14

## 2022-04-14 RX ORDER — LISINOPRIL 10 MG/1
10 TABLET ORAL DAILY
Qty: 90 TABLET | Refills: 3 | Status: SHIPPED | OUTPATIENT
Start: 2022-04-14

## 2022-04-14 RX ORDER — ATORVASTATIN CALCIUM 10 MG/1
10 TABLET, FILM COATED ORAL DAILY
Qty: 90 TABLET | Refills: 3 | Status: SHIPPED | OUTPATIENT
Start: 2022-04-14

## 2022-04-14 NOTE — PATIENT INSTRUCTIONS

## 2022-04-14 NOTE — PROGRESS NOTES
Assessment/Plan:         Diagnoses and all orders for this visit:    Benign essential hypertension  -     lisinopril (ZESTRIL) 10 mg tablet; Take 1 tablet (10 mg total) by mouth daily  -     Comprehensive metabolic panel  -     CBC and differential    Mixed hyperlipidemia  -     atorvastatin (LIPITOR) 10 mg tablet; Take 1 tablet (10 mg total) by mouth daily  -     Lipid panel  -     TSH, 3rd generation with Free T4 reflex    Impaired fasting glucose    Coronary artery disease involving native coronary artery of native heart without angina pectoris    Mild intermittent asthma without complication  -     albuterol (PROVENTIL HFA,VENTOLIN HFA) 90 mcg/act inhaler; Inhale 2 puffs every 6 (six) hours as needed for wheezing    Nonalcoholic steatohepatitis    Irritable bowel syndrome with diarrhea    Impacted cerumen of right ear  -     Ear cerumen removal    Male erectile disorder  -     sildenafil (VIAGRA) 50 MG tablet; Take 1 tablet (50 mg total) by mouth daily as needed for erectile dysfunction    MCI (mild cognitive impairment)  -     Vitamin B12  -     Folate    Carotid stenosis, asymptomatic, left    History of right-sided carotid endarterectomy    Hyperuricemia    Medicare annual wellness visit, subsequent    Encounter for screening for other disorder          Continue with current medications  Repeat labs-I included TSH, E26 and folic acid  Hearing evaluation scheduled  Consider neuropsychological testing  OV 6 months  As a separate procedure I performed cerumen removal right ear-see procedure note  Patient ID: Izzy Scott is a 73 y o  male  Follow up visit  Medications reviewed  Longstanding history of hypertension with intolerance to multiple blood pressure agents  Element of white coat syndrome  He had a number of elevated BP readings in January 2022 and restarted Lisinopril 10 mg/day  He is tolerating w/out side effects  /70 today  10/2021 creatinine 0 89  GFR 87    Electrolytes normal  Hgb 14 1  Hyperlipidemia mixed type and CAD with prior intolerance to a number of statins, fibrates and fish oils  He is now on Atorvastatin 10 mg/day w/out side effects  102/2021 lipid profile cholesterol 207  TGs 66  HDL 49    LFTs normal  FBS 94   08/2019  s/p admission for NSTEMI  Elevated troponin peaking at 4 61  Cardiac catheterization 95% lesion in the distal RCA  Status post RENE x2  Mid circumflex 65% stenosis  Proximal right coronary artery 50% stenosis  Echocardiogram normal left ventricular systolic function  EF 60%  Hypokinesis of the basal mid inferior walls  Grade 1 diastolic dysfunction  Normal right ventricle  Mildly dilated left atrium  Mild MR  No pericardial effusion  Aortic root normal size        Recent Results (from the past 5040 hour(s))   CBC and differential    Collection Time: 10/13/21  7:25 AM   Result Value Ref Range    WBC 5 92 4 31 - 10 16 Thousand/uL    RBC 4 56 3 88 - 5 62 Million/uL    Hemoglobin 14 1 12 0 - 17 0 g/dL    Hematocrit 43 2 36 5 - 49 3 %    MCV 95 82 - 98 fL    MCH 30 9 26 8 - 34 3 pg    MCHC 32 6 31 4 - 37 4 g/dL    RDW 13 1 11 6 - 15 1 %    MPV 11 5 8 9 - 12 7 fL    Platelets 405 177 - 578 Thousands/uL    nRBC 0 /100 WBCs    Neutrophils Relative 57 43 - 75 %    Immat GRANS % 0 0 - 2 %    Lymphocytes Relative 29 14 - 44 %    Monocytes Relative 9 4 - 12 %    Eosinophils Relative 4 0 - 6 %    Basophils Relative 1 0 - 1 %    Neutrophils Absolute 3 39 1 85 - 7 62 Thousands/µL    Immature Grans Absolute 0 02 0 00 - 0 20 Thousand/uL    Lymphocytes Absolute 1 71 0 60 - 4 47 Thousands/µL    Monocytes Absolute 0 55 0 17 - 1 22 Thousand/µL    Eosinophils Absolute 0 22 0 00 - 0 61 Thousand/µL    Basophils Absolute 0 03 0 00 - 0 10 Thousands/µL   Comprehensive metabolic panel    Collection Time: 10/13/21  7:25 AM   Result Value Ref Range    Sodium 137 136 - 145 mmol/L    Potassium 4 6 3 5 - 5 3 mmol/L    Chloride 107 100 - 108 mmol/L    CO2 27 21 - 32 mmol/L ANION GAP 3 (L) 4 - 13 mmol/L    BUN 15 5 - 25 mg/dL    Creatinine 0 89 0 60 - 1 30 mg/dL    Glucose, Fasting 94 65 - 99 mg/dL    Calcium 9 5 8 3 - 10 1 mg/dL    AST 15 5 - 45 U/L    ALT 17 12 - 78 U/L    Alkaline Phosphatase 56 46 - 116 U/L    Total Protein 7 5 6 4 - 8 2 g/dL    Albumin 3 9 3 5 - 5 0 g/dL    Total Bilirubin 0 55 0 20 - 1 00 mg/dL    eGFR 87 ml/min/1 73sq m   Lipid panel    Collection Time: 10/13/21  7:25 AM   Result Value Ref Range    Cholesterol 207 (H) 50 - 200 mg/dL    Triglycerides 66 <=150 mg/dL    HDL, Direct 49 >=40 mg/dL    LDL Calculated 145 (H) 0 - 100 mg/dL    Non-HDL-Chol (CHOL-HDL) 158 mg/dl   PSA, Total Screen    Collection Time: 10/13/21  7:25 AM   Result Value Ref Range    PSA 0 9 0 0 - 4 0 ng/mL   POCT ECG    Collection Time: 01/17/22  8:09 AM   Result Value Ref Range    INTERPRETATIONS             The following portions of the patient's history were reviewed and updated as appropriate: allergies, current medications, past family history, past medical history, past social history, past surgical history and problem list     Review of Systems   Constitutional: Positive for unexpected weight change (7 lb weight loss from 01/2022 with dieting )  Negative for appetite change, chills, fatigue and fever  70+ lb weight loss from 01/2019 with dieting  HENT: Positive for hearing loss  Negative for congestion, ear pain, rhinorrhea, sore throat and trouble swallowing  Eyes: Negative for visual disturbance  Respiratory: Negative for cough, shortness of breath and wheezing  History of asthma he uses infrequent Albuterol MDI  No orthopnea or PND   Cardiovascular: Negative for chest pain, palpitations and leg swelling  See HPI  Carotid artery stenosis  Status post right CEA  Carotid artery Dopplers 01/2022 right ICA patent internal carotid artery and endarterectomy site    Left ICA  50-69% stenosis-no change from 06/2021 03/2017 nuclear stress test normal    Gastrointestinal: Positive for abdominal distention  Negative for abdominal pain, blood in stool, constipation, diarrhea, nausea and vomiting  IBS with diarrhea  Episodes of postprandial abdominal bloating  Multiple abdominal surgeries  03/2021 EGD normal esophagus  Status post fundoplication  Biopsies no dysplasia or malignancy  08/2019 EGD Genao's esophagus  Multiple small superficial ulcer craters in gastric antrum  No longer on PPI  03/2021 colonoscopy mild diverticulosis left colon  Evidence of previous end to end sigmoid resection  Four sessile polyps  Biopsies no microscopic colitis  Polyps benign-tubular adenomas  Fatty liver  04/2021 u/s elastography Metavir score of F0 - F1, Absent or Mild Fibrosis  Liver steatosis grading:  S2 (33% - 66% steatosis)  03/2021 LFTs normal  Ultrasound 06/2019 + fatty liver  08/2019 alpha 1 antitrypsin level normal   Celiac antibodies negative  Iron 84  Ferritin 349  Hepatitis B surface antigen negative  Hepatitis-C antibody negative  Anti smooth muscle antibody negative  Ceruloplasmin level normal   RED negative  SPEP no monoclonal bands  PEREZ Fibroscore minimal to moderate steatosis  No fibrosis  Borderline to probable PEREZ   Endocrine: Negative for polydipsia and polyuria  Genitourinary: Negative for difficulty urinating  Lab Results       Component                Value               Date                       PSA                      0 9                 10/13/2021                 PSA                      0 7                 02/24/2020                 PSA                      0 5                 03/11/2017                              '   Musculoskeletal: Positive for arthralgias and back pain  Negative for myalgias          Status post fall 09/2020 landing on his right shoulder and right elbow   MRI right shoulder Supraspinatus tendinosis with full-thickness anterior leading edge tear spanning 9 mm anteroposteriorly and the torn tendon edge proximally retracted by 7 mm  Moderate infraspinatus tendinosis without discrete tear  Moderate subscapularis tendinosis with partial-thickness midsubstance tear resulting in medial subluxation of long head of biceps which is perched over the greater tuberosity  Moderate long head of biceps tendinosis with low-grade interstitial tear  Moderate acromioclavicular degenerative arthropathy  Symptom improvement with PT   10/2020 CRP < 3 0  11/2020 x-rays of right shoulder separate os septic/calcific densities seen about the shoulder joint noted lateral to the acromion process and above AC joint  Mild osteoarthritis of glenohumeral joint  Subacromial spurring  History of gout/hyperuricemia with flare-up 06/2020  He is no longer taking Allopurinol or Colchicine due to GI side effects  08/2020 uric acid level 6 3 improved from 9 7 in 2014  He has a history of multiple drug allergies/sensitivities  He is on low-dose Aspirin 81 mg daily  01/2020 x-rays lumbar spine mild loss of disc at L5-S1  Facet degenerative changes particularly L3-L4 through L5-S1  Lab Results       Component                Value               Date                       URICACID                 6 3                 08/25/2020                         Skin: Negative for rash  Allergic/Immunologic: Negative for environmental allergies  Neurological: Negative for dizziness and headaches  Short term memory issues  He is scheduled for hearing test     Hematological: Negative for adenopathy  Does not bruise/bleed easily  Psychiatric/Behavioral: Negative for dysphoric mood and sleep disturbance           Objective:      /70   Pulse 80   Temp (!) 95 7 °F (35 4 °C)   Resp 16   Ht 5' 8" (1 727 m)   Wt 86 6 kg (191 lb)   BMI 29 04 kg/m²     BP Readings from Last 3 Encounters:   04/14/22 110/70   01/20/22 140/88   01/17/22 164/92     Wt Readings from Last 3 Encounters:   04/14/22 86 6 kg (191 lb)   01/20/22 89 8 kg (198 lb)   01/17/22 89 8 kg (198 lb)        Physical Exam  Vitals and nursing note reviewed  Constitutional:       General: He is not in acute distress  Appearance: He is well-developed  HENT:      Right Ear: There is impacted cerumen  Left Ear: Tympanic membrane and ear canal normal    Eyes:      General: No scleral icterus  Extraocular Movements: Extraocular movements intact  Conjunctiva/sclera: Conjunctivae normal       Pupils: Pupils are equal, round, and reactive to light  Neck:      Thyroid: No thyroid mass or thyromegaly  Vascular: No carotid bruit or JVD  Trachea: No tracheal deviation  Cardiovascular:      Rate and Rhythm: Normal rate and regular rhythm  Pulses:           Carotid pulses are 2+ on the right side and 2+ on the left side  Heart sounds: Normal heart sounds  No murmur heard  No gallop  Pulmonary:      Effort: Pulmonary effort is normal  No respiratory distress  Breath sounds: Normal breath sounds  No wheezing or rales  Chest:   Breasts:      Right: No supraclavicular adenopathy  Left: No supraclavicular adenopathy  Abdominal:      General: Bowel sounds are normal  There is no distension or abdominal bruit  Palpations: Abdomen is soft  There is no hepatomegaly, splenomegaly or mass  Tenderness: There is no abdominal tenderness  There is no guarding or rebound  Musculoskeletal:      Right lower leg: No edema  Left lower leg: No edema  Lymphadenopathy:      Cervical: No cervical adenopathy  Upper Body:      Right upper body: No supraclavicular adenopathy  Left upper body: No supraclavicular adenopathy  Skin:     Findings: No rash  Nails: There is no clubbing  Neurological:      General: No focal deficit present  Mental Status: He is alert and oriented to person, place, and time     Psychiatric:         Mood and Affect: Mood normal          Behavior: Behavior normal          Cognition and Memory: Cognition normal          Ear cerumen removal    Date/Time: 4/14/2022 11:25 AM  Performed by: Lesley Cao MD  Authorized by: Lesley Cao MD   Universal Protocol:  Consent: Verbal consent obtained    Risks and benefits: risks, benefits and alternatives were discussed    Patient location:  Clinic  Procedure details:     Local anesthetic:  None    Location:  R ear    Procedure type: curette      Approach:  External  Post-procedure details:     Complication:  None    Hearing quality:  Improved  Comments:      Post procedure right TM and ear canal normal

## 2022-04-14 NOTE — PROGRESS NOTES
Assessment and Plan:     Problem List Items Addressed This Visit        Digestive    Irritable bowel syndrome with diarrhea    Nonalcoholic steatohepatitis       Endocrine    Impaired fasting glucose       Respiratory    Asthma    Relevant Medications    albuterol (PROVENTIL HFA,VENTOLIN HFA) 90 mcg/act inhaler       Cardiovascular and Mediastinum    Benign essential hypertension - Primary    Relevant Medications    lisinopril (ZESTRIL) 10 mg tablet    Other Relevant Orders    Comprehensive metabolic panel    CBC and differential    Coronary artery disease involving native coronary artery of native heart without angina pectoris    Relevant Medications    sildenafil (VIAGRA) 50 MG tablet    Carotid stenosis, asymptomatic, left       Other    Hyperuricemia    Mixed hyperlipidemia    Relevant Medications    atorvastatin (LIPITOR) 10 mg tablet    Other Relevant Orders    Lipid panel    TSH, 3rd generation with Free T4 reflex    History of right-sided carotid endarterectomy      Other Visit Diagnoses     Impacted cerumen of right ear        Relevant Orders    Ear cerumen removal    Male erectile disorder        Relevant Medications    sildenafil (VIAGRA) 50 MG tablet    MCI (mild cognitive impairment)        Relevant Orders    Vitamin B12    Folate    Medicare annual wellness visit, subsequent        Encounter for screening for other disorder            BMI Counseling: Body mass index is 29 04 kg/m²  The BMI is above normal  Nutrition recommendations include decreasing portion sizes, consuming healthier snacks, moderation in carbohydrate intake, reducing intake of saturated and trans fat and reducing intake of cholesterol  Exercise recommendations include exercising 3-5 times per week  No pharmacotherapy was ordered  Rationale for BMI follow-up plan is due to patient being overweight or obese  Depression Screening and Follow-up Plan: Patient was screened for depression during today's encounter   They screened negative with a PHQ-2 score of 0  Falls Plan of Care: balance, strength, and gait training instructions were provided  Preventive health issues were discussed with patient, and age appropriate screening tests were ordered as noted in patient's After Visit Summary  Personalized health advice and appropriate referrals for health education or preventive services given if needed, as noted in patient's After Visit Summary       History of Present Illness:     Patient presents for Medicare Annual Wellness visit    Patient Care Team:  Jerel Haywood MD as PCP - Susanna Valero MD as Endoscopist  Kwame Gee DO (Cardiology)     Problem List:     Patient Active Problem List   Diagnosis    Asthma    Genao esophagus    Benign essential hypertension    Diverticulosis    Fatty infiltration of liver    Hyperuricemia    Impaired fasting glucose    Mixed hyperlipidemia    Drug-induced erectile dysfunction    Non-allergic rhinitis    Osteoarthritis of knee    Vitamin B12 deficiency    Irritable bowel syndrome with diarrhea    History of colonic diverticulitis    History of right-sided carotid endarterectomy    Coronary artery disease involving native coronary artery of native heart without angina pectoris    Incisional hernia    Arthritis of right acromioclavicular joint    Carotid stenosis, asymptomatic, left    Osteoarthritis of right glenohumeral joint    Nonalcoholic steatohepatitis      Past Medical and Surgical History:     Past Medical History:   Diagnosis Date    Allergic reaction     LAST ASSESSED: [id number]    Asthma     Bursitis of heel     LAST ASSESSED: [id number]    Derangement of meniscus of left knee due to old injury     LAST ASSESSED: [id number]    Diverticulitis of colon     LAST ASSESSED: [id number]    Dizziness     LAST ASSESSED: [id number]    GERD (gastroesophageal reflux disease)     History of colon polyps     Hypertension     Malignant hypertension     LAST ASSESSED: [id number]    Non-ST elevated myocardial infarction ([facility] [address] ) 3/2/2020    NSTEMI (non-ST elevated myocardial infarction) ([facility] [address] )     Plantar fasciitis     Seasonal allergies     Shoulder impingement     LAST ASSESSED: [id number]    Stroke [facility])      Past Surgical History:   Procedure Laterality Date    APPENDECTOMY      CAROTID ENDARTARECTOMY      COLECTOMY Left     PARTIAL - SIGMOID; HEMICOLECTOMY FOR DIVERTICULITIS; ONSET:     COLON SURGERY      COLONOSCOPY N/A 2016    Procedure: COLONOSCOPY;  Surgeon: Dennie Pang, MD;  [facility];   Service:     HERNIA REPAIR      VENTRAL    NISSEN FUNDOPLICATION      ESOPHAGOGASTRIC FUNDOPLASTY LAST ASSESSED: [id number]    SINUS SURGERY      THROMBOENDARTERECTOMY      CAROTID; ONSET; MAY 2012      Family History:     Family History   Problem Relation Age of Onset    Cancer Paternal Grandfather     Hypertension Mother     Hyperlipidemia Mother     Hypertension Father     Hyperlipidemia Father     Colon cancer Neg Hx       Social History:     Social History     Socioeconomic History    Marital status: /Civil Union     Spouse name: None    Number of children: None    Years of education: None    Highest education level: None   Occupational History    None   Tobacco Use    Smoking status: Former Smoker     Packs/day: 0 25     Years: 5 00     Pack years: 1 25     Types: Cigarettes     Quit date: 1960     Years since quittin 3    Smokeless tobacco: Never Used   Vaping Use    Vaping Use: Never used   Substance and Sexual Activity    Alcohol use: No    Drug use: No    Sexual activity: None   Other Topics Concern    None   Social History Narrative    None     Social Determinants of Health     Financial Resource Strain: Not on file   Food Insecurity: Not on file   Transportation Needs: Not on file   Physical Activity: Not on file   Stress: Not on file   Social Connections: Not on file   Intimate Partner Violence: Not on file   Housing Stability: Not on file      Medications and Allergies:     Current Outpatient Medications   Medication Sig Dispense Refill    acetaminophen (TYLENOL) 325 mg tablet Take 2 tablets (650 mg total) by mouth every 6 (six) hours as needed for mild pain, headaches or fever 30 tablet 0    albuterol (PROVENTIL HFA,VENTOLIN HFA) 90 mcg/act inhaler Inhale 2 puffs every 6 (six) hours as needed for wheezing 54 g 3    aspirin (ECOTRIN LOW STRENGTH) 81 mg EC tablet Take 81 mg by mouth every other day        atorvastatin (LIPITOR) 10 mg tablet Take 1 tablet (10 mg total) by mouth daily 90 tablet 3    fluticasone (FLONASE) 50 mcg/act nasal spray 1 spray into each nostril daily      fluticasone-salmeterol (Advair HFA) 230-21 MCG/ACT inhaler       levalbuterol (XOPENEX) 1 25 mg/3 mL nebulizer solution Take 3 mL (1 25 mg total) by nebulization every 8 (eight) hours as needed for wheezing or shortness of breath 75 mL 5    lisinopril (ZESTRIL) 10 mg tablet Take 1 tablet (10 mg total) by mouth daily 90 tablet 3    multivitamin (THERAGRAN) TABS Take 1 tablet by mouth daily      sildenafil (VIAGRA) 50 MG tablet Take 1 tablet (50 mg total) by mouth daily as needed for erectile dysfunction 10 tablet 5     No current facility-administered medications for this visit       Allergies   Allergen Reactions    Depo-Medrol [Methylprednisolone] Anaphylaxis     Severe Vagal Reaction    Iv Contrast [Iodinated Diagnostic Agents] Angioedema     Pt states itchy/swollen eyes, trouble breathing (years ago)    Rosuvastatin Headache and Confusion    Sulfa Antibiotics Hives      Immunizations:     Immunization History   Administered Date(s) Administered    COVID-19 MODERNA VACC 0 5 ML IM 01/22/2021, 02/19/2021, 11/09/2021    INFLUENZA 12/19/2016, 09/25/2017, 09/18/2018, 09/24/2021    Influenza Quadrivalent Preservative Free 3 years and older IM 09/25/2017    Influenza Quadrivalent, 6-35 Months IM 12/19/2016    Influenza, high dose seasonal 0 7 mL 01/09/2020, 09/04/2020    Influenza, seasonal, injectable 11/14/2012, 11/12/2013    Pneumococcal Conjugate 13-Valent 08/30/2019    Pneumococcal Polysaccharide PPV23 12/17/2007, 01/26/2018    TD (adult) Preservative Free 08/30/2019    Td (adult), adsorbed 08/30/2019      Health Maintenance:         Topic Date Due    Colorectal Cancer Screening  03/17/2026    Hepatitis C Screening  Completed         Topic Date Due    DTaP,Tdap,and Td Vaccines (1 - Tdap) 08/31/2019      Medicare Health Risk Assessment:     /70   Pulse 80   Temp (!) 95 7 °F (35 4 °C)   Resp 16   Ht 5' 8" (1 727 m)   Wt 86 6 kg (191 lb)   BMI 29 04 kg/m²      Kyara Smith is here for his Subsequent Wellness visit  Last Medicare Wellness visit information reviewed, patient interviewed and updates made to the record today  Health Risk Assessment:   Patient rates overall health as very good  Patient feels that their physical health rating is slightly better  Patient is very satisfied with their life  Eyesight was rated as same  Hearing was rated as same  Patient feels that their emotional and mental health rating is same  Patients states they are never, rarely angry  Patient states they are often unusually tired/fatigued  Pain experienced in the last 7 days has been some  Patient's pain rating has been 5/10  Patient states that he has experienced no weight loss or gain in last 6 months  Depression Screening:   PHQ-2 Score: 0      Fall Risk Screening: In the past year, patient has experienced: no history of falling in past year      Home Safety:  Patient does not have trouble with stairs inside or outside of their home  Patient has working smoke alarms and has working carbon monoxide detector  Home safety hazards include: none  Nutrition:   Current diet is Low Cholesterol, Low Saturated Fat, No Added Salt and Limited junk food  Medications:   Patient is currently taking over-the-counter supplements   OTC medications include: tylenol  Patient is able to manage medications  Activities of Daily Living (ADLs)/Instrumental Activities of Daily Living (IADLs):   Walk and transfer into and out of bed and chair?: Yes  Dress and groom yourself?: Yes    Bathe or shower yourself?: Yes    Feed yourself? Yes  Do your laundry/housekeeping?: Yes  Manage your money, pay your bills and track your expenses?: Yes  Make your own meals?: Yes    Do your own shopping?: Yes    Previous Hospitalizations:   Any hospitalizations or ED visits within the last 12 months?: No      Advance Care Planning:   Living will: Yes    Durable POA for healthcare: Yes    Advanced directive: Yes      Cognitive Screening:   Provider or family/friend/caregiver concerned regarding cognition?: No    PREVENTIVE SCREENINGS      Cardiovascular Screening:    General: Screening Not Indicated and History Lipid Disorder      Diabetes Screening:     General: Screening Current      Colorectal Cancer Screening:     General: Screening Current      Prostate Cancer Screening:    General: Screening Current      Osteoporosis Screening:    General: Screening Not Indicated      Abdominal Aortic Aneurysm (AAA) Screening:    Risk factors include: age between 73-67 yo and tobacco use        General: Screening Current      Lung Cancer Screening:     General: Screening Not Indicated      Hepatitis C Screening:    General: Screening Current    Screening, Brief Intervention, and Referral to Treatment (SBIRT)    Screening  Typical number of drinks in a day: 0  Typical number of drinks in a week: 0  Interpretation: Low risk drinking behavior      AUDIT-C Screenin) How often did you have a drink containing alcohol in the past year? never  2) How many drinks did you have on a typical day when you were drinking in the past year? 0  3) How often did you have 6 or more drinks on one occasion in the past year? never    AUDIT-C Score: 0  Interpretation: Score 0-3 (male): Negative screen for alcohol misuse    Single Item Drug Screening:  How often have you used an illegal drug (including marijuana) or a prescription medication for non-medical reasons in the past year? never    Single Item Drug Screen Score: 0  Interpretation: Negative screen for possible drug use disorder    Brief Intervention  Alcohol & drug use screenings were reviewed  No concerns regarding substance use disorder identified  Other Counseling Topics:   Calcium and vitamin D intake and regular weightbearing exercise         Malou Howard MD

## 2022-05-12 ENCOUNTER — APPOINTMENT (OUTPATIENT)
Dept: LAB | Facility: CLINIC | Age: 72
End: 2022-05-12
Payer: COMMERCIAL

## 2022-05-12 LAB
ALBUMIN SERPL BCP-MCNC: 4 G/DL (ref 3.5–5)
ALP SERPL-CCNC: 53 U/L (ref 46–116)
ALT SERPL W P-5'-P-CCNC: 27 U/L (ref 12–78)
ANION GAP SERPL CALCULATED.3IONS-SCNC: 2 MMOL/L (ref 4–13)
AST SERPL W P-5'-P-CCNC: 14 U/L (ref 5–45)
BASOPHILS # BLD AUTO: 0.04 THOUSANDS/ΜL (ref 0–0.1)
BASOPHILS NFR BLD AUTO: 1 % (ref 0–1)
BILIRUB SERPL-MCNC: 0.76 MG/DL (ref 0.2–1)
BUN SERPL-MCNC: 21 MG/DL (ref 5–25)
CALCIUM SERPL-MCNC: 9.6 MG/DL (ref 8.3–10.1)
CHLORIDE SERPL-SCNC: 108 MMOL/L (ref 100–108)
CHOLEST SERPL-MCNC: 152 MG/DL
CO2 SERPL-SCNC: 29 MMOL/L (ref 21–32)
CREAT SERPL-MCNC: 1.1 MG/DL (ref 0.6–1.3)
EOSINOPHIL # BLD AUTO: 0.25 THOUSAND/ΜL (ref 0–0.61)
EOSINOPHIL NFR BLD AUTO: 4 % (ref 0–6)
ERYTHROCYTE [DISTWIDTH] IN BLOOD BY AUTOMATED COUNT: 13.3 % (ref 11.6–15.1)
FOLATE SERPL-MCNC: 15.1 NG/ML (ref 3.1–17.5)
GFR SERPL CREATININE-BSD FRML MDRD: 68 ML/MIN/1.73SQ M
GLUCOSE P FAST SERPL-MCNC: 100 MG/DL (ref 65–99)
HCT VFR BLD AUTO: 43.5 % (ref 36.5–49.3)
HDLC SERPL-MCNC: 50 MG/DL
HGB BLD-MCNC: 14 G/DL (ref 12–17)
IMM GRANULOCYTES # BLD AUTO: 0.02 THOUSAND/UL (ref 0–0.2)
IMM GRANULOCYTES NFR BLD AUTO: 0 % (ref 0–2)
LDLC SERPL CALC-MCNC: 90 MG/DL (ref 0–100)
LYMPHOCYTES # BLD AUTO: 1.42 THOUSANDS/ΜL (ref 0.6–4.47)
LYMPHOCYTES NFR BLD AUTO: 20 % (ref 14–44)
MCH RBC QN AUTO: 30.4 PG (ref 26.8–34.3)
MCHC RBC AUTO-ENTMCNC: 32.2 G/DL (ref 31.4–37.4)
MCV RBC AUTO: 95 FL (ref 82–98)
MONOCYTES # BLD AUTO: 0.58 THOUSAND/ΜL (ref 0.17–1.22)
MONOCYTES NFR BLD AUTO: 8 % (ref 4–12)
NEUTROPHILS # BLD AUTO: 4.65 THOUSANDS/ΜL (ref 1.85–7.62)
NEUTS SEG NFR BLD AUTO: 67 % (ref 43–75)
NONHDLC SERPL-MCNC: 102 MG/DL
NRBC BLD AUTO-RTO: 0 /100 WBCS
PLATELET # BLD AUTO: 195 THOUSANDS/UL (ref 149–390)
PMV BLD AUTO: 11.7 FL (ref 8.9–12.7)
POTASSIUM SERPL-SCNC: 4.6 MMOL/L (ref 3.5–5.3)
PROT SERPL-MCNC: 7.6 G/DL (ref 6.4–8.2)
RBC # BLD AUTO: 4.6 MILLION/UL (ref 3.88–5.62)
SODIUM SERPL-SCNC: 139 MMOL/L (ref 136–145)
TRIGL SERPL-MCNC: 58 MG/DL
TSH SERPL DL<=0.05 MIU/L-ACNC: 1.49 UIU/ML (ref 0.45–4.5)
VIT B12 SERPL-MCNC: 866 PG/ML (ref 100–900)
WBC # BLD AUTO: 6.96 THOUSAND/UL (ref 4.31–10.16)

## 2022-05-12 PROCEDURE — 80061 LIPID PANEL: CPT | Performed by: FAMILY MEDICINE

## 2022-05-12 PROCEDURE — 85025 COMPLETE CBC W/AUTO DIFF WBC: CPT | Performed by: FAMILY MEDICINE

## 2022-05-12 PROCEDURE — 82607 VITAMIN B-12: CPT | Performed by: FAMILY MEDICINE

## 2022-05-12 PROCEDURE — 82746 ASSAY OF FOLIC ACID SERUM: CPT | Performed by: FAMILY MEDICINE

## 2022-05-12 PROCEDURE — 36415 COLL VENOUS BLD VENIPUNCTURE: CPT | Performed by: FAMILY MEDICINE

## 2022-05-12 PROCEDURE — 80053 COMPREHEN METABOLIC PANEL: CPT | Performed by: FAMILY MEDICINE

## 2022-05-12 PROCEDURE — 84443 ASSAY THYROID STIM HORMONE: CPT | Performed by: FAMILY MEDICINE

## 2022-05-23 ENCOUNTER — APPOINTMENT (EMERGENCY)
Dept: RADIOLOGY | Facility: HOSPITAL | Age: 72
End: 2022-05-23
Payer: COMMERCIAL

## 2022-05-23 ENCOUNTER — HOSPITAL ENCOUNTER (EMERGENCY)
Facility: HOSPITAL | Age: 72
Discharge: HOME | End: 2022-05-23
Attending: EMERGENCY MEDICINE
Payer: COMMERCIAL

## 2022-05-23 VITALS
WEIGHT: 189.82 LBS | RESPIRATION RATE: 16 BRPM | HEART RATE: 68 BPM | TEMPERATURE: 97.7 F | DIASTOLIC BLOOD PRESSURE: 70 MMHG | SYSTOLIC BLOOD PRESSURE: 146 MMHG | BODY MASS INDEX: 28.86 KG/M2 | OXYGEN SATURATION: 97 %

## 2022-05-23 DIAGNOSIS — J45.901 ASTHMA EXACERBATION: Primary | ICD-10-CM

## 2022-05-23 LAB
2HR DELTA HS TROPONIN: 0 NG/L
ALBUMIN SERPL BCP-MCNC: 3.8 G/DL (ref 3.5–5)
ALP SERPL-CCNC: 46 U/L (ref 34–104)
ALT SERPL W P-5'-P-CCNC: 10 U/L (ref 7–52)
ANION GAP SERPL CALCULATED.3IONS-SCNC: 8 MMOL/L (ref 4–13)
AST SERPL W P-5'-P-CCNC: 12 U/L (ref 13–39)
ATRIAL RATE: 55 BPM
BASOPHILS # BLD AUTO: 0.03 THOUSANDS/ΜL (ref 0–0.1)
BASOPHILS NFR BLD AUTO: 0 % (ref 0–1)
BILIRUB SERPL-MCNC: 0.6 MG/DL (ref 0.2–1)
BUN SERPL-MCNC: 20 MG/DL (ref 5–25)
CALCIUM SERPL-MCNC: 9 MG/DL (ref 8.4–10.2)
CARDIAC TROPONIN I PNL SERPL HS: 5 NG/L
CARDIAC TROPONIN I PNL SERPL HS: 5 NG/L
CHLORIDE SERPL-SCNC: 107 MMOL/L (ref 96–108)
CO2 SERPL-SCNC: 24 MMOL/L (ref 21–32)
CREAT SERPL-MCNC: 0.94 MG/DL (ref 0.6–1.3)
EOSINOPHIL # BLD AUTO: 0.28 THOUSAND/ΜL (ref 0–0.61)
EOSINOPHIL NFR BLD AUTO: 3 % (ref 0–6)
ERYTHROCYTE [DISTWIDTH] IN BLOOD BY AUTOMATED COUNT: 13.6 % (ref 11.6–15.1)
FLUAV RNA RESP QL NAA+PROBE: NEGATIVE
FLUBV RNA RESP QL NAA+PROBE: NEGATIVE
GFR SERPL CREATININE-BSD FRML MDRD: 82 ML/MIN/1.73SQ M
GLUCOSE SERPL-MCNC: 112 MG/DL (ref 65–140)
HCT VFR BLD AUTO: 39.2 % (ref 36.5–49.3)
HGB BLD-MCNC: 13 G/DL (ref 12–17)
IMM GRANULOCYTES # BLD AUTO: 0.03 THOUSAND/UL (ref 0–0.2)
IMM GRANULOCYTES NFR BLD AUTO: 0 % (ref 0–2)
LYMPHOCYTES # BLD AUTO: 0.82 THOUSANDS/ΜL (ref 0.6–4.47)
LYMPHOCYTES NFR BLD AUTO: 10 % (ref 14–44)
MAGNESIUM SERPL-MCNC: 1.7 MG/DL (ref 1.9–2.7)
MCH RBC QN AUTO: 31 PG (ref 26.8–34.3)
MCHC RBC AUTO-ENTMCNC: 33.2 G/DL (ref 31.4–37.4)
MCV RBC AUTO: 94 FL (ref 82–98)
MONOCYTES # BLD AUTO: 0.55 THOUSAND/ΜL (ref 0.17–1.22)
MONOCYTES NFR BLD AUTO: 7 % (ref 4–12)
NEUTROPHILS # BLD AUTO: 6.64 THOUSANDS/ΜL (ref 1.85–7.62)
NEUTS SEG NFR BLD AUTO: 80 % (ref 43–75)
NRBC BLD AUTO-RTO: 0 /100 WBCS
P AXIS: 59 DEGREES
PHOSPHATE SERPL-MCNC: 1.9 MG/DL (ref 2.3–4.1)
PLATELET # BLD AUTO: 164 THOUSANDS/UL (ref 149–390)
PMV BLD AUTO: 11.1 FL (ref 8.9–12.7)
POTASSIUM SERPL-SCNC: 3.9 MMOL/L (ref 3.5–5.3)
PROT SERPL-MCNC: 6.8 G/DL (ref 6.4–8.4)
QRS AXIS: 47 DEGREES
QRSD INTERVAL: 104 MS
QT INTERVAL: 414 MS
QTC INTERVAL: 396 MS
RBC # BLD AUTO: 4.19 MILLION/UL (ref 3.88–5.62)
RSV RNA RESP QL NAA+PROBE: NEGATIVE
SARS-COV-2 RNA RESP QL NAA+PROBE: NEGATIVE
SODIUM SERPL-SCNC: 139 MMOL/L (ref 135–147)
T WAVE AXIS: 51 DEGREES
VENTRICULAR RATE: 55 BPM
WBC # BLD AUTO: 8.35 THOUSAND/UL (ref 4.31–10.16)

## 2022-05-23 PROCEDURE — 0241U HB NFCT DS VIR RESP RNA 4 TRGT: CPT

## 2022-05-23 PROCEDURE — 94640 AIRWAY INHALATION TREATMENT: CPT

## 2022-05-23 PROCEDURE — 84484 ASSAY OF TROPONIN QUANT: CPT

## 2022-05-23 PROCEDURE — 93005 ELECTROCARDIOGRAM TRACING: CPT

## 2022-05-23 PROCEDURE — 71045 X-RAY EXAM CHEST 1 VIEW: CPT

## 2022-05-23 PROCEDURE — 84100 ASSAY OF PHOSPHORUS: CPT

## 2022-05-23 PROCEDURE — 99285 EMERGENCY DEPT VISIT HI MDM: CPT

## 2022-05-23 PROCEDURE — 83735 ASSAY OF MAGNESIUM: CPT

## 2022-05-23 PROCEDURE — 80053 COMPREHEN METABOLIC PANEL: CPT

## 2022-05-23 PROCEDURE — 36415 COLL VENOUS BLD VENIPUNCTURE: CPT

## 2022-05-23 PROCEDURE — 85025 COMPLETE CBC W/AUTO DIFF WBC: CPT

## 2022-05-23 PROCEDURE — 93010 ELECTROCARDIOGRAM REPORT: CPT | Performed by: INTERNAL MEDICINE

## 2022-05-23 RX ORDER — PREDNISONE 50 MG/1
50 TABLET ORAL DAILY
Qty: 4 TABLET | Refills: 0 | Status: SHIPPED | OUTPATIENT
Start: 2022-05-23 | End: 2022-05-27

## 2022-05-23 RX ORDER — IPRATROPIUM BROMIDE AND ALBUTEROL SULFATE 2.5; .5 MG/3ML; MG/3ML
3 SOLUTION RESPIRATORY (INHALATION) ONCE
Status: COMPLETED | OUTPATIENT
Start: 2022-05-23 | End: 2022-05-23

## 2022-05-23 RX ORDER — ALBUTEROL SULFATE 2.5 MG/3ML
1 SOLUTION RESPIRATORY (INHALATION) ONCE
Status: COMPLETED | OUTPATIENT
Start: 2022-05-23 | End: 2022-05-23

## 2022-05-23 RX ORDER — IPRATROPIUM BROMIDE AND ALBUTEROL SULFATE 2.5; .5 MG/3ML; MG/3ML
3 SOLUTION RESPIRATORY (INHALATION)
Status: DISCONTINUED | OUTPATIENT
Start: 2022-05-23 | End: 2022-05-23

## 2022-05-23 RX ORDER — METHYLPREDNISOLONE SOD SUCC 125 MG
1 VIAL (EA) INJECTION ONCE
Status: COMPLETED | OUTPATIENT
Start: 2022-05-23 | End: 2022-05-23

## 2022-05-23 RX ADMIN — MAGNESIUM OXIDE TAB 400 MG (241.3 MG ELEMENTAL MG) 400 MG: 400 (241.3 MG) TAB at 08:05

## 2022-05-23 RX ADMIN — IPRATROPIUM BROMIDE AND ALBUTEROL SULFATE 3 ML: 2.5; .5 SOLUTION RESPIRATORY (INHALATION) at 05:44

## 2022-05-23 NOTE — ED ATTENDING ATTESTATION
5/23/2022  IQing DO, saw and evaluated the patient  I have discussed the patient with the resident/non-physician practitioner and agree with the resident's/non-physician practitioner's findings, Plan of Care, and MDM as documented in the resident's/non-physician practitioner's note, except where noted  All available labs and Radiology studies were reviewed  I was present for key portions of any procedure(s) performed by the resident/non-physician practitioner and I was immediately available to provide assistance  At this point I agree with the current assessment done in the Emergency Department    I have conducted an independent evaluation of this patient a history and physical is as follows:    ED Course         Critical Care Time  Procedures          53-year-old male, shortness of breath, large amount improvement with breathing treatments and steroids, delta troponins negative, on exam speaking full clear sentences they difficulty, comfortable discharge plan, will DC

## 2022-05-23 NOTE — ED PROVIDER NOTES
History  Chief Complaint   Patient presents with    Shortness of Breath     Pt presents to ED via EMS from home c/o sudden SOB at 0400  Pt states he has a hx of asthma and believes the grass work he was doing yesterday irritated his airway  Pt had 1 albuterol daniela and 125mg solumedrol prehospital and now states he feels much better  Pt is a 72-year old male with a PMHx significant for CAD, stroke, asthma, HTN, and GERD presenting to the ED with a complaint of SOB that has worsened over the past day  Pt reports he has an allergy to grass however cut the grass 2 days ago without a mask  Report that evening he began with a scratchy throat and mild non-productive cough  Reports he also cut a significant amount of grass 1 day ago but wore a mask  Reports last night he developed SOB which progressively worsened throughout the night  Denies audible wheezing and stridor  Pt reports trying his albuterol pump inhaler without relief  Denies using his at home nebulizer  Repots shortly prior to arrival it was getting very difficult to breath prompting him to call EMS  Reports EMS gave 1 nebulizer treatment which significantly improved his symptoms  Reports he feels much better but still has mild SOB  Denies CP, CP worse with exertion, pleuritic CP and diaphoresis  Denies purulent sputum production  Denies fever, chills, nasal congestion, rhinorrhea, abdominal pain, N/VD, back pain, neck pain, dysuria, hematuria, HA, confusion, rash and weakness  No sick contacts or recent travel  Pt is vaccinated against COVID  Prior to Admission Medications   Prescriptions Last Dose Informant Patient Reported?  Taking?   acetaminophen (TYLENOL) 325 mg tablet  Self No Yes   Sig: Take 2 tablets (650 mg total) by mouth every 6 (six) hours as needed for mild pain, headaches or fever   albuterol (PROVENTIL HFA,VENTOLIN HFA) 90 mcg/act inhaler 5/23/2022 at Unknown time  No Yes   Sig: Inhale 2 puffs every 6 (six) hours as needed for wheezing   aspirin (ECOTRIN LOW STRENGTH) 81 mg EC tablet 2022 at Unknown time Self Yes Yes   Sig: Take 81 mg by mouth every other day     atorvastatin (LIPITOR) 10 mg tablet 2022 at Unknown time  No Yes   Sig: Take 1 tablet (10 mg total) by mouth daily   fluticasone (FLONASE) 50 mcg/act nasal spray  Self Yes Yes   Si spray into each nostril daily   fluticasone-salmeterol (Advair HFA) 230-21 MCG/ACT inhaler  Self Yes Yes   levalbuterol (XOPENEX) 1 25 mg/3 mL nebulizer solution  Self No Yes   Sig: Take 3 mL (1 25 mg total) by nebulization every 8 (eight) hours as needed for wheezing or shortness of breath   lisinopril (ZESTRIL) 10 mg tablet 2022 at Unknown time  No Yes   Sig: Take 1 tablet (10 mg total) by mouth daily   multivitamin (THERAGRAN) TABS 2022 at Unknown time Self Yes Yes   Sig: Take 1 tablet by mouth daily   sildenafil (VIAGRA) 50 MG tablet   No Yes   Sig: Take 1 tablet (50 mg total) by mouth daily as needed for erectile dysfunction      Facility-Administered Medications: None       Past Medical History:   Diagnosis Date    Allergic reaction     LAST ASSESSED: [id number]    Asthma     Bursitis of heel     LAST ASSESSED: [id number]    Derangement of meniscus of left knee due to old injury     LAST ASSESSED: [id number]    Diverticulitis of colon     LAST ASSESSED: [id number]    Dizziness     LAST ASSESSED: [id number]    GERD (gastroesophageal reflux disease)     History of colon polyps     Hypertension     Malignant hypertension     LAST ASSESSED: [id number]    Non-ST elevated myocardial infarction ([facility] [address] ) 3/2/2020    NSTEMI (non-ST elevated myocardial infarction) ([facility] [address] )     Plantar fasciitis     Seasonal allergies     Shoulder impingement     LAST ASSESSED: [id number]    Stroke ([address] )        Past Surgical History:   Procedure Laterality Date    APPENDECTOMY      CAROTID ENDARTARECTOMY      COLECTOMY Left     PARTIAL - SIGMOID; HEMICOLECTOMY FOR DIVERTICULITIS; ONSET:   COLON SURGERY      COLONOSCOPY N/A 2016    Procedure: COLONOSCOPY;  Surgeon: Catrachito Che MD;  [facility]; Service:     HERNIA REPAIR      VENTRAL    NISSEN FUNDOPLICATION      ESOPHAGOGASTRIC FUNDOPLASTY LAST ASSESSED: [id number]    SINUS SURGERY      THROMBOENDARTERECTOMY      CAROTID; ONSET; MAY 2012       Family History   Problem Relation Age of Onset    Cancer Paternal Grandfather     Hypertension Mother     Hyperlipidemia Mother     Hypertension Father     Hyperlipidemia Father     Colon cancer Neg Hx      I have reviewed and agree with the history as documented  E-Cigarette/Vaping    E-Cigarette Use Never User      E-Cigarette/Vaping Substances    Nicotine No     THC No     CBD No     Flavoring No     Other No     Unknown No      Social History     Tobacco Use    Smoking status: Former Smoker     Packs/day: 0 25     Years: 5 00     Pack years: 1 25     Types: Cigarettes     Quit date:      Years since quittin 4    Smokeless tobacco: Never Used   Vaping Use    Vaping Use: Never used   Substance Use Topics    Alcohol use: No    Drug use: No       Review of Systems   Constitutional: Negative for chills and fever  HENT: Negative for ear pain and sore throat  Eyes: Negative for pain and visual disturbance  Respiratory: Positive for cough and shortness of breath  Negative for wheezing and stridor  Cardiovascular: Negative for chest pain and palpitations  Gastrointestinal: Negative for abdominal pain, diarrhea, nausea and vomiting  Genitourinary: Negative for dysuria and hematuria  Musculoskeletal: Negative for arthralgias and back pain  Skin: Negative for color change and rash  Neurological: Negative for seizures, syncope and weakness  Psychiatric/Behavioral: Negative for confusion  All other systems reviewed and are negative  Physical Exam  Physical Exam  Vitals and nursing note reviewed     Constitutional:       General: He is not in acute distress  Appearance: He is well-developed  He is not ill-appearing  HENT:      Head: Normocephalic and atraumatic  Mouth/Throat:      Mouth: Mucous membranes are moist       Pharynx: Oropharynx is clear  No pharyngeal swelling or oropharyngeal exudate  Comments: Oropharynx patent and clear  Eyes:      Conjunctiva/sclera: Conjunctivae normal    Neck:      Vascular: No JVD  Cardiovascular:      Rate and Rhythm: Normal rate and regular rhythm  Heart sounds: No murmur heard  Pulmonary:      Effort: Pulmonary effort is normal  No tachypnea, accessory muscle usage, respiratory distress or retractions  Breath sounds: No stridor or decreased air movement  Comments: Mild end expiratory wheeze throughout all lung fields b/l at initial evaluation  Adequate air movement  No respiratory distress  Pt speaking in full sentences without issue  O2 sat on RA- 100%  Chest:      Chest wall: No tenderness  Abdominal:      Palpations: Abdomen is soft  Tenderness: There is no abdominal tenderness  Musculoskeletal:      Cervical back: Neck supple  Lymphadenopathy:      Cervical: No cervical adenopathy  Skin:     General: Skin is warm and dry  Capillary Refill: Capillary refill takes less than 2 seconds  Neurological:      General: No focal deficit present  Mental Status: He is alert     Psychiatric:         Mood and Affect: Mood normal          Behavior: Behavior normal          Vital Signs  ED Triage Vitals [05/23/22 0439]   Temperature Pulse Respirations Blood Pressure SpO2   97 7 °F (36 5 °C) 62 20 (!) 183/78 100 %      Temp Source Heart Rate Source Patient Position - Orthostatic VS BP Location FiO2 (%)   Oral Monitor Sitting Right arm --      Pain Score       No Pain           Vitals:    05/23/22 0439 05/23/22 0544 05/23/22 0745   BP: (!) 183/78 (!) 177/81 146/70   Pulse: 62 57 68   Patient Position - Orthostatic VS: Sitting Sitting Lying         Visual Acuity      ED Medications  Medications   albuterol (FOR EMS ONLY) (2 5 mg/3 mL) 0 083 % inhalation solution 2 5 mg (0 mg Does not apply Given to EMS 5/23/22 0453)   methylPREDNISolone sodium succinate (FOR EMS ONLY) (Solu-MEDROL) 125 MG injection 125 mg (0 mg Does not apply Given to EMS 5/23/22 0453)   ipratropium-albuterol (DUO-NEB) 0 5-2 5 mg/3 mL inhalation solution 3 mL (3 mL Nebulization Given 5/23/22 0544)   magnesium oxide (MAG-OX) tablet 400 mg (400 mg Oral Given 5/23/22 0805)       Diagnostic Studies  Results Reviewed     Procedure Component Value Units Date/Time    Phosphorus [[id number]]  (Abnormal) Collected: 05/23/22 0520    Lab Status: Final result Specimen: Blood from Arm, Right Updated: 05/23/22 0844     Phosphorus 1 9 mg/dL     HS Troponin I 2hr [[id number]]  (Normal) Collected: 05/23/22 0729    Lab Status: Final result Specimen: Blood from Hand, Left Updated: 05/23/22 0807     hs TnI 2hr 5 ng/L      Delta 2hr hsTnI 0 ng/L     HS Troponin 0hr (reflex protocol) [[id number]]  (Normal) Collected: 05/23/22 0520    Lab Status: Final result Specimen: Blood from Arm, Right Updated: 05/23/22 0620     hs TnI 0hr 5 ng/L     COVID/FLU/RSV - 2 hour TAT [[id number]]  (Normal) Collected: 05/23/22 0520    Lab Status: Final result Specimen: Nares from Nose Updated: 05/23/22 0614     SARS-CoV-2 Negative     INFLUENZA A PCR Negative     INFLUENZA B PCR Negative     RSV PCR Negative    Narrative:      FOR PEDIATRIC PATIENTS - copy/paste COVID Guidelines URL to browser: [email or web address]    SARS-CoV-2 assay is a Nucleic Acid Amplification assay intended for the  qualitative detection of nucleic acid from SARS-CoV-2 in nasopharyngeal  swabs  Results are for the presumptive identification of SARS-CoV-2 RNA      Positive results are indicative of infection with SARS-CoV-2, the virus  causing COVID-19, but do not rule out bacterial infection or co-infection  with other viruses  Laboratories within the [place] and its  territories are required to report all positive results to the appropriate  public health authorities  Negative results do not preclude SARS-CoV-2  infection and should not be used as the sole basis for treatment or other  patient management decisions  Negative results must be combined with  clinical observations, patient history, and epidemiological information  This test has not been FDA cleared or approved  This test has been authorized by FDA under an Emergency Use Authorization  (EUA)  This test is only authorized for the duration of time the  declaration that circumstances exist justifying the authorization of the  emergency use of an in vitro diagnostic tests for detection of SARS-CoV-2  virus and/or diagnosis of COVID-19 infection under section 564(b)(1) of  the Act, 21 U  S C  630IDG-2(K)(7), unless the authorization is terminated  or revoked sooner  The test has been validated but independent review by FDA  and CLIA is pending  Test performed using [employer] GeneXpert: This RT-PCR assay targets N2,  a region unique to SARS-CoV-2  A conserved region in the E-gene was chosen  for pan-Sarbecovirus detection which includes SARS-CoV-2      Comprehensive metabolic panel [[id number]]  (Abnormal) Collected: 05/23/22 0520    Lab Status: Final result Specimen: Blood from Arm, Right Updated: 05/23/22 0604     Sodium 139 mmol/L      Potassium 3 9 mmol/L      Chloride 107 mmol/L      CO2 24 mmol/L      ANION GAP 8 mmol/L      BUN 20 mg/dL      Creatinine 0 94 mg/dL      Glucose 112 mg/dL      Calcium 9 0 mg/dL      AST 12 U/L      ALT 10 U/L      Alkaline Phosphatase 46 U/L      Total Protein 6 8 g/dL      Albumin 3 8 g/dL      Total Bilirubin 0 60 mg/dL      eGFR 82 ml/min/1 73sq m     Narrative:      [place] guidelines for Chronic Kidney Disease (CKD):     Stage 1 with normal or high GFR (GFR > 90 mL/min/1 73 square meters)   Stage 2 Mild CKD (GFR = 60-89 mL/min/1 73 square meters)    Stage 3A Moderate CKD (GFR = 45-59 mL/min/1 73 square meters)    Stage 3B Moderate CKD (GFR = 30-44 mL/min/1 73 square meters)    Stage 4 Severe CKD (GFR = 15-29 mL/min/1 73 square meters)    Stage 5 End Stage CKD (GFR <15 mL/min/1 73 square meters)  Note: GFR calculation is accurate only with a steady state creatinine    Magnesium [[id number]]  (Abnormal) Collected: 05/23/22 0520    Lab Status: Final result Specimen: Blood from Arm, Right Updated: 05/23/22 0604     Magnesium 1 7 mg/dL     CBC and differential [[id number]]  (Abnormal) Collected: 05/23/22 0520    Lab Status: Final result Specimen: Blood from Arm, Right Updated: 05/23/22 0539     WBC 8 35 Thousand/uL      RBC 4 19 Million/uL      Hemoglobin 13 0 g/dL      Hematocrit 39 2 %      MCV 94 fL      MCH 31 0 pg      MCHC 33 2 g/dL      RDW 13 6 %      MPV 11 1 fL      Platelets 129 Thousands/uL      nRBC 0 /100 WBCs      Neutrophils Relative 80 %      Immat GRANS % 0 %      Lymphocytes Relative 10 %      Monocytes Relative 7 %      Eosinophils Relative 3 %      Basophils Relative 0 %      Neutrophils Absolute 6 64 Thousands/µL      Immature Grans Absolute 0 03 Thousand/uL      Lymphocytes Absolute 0 82 Thousands/µL      Monocytes Absolute 0 55 Thousand/µL      Eosinophils Absolute 0 28 Thousand/µL      Basophils Absolute 0 03 Thousands/µL                  XR chest 1 view portable   ED Interpretation by Tonja Wilson PA-C (05/23 3458)   ED wet read:  No acute cardiopulmonary disease appreciated          Final Result by Grisel Chavez MD (05/23 4522)      No active disease in the chest                      Workstation performed: [id number]                    Procedures  ECG 12 Lead Documentation Only    Date/Time: 5/23/2022 5:24 AM  Performed by: Tonja Wilson PA-C  Authorized by: Bradley Marcos PA-C     Interpretation:     Interpretation: normal    Rate:     ECG rate:  55    ECG rate assessment: normal    Rhythm:     Rhythm: sinus rhythm    Ectopy:     Ectopy: none    QRS:     QRS axis:  Normal    QRS intervals:  Normal  Conduction:     Conduction: normal    ST segments:     ST segments:  Normal  T waves:     T waves: normal               ED Course             HEART Risk Score    Flowsheet Row Most Recent Value   Heart Score Risk Calculator    History 0 Filed at: 05/23/2022 0710   ECG 0 Filed at: 05/23/2022 0710   Age 2 Filed at: 05/23/2022 0710   Risk Factors 2 Filed at: 05/23/2022 0710   Troponin 0 Filed at: 05/23/2022 0710   HEART Score 4 Filed at: 05/23/2022 0710                                      MDM  Number of Diagnoses or Management Options     Amount and/or Complexity of Data Reviewed  Clinical lab tests: ordered and reviewed  Tests in the radiology section of CPT®: ordered and reviewed    Risk of Complications, Morbidity, and/or Mortality  General comments: Pt presenting with worsening SOB over this past night  Had a nebulizer tx by EMS which greatly improved his symptoms  Mild end expiratory wheeze on exam otherwise no acute findings  Pt resting comfortably in the stretcher on NAD  VSS  In light of pt's PMHx will obtain cardiac workup to r/o cardiac component  Duo neb ordered in light of mild expiratory wheeze  On bedside re-evaluation pt continues to rest in the stretcher in NAD  Reports symptoms have completely resolved after 2nd nebulizer tx  No new complaints  EKG- non-ischemic and initial trop 5  Will obtain 2 hr trop  Advised pt to f/u with his PCP for re-evaluation and further management  Care handed off to Dr Austyn Sutherland pending 2 hr trop  , phosphorus and disposition       Patient Progress  Patient progress: stable      Disposition  Final diagnoses:   Asthma exacerbation     Time reflects when diagnosis was documented in both MDM as applicable and the Disposition within this note     Time User Action Codes Description Comment    5/23/2022  7:02 FADY Morejon Add [[id number] [id number]] Asthma exacerbation       ED Disposition     ED Disposition   Discharge    Condition   Stable    Date/Time   Mon May 23, 2022  8:18 AM    Comment   Jordy Crowell discharge to home/self care  Follow-up Information     Follow up With Specialties Details Why Contact Info Additional [address] Emergency Department Emergency Medicine Go to  If symptoms worsen [address] [facility] [address] [facility] Emergency Department, [address], [place], [place], [address]          Discharge Medication List as of 5/23/2022  8:18 AM      START taking these medications    Details   predniSONE 50 mg tablet Take 1 tablet (50 mg total) by mouth in the morning for 4 doses  , Starting Mon 5/23/2022, Until Fri 5/27/2022, Normal         CONTINUE these medications which have NOT CHANGED    Details   acetaminophen (TYLENOL) 325 mg tablet Take 2 tablets (650 mg total) by mouth every 6 (six) hours as needed for mild pain, headaches or fever, Starting Tue 8/20/2019, No Print      albuterol (PROVENTIL HFA,VENTOLIN HFA) 90 mcg/act inhaler Inhale 2 puffs every 6 (six) hours as needed for wheezing, Starting Thu 4/14/2022, Normal      aspirin (ECOTRIN LOW STRENGTH) 81 mg EC tablet Take 81 mg by mouth every other day  , Historical Med      atorvastatin (LIPITOR) 10 mg tablet Take 1 tablet (10 mg total) by mouth daily, Starting Thu 4/14/2022, Normal      fluticasone (FLONASE) 50 mcg/act nasal spray 1 spray into each nostril daily, Historical Med      fluticasone-salmeterol (Advair HFA) 230-21 MCG/ACT inhaler Historical Med      levalbuterol (XOPENEX) 1 25 mg/3 mL nebulizer solution Take 3 mL (1 25 mg total) by nebulization every 8 (eight) hours as needed for wheezing or shortness of breath, Starting Wed 10/13/2021, Normal      lisinopril (ZESTRIL) 10 mg tablet Take 1 tablet (10 mg total) by mouth daily, Starting Thu 4/14/2022, Normal      multivitamin (THERAGRAN) TABS Take 1 tablet by mouth daily, Historical Med      sildenafil (VIAGRA) 50 MG tablet Take 1 tablet (50 mg total) by mouth daily as needed for erectile dysfunction, Starting Thu 4/14/2022, Normal             No discharge procedures on file      PDMP Review     None          ED Provider  Electronically Signed by           Timbo Mcclain PA-C  05/23/22 0004

## 2022-05-24 ENCOUNTER — OFFICE VISIT (OUTPATIENT)
Dept: FAMILY MEDICINE CLINIC | Facility: CLINIC | Age: 72
End: 2022-05-24
Payer: COMMERCIAL

## 2022-05-24 VITALS
HEIGHT: 68 IN | SYSTOLIC BLOOD PRESSURE: 122 MMHG | DIASTOLIC BLOOD PRESSURE: 74 MMHG | BODY MASS INDEX: 29.02 KG/M2 | HEART RATE: 60 BPM | WEIGHT: 191.5 LBS | TEMPERATURE: 96.7 F | OXYGEN SATURATION: 97 %

## 2022-05-24 DIAGNOSIS — J45.21 MILD INTERMITTENT ASTHMA WITH ACUTE EXACERBATION: Primary | ICD-10-CM

## 2022-05-24 DIAGNOSIS — R05.9 COUGH: ICD-10-CM

## 2022-05-24 PROCEDURE — 99214 OFFICE O/P EST MOD 30 MIN: CPT | Performed by: NURSE PRACTITIONER

## 2022-05-24 PROCEDURE — 1036F TOBACCO NON-USER: CPT | Performed by: NURSE PRACTITIONER

## 2022-05-24 PROCEDURE — 3074F SYST BP LT 130 MM HG: CPT | Performed by: NURSE PRACTITIONER

## 2022-05-24 PROCEDURE — 3078F DIAST BP <80 MM HG: CPT | Performed by: NURSE PRACTITIONER

## 2022-05-24 PROCEDURE — 3008F BODY MASS INDEX DOCD: CPT | Performed by: NURSE PRACTITIONER

## 2022-05-24 PROCEDURE — 1160F RVW MEDS BY RX/DR IN RCRD: CPT | Performed by: NURSE PRACTITIONER

## 2022-05-24 RX ORDER — FLUTICASONE PROPIONATE AND SALMETEROL XINAFOATE 230; 21 UG/1; UG/1
2 AEROSOL, METERED RESPIRATORY (INHALATION) 2 TIMES DAILY
Qty: 12 G | Refills: 3 | Status: SHIPPED | OUTPATIENT
Start: 2022-05-24

## 2022-05-24 RX ORDER — BENZONATATE 100 MG/1
100 CAPSULE ORAL 3 TIMES DAILY PRN
Qty: 20 CAPSULE | Refills: 0 | Status: SHIPPED | OUTPATIENT
Start: 2022-05-24 | End: 2022-06-02 | Stop reason: SDUPTHER

## 2022-05-24 NOTE — PROGRESS NOTES
Assessment/Plan:     Diagnoses and all orders for this visit:    Mild intermittent asthma with acute exacerbation  -     fluticasone-salmeterol (Advair HFA) 230-21 MCG/ACT inhaler; Inhale 2 puffs  in the morning and 2 puffs in the evening  Cough  -     benzonatate (TESSALON PERLES) 100 mg capsule; Take 1 capsule (100 mg total) by mouth as needed in the morning and 1 capsule (100 mg total) as needed at noon and 1 capsule (100 mg total) as needed in the evening for cough  #1 Mild intermittent asthma with acute exacerbation  Discussed with patient plan to have him complete the prednisone given to him in the emergency room  Discussed with patient plan to restart him back on Advair HFA twice a day to better manage his asthma  Discussed with patient need to use rescue inhaler if going to be around potential triggers like fresh cut grass  #2 Cough  Discussed with patient plan to treat cough with benzonatate as needed  Patient instructed to call if no improvement in 72 hours or symptoms worsen    Subjective:      Patient ID: Lamont Bailey is a 73 y o  male  73 y o male presenting for an emergency room follow-up for asthma exacerbation on 05/23/2022  Patient presented yesterday to the [facility] emergency room via EMS with sudden onset of shortness of breath  He has a history of asthma and believes that it was triggered after he mowed his lawn than mowed his neighbors lawn without use of a mask  The shortness of breath developed after cutting the grass and progressive worsened throughout the night  His wife reports that he attempted to use his rescue inhaler early on but some symptoms worsened it did not help  He reports that he has a home nebulizer but did not use it with worsening symptoms  He had quit using the LABA inhaler a while ago  He was told by the emergency room that he need to follow-up immediately to be restarted on the LABA inhaler         Family History   Problem Relation Age of Onset    Cancer Paternal Grandfather     Hypertension Mother     Hyperlipidemia Mother     Hypertension Father     Hyperlipidemia Father     Colon cancer Neg Hx      Social History     Socioeconomic History    Marital status: /Civil Union     Spouse name: Not on file    Number of children: Not on file    Years of education: Not on file    Highest education level: Not on file   Occupational History    Not on file   Tobacco Use    Smoking status: Former Smoker     Packs/day: 0 25     Years: 5 00     Pack years: 1 25     Types: Cigarettes     Quit date:      Years since quittin 4    Smokeless tobacco: Never Used   Vaping Use    Vaping Use: Never used   Substance and Sexual Activity    Alcohol use: No    Drug use: No    Sexual activity: Not on file   Other Topics Concern    Not on file   Social History Narrative    Not on file     Social Determinants of Health     Financial Resource Strain: Not on file   Food Insecurity: Not on file   Transportation Needs: Not on file   Physical Activity: Not on file   Stress: Not on file   Social Connections: Not on file   Intimate Partner Violence: Not on file   Housing Stability: Not on file     E-Cigarette/Vaping    E-Cigarette Use Never User      E-Cigarette/Vaping Substances    Nicotine No     THC No     CBD No     Flavoring No     Other No     Unknown No      Past Medical History:   Diagnosis Date    Allergic reaction     LAST ASSESSED: [id number]    Asthma     Bursitis of heel     LAST ASSESSED: [id number]    Derangement of meniscus of left knee due to old injury     LAST ASSESSED: [id number]    Diverticulitis of colon     LAST ASSESSED: [id number]    Dizziness     LAST ASSESSED: [id number]    GERD (gastroesophageal reflux disease)     History of colon polyps     Hypertension     Malignant hypertension     LAST ASSESSED: [id number]    Non-ST elevated [facility] [address] [facility] 3/2/2020    [facility]     Plantar fasciitis     Seasonal allergies     Shoulder impingement     LAST ASSESSED: [id number]    Stroke [facility])      Past Surgical History:   Procedure Laterality Date    APPENDECTOMY      CAROTID ENDARTARECTOMY      COLECTOMY Left     PARTIAL - SIGMOID; HEMICOLECTOMY FOR DIVERTICULITIS; ONSET: 1987    COLON SURGERY      COLONOSCOPY N/A 12/9/2016    Procedure: COLONOSCOPY;  Surgeon: Hiram Zamora MD;  [facility]; Service:     HERNIA REPAIR      VENTRAL    NISSEN FUNDOPLICATION      ESOPHAGOGASTRIC FUNDOPLASTY LAST ASSESSED: [id number]    SINUS SURGERY      THROMBOENDARTERECTOMY      CAROTID; ONSET; MAY 2012     Allergies   Allergen Reactions    Depo-Medrol [Methylprednisolone] Anaphylaxis     Severe Vagal Reaction    Iv Contrast [Iodinated Diagnostic Agents] Angioedema     Pt states itchy/swollen eyes, trouble breathing (years ago)    Rosuvastatin Headache and Confusion    Sulfa Antibiotics Hives       Current Outpatient Medications:     acetaminophen (TYLENOL) 325 mg tablet, Take 2 tablets (650 mg total) by mouth every 6 (six) hours as needed for mild pain, headaches or fever, Disp: 30 tablet, Rfl: 0    aspirin (ECOTRIN LOW STRENGTH) 81 mg EC tablet, Take 81 mg by mouth every other day  , Disp: , Rfl:     atorvastatin (LIPITOR) 10 mg tablet, Take 1 tablet (10 mg total) by mouth daily, Disp: 90 tablet, Rfl: 3    benzonatate (TESSALON PERLES) 100 mg capsule, Take 1 capsule (100 mg total) by mouth as needed in the morning and 1 capsule (100 mg total) as needed at noon and 1 capsule (100 mg total) as needed in the evening for cough  , Disp: 20 capsule, Rfl: 0    fluticasone (FLONASE) 50 mcg/act nasal spray, 1 spray into each nostril daily, Disp: , Rfl:     fluticasone-salmeterol (Advair HFA) 230-21 MCG/ACT inhaler, Inhale 2 puffs  in the morning and 2 puffs in the evening , Disp: 12 g, Rfl: 3    levalbuterol (XOPENEX) 1 25 mg/3 mL nebulizer solution, Take 3 mL (1 25 mg total) by nebulization every 8 (eight) hours as needed for wheezing or shortness of breath, Disp: 75 mL, Rfl: 5    lisinopril (ZESTRIL) 10 mg tablet, Take 1 tablet (10 mg total) by mouth daily, Disp: 90 tablet, Rfl: 3    multivitamin (THERAGRAN) TABS, Take 1 tablet by mouth daily, Disp: , Rfl:     predniSONE 50 mg tablet, Take 1 tablet (50 mg total) by mouth in the morning for 4 doses  , Disp: 4 tablet, Rfl: 0    albuterol (PROVENTIL HFA,VENTOLIN HFA) 90 mcg/act inhaler, Inhale 2 puffs every 6 (six) hours as needed for wheezing (Patient not taking: Reported on 5/24/2022), Disp: 54 g, Rfl: 3    sildenafil (VIAGRA) 50 MG tablet, Take 1 tablet (50 mg total) by mouth daily as needed for erectile dysfunction, Disp: 10 tablet, Rfl: 5    Review of Systems   Constitutional: Positive for fatigue  Negative for chills and fever  HENT: Negative  Respiratory: Positive for cough, chest tightness and wheezing  Negative for shortness of breath  Cardiovascular: Negative  Gastrointestinal: Negative  Neurological: Negative  Psychiatric/Behavioral: Negative  Objective:    /74 (BP Location: Left arm, Patient Position: Sitting, Cuff Size: Standard)   Pulse 60   Temp (!) 96 7 °F (35 9 °C)   Ht 5' 8" (1 727 m)   Wt 86 9 kg (191 lb 8 oz)   SpO2 97%   BMI 29 12 kg/m² (Reviewed)     Physical Exam  Vitals reviewed  Constitutional:       General: He is not in acute distress  Appearance: He is not ill-appearing  HENT:      Head: Normocephalic and atraumatic  Right Ear: External ear normal       Left Ear: External ear normal       Nose: Nose normal       Mouth/Throat:      Mouth: Mucous membranes are moist       Pharynx: Oropharynx is clear  Eyes:      Extraocular Movements: Extraocular movements intact  Conjunctiva/sclera: Conjunctivae normal       Pupils: Pupils are equal, round, and reactive to light  Cardiovascular:      Rate and Rhythm: Normal rate and regular rhythm  Heart sounds: Normal heart sounds  Pulmonary:      Effort: No respiratory distress  Breath sounds: Wheezing present  Musculoskeletal:      Cervical back: Neck supple  Skin:     General: Skin is warm and dry  Capillary Refill: Capillary refill takes less than 2 seconds  Neurological:      General: No focal deficit present  Mental Status: He is alert and oriented to person, place, and time     Psychiatric:         Mood and Affect: Mood normal          Behavior: Behavior normal

## 2022-06-02 DIAGNOSIS — R05.9 COUGH: ICD-10-CM

## 2022-06-02 RX ORDER — BENZONATATE 100 MG/1
100 CAPSULE ORAL 3 TIMES DAILY PRN
Qty: 20 CAPSULE | Refills: 0 | Status: SHIPPED | OUTPATIENT
Start: 2022-06-02 | End: 2022-10-26

## 2022-06-07 ENCOUNTER — OFFICE VISIT (OUTPATIENT)
Dept: FAMILY MEDICINE CLINIC | Facility: CLINIC | Age: 72
End: 2022-06-07
Payer: COMMERCIAL

## 2022-06-07 VITALS
OXYGEN SATURATION: 99 % | HEART RATE: 60 BPM | TEMPERATURE: 96.9 F | SYSTOLIC BLOOD PRESSURE: 126 MMHG | HEIGHT: 68 IN | WEIGHT: 186 LBS | BODY MASS INDEX: 28.19 KG/M2 | DIASTOLIC BLOOD PRESSURE: 78 MMHG

## 2022-06-07 DIAGNOSIS — K21.9 GASTROESOPHAGEAL REFLUX DISEASE, UNSPECIFIED WHETHER ESOPHAGITIS PRESENT: Primary | ICD-10-CM

## 2022-06-07 PROCEDURE — 3008F BODY MASS INDEX DOCD: CPT | Performed by: NURSE PRACTITIONER

## 2022-06-07 PROCEDURE — 1160F RVW MEDS BY RX/DR IN RCRD: CPT | Performed by: NURSE PRACTITIONER

## 2022-06-07 PROCEDURE — 3074F SYST BP LT 130 MM HG: CPT | Performed by: NURSE PRACTITIONER

## 2022-06-07 PROCEDURE — 1036F TOBACCO NON-USER: CPT | Performed by: NURSE PRACTITIONER

## 2022-06-07 PROCEDURE — 3078F DIAST BP <80 MM HG: CPT | Performed by: NURSE PRACTITIONER

## 2022-06-07 PROCEDURE — 99214 OFFICE O/P EST MOD 30 MIN: CPT | Performed by: NURSE PRACTITIONER

## 2022-06-07 RX ORDER — OMEPRAZOLE 40 MG/1
40 CAPSULE, DELAYED RELEASE ORAL DAILY
Qty: 90 CAPSULE | Refills: 2 | Status: SHIPPED | OUTPATIENT
Start: 2022-06-07

## 2022-06-07 NOTE — PROGRESS NOTES
Assessment/Plan:     Diagnoses and all orders for this visit:    Gastroesophageal reflux disease, unspecified whether esophagitis present  -     omeprazole (PriLOSEC) 40 MG capsule; Take 1 capsule (40 mg total) by mouth daily      Discussed with patient plan to treat with omeprazole 40 mg daily in the morning and continue taking famotidine in the evening  Patient instructed to call if no improvement in 72 hours or symptoms worsen    Subjective:      Patient ID: Skye Mckenzie is a 73 y o  male  73 y o male presenting with acid reflux issues for the past few days  He reports that when he eats his "stomach sticks out" at times  He has been having issues with controlling his asthma that as been causing him to cough excessively at times  He reports that he has had multiple surgeries for hernias in the past  His acid reflux was doing weel until the excessive cough started causing the acid to start again  He reports that the acid is located back of his throat and after eating he is starting to feel more bloated like the food is not going down  He called his GI physican's office but unable to get an appointment to beginning of 2022        Family History   Problem Relation Age of Onset    Cancer Paternal Grandfather     Hypertension Mother     Hyperlipidemia Mother     Hypertension Father     Hyperlipidemia Father     Colon cancer Neg Hx      Social History     Socioeconomic History    Marital status: /Civil Union     Spouse name: Not on file    Number of children: Not on file    Years of education: Not on file    Highest education level: Not on file   Occupational History    Not on file   Tobacco Use    Smoking status: Former Smoker     Packs/day: 0 25     Years: 5 00     Pack years: 1 25     Types: Cigarettes     Quit date:      Years since quittin 4    Smokeless tobacco: Never Used   Vaping Use    Vaping Use: Never used   Substance and Sexual Activity    Alcohol use: No    Drug use: No    Sexual activity: Not on file   Other Topics Concern    Not on file   Social History Narrative    Not on file     Social Determinants of Health     Financial Resource Strain: Not on file   Food Insecurity: Not on file   Transportation Needs: Not on file   Physical Activity: Not on file   Stress: Not on file   Social Connections: Not on file   Intimate Partner Violence: Not on file   Housing Stability: Not on file     E-Cigarette/Vaping    E-Cigarette Use Never User      E-Cigarette/Vaping Substances    Nicotine No     THC No     CBD No     Flavoring No     Other No     Unknown No      Past Medical History:   Diagnosis Date    Allergic reaction     LAST ASSESSED: [id number]    Asthma     Bursitis of heel     LAST ASSESSED: [id number]    Derangement of meniscus of left knee due to old injury     LAST ASSESSED: [id number]    Diverticulitis of colon     LAST ASSESSED: [id number]    Dizziness     LAST ASSESSED: [id number]    GERD (gastroesophageal reflux disease)     History of colon polyps     Hypertension     Malignant hypertension     LAST ASSESSED: [id number]    Non-ST elevated [facility] ([facility] [address] ) 3/2/2020    [facility]     Plantar fasciitis     Seasonal allergies     Shoulder impingement     LAST ASSESSED: [id number]    [facility]      Past Surgical History:   Procedure Laterality Date    APPENDECTOMY      CAROTID ENDARTARECTOMY      COLECTOMY Left     PARTIAL - SIGMOID; HEMICOLECTOMY FOR DIVERTICULITIS; ONSET: 1987    COLON SURGERY      COLONOSCOPY N/A 12/9/2016    Procedure: COLONOSCOPY;  Surgeon: Adelina Anguiano MD;  [facility];   Service:     HERNIA REPAIR      VENTRAL    NISSEN FUNDOPLICATION      ESOPHAGOGASTRIC FUNDOPLASTY LAST ASSESSED: [id number]    SINUS SURGERY      THROMBOENDARTERECTOMY      CAROTID; ONSET; MAY 2012     Allergies   Allergen Reactions    Depo-Medrol [Methylprednisolone] Anaphylaxis     Severe Vagal Reaction    Iv Contrast [Iodinated Diagnostic Agents] Angioedema     Pt states itchy/swollen eyes, trouble breathing (years ago)    Rosuvastatin Headache and Confusion    Sulfa Antibiotics Hives       Current Outpatient Medications:     omeprazole (PriLOSEC) 40 MG capsule, Take 1 capsule (40 mg total) by mouth daily, Disp: 90 capsule, Rfl: 2    acetaminophen (TYLENOL) 325 mg tablet, Take 2 tablets (650 mg total) by mouth every 6 (six) hours as needed for mild pain, headaches or fever, Disp: 30 tablet, Rfl: 0    albuterol (PROVENTIL HFA,VENTOLIN HFA) 90 mcg/act inhaler, Inhale 2 puffs every 6 (six) hours as needed for wheezing (Patient not taking: Reported on 5/24/2022), Disp: 54 g, Rfl: 3    aspirin (ECOTRIN LOW STRENGTH) 81 mg EC tablet, Take 81 mg by mouth every other day  , Disp: , Rfl:     atorvastatin (LIPITOR) 10 mg tablet, Take 1 tablet (10 mg total) by mouth daily, Disp: 90 tablet, Rfl: 3    benzonatate (TESSALON PERLES) 100 mg capsule, Take 1 capsule (100 mg total) by mouth 3 (three) times a day as needed for cough, Disp: 20 capsule, Rfl: 0    fluticasone (FLONASE) 50 mcg/act nasal spray, 1 spray into each nostril daily, Disp: , Rfl:     fluticasone-salmeterol (Advair HFA) 230-21 MCG/ACT inhaler, Inhale 2 puffs  in the morning and 2 puffs in the evening , Disp: 12 g, Rfl: 3    levalbuterol (XOPENEX) 1 25 mg/3 mL nebulizer solution, Take 3 mL (1 25 mg total) by nebulization every 8 (eight) hours as needed for wheezing or shortness of breath, Disp: 75 mL, Rfl: 5    lisinopril (ZESTRIL) 10 mg tablet, Take 1 tablet (10 mg total) by mouth daily, Disp: 90 tablet, Rfl: 3    multivitamin (THERAGRAN) TABS, Take 1 tablet by mouth daily, Disp: , Rfl:     sildenafil (VIAGRA) 50 MG tablet, Take 1 tablet (50 mg total) by mouth daily as needed for erectile dysfunction, Disp: 10 tablet, Rfl: 5    Review of Systems   Constitutional: Negative  Respiratory: Positive for cough      Cardiovascular: Negative  Gastrointestinal: Positive for abdominal distention and abdominal pain  Negative for constipation, diarrhea, nausea and vomiting  Acid reflux and bloating   Musculoskeletal: Negative  Neurological: Negative  Psychiatric/Behavioral: Negative  Objective:    /78 (BP Location: Left arm, Patient Position: Sitting, Cuff Size: Standard)   Pulse 60   Temp (!) 96 9 °F (36 1 °C)   Ht 5' 8" (1 727 m)   Wt 84 4 kg (186 lb)   SpO2 99%   BMI 28 28 kg/m² (Reviewed)     Physical Exam  Vitals reviewed  Constitutional:       General: He is not in acute distress  Appearance: He is well-developed and well-groomed  He is not ill-appearing  HENT:      Head: Normocephalic and atraumatic  Eyes:      General: Lids are normal       Extraocular Movements: Extraocular movements intact  Conjunctiva/sclera: Conjunctivae normal       Pupils: Pupils are equal, round, and reactive to light  Neck:      Thyroid: No thyromegaly or thyroid tenderness  Trachea: Trachea and phonation normal    Cardiovascular:      Rate and Rhythm: Normal rate and regular rhythm  Pulses: Normal pulses  Heart sounds: Normal heart sounds  Pulmonary:      Effort: Pulmonary effort is normal       Breath sounds: Normal breath sounds  Abdominal:      General: Abdomen is flat  Bowel sounds are normal  There is no distension  Palpations: Abdomen is soft  Tenderness: There is no abdominal tenderness  Hernia: A hernia is present  Skin:     General: Skin is warm and dry  Capillary Refill: Capillary refill takes less than 2 seconds  Neurological:      General: No focal deficit present  Mental Status: He is alert and oriented to person, place, and time  Psychiatric:         Mood and Affect: Mood normal          Behavior: Behavior normal  Behavior is cooperative  Thought Content:  Thought content normal

## 2022-06-15 ENCOUNTER — TELEPHONE (OUTPATIENT)
Dept: VASCULAR SURGERY | Facility: CLINIC | Age: 72
End: 2022-06-15

## 2022-06-23 NOTE — TELEPHONE ENCOUNTER
Call patient to schedule OV after his CV due 07/14/2022   Voice mail was left phone number was provide [phone] opt 3

## 2022-07-01 NOTE — TELEPHONE ENCOUNTER
Call patient to schedule OV after his CV due 07/14/2022   Voice mail was left phone number was provide [phone] opt 3    Letter sent

## 2022-07-13 ENCOUNTER — CONSULT (OUTPATIENT)
Dept: GASTROENTEROLOGY | Facility: AMBULARY SURGERY CENTER | Age: 72
End: 2022-07-13
Attending: COLON & RECTAL SURGERY
Payer: COMMERCIAL

## 2022-07-13 VITALS
HEIGHT: 68 IN | OXYGEN SATURATION: 100 % | WEIGHT: 184.2 LBS | DIASTOLIC BLOOD PRESSURE: 76 MMHG | BODY MASS INDEX: 27.92 KG/M2 | RESPIRATION RATE: 18 BRPM | SYSTOLIC BLOOD PRESSURE: 128 MMHG | HEART RATE: 64 BPM

## 2022-07-13 DIAGNOSIS — K57.90 DIVERTICULOSIS: ICD-10-CM

## 2022-07-13 DIAGNOSIS — R14.0 ABDOMINAL BLOATING: Primary | ICD-10-CM

## 2022-07-13 DIAGNOSIS — K22.70 BARRETT'S ESOPHAGUS WITHOUT DYSPLASIA: ICD-10-CM

## 2022-07-13 DIAGNOSIS — R10.30 LOWER ABDOMINAL PAIN: ICD-10-CM

## 2022-07-13 PROCEDURE — 3074F SYST BP LT 130 MM HG: CPT | Performed by: INTERNAL MEDICINE

## 2022-07-13 PROCEDURE — 3078F DIAST BP <80 MM HG: CPT | Performed by: INTERNAL MEDICINE

## 2022-07-13 PROCEDURE — 99204 OFFICE O/P NEW MOD 45 MIN: CPT | Performed by: INTERNAL MEDICINE

## 2022-07-13 NOTE — PATIENT INSTRUCTIONS
For your bowel habits, as we discussed during today's visit, x 2 weeks  - I would recommend starting psyllium, fiber supplementation  This can be done with use of Konsyl, Citrucil, Metamucil or Benefiber fiber, which can be purchased over-the-counter  I would recommend starting slow with 1 tsp mixed into a large glass of water, once a day, and increasing to goal of 1 tbsp mixed into a large glass of water per day  - this helps with both diarrhea and constipation, helping to bulk the stool, and should not cause constipation or diarrhea, but treat both  - the only side effect of this can be bloating, if this occurs, cut down on the dose, and increase your water intake or alternatively, consider switching to an alternative as listed above    I recommend IBGard an over the counter formulation of peppermint oil that is encapsulated to release in the intestines  2 capsules 3 times/day 30-90 minutes before a meal with water for 4 weeks  Understanding Bloating and Distension    What is bloating? Bloating can be described as the feeling that there is an inflated balloon in the abdomen  It is a commonly reported symptom and is sometimes associated with distension, or the visible increase in the width of the area between your hips and chest (abdominal girth)  Both bloating and distension cause discomfort, and sometimes pain, and have a negative impact on the quality of life for some individuals  The symptoms may be linked with other gas related complaints, such as burping or belching (eructation), swallowing air (aerophagia), and passing intestinal gas (flatulence)    Learn more about controlling intestinal gas    Frequency of reporting of abdominal bloating in individuals with FGIDs  - IBS: 23%-96%  - Functional dyspepsia: 50%  - Chronic constipation: 56%  Some people with functional gastrointestinal disorders (FGIDs) and motility disorders frequently experience bloating, distension, or both as symptoms of their conditions  There is also something called functional bloating, which is fullness and or/distension of the abdomen, not associated with changes in bowel movements  Causes  While researchers have proposed several different explanations for bloating and distension, there is no conclusive answer as to why the two symptoms occur  Possible reasons for bloating and distension include: Too much gas in the intestine  Abnormal levels of bacteria in the small intestine (small intestinal bacterial overgrowth - SIBO)  Imbalance of microorganisms that usually live in the bowel (dysbacteriosis); sometimes the result of taking antibiotics  Food intolerance  Increased perception and sensitivity to what is happening in the digestive tract  Increased curvature of the lumbar region of the spine (lumbar lordosis), which decreases the capacity of the abdomen to hold gas    Bloating and Gastroparesis  Gastroparesis is a motility disorder in which the muscle contractions that move food along the digestive tract do not work properly and the stomach empties too slowly  Reports from highly specialized (tertiary) medical centers that often see people with severe gastroparesis suggest that bloating is a common symptom  Bloating severity appears to be related to the intensity of other gastroparesis symptoms, but is not affected by gastric emptying rates  Antiemetics, probiotics, and antidepressants with significant norepinephrine reuptake inhibitor activity may help  The main symptom of bacterial overgrowth (SIBO), a condition that sometimes accompanies gastroparesis, is bloating  Careful use of antibiotics and probiotics may be helpful in the management of these symptoms  Treatment  There is no universally effective treatment for bloating and distension  Treatment availabilities vary from country to country and there are many underlying possibilities as to what is causing the symptoms   However, despite the fact that there isnt an easy answer for bloating or distension, there are things that people can do with the help of their physicians to try and alleviate the symptoms  Working with a Doctor  It is important to speak openly and honestly with your physician to express a clear picture of your experiences and symptoms  FGIDs present special challenges when communicating, specifically because of their vague symptoms and sensitive subject matter  There isnt a diagnostic test for bloating or distension; however your doctor may run some tests to rule out underlying problems or associated disorders  For more information about GI tests and how to prepare for them, see our website [email or web address] or see [facility] publications  #499 or #714  These tests include:    Stool analysis  Blood workup  Abdominal x-rays  Barium swallow  Small transit follow through  Barium enema  Gastric emptying tests  Esophageal, antroduodenal, or anorectal manometry  Colonic transit studies  Breath test  Upper endoscopy  Colonoscopy with biopsies  Learn how to prepare for tests    Individuals can help their physicians by describing their complaints as accurately and concisely as possible  With regard to bloating and distension, here are some important questions to ask and details to tell your health care provider (keeping track of the things that trigger your symptoms is a good way to discover the answers):    Did you know that [place] and some other languages dont have a word for bloating? People use the words swelling and inflammation, or describe it as feeling pregnant   Using the balloon analogy can be the most helpful  Let your doctor know exactly whether you have the sensation of having a balloon in your abdomen (bloating), the truly visible increase in your abdominal girth (distension), or both  Things to Ask your Doctor:  Am I bloated? Am I distended? Am I both bloated and distended?     Things to Tell your Doctor:  Is the symptom located in the upper or lower abdomen? Is it in a concentrated area? Is your bloating or distension associated with burping? Do you experience nausea or vomiting? Is the symptom associated with pain in your abdomen? Upper or lower? Does the bloating or distension relate to passing gas or a change in your bowel habits (diarrhea, constipation, or alternation of both)? Are your symptoms related to food? Which ones? Do they occur right after eating? Do your symptoms increase during the day or improve during night hours? Medications and other therapies  Some medications and other treatments have been found to help ease the symptoms of bloating and distension  Your doctor may talk to you about some of these options, depending on your symptoms and other health related considerations  Antispasmodics: These can relax the muscles of the bowel and provide relief  Examples include dicyclomine (Bentyl) and hyoscyamine (Levsin) in the [place] and otilonium bromide or pinaverium bromide available in [facility] and some countries in [facility] and [place] and a combination of pinaverium bromide with simethicone (Alevian Duo) in some [place] countries  Learn more about antispasmodics  Probiotics: These dietary supplements contain live bacteria that help balance out the existing bacteria of the intestines  Some that include a relatively low level of probiotic bacteria are available over the counter or in yogurt varieties  Other options include Bifidobacterium infantis 72220 for individuals with irritable bowel syndrome (IBS) in general, and Bifidobacterium animalis [id number] for patients with IBS with constipation (IBS-C)  Learn more about probiotics  Rifaximin: This antibiotic is only slightly absorbed and can be used for short periods of time   Usually it is used (off-label) to lessen bloating in people with IBS whose symptoms do not include constipation, or in those with small intestinal bacterial overgrowth  Antidepressants: These drugs affect receptors in the gut and in the brain  Given in lower dosages than what is used to treat depression, they have been shown to help alleviate bloating and distension  Amitryptiline (Elavil), a tricyclic antidepressant, is commonly used to treat pain and discomfort and can be helpful for bloating  Other options: Medications that increase fluid content in stools, lubiprostone (Amitiza) or linaclotide (Linzess) for example, may also be used  Psychological therapies: Treatments including hypnotherapy and cognitive-behavioral therapy can be useful and help with symptoms and mood  Low FODMAP diet: Working with a doctor or registered dietitian to determine a diet low in FODMAPs is an option for alleviating bloating and distension symptoms  FODMAPs are shortchain carbohydrates that are poorly absorbed in the small intestine and rapidly fermented by bacteria in the gut  Foods that are rich in FODMAPs include:  Fruits such as mangoes, apples, pears, avocados, blackberries, and plums  Dairy products like cow, sheep, and goat milk, as well as yogurt, ice cream, and soft cheeses including cottage cheese, cream cheese and mascarpone  Honey  Vegetables and legumes such as asparagus, bell peppers, broccoli, [place] sprouts, cabbage, cauliflower, eggplant, onion, garlic, baked beans, kidney beans, and lentils  Sweeteners like sorbitol and maltitol (frequently used in gum and other candies)  Learn more about the low FODMAP diet    Conclusion  Bloating and distension are both very common, for the general population and for those with FGIDs and motility disorders  Either of the two may be very bothersome to individuals that are experiencing the symptoms, as well as challenging to those trying to treat them  There is not a conclusive cause for bloating or distension, nor is there a universally effective treatment   With the help of a physician, individuals can find different treatment options that may help alleviate their symptoms      https://iffgd org/  Scheduled date of EGD(as of today):10/4/2022  Physician performing EGD:dr torres  Location of EGD:[facility]  Instructions reviewed with patient by:Malorie GONZALEZ  Clearances:

## 2022-07-13 NOTE — PROGRESS NOTES
[facility] - Outpatient Consultation  Ayah Mendes 81 y o  male MRN: [id number]  Encounter: [id number]      PCP: Eulalia Atwood MD  Referring: Perla Yanes MD  [address] [place] [address]      ASSESSMENT AND PLAN:      1  Abdominal bloating  2  Lower abdominal pain  Unclear if related to surgical adhesions given location of discomfort vs other  - EGD; Future, r/o erosive disease  - CT abdomen pelvis w contrast; Future, r/o obstructing lesions  - start psyllium fiber- if ineffective, add motility agent (IBGard)  - would consider sibo/rifaximin if no relief    3  Genao's esophagus without dysplasia  Continue lifelong PPI- on omeprazole  - EGD; Future  - Ambulatory referral to Gastroenterology    4  Diverticulosis  - start psyllium fiber      ______________________________________________________________________    CC:  Chief Complaint   Patient presents with    Abdominal Pain     A lot scar tissue and prior surgeries        HPI:      Patient is a 81year old male referred for abdominal pain  He has diverticular disease, GERD, BE, IFG, asthma, HTN, CAD, and osteoarthritis  He is present with his wife at today's visit  He describes RLQ / right middle abdominal discomfort that occurs after eating  Symptoms occur > 30 mins after eating and are described abdominal bloating  Symptoms are improved with bowel movements  Patient is concerned pain is related to surgical adhesions/scarring  His most recent EGD/colonoscopy was on 3/17/21 which revealed mild diverticulosis, previous surgery in the colon, 2 tubular adenoma  Surgical history is significant for sigmoid resection for diverticulitis, Nissen fundoplication in 2816- back on PPI since 2015  Ventral hernia repair  He has previously been seen by Dr Margy Felix, Dr Sherice Miller for his GI evaluations  Abdominal Pain  This is a recurrent problem  The current episode started more than 1 month ago  The onset quality is sudden   The problem occurs daily  The most recent episode lasted 2 hours  The problem has been waxing and waning  The pain is located in the epigastric region  The pain is at a severity of 7/10  The quality of the pain is aching, burning, cramping, dull, a sensation of fullness and sharp  The abdominal pain does not radiate  Associated symptoms include constipation, diarrhea, flatus and nausea  Pertinent negatives include no arthralgias, belching, dysuria, fever, frequency, headaches, hematochezia, hematuria, melena, myalgias, vomiting or weight loss  The pain is aggravated by coughing and eating  The pain is relieved by activity, movement and palpation  Prior diagnostic workup includes CT scan  REVIEW OF SYSTEMS:    CONSTITUTIONAL: Denies any fever, chills, rigors, and weight loss  HEENT: No earache or tinnitus  Denies hearing loss or visual disturbances  CARDIOVASCULAR: No chest pain or palpitations  RESPIRATORY: Denies any cough, hemoptysis, shortness of breath or dyspnea on exertion  GASTROINTESTINAL: As noted in the History of Present Illness  GENITOURINARY: No problems with urination  Denies any hematuria or dysuria  NEUROLOGIC: No dizziness or vertigo, denies headaches  MUSCULOSKELETAL: Denies any muscle or joint pain  SKIN: Denies skin rashes or itching  ENDOCRINE: Denies excessive thirst  Denies intolerance to heat or cold  PSYCHOSOCIAL: Denies depression or anxiety  Denies any recent memory loss         Historical Information   Past Medical History:   Diagnosis Date    Allergic reaction     LAST ASSESSED: [id number]    Asthma     Bursitis of heel     LAST ASSESSED: [id number]    Derangement of meniscus of left knee due to old injury     LAST ASSESSED: [id number]    Diverticulitis of colon     LAST ASSESSED: [id number]    Dizziness     LAST ASSESSED: [id number]    GERD (gastroesophageal reflux disease)     History of colon polyps     Hypertension     Malignant hypertension     LAST ASSESSED: [id number]    Non-ST elevated myocardial infarction ([facility] ) 3/2/2020    NSTEMI (non-ST elevated myocardial infarction) ([facility] )     Plantar fasciitis     Seasonal allergies     Shoulder impingement     LAST ASSESSED: [id number]    Stroke [facility])      Past Surgical History:   Procedure Laterality Date    APPENDECTOMY      CAROTID ENDARTARECTOMY      COLECTOMY Left     PARTIAL - SIGMOID; HEMICOLECTOMY FOR DIVERTICULITIS; ONSET:     COLON SURGERY      COLONOSCOPY N/A 2016    Procedure: COLONOSCOPY;  Surgeon: Dominic Limon MD;  [facility];   Service:     HERNIA REPAIR      VENTRAL    NISSEN FUNDOPLICATION      ESOPHAGOGASTRIC FUNDOPLASTY LAST ASSESSED: [id number]    SINUS SURGERY      THROMBOENDARTERECTOMY      CAROTID; ONSET; MAY 2012     Social History   Social History     Substance and Sexual Activity   Alcohol Use No     Social History     Substance and Sexual Activity   Drug Use No     Social History     Tobacco Use   Smoking Status Former Smoker    Packs/day: 0 25    Years: 5 00    Pack years: 1 25    Types: Cigarettes    Quit date: 56    Years since quittin 5   Smokeless Tobacco Never Used     Family History   Problem Relation Age of Onset    Cancer Paternal Grandfather     Hypertension Mother     Hyperlipidemia Mother     Hypertension Father     Hyperlipidemia Father     Colon cancer Neg Hx        Meds/Allergies       Current Outpatient Medications:     acetaminophen (TYLENOL) 325 mg tablet    albuterol (PROVENTIL HFA,VENTOLIN HFA) 90 mcg/act inhaler    aspirin (ECOTRIN LOW STRENGTH) 81 mg EC tablet    atorvastatin (LIPITOR) 10 mg tablet    fluticasone (FLONASE) 50 mcg/act nasal spray    fluticasone-salmeterol (Advair HFA) 230-21 MCG/ACT inhaler    levalbuterol (XOPENEX) 1 25 mg/3 mL nebulizer solution    lisinopril (ZESTRIL) 10 mg tablet    multivitamin (THERAGRAN) TABS    omeprazole (PriLOSEC) 40 MG capsule    sildenafil (VIAGRA) 50 MG tablet    benzonatate (TESSALON PERLES) 100 mg capsule    Allergies   Allergen Reactions    Depo-Medrol [Methylprednisolone] Anaphylaxis     Severe Vagal Reaction    Iv Contrast [Iodinated Diagnostic Agents] Angioedema     Pt states itchy/swollen eyes, trouble breathing (years ago)    Rosuvastatin Headache and Confusion    Sulfa Antibiotics Hives           Objective     Blood pressure 128/76, pulse 64, resp  rate 18, height 5' 8" (1 727 m), weight 83 6 kg (184 lb 3 2 oz), SpO2 100 %  Body mass index is 28 01 kg/m²  PHYSICAL EXAM:      General Appearance:   Alert, cooperative, no distress   HEENT:   Normocephalic, atraumatic, anicteric  Neck:  Supple, symmetrical, trachea midline   Lungs:   Clear to auscultation bilaterally; no rales, rhonchi or wheezing; respirations unlabored    Heart[de-identified]   Regular rate and rhythm; no murmur, rub, or gallop     Abdomen:   Soft, non-tender, non-distended; normal bowel sounds; no masses, no organomegaly, + multiple abdominal scars with scar tissue palpated at the site of abdominal discomfort    Genitalia:   Deferred    Rectal:   Deferred    Extremities:  No cyanosis, clubbing or edema    Pulses:  2+ and symmetric    Skin:  No jaundice, rashes, or lesions    Lymph nodes:  No palpable cervical lymphadenopathy        Lab Results:     Lab Results   Component Value Date    WBC 8 35 05/23/2022    HGB 13 0 05/23/2022    HCT 39 2 05/23/2022    MCV 94 05/23/2022     05/23/2022       Lab Results   Component Value Date     12/10/2015    K 3 9 05/23/2022     05/23/2022    CO2 24 05/23/2022    ANIONGAP 11 12/10/2015    BUN 20 05/23/2022    CREATININE 0 94 05/23/2022    GLUCOSE 94 12/10/2015    GLUF 100 (H) 05/12/2022    CALCIUM 9 0 05/23/2022    AST 12 (L) 05/23/2022    ALT 10 05/23/2022    ALKPHOS 46 05/23/2022    PROT 7 6 11/11/2015    BILITOT 0 47 11/11/2015    EGFR 82 05/23/2022       Lab Results   Component Value Date    INR 1 15 08/27/2019    INR 1 15 08/18/2019 INR 1 04 01/24/2019    PROTIME 14 1 08/27/2019    PROTIME 14 1 08/18/2019    PROTIME 13 3 01/24/2019         Radiology Results:   No results found  Portions of the record may have been created with voice recognition software  Occasional wrong word or "sound a like" substitutions may have occurred due to the inherent limitations of voice recognition software  Read the chart carefully and recognize, using context, where substitutions have occurred

## 2022-07-14 ENCOUNTER — HOSPITAL ENCOUNTER (OUTPATIENT)
Dept: NON INVASIVE DIAGNOSTICS | Facility: CLINIC | Age: 72
Discharge: HOME | End: 2022-07-14
Payer: COMMERCIAL

## 2022-07-14 DIAGNOSIS — I65.22 CAROTID STENOSIS, ASYMPTOMATIC, LEFT: ICD-10-CM

## 2022-07-14 PROCEDURE — 93880 EXTRACRANIAL BILAT STUDY: CPT | Performed by: SURGERY

## 2022-07-14 PROCEDURE — 93880 EXTRACRANIAL BILAT STUDY: CPT

## 2022-07-22 DIAGNOSIS — R10.30 LOWER ABDOMINAL PAIN: Primary | ICD-10-CM

## 2022-07-22 DIAGNOSIS — R14.0 BLOATING: ICD-10-CM

## 2022-08-11 ENCOUNTER — OFFICE VISIT (OUTPATIENT)
Dept: VASCULAR SURGERY | Facility: CLINIC | Age: 72
End: 2022-08-11
Payer: COMMERCIAL

## 2022-08-11 VITALS
WEIGHT: 189 LBS | HEIGHT: 68 IN | TEMPERATURE: 97.4 F | SYSTOLIC BLOOD PRESSURE: 164 MMHG | HEART RATE: 60 BPM | BODY MASS INDEX: 28.64 KG/M2 | DIASTOLIC BLOOD PRESSURE: 82 MMHG

## 2022-08-11 DIAGNOSIS — I65.22 CAROTID STENOSIS, ASYMPTOMATIC, LEFT: Primary | ICD-10-CM

## 2022-08-11 PROCEDURE — 1160F RVW MEDS BY RX/DR IN RCRD: CPT | Performed by: NURSE PRACTITIONER

## 2022-08-11 PROCEDURE — 99213 OFFICE O/P EST LOW 20 MIN: CPT | Performed by: NURSE PRACTITIONER

## 2022-08-11 PROCEDURE — 3077F SYST BP >= 140 MM HG: CPT | Performed by: NURSE PRACTITIONER

## 2022-08-11 PROCEDURE — 3079F DIAST BP 80-89 MM HG: CPT | Performed by: NURSE PRACTITIONER

## 2022-08-11 NOTE — ASSESSMENT & PLAN NOTE
68-year-old male with HTN, HLD, CAD s/p PCI, symptomatic right ICA stenosis s/p right CEA by Dr Dimas Thurston '12 and asymptomatic moderate left ICA stenosis, GERD, osteoarthritis  Patient presents to the office for [facility] visit to review CV duplex 7/14/22   -CV duplex showed right ICA patent, minimal plaque/intimal hyperplasia and left ICA 50-69% stenosis with velocities 159/52, ratio 1 65   -Asymptomatic from a left carotid standpoint   -Continue aspirin   -Intolerant to statin therapies due to confusion  Currently taking atorvastatin 10 mg twice per week   -Blood pressure slightly elevated at start of visit  164/82   Normally 120/70s  -Continue Mediterranean diet  -Repeat carotid duplex in 6 months   -Follow up in the office in 1 year

## 2022-08-11 NOTE — PROGRESS NOTES
Assessment/Plan:    Carotid stenosis, asymptomatic, left  74-year-old male with HTN, HLD, CAD s/p PCI, symptomatic right ICA stenosis s/p right CEA by Dr Alexandro Gregory and asymptomatic moderate left ICA stenosis, GERD, osteoarthritis  Patient presents to the office for [facility] visit to review CV duplex 7/14/22   -CV duplex showed right ICA patent, minimal plaque/intimal hyperplasia and left ICA 50-69% stenosis with velocities 159/52, ratio 1 65   -Asymptomatic from a left carotid standpoint   -Continue aspirin   -Intolerant to statin therapies due to confusion  Currently taking atorvastatin 10 mg twice per week   -Blood pressure slightly elevated at start of visit  164/82  Normally 120/70s  -Continue Mediterranean diet  -Repeat carotid duplex in 6 months   -Follow up in the office in 1 year       Diagnoses and all orders for this visit:    Carotid stenosis, asymptomatic, left  -     VAS carotid complete study; Future          Subjective:      Patient ID: William Lubin is a 81 y o  male  Pt presents to the office today to rev CV done on 7/14/22, s/p R CEA done in 2012  Pt denies TIA or stroke like symptoms  Pt is taking asa 81mg and atorvastatin  HPI  74-year-old male with HTN, HLD, CAD s/p PCI, symptomatic right ICA stenosis s/p right CEA by Dr Alexandro Gregory and asymptomatic moderate left ICA stenosis, GERD, osteoarthritis  Patient presents to the office for [facility] visit to review CV duplex 7/14/22   Patient last seen in the office January 2022 by Dr Wang Chau  Patient is accompanied by his wife  He denies any focal neurological events consistent with TIA/CVA  He is active  He denies any lower extremity claudication symptoms  Follows a Mediterranean diet  He is taking aspirin 81 mg  He is had some issues with Crestor causing confusion  He took a statin holiday and recently restarted Lipitor 10 mg twice per week    CV duplex showed right ICA patent, minimal plaque/intimal hyperplasia and left ICA 50-69% stenosis with velocities 159/52, ratio 1 65   Blood pressure slightly elevated office visit today 164/82  Patient takes blood pressure regularly at home and has readings of 120-130/60-70  Recent reading in EMR 4 weeks ago blood pressure 128/76   Incident to billing  Dr Anastasia Wong in the office at time of visit  The following portions of the patient's history were reviewed and updated as appropriate: allergies, current medications, past family history, past medical history, past social history, past surgical history and problem list   Review of systems reviewed    Review of Systems   Constitutional: Negative  HENT: Negative  Eyes: Negative  Respiratory: Negative  Cardiovascular: Negative  Gastrointestinal: Negative  Endocrine: Negative  Genitourinary: Negative  Musculoskeletal: Negative  Skin: Negative  Allergic/Immunologic: Negative  Neurological: Negative  Hematological: Negative  Psychiatric/Behavioral: Negative  Objective:  I have reviewed and made appropriate changes to the review of systems input by the medical assistant      Vitals:    08/11/22 1034   BP: 164/82   BP Location: Right arm   Patient Position: Sitting   Cuff Size: Standard   Pulse: 60   Temp: (!) 97 4 °F (36 3 °C)   TempSrc: Tympanic   Weight: 85 7 kg (189 lb)   Height: 5' 8" (1 727 m)       Patient Active Problem List   Diagnosis    Asthma    Genao esophagus    Benign essential hypertension    Diverticulosis    Fatty infiltration of liver    Hyperuricemia    Impaired fasting glucose    Mixed hyperlipidemia    Drug-induced erectile dysfunction    Non-allergic rhinitis    Osteoarthritis of knee    Vitamin B12 deficiency    Irritable bowel syndrome with diarrhea    History of colonic diverticulitis    History of right-sided carotid endarterectomy    Coronary artery disease involving native coronary artery of native heart without angina pectoris    Incisional hernia    Arthritis of right acromioclavicular joint    Carotid stenosis, asymptomatic, left    Osteoarthritis of right glenohumeral joint    Nonalcoholic steatohepatitis       Past Surgical History:   Procedure Laterality Date    APPENDECTOMY      CAROTID ENDARTARECTOMY      COLECTOMY Left     PARTIAL - SIGMOID; HEMICOLECTOMY FOR DIVERTICULITIS; ONSET:     COLON SURGERY      COLONOSCOPY N/A 2016    Procedure: COLONOSCOPY;  Surgeon: Jayce Dee MD;  [facility];   Service:     HERNIA REPAIR      VENTRAL    NISSEN FUNDOPLICATION      ESOPHAGOGASTRIC FUNDOPLASTY LAST ASSESSED: [id number]    SINUS SURGERY      THROMBOENDARTERECTOMY      CAROTID; ONSET; MAY 2012       Family History   Problem Relation Age of Onset    Cancer Paternal Grandfather     Hypertension Mother     Hyperlipidemia Mother     Hypertension Father     Hyperlipidemia Father     Colon cancer Neg Hx        Social History     Socioeconomic History    Marital status: /Civil Union     Spouse name: Not on file    Number of children: Not on file    Years of education: Not on file    Highest education level: Not on file   Occupational History    Not on file   Tobacco Use    Smoking status: Former Smoker     Packs/day: 0 25     Years: 5 00     Pack years: 1 25     Types: Cigarettes     Quit date:      Years since quittin 6    Smokeless tobacco: Never Used   Vaping Use    Vaping Use: Never used   Substance and Sexual Activity    Alcohol use: No    Drug use: No    Sexual activity: Not on file   Other Topics Concern    Not on file   Social History Narrative    Not on file     Social Determinants of Health     Financial Resource Strain: Not on file   Food Insecurity: Not on file   Transportation Needs: Not on file   Physical Activity: Not on file   Stress: Not on file   Social Connections: Not on file   Intimate Partner Violence: Not on file   Housing Stability: Not on file       Allergies   Allergen Reactions    Depo-Medrol [Methylprednisolone] Anaphylaxis     Severe Vagal Reaction    Iv Contrast [Iodinated Diagnostic Agents] Angioedema     Pt states itchy/swollen eyes, trouble breathing (years ago)    Rosuvastatin Headache and Confusion    Sulfa Antibiotics Hives         Current Outpatient Medications:     acetaminophen (TYLENOL) 325 mg tablet, Take 2 tablets (650 mg total) by mouth every 6 (six) hours as needed for mild pain, headaches or fever, Disp: 30 tablet, Rfl: 0    albuterol (PROVENTIL HFA,VENTOLIN HFA) 90 mcg/act inhaler, Inhale 2 puffs every 6 (six) hours as needed for wheezing, Disp: 54 g, Rfl: 3    aspirin (ECOTRIN LOW STRENGTH) 81 mg EC tablet, Take 81 mg by mouth every other day  , Disp: , Rfl:     atorvastatin (LIPITOR) 10 mg tablet, Take 1 tablet (10 mg total) by mouth daily, Disp: 90 tablet, Rfl: 3    fluticasone (FLONASE) 50 mcg/act nasal spray, 1 spray into each nostril daily, Disp: , Rfl:     fluticasone-salmeterol (Advair HFA) 230-21 MCG/ACT inhaler, Inhale 2 puffs  in the morning and 2 puffs in the evening , Disp: 12 g, Rfl: 3    levalbuterol (XOPENEX) 1 25 mg/3 mL nebulizer solution, Take 3 mL (1 25 mg total) by nebulization every 8 (eight) hours as needed for wheezing or shortness of breath, Disp: 75 mL, Rfl: 5    lisinopril (ZESTRIL) 10 mg tablet, Take 1 tablet (10 mg total) by mouth daily, Disp: 90 tablet, Rfl: 3    multivitamin (THERAGRAN) TABS, Take 1 tablet by mouth daily, Disp: , Rfl:     omeprazole (PriLOSEC) 40 MG capsule, Take 1 capsule (40 mg total) by mouth daily, Disp: 90 capsule, Rfl: 2    sildenafil (VIAGRA) 50 MG tablet, Take 1 tablet (50 mg total) by mouth daily as needed for erectile dysfunction, Disp: 10 tablet, Rfl: 5    benzonatate (TESSALON PERLES) 100 mg capsule, Take 1 capsule (100 mg total) by mouth 3 (three) times a day as needed for cough, Disp: 20 capsule, Rfl: 0      /82 (BP Location: Right arm, Patient Position: Sitting, Cuff Size: Standard)   Pulse 60   Temp (!) 97 4 °F (36 3 °C) (Tympanic)   Ht 5' 8" (1 727 m)   Wt 85 7 kg (189 lb)   BMI 28 74 kg/m²          Physical Exam  Vitals and nursing note reviewed  Constitutional:       Appearance: Normal appearance  HENT:      Head: Normocephalic and atraumatic  Eyes:      Extraocular Movements: Extraocular movements intact  Neck:      Vascular: No carotid bruit  Cardiovascular:      Pulses: Normal pulses  Heart sounds: Normal heart sounds  Pulmonary:      Effort: Pulmonary effort is normal       Breath sounds: Normal breath sounds  Abdominal:      Palpations: Abdomen is soft  Musculoskeletal:         General: Normal range of motion  Skin:     General: Skin is warm  Neurological:      General: No focal deficit present  Mental Status: He is alert and oriented to person, place, and time     Psychiatric:         Mood and Affect: Mood normal          Behavior: Behavior normal

## 2022-08-11 NOTE — PATIENT INSTRUCTIONS
Repeat carotid study in 6 months  Follow-up in the office in 1 year    Carotid Artery Disease   AMBULATORY CARE:   Carotid artery disease (CAD)  means the major blood vessels in your neck are narrowed or becoming blocked  These 2 major blood vessels are called the carotid arteries  They supply your brain with blood  The narrow or blocked blood vessels increase your risk for a stroke  CAD is also called carotid artery stenosis  Have someone call your local emergency number (911 in the [address] [facility]) if:   You have any of the following signs of a stroke:      Numbness or drooping on one side of your face     Weakness in an arm or leg    Confusion or difficulty speaking    Dizziness, a severe headache, or vision loss    You have any of the following signs of a heart attack:      Squeezing, pressure, or pain in your chest    You may  also have any of the following:     Discomfort or pain in your back, neck, jaw, stomach, or arm    Shortness of breath    Nausea or vomiting    Lightheadedness or a sudden cold sweat    Call your doctor if:   You have questions or concerns about your condition or care  Common signs and symptoms:  CAD develops slowly  You may have no signs or symptoms until you have a mini-stroke, or transient ischemic attack (TIA)  A TIA is a temporary lack of blood flow to your brain  A TIA goes away quickly and does not cause permanent damage  A TIA may be a warning sign that you are about to have a stroke  If you have any symptoms of a TIA or stroke, seek care immediately  Warning signs of a stroke:   The words BE FAST can help you remember and recognize warning signs of a stroke:  B = Balance:  Sudden loss of balance    E = Eyes:  Loss of vision in one or both eyes    F = Face:  Face droops on one side    A = Arms:  Arm drops when both arms are raised    S = Speech:  Speech is slurred or sounds different    T = Time:  Time to get help immediately       Treatment  depends on how narrow your arteries have become  Treatment also depends on your symptoms and your general health  The goal of treatment is to lower your risk for a stroke  You may need any of the following:  Medicines:      Aspirin,  or other blood thinner, may be recommended  These will help prevent blood clots from forming in your carotid arteries  If your healthcare provider wants you to take aspirin, do not take acetaminophen or ibuprofen instead  Cholesterol medicine  lowers your cholesterol level  Blood pressure medicine  lowers or helps control your blood pressure  Procedures  can help open blocked arteries:     Carotid endarterectomy (CEA)  is used to cut and remove plaque buildup from your arteries  Carotid angioplasty and stenting (MEG)  is used to push the plaque against the artery wall with a balloon device  Then, a stent is placed to keep the artery open  A stent is a small metal mesh tube  Prevent a stroke:   Do not smoke, and avoid secondhand smoke  Nicotine and other chemicals in cigarettes and cigars increase your risk for a stroke  Ask your healthcare provider for information if you currently smoke and need help to quit  E-cigarettes or smokeless tobacco still contain nicotine  Talk to your healthcare provider before you use these products  Eat a variety of healthy foods  Healthy foods include fruit, vegetables, whole-grain breads, low-fat dairy products, chicken, and fish  Choose fish that are high in omega-3 fatty acids, such as salmon and fresh tuna  Ask your healthcare provider for more information on a heart healthy diet and the DASH eating plan  Limit sodium (salt)  Sodium may increase your blood pressure  Add less table salt to your foods  Read food labels and choose foods that are low in sodium  Your healthcare provider may suggest you follow a low sodium diet  Reach or maintain a healthy weight  Extra weight makes your heart work harder   Ask your healthcare provider what a healthy weight is for you  He or she can help you create a safe weight loss plan  Even a weight loss of 10% of your extra body weight can help your heart function better  Exercise regularly  Exercise helps improve heart function and can help you manage your weight  Exercise can also help lower your cholesterol and blood sugar levels  Try to get at least 30 minutes of exercise 5 times each week  Try to be physically active every day  This may include walking, riding a bicycle, or swimming  Your healthcare provider can help you create an exercise plan that works best for you  Limit alcohol  Alcohol can increase your blood pressure and triglyceride levels  A drink of alcohol is 12 ounces of beer, 5 ounces of wine, or 1½ ounces of liquor  Follow up with your doctor as directed:  Write down your questions so you remember to ask them during your visits  © Copyright [employer] 2022 Information is for End User's use only and may not be sold, redistributed or otherwise used for commercial purposes  All illustrations and images included in CareNotes® are the copyrighted property of [employer]  or [facility] Rad Muniz   The above information is an  only  It is not intended as medical advice for individual conditions or treatments  Talk to your doctor, nurse or pharmacist before following any medical regimen to see if it is safe and effective for you

## 2022-08-12 ENCOUNTER — APPOINTMENT (OUTPATIENT)
Dept: LAB | Facility: CLINIC | Age: 72
End: 2022-08-12
Payer: COMMERCIAL

## 2022-08-12 DIAGNOSIS — R14.0 BLOATING: ICD-10-CM

## 2022-08-12 DIAGNOSIS — R10.30 LOWER ABDOMINAL PAIN: ICD-10-CM

## 2022-08-12 LAB
ANION GAP SERPL CALCULATED.3IONS-SCNC: 6 MMOL/L (ref 4–13)
BUN SERPL-MCNC: 23 MG/DL (ref 5–25)
CALCIUM SERPL-MCNC: 9.1 MG/DL (ref 8.3–10.1)
CHLORIDE SERPL-SCNC: 107 MMOL/L (ref 96–108)
CO2 SERPL-SCNC: 25 MMOL/L (ref 21–32)
CREAT SERPL-MCNC: 1.14 MG/DL (ref 0.6–1.3)
GFR SERPL CREATININE-BSD FRML MDRD: 64 ML/MIN/1.73SQ M
GLUCOSE P FAST SERPL-MCNC: 102 MG/DL (ref 65–99)
POTASSIUM SERPL-SCNC: 4.7 MMOL/L (ref 3.5–5.3)
SODIUM SERPL-SCNC: 138 MMOL/L (ref 135–147)

## 2022-08-12 PROCEDURE — 80048 BASIC METABOLIC PNL TOTAL CA: CPT

## 2022-08-12 PROCEDURE — 36415 COLL VENOUS BLD VENIPUNCTURE: CPT

## 2022-08-16 ENCOUNTER — TELEPHONE (OUTPATIENT)
Dept: CARDIOLOGY CLINIC | Facility: CLINIC | Age: 72
End: 2022-08-16

## 2022-08-16 DIAGNOSIS — Z91.041 CONTRAST MEDIA ALLERGY: Primary | ICD-10-CM

## 2022-08-16 RX ORDER — DIPHENHYDRAMINE HCL 50 MG
50 CAPSULE ORAL ONCE
Qty: 30 CAPSULE | Refills: 0 | Status: SHIPPED | OUTPATIENT
Start: 2022-08-16 | End: 2022-08-16

## 2022-08-16 RX ORDER — PREDNISONE 50 MG/1
50 TABLET ORAL DAILY
Qty: 3 TABLET | Refills: 0 | Status: SHIPPED | OUTPATIENT
Start: 2022-08-16 | End: 2022-08-19

## 2022-08-16 NOTE — TELEPHONE ENCOUNTER
Patient's wife called stating patient feels extremely fatigued  He also feels lightheaded and dizzy when changing position from sitting to standing  He also complaints of Brain fog and generally doesn't feel well getting worse for the past 2 months  He is taking lisinopril 10mg daily and atorvastatin 10mg twice a week  bp is 126/67 hr 63 today  139/72 hr 78 yesterday, 146/72 hr 71 after doing yard work    Patient feels he is having side effects from lisinopril please advise

## 2022-08-17 ENCOUNTER — TELEPHONE (OUTPATIENT)
Dept: NON INVASIVE DIAGNOSTICS | Facility: HOSPITAL | Age: 72
End: 2022-08-17

## 2022-08-17 NOTE — TELEPHONE ENCOUNTER
Spoke with pt's wife in regards to allergy prep  Pt's wife understood instructions and will be picking prep up from the pharmacy  Instructed to call back to radiology dept if they have any questions

## 2022-08-25 ENCOUNTER — HOSPITAL ENCOUNTER (OUTPATIENT)
Dept: CT IMAGING | Facility: HOSPITAL | Age: 72
Discharge: HOME | End: 2022-08-25
Attending: INTERNAL MEDICINE
Payer: COMMERCIAL

## 2022-08-25 DIAGNOSIS — R10.30 LOWER ABDOMINAL PAIN: ICD-10-CM

## 2022-08-25 DIAGNOSIS — R14.0 ABDOMINAL BLOATING: ICD-10-CM

## 2022-08-25 PROCEDURE — 74177 CT ABD & PELVIS W/CONTRAST: CPT

## 2022-08-25 PROCEDURE — G1004 CDSM NDSC: HCPCS

## 2022-08-25 RX ORDER — IODIXANOL 320 MG/ML
70 INJECTION, SOLUTION INTRAVASCULAR
Status: COMPLETED | OUTPATIENT
Start: 2022-08-25 | End: 2022-08-25

## 2022-08-25 RX ADMIN — IODIXANOL 70 ML: 320 INJECTION, SOLUTION INTRAVASCULAR at 14:21

## 2022-08-31 ENCOUNTER — TELEPHONE (OUTPATIENT)
Dept: OTHER | Facility: OTHER | Age: 72
End: 2022-08-31

## 2022-08-31 NOTE — TELEPHONE ENCOUNTER
Pt's wife called in stating Dr Agustín Celaya called them regarding pt's CT Scan results  She is requesting a call back

## 2022-09-01 ENCOUNTER — TELEPHONE (OUTPATIENT)
Dept: OTHER | Facility: OTHER | Age: 72
End: 2022-09-01

## 2022-09-01 NOTE — PROGRESS NOTES
Spoke to patient's wife, Nehal Means  Reviewed CT findings- recommended follow up with EGD/colonoscopy  He is lactose intolerant, finding improvement with lactaid pills  Also benefit with IBGard  Symptoms are still intermittent (exposure to fried foods exacerbates for example)- overall much better  Her questions were answered

## 2022-09-01 NOTE — TELEPHONE ENCOUNTER
Patients wife called to report she spoke with Dr Daryl Hong earlier today regarding and EGD and colonoscopy scheduled for 10/4/2022  Reported they need to reschedule the procedure as they will be out of town  Requesting call back to reschedule procedure and follow-up appointment

## 2022-09-02 ENCOUNTER — APPOINTMENT (EMERGENCY)
Dept: CT IMAGING | Facility: HOSPITAL | Age: 72
End: 2022-09-02
Payer: COMMERCIAL

## 2022-09-02 ENCOUNTER — APPOINTMENT (OUTPATIENT)
Dept: ULTRASOUND IMAGING | Facility: HOSPITAL | Age: 72
End: 2022-09-02
Payer: COMMERCIAL

## 2022-09-02 ENCOUNTER — HOSPITAL ENCOUNTER (EMERGENCY)
Facility: HOSPITAL | Age: 72
Discharge: HOME | End: 2022-09-03
Attending: EMERGENCY MEDICINE
Payer: COMMERCIAL

## 2022-09-02 DIAGNOSIS — R53.1 GENERALIZED WEAKNESS: ICD-10-CM

## 2022-09-02 DIAGNOSIS — R10.9 ABDOMINAL PAIN: Primary | ICD-10-CM

## 2022-09-02 LAB
ALBUMIN SERPL BCP-MCNC: 4.1 G/DL (ref 3.5–5)
ALP SERPL-CCNC: 42 U/L (ref 34–104)
ALT SERPL W P-5'-P-CCNC: 10 U/L (ref 7–52)
ANION GAP SERPL CALCULATED.3IONS-SCNC: 10 MMOL/L (ref 4–13)
AST SERPL W P-5'-P-CCNC: 18 U/L (ref 13–39)
ATRIAL RATE: 54 BPM
BASOPHILS # BLD AUTO: 0.04 THOUSANDS/ΜL (ref 0–0.1)
BASOPHILS NFR BLD AUTO: 0 % (ref 0–1)
BILIRUB SERPL-MCNC: 0.49 MG/DL (ref 0.2–1)
BUN SERPL-MCNC: 16 MG/DL (ref 5–25)
CALCIUM SERPL-MCNC: 9.1 MG/DL (ref 8.4–10.2)
CARDIAC TROPONIN I PNL SERPL HS: 6 NG/L (ref 8–18)
CHLORIDE SERPL-SCNC: 104 MMOL/L (ref 96–108)
CO2 SERPL-SCNC: 22 MMOL/L (ref 21–32)
CREAT SERPL-MCNC: 1.1 MG/DL (ref 0.6–1.3)
EOSINOPHIL # BLD AUTO: 0.09 THOUSAND/ΜL (ref 0–0.61)
EOSINOPHIL NFR BLD AUTO: 1 % (ref 0–6)
ERYTHROCYTE [DISTWIDTH] IN BLOOD BY AUTOMATED COUNT: 13.9 % (ref 11.6–15.1)
GFR SERPL CREATININE-BSD FRML MDRD: 67 ML/MIN/1.73SQ M
GLUCOSE SERPL-MCNC: 110 MG/DL (ref 65–140)
HCT VFR BLD AUTO: 40.1 % (ref 36.5–49.3)
HGB BLD-MCNC: 13.5 G/DL (ref 12–17)
IMM GRANULOCYTES # BLD AUTO: 0.06 THOUSAND/UL (ref 0–0.2)
IMM GRANULOCYTES NFR BLD AUTO: 1 % (ref 0–2)
LACTATE SERPL-SCNC: 2.7 MMOL/L (ref 0.5–2)
LACTATE SERPL-SCNC: 3.1 MMOL/L (ref 0.5–2)
LIPASE SERPL-CCNC: 18 U/L (ref 11–82)
LYMPHOCYTES # BLD AUTO: 0.84 THOUSANDS/ΜL (ref 0.6–4.47)
LYMPHOCYTES NFR BLD AUTO: 7 % (ref 14–44)
MCH RBC QN AUTO: 31.6 PG (ref 26.8–34.3)
MCHC RBC AUTO-ENTMCNC: 33.7 G/DL (ref 31.4–37.4)
MCV RBC AUTO: 94 FL (ref 82–98)
MONOCYTES # BLD AUTO: 0.74 THOUSAND/ΜL (ref 0.17–1.22)
MONOCYTES NFR BLD AUTO: 7 % (ref 4–12)
NEUTROPHILS # BLD AUTO: 9.66 THOUSANDS/ΜL (ref 1.85–7.62)
NEUTS SEG NFR BLD AUTO: 84 % (ref 43–75)
NRBC BLD AUTO-RTO: 0 /100 WBCS
P AXIS: 51 DEGREES
PLATELET # BLD AUTO: 209 THOUSANDS/UL (ref 149–390)
PMV BLD AUTO: 11 FL (ref 8.9–12.7)
POTASSIUM SERPL-SCNC: 4.2 MMOL/L (ref 3.5–5.3)
PR INTERVAL: 208 MS
PROT SERPL-MCNC: 6.9 G/DL (ref 6.4–8.4)
QRS AXIS: 44 DEGREES
QRSD INTERVAL: 106 MS
QT INTERVAL: 454 MS
QTC INTERVAL: 430 MS
RBC # BLD AUTO: 4.27 MILLION/UL (ref 3.88–5.62)
SODIUM SERPL-SCNC: 136 MMOL/L (ref 135–147)
T WAVE AXIS: 45 DEGREES
VENTRICULAR RATE: 54 BPM
WBC # BLD AUTO: 11.43 THOUSAND/UL (ref 4.31–10.16)

## 2022-09-02 PROCEDURE — 93005 ELECTROCARDIOGRAM TRACING: CPT

## 2022-09-02 PROCEDURE — 99284 EMERGENCY DEPT VISIT MOD MDM: CPT | Performed by: EMERGENCY MEDICINE

## 2022-09-02 PROCEDURE — 93010 ELECTROCARDIOGRAM REPORT: CPT | Performed by: INTERNAL MEDICINE

## 2022-09-02 PROCEDURE — 83690 ASSAY OF LIPASE: CPT

## 2022-09-02 PROCEDURE — 99285 EMERGENCY DEPT VISIT HI MDM: CPT

## 2022-09-02 PROCEDURE — 85025 COMPLETE CBC W/AUTO DIFF WBC: CPT

## 2022-09-02 PROCEDURE — 83605 ASSAY OF LACTIC ACID: CPT

## 2022-09-02 PROCEDURE — 76705 ECHO EXAM OF ABDOMEN: CPT

## 2022-09-02 PROCEDURE — G1004 CDSM NDSC: HCPCS

## 2022-09-02 PROCEDURE — 80053 COMPREHEN METABOLIC PANEL: CPT

## 2022-09-02 PROCEDURE — 84484 ASSAY OF TROPONIN QUANT: CPT

## 2022-09-02 PROCEDURE — 36415 COLL VENOUS BLD VENIPUNCTURE: CPT

## 2022-09-02 PROCEDURE — 96374 THER/PROPH/DIAG INJ IV PUSH: CPT

## 2022-09-02 PROCEDURE — 74176 CT ABD & PELVIS W/O CONTRAST: CPT

## 2022-09-02 RX ORDER — DIPHENHYDRAMINE HYDROCHLORIDE 50 MG/ML
25 INJECTION INTRAMUSCULAR; INTRAVENOUS ONCE
Status: DISCONTINUED | OUTPATIENT
Start: 2022-09-02 | End: 2022-09-02

## 2022-09-02 RX ADMIN — HYDROCORTISONE SODIUM SUCCINATE 200 MG: 100 INJECTION, POWDER, FOR SOLUTION INTRAMUSCULAR; INTRAVENOUS at 18:45

## 2022-09-02 NOTE — TELEPHONE ENCOUNTER
Spoke w/ pt's wife/she rescheduled her 's EGD from 10/4/2022 to 11/26/2022 at [facility] w/ Dr Charlie Burrell

## 2022-09-02 NOTE — ED PROVIDER NOTES
History  Chief Complaint   Patient presents with    Weakness - Generalized     Pt had near syncopal episode  Wife states that pt became very pale and sweaty  Pt now c/o abd pain and dizziness  HPI Pt is a 81year old male presenting to the ED s/p presyncope while playing minigolf with family pta  He was standing in the hot weather when he began to feel week and nauseated along with diffuse abdominal pain  He had been consuming water at the time but believes he may be dehydrated  Denies cp, dyspnea, palpitations, diarrhea  The abdominal pain is chronic/recurrent but worse today and he attributes it to the scar tissue from an open nissen fundiplication surgery about 25 years ago  Many abdominal scars from previous surgery  Denies hx of afib  Prior to Admission Medications   Prescriptions Last Dose Informant Patient Reported? Taking?   acetaminophen (TYLENOL) 325 mg tablet  Self No No   Sig: Take 2 tablets (650 mg total) by mouth every 6 (six) hours as needed for mild pain, headaches or fever   albuterol (PROVENTIL HFA,VENTOLIN HFA) 90 mcg/act inhaler  Self No No   Sig: Inhale 2 puffs every 6 (six) hours as needed for wheezing   aspirin (ECOTRIN LOW STRENGTH) 81 mg EC tablet  Self Yes No   Sig: Take 81 mg by mouth every other day     atorvastatin (LIPITOR) 10 mg tablet  Self No No   Sig: Take 1 tablet (10 mg total) by mouth daily   benzonatate (TESSALON PERLES) 100 mg capsule  Self No No   Sig: Take 1 capsule (100 mg total) by mouth 3 (three) times a day as needed for cough   diphenhydrAMINE (BENADRYL) 50 mg capsule   No No   Sig: Take 1 capsule (50 mg total) by mouth 1 (one) time for 1 dose Take 1 capsule (50 mg) 1 hour prior to CT scan  fluticasone (FLONASE) 50 mcg/act nasal spray  Self Yes No   Si spray into each nostril daily   fluticasone-salmeterol (Advair HFA) 230-21 MCG/ACT inhaler  Self No No   Sig: Inhale 2 puffs  in the morning and 2 puffs in the evening     levalbuterol (XOPENEX) 1 25 mg/3 mL nebulizer solution  Self No No   Sig: Take 3 mL (1 25 mg total) by nebulization every 8 (eight) hours as needed for wheezing or shortness of breath   lisinopril (ZESTRIL) 10 mg tablet  Self No No   Sig: Take 1 tablet (10 mg total) by mouth daily   multivitamin (THERAGRAN) TABS  Self Yes No   Sig: Take 1 tablet by mouth daily   omeprazole (PriLOSEC) 40 MG capsule  Self No No   Sig: Take 1 capsule (40 mg total) by mouth daily   sildenafil (VIAGRA) 50 MG tablet  Self No No   Sig: Take 1 tablet (50 mg total) by mouth daily as needed for erectile dysfunction      Facility-Administered Medications: None       Past Medical History:   Diagnosis Date    Allergic reaction     LAST ASSESSED: [id number]    Asthma     Bursitis of heel     LAST ASSESSED: [id number]    Derangement of meniscus of left knee due to old injury     LAST ASSESSED: [id number]    Diverticulitis of colon     LAST ASSESSED: [id number]    Dizziness     LAST ASSESSED: [id number]    GERD (gastroesophageal reflux disease)     History of colon polyps     Hypertension     Malignant hypertension     LAST ASSESSED: [id number]    Non-ST elevated myocardial infarction ([facility] ) 3/2/2020    NSTEMI (non-ST elevated myocardial infarction) ([facility] )     Plantar fasciitis     Seasonal allergies     Shoulder impingement     LAST ASSESSED: [id number]    [facility]        Past Surgical History:   Procedure Laterality Date    APPENDECTOMY      CAROTID ENDARTARECTOMY      COLECTOMY Left     PARTIAL - SIGMOID; HEMICOLECTOMY FOR DIVERTICULITIS; ONSET: 1987    COLON SURGERY      COLONOSCOPY N/A 12/9/2016    Procedure: COLONOSCOPY;  Surgeon: Junior Gauthier MD;  [facility];   Service:     HERNIA REPAIR      VENTRAL    NISSEN FUNDOPLICATION      ESOPHAGOGASTRIC FUNDOPLASTY LAST ASSESSED: [id number]    SINUS SURGERY      THROMBOENDARTERECTOMY      CAROTID; ONSET; MAY 2012       Family History   Problem Relation Age of Onset    Cancer Paternal Grandfather    Amy Rivera Hypertension Mother     Hyperlipidemia Mother     Hypertension Father     Hyperlipidemia Father     Colon cancer Neg Hx      I have reviewed and agree with the history as documented  E-Cigarette/Vaping    E-Cigarette Use Never User      E-Cigarette/Vaping Substances    Nicotine No     THC No     CBD No     Flavoring No     Other No     Unknown No      Social History     Tobacco Use    Smoking status: Former Smoker     Packs/day: 0 25     Years: 5 00     Pack years: 1 25     Types: Cigarettes     Quit date:      Years since quittin 7    Smokeless tobacco: Never Used   Vaping Use    Vaping Use: Never used   Substance Use Topics    Alcohol use: No    Drug use: No        Review of Systems   Constitutional: Positive for fatigue  Negative for chills and fever  HENT: Negative for congestion and rhinorrhea  Eyes: Negative for photophobia and redness  Respiratory: Negative for chest tightness, shortness of breath and wheezing  Cardiovascular: Negative for chest pain and palpitations  Gastrointestinal: Positive for abdominal pain and nausea  Negative for diarrhea and vomiting  Musculoskeletal: Negative for arthralgias, back pain, neck pain and neck stiffness  Skin: Negative for color change, pallor and rash  Neurological: Positive for weakness and light-headedness  Negative for seizures, facial asymmetry, speech difficulty and headaches  Psychiatric/Behavioral: Negative for agitation, behavioral problems and confusion         Physical Exam  ED Triage Vitals   Temperature Pulse Respirations Blood Pressure SpO2   22 1930 22 1436 22 1436 22 1436 22 1436   98 6 °F (37 °C) (!) 47 16 155/72 100 %      Temp Source Heart Rate Source Patient Position - Orthostatic VS BP Location FiO2 (%)   22 1930 22 1930 22 1615 22 1615 --   Oral Monitor Lying Left arm       Pain Score       22 1436       8             Orthostatic Vital Signs  Vitals:    09/02/22 1615 09/02/22 1700 09/02/22 1800 09/02/22 1930   BP: 159/72 145/69 140/65 157/70   Pulse: (!) 52 55 58 (!) 46   Patient Position - Orthostatic VS: Lying Lying  Sitting       Physical Exam  Vitals and nursing note reviewed  Constitutional:       General: He is in acute distress (mild)  Appearance: Normal appearance  He is not ill-appearing, toxic-appearing or diaphoretic  HENT:      Head: Normocephalic and atraumatic  Nose: Nose normal       Mouth/Throat:      Mouth: Mucous membranes are dry  Pharynx: Oropharynx is clear  Eyes:      Conjunctiva/sclera: Conjunctivae normal       Pupils: Pupils are equal, round, and reactive to light  Cardiovascular:      Rate and Rhythm: Regular rhythm  Bradycardia present  Pulses: Normal pulses  Heart sounds: Normal heart sounds  No murmur heard  No friction rub  No gallop  Pulmonary:      Effort: Pulmonary effort is normal  No respiratory distress  Breath sounds: Normal breath sounds  No wheezing, rhonchi or rales  Chest:      Chest wall: No tenderness  Abdominal:      General: Abdomen is flat  Tenderness: There is abdominal tenderness (diffusely, mild)  There is no guarding  Musculoskeletal:         General: Normal range of motion  Cervical back: Normal range of motion and neck supple  Skin:     General: Skin is warm and dry  Neurological:      General: No focal deficit present  Mental Status: He is alert and oriented to person, place, and time  Psychiatric:         Mood and Affect: Mood normal          Behavior: Behavior normal          ED Medications  Medications   hydrocortisone sodium succinate (PF) (Solu-CORTEF) injection 200 mg (200 mg Intravenous Given 9/2/22 1845)       Diagnostic Studies  Results Reviewed     Procedure Component Value Units Date/Time    Lactic acid 2 Hours [[id number]] Collected: 09/02/22 1924    Lab Status:  In process Specimen: Blood from Hand, Left Updated: 09/02/22 1931    Lactic acid, plasma [[id number]]  (Abnormal) Collected: 09/02/22 1607    Lab Status: Final result Specimen: Blood from Arm, Right Updated: 09/02/22 1720     LACTIC ACID 2 7 mmol/L     Narrative:      Result may be elevated if tourniquet was used during collection      High Sensitivity Troponin I Random [[id number]]  (Abnormal) Collected: 09/02/22 1607    Lab Status: Final result Specimen: Blood from Arm, Right Updated: 09/02/22 1719     HS TnI random 6 ng/L     Comprehensive metabolic panel [[id number]] Collected: 09/02/22 1607    Lab Status: Final result Specimen: Blood from Arm, Right Updated: 09/02/22 1719     Sodium 136 mmol/L      Potassium 4 2 mmol/L      Chloride 104 mmol/L      CO2 22 mmol/L      ANION GAP 10 mmol/L      BUN 16 mg/dL      Creatinine 1 10 mg/dL      Glucose 110 mg/dL      Calcium 9 1 mg/dL      AST 18 U/L      ALT 10 U/L      Alkaline Phosphatase 42 U/L      Total Protein 6 9 g/dL      Albumin 4 1 g/dL      Total Bilirubin 0 49 mg/dL      eGFR 67 ml/min/1 73sq m     Narrative:      Angelica guidelines for Chronic Kidney Disease (CKD):     Stage 1 with normal or high GFR (GFR > 90 mL/min/1 73 square meters)    Stage 2 Mild CKD (GFR = 60-89 mL/min/1 73 square meters)    Stage 3A Moderate CKD (GFR = 45-59 mL/min/1 73 square meters)    Stage 3B Moderate CKD (GFR = 30-44 mL/min/1 73 square meters)    Stage 4 Severe CKD (GFR = 15-29 mL/min/1 73 square meters)    Stage 5 End Stage CKD (GFR <15 mL/min/1 73 square meters)  Note: GFR calculation is accurate only with a steady state creatinine    Lipase [[id number]]  (Normal) Collected: 09/02/22 1607    Lab Status: Final result Specimen: Blood from Arm, Right Updated: 09/02/22 1719     Lipase 18 u/L     CBC and differential [[id number]]  (Abnormal) Collected: 09/02/22 1607    Lab Status: Final result Specimen: Blood from Arm, Right Updated: 09/02/22 1643     WBC 11 43 Thousand/uL      RBC 4 27 Million/uL Hemoglobin 13 5 g/dL      Hematocrit 40 1 %      MCV 94 fL      MCH 31 6 pg      MCHC 33 7 g/dL      RDW 13 9 %      MPV 11 0 fL      Platelets 060 Thousands/uL      nRBC 0 /100 WBCs      Neutrophils Relative 84 %      Immat GRANS % 1 %      Lymphocytes Relative 7 %      Monocytes Relative 7 %      Eosinophils Relative 1 %      Basophils Relative 0 %      Neutrophils Absolute 9 66 Thousands/µL      Immature Grans Absolute 0 06 Thousand/uL      Lymphocytes Absolute 0 84 Thousands/µL      Monocytes Absolute 0 74 Thousand/µL      Eosinophils Absolute 0 09 Thousand/µL      Basophils Absolute 0 04 Thousands/µL                  CT abdomen pelvis w contrast    (Results Pending)         Procedures  Procedures      ED Course         Lactic acid of 2 7 - Ordering CT abdomen/pelvis with IV and oral contrast ordered  Pt becomes flushed with iv contrast - pretreated with 200mg hydrocortisone  Pt will be signed out pending 12 hour hydrocortisone administration in preparation for the CT abd/pelvis with IV and oral contrast  Signed out to Dr Oliver Meza (attending) and Dr David Rogers (PGY1)                                  MDM     Presyncope  Abdominal pain  ACS r/o  Dehydration  Gastroenteritis      Disposition  Final diagnoses:   None     ED Disposition     None      Follow-up Information    None         Patient's Medications   Discharge Prescriptions    No medications on file     No discharge procedures on file  PDMP Review     None           ED Provider  Attending physically available and evaluated Ayah Cinthya LOPES managed the patient along with the ED Attending      Electronically Signed by         Mitesh Espinal DO  09/04/22 1400

## 2022-09-03 ENCOUNTER — APPOINTMENT (EMERGENCY)
Dept: CT IMAGING | Facility: HOSPITAL | Age: 72
End: 2022-09-03
Payer: COMMERCIAL

## 2022-09-03 ENCOUNTER — TELEPHONE (OUTPATIENT)
Dept: OTHER | Facility: OTHER | Age: 72
End: 2022-09-03

## 2022-09-03 VITALS
HEART RATE: 68 BPM | RESPIRATION RATE: 18 BRPM | DIASTOLIC BLOOD PRESSURE: 75 MMHG | TEMPERATURE: 98.6 F | OXYGEN SATURATION: 99 % | SYSTOLIC BLOOD PRESSURE: 155 MMHG

## 2022-09-03 LAB
CARDIAC TROPONIN I PNL SERPL HS: 6 NG/L
LACTATE SERPL-SCNC: 1.7 MMOL/L (ref 0.5–2)

## 2022-09-03 PROCEDURE — G1004 CDSM NDSC: HCPCS

## 2022-09-03 PROCEDURE — 84484 ASSAY OF TROPONIN QUANT: CPT | Performed by: EMERGENCY MEDICINE

## 2022-09-03 PROCEDURE — 74177 CT ABD & PELVIS W/CONTRAST: CPT

## 2022-09-03 PROCEDURE — 96361 HYDRATE IV INFUSION ADD-ON: CPT

## 2022-09-03 PROCEDURE — 96375 TX/PRO/DX INJ NEW DRUG ADDON: CPT

## 2022-09-03 PROCEDURE — 36415 COLL VENOUS BLD VENIPUNCTURE: CPT | Performed by: EMERGENCY MEDICINE

## 2022-09-03 PROCEDURE — 83605 ASSAY OF LACTIC ACID: CPT | Performed by: EMERGENCY MEDICINE

## 2022-09-03 PROCEDURE — 96374 THER/PROPH/DIAG INJ IV PUSH: CPT

## 2022-09-03 RX ORDER — IODIXANOL 320 MG/ML
70 INJECTION, SOLUTION INTRAVASCULAR
Status: COMPLETED | OUTPATIENT
Start: 2022-09-03 | End: 2022-09-03

## 2022-09-03 RX ORDER — DIPHENHYDRAMINE HYDROCHLORIDE 50 MG/ML
50 INJECTION INTRAMUSCULAR; INTRAVENOUS ONCE
Status: COMPLETED | OUTPATIENT
Start: 2022-09-03 | End: 2022-09-03

## 2022-09-03 RX ORDER — SODIUM CHLORIDE 9 MG/ML
125 INJECTION, SOLUTION INTRAVENOUS CONTINUOUS
Status: DISCONTINUED | OUTPATIENT
Start: 2022-09-03 | End: 2022-09-03 | Stop reason: HOSPADM

## 2022-09-03 RX ADMIN — HYDROCORTISONE SODIUM SUCCINATE 200 MG: 100 INJECTION, POWDER, FOR SOLUTION INTRAMUSCULAR; INTRAVENOUS at 05:52

## 2022-09-03 RX ADMIN — IODIXANOL 70 ML: 320 INJECTION, SOLUTION INTRAVASCULAR at 07:57

## 2022-09-03 RX ADMIN — IOHEXOL 50 ML: 240 INJECTION, SOLUTION INTRATHECAL; INTRAVASCULAR; INTRAVENOUS; ORAL at 05:52

## 2022-09-03 RX ADMIN — SODIUM CHLORIDE 125 ML/HR: 0.9 INJECTION, SOLUTION INTRAVENOUS at 03:49

## 2022-09-03 RX ADMIN — SODIUM CHLORIDE 1000 ML: 0.9 INJECTION, SOLUTION INTRAVENOUS at 03:49

## 2022-09-03 RX ADMIN — DIPHENHYDRAMINE HYDROCHLORIDE 50 MG: 50 INJECTION, SOLUTION INTRAMUSCULAR; INTRAVENOUS at 06:45

## 2022-09-03 NOTE — ED CARE HANDOFF
Emergency Department Sign Out Note        Sign out and transfer of care from Dr Caitlin Pabon  See Separate Emergency Department note  The patient, Jennifer Dorantes, was evaluated by the previous provider for abdominal pain with concern for mesenteric ischemia  Workup Completed:  CBC, CMP, Lipase, Troponin and Lactic Acid and repeat  ED Course / Workup Pending (followup): Patient has been undergoing a bowel prep for CT of the abdomen for the past 12 hours  CT is scheduled for 07:45 this AM                                   ED Course as of 09/03/22 1001   Sat Sep 03, 2022   0801 Initial troponin was 6 so we will repeat  His lactic was trending up so we will also do a 4 hour repeat  0845 LACTIC ACID: 1 7  Lactic improved with IV fluids  4319 CT abdomen pelvis w contrast    Stable exam  No acute findings  0173 The patient will be discharged for an ambulatory referral as an outpatient to Gastroenterology  He will be encouraged to take adequate fluids  Return precautions will be discussed  Further instructions per discharge orders  Procedures  MDM  Number of Diagnoses or Management Options  Diagnosis management comments: The patient presented with severe abdominal pain and generalized weakness that began while he was playing [employer] golf approximately 18 hours ago  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 but he did not wear yesterday  He says that he has had similar pain in the past that resolved within a few hours but this is continued and the weakness is new  The attending at that time was very concerned for mesenteric ischemia  Patient lactic acid was 2 7 and worsened on 2 hour repeat after receiving fluids  He has been undergoing a bowel prep for the past 12 hours and is scheduled for a CT of his abdomen with contrast this morning  His disposition is pending findings on the CT            Disposition  Final diagnoses:   Abdominal pain   Generalized weakness     Time reflects when diagnosis was documented in both MDM as applicable and the Disposition within this note     Time User Action Codes Description Comment    9/3/2022  7:31 AM Paulina Nicholas Add [R10 9] Abdominal pain     9/3/2022  9:45 AM Kiersten Marino Add [R53 1] Generalized weakness       ED Disposition     ED Disposition   Discharge    Condition   Stable    Date/Time   Sat Sep 3, 2022  9:45 AM    Comment   Swenson Million discharge to home/self care  Follow-up Information     Follow up With Specialties Details Why Contact Info Additional [facility] Schedule an appointment as soon as possible for a visit  For abdominal pain and generalized weakness   [phone]  Jennifer [place] [facility], [address] [facility] [address], [place], [place], [address]    [facility] Emergency Department Emergency Medicine  As needed [address] Emergency Department, [address], [place], [place], [address]        Patient's Medications   Discharge Prescriptions    No medications on file            ED Provider  Electronically Signed by     Reynaldo Dan DO  09/03/22 1009

## 2022-09-03 NOTE — ED NOTES
Call placed to CT spoke to [address] [place] [address], made aware pt received solestefania-cortef @ [phone]- will give pt oral con and pt may go for CT in 2 hours      Aggie Palmer RN  09/02/22 205

## 2022-09-03 NOTE — ED CARE HANDOFF
Emergency Department Sign Out Note        Sign out and transfer of care from Dr Daquan Sanders  al  See Separate Emergency Department note  The patient, Angelia Murphy, was evaluated by the previous provider for abdominal pain and possible mesenteric ischemia  Workup Completed:  Yes except CT with IV contrast pending 12 hour allergy prep    ED Course / Workup Pending (followup): Signed out to Dr Turner Macdonald to check CT abd/pelvis with IV contrast and patient will most likely need admission  Procedures  MDM        Disposition  Final diagnoses:   Abdominal pain     Time reflects when diagnosis was documented in both MDM as applicable and the Disposition within this note     Time User Action Codes Description Comment    9/3/2022  7:31 AM Harris Velásquez Add [R10 9] Abdominal pain       ED Disposition     None      Follow-up Information    None       Patient's Medications   Discharge Prescriptions    No medications on file     No discharge procedures on file         ED Provider  Electronically Signed by     Connor Damian MD  09/03/22 9611

## 2022-09-03 NOTE — ED ATTENDING ATTESTATION
9/2/2022  I, Mariana Núñez MD, saw and evaluated the patient  I have discussed the patient with the resident/non-physician practitioner and agree with the resident's/non-physician practitioner's findings, Plan of Care, and MDM as documented in the resident's/non-physician practitioner's note, except where noted  All available labs and Radiology studies were reviewed  I was present for key portions of any procedure(s) performed by the resident/non-physician practitioner and I was immediately available to provide assistance  At this point I agree with the current assessment done in the Emergency Department  I have conducted an independent evaluation of this patient a history and physical is as follows:    82-year-old male presenting with periumbilical abdominal pain after eating, described as bloating discomfort  Patient had a lactic acidosis on arrival   Unfortunately the develops angioedema with IV contrast so received 12 hour protocol  Currently awaiting results of CT scan with IV contrast for further evaluation  Lactic acidosis cleared following IV fluids  Second troponin stable from 1st   CT scan with IV contrast with no acute intra-abdominal abnormalities  Patient's abdomen is completely benign on re-examination and he denies any ongoing pain  Discussed with the patient low suspicion for acute surgical pathology at this time however he was encouraged to return to the emergency department for worsening or persistent pain  He expressed agreement understanding and was discharged in good condition      ED Course         Critical Care Time  Procedures

## 2022-09-03 NOTE — ED NOTES
ED Resident Marty Conde made aware repeat lactic 3 1- no new orders recieved     Edward Orourke, MARINE  09/02/22 2046

## 2022-09-03 NOTE — DISCHARGE INSTRUCTIONS
Your imaging here in the emergency department was unremarkable  Her lactic was elevated but responded to fluids  You have been given ambulatory referral to Gastroenterology instructed to call them to make an appointment as soon as possible  Please continue to take adequate fluid intake  Please take Tylenol as needed for abdominal pain every 6 hours  Please return to the emergency department should you develop abdominal pain uncontrolled with medication, fever, inability to have a bowel movement, chest pain, shortness of breath or any other symptoms that you find concerning

## 2022-09-03 NOTE — TELEPHONE ENCOUNTER
PT 's wife requesting a call back regarding PT's lisinopril and if a different medication can be called in

## 2022-09-06 ENCOUNTER — TELEPHONE (OUTPATIENT)
Dept: GASTROENTEROLOGY | Facility: AMBULARY SURGERY CENTER | Age: 72
End: 2022-09-06

## 2022-09-06 NOTE — TELEPHONE ENCOUNTER
Please call patient and inform him that it is okay to proceed with his EGD which is schedule late November  I did send Dr Cirilo Villarreal a  Message, see her reponse below  She would encourage him to stay well hydrated day before procedure    Thank you

## 2022-09-06 NOTE — TELEPHONE ENCOUNTER
Patients GI provider:  Dr Daryl Hong    Number to return call: ( [phone]    Reason for call: Pt calling to inform Dr Daryl Hong that he was in the ED over the weekend with high acid levels   He asked if he needs to be seen before his procedure    Scheduled procedure/appointment date if applicable: prashanth [phone]

## 2022-09-06 NOTE — TELEPHONE ENCOUNTER
OK to proceed with procedures- looks like they are scheduled late Nov  Would encourage him to hydrate and drink clear liquids along with prep

## 2022-09-06 NOTE — TELEPHONE ENCOUNTER
Spoke with patient, reviewed provider recommendations  Patient verbalized understanding, no further questions

## 2022-09-08 ENCOUNTER — TELEPHONE (OUTPATIENT)
Dept: CARDIOLOGY CLINIC | Facility: CLINIC | Age: 72
End: 2022-09-08

## 2022-09-08 NOTE — TELEPHONE ENCOUNTER
Patient is being treated by GI with omeprazole for nausea  He is still having nausea and the GI doctor suggested that it may be a side effect of lisinopril  She called to ask if a beta blocker can be prescribed instead  Please advise

## 2022-09-13 NOTE — TELEPHONE ENCOUNTER
Patient's wife returned my call, his symptoms have resolved he will continue lisinopril for now and hold if symptoms return  F/u appt was scheduled

## 2022-09-26 NOTE — ED ATTENDING ATTESTATION
9/2/2022  Dakotah Beltran DO, saw and evaluated the patient  I have discussed the patient with the resident/non-physician practitioner and agree with the resident's/non-physician practitioner's findings, Plan of Care, and MDM as documented in the resident's/non-physician practitioner's note, except where noted  All available labs and Radiology studies were reviewed  I was present for key portions of any procedure(s) performed by the resident/non-physician practitioner and I was immediately available to provide assistance  At this point I agree with the current assessment done in the Emergency Department    I have conducted an independent evaluation of this patient a history and physical is as follows:    ED Course         Critical Care Time  Procedures

## 2022-10-12 ENCOUNTER — OFFICE VISIT (OUTPATIENT)
Dept: CARDIOLOGY CLINIC | Facility: CLINIC | Age: 72
End: 2022-10-12
Payer: COMMERCIAL

## 2022-10-12 VITALS
OXYGEN SATURATION: 100 % | HEIGHT: 68 IN | WEIGHT: 185.2 LBS | SYSTOLIC BLOOD PRESSURE: 174 MMHG | DIASTOLIC BLOOD PRESSURE: 80 MMHG | HEART RATE: 59 BPM | BODY MASS INDEX: 28.07 KG/M2

## 2022-10-12 DIAGNOSIS — I10 BENIGN ESSENTIAL HYPERTENSION: Primary | ICD-10-CM

## 2022-10-12 DIAGNOSIS — E78.2 MIXED HYPERLIPIDEMIA: ICD-10-CM

## 2022-10-12 DIAGNOSIS — I25.10 CORONARY ARTERY DISEASE INVOLVING NATIVE CORONARY ARTERY OF NATIVE HEART WITHOUT ANGINA PECTORIS: ICD-10-CM

## 2022-10-12 PROCEDURE — 99213 OFFICE O/P EST LOW 20 MIN: CPT | Performed by: INTERNAL MEDICINE

## 2022-10-12 NOTE — PROGRESS NOTES
Cardiology Follow Up    Essence Wood  1950  [id number]  [address] [facility] [address] [phone]    1  Benign essential hypertension     2  Coronary artery disease involving native coronary artery of native heart without angina pectoris     3  Mixed hyperlipidemia           Discussion/Summary: All of his assessed cardiac problems are stable  I have reviewed his medications and made no changes  No cardiac testing is ordered  RTO 1 year  Interval History: He has not had any cardiac problems since his last OV  He continues to walk 5 - 10 miles on a regular basis without CP, SOB  BP is 174/80 in the office but he checks it regularly at home and it is consistently 120s / 70s  He has CAD with cardiac cath 8/2019 for CP  He has distal RCA stenting  LDL 90      Patient Active Problem List   Diagnosis   • Asthma   • Genao esophagus   • Benign essential hypertension   • Diverticulosis   • Fatty infiltration of liver   • Hyperuricemia   • Impaired fasting glucose   • Mixed hyperlipidemia   • Drug-induced erectile dysfunction   • Non-allergic rhinitis   • Osteoarthritis of knee   • Vitamin B12 deficiency   • Irritable bowel syndrome with diarrhea   • History of colonic diverticulitis   • History of right-sided carotid endarterectomy   • Coronary artery disease involving native coronary artery of native heart without angina pectoris   • Incisional hernia   • Arthritis of right acromioclavicular joint   • Carotid stenosis, asymptomatic, left   • Osteoarthritis of right glenohumeral joint   • Nonalcoholic steatohepatitis     Past Medical History:   Diagnosis Date   • Allergic reaction     LAST ASSESSED: [id number]   • Asthma    • Bursitis of heel     LAST ASSESSED: [id number]   • Derangement of meniscus of left knee due to old injury     LAST ASSESSED: [id number]   • Diverticulitis of colon     LAST ASSESSED: [id number]   • Dizziness     LAST ASSESSED: [id number]   • GERD (gastroesophageal reflux disease)    • History of colon polyps    • Hypertension    • Malignant hypertension     LAST ASSESSED: [id number]   • Non-ST elevated myocardial infarction ([facility] [address] ) 3/2/2020   • NSTEMI (non-ST elevated myocardial infarction) ([facility])    • Plantar fasciitis    • Seasonal allergies    • Shoulder impingement     LAST ASSESSED: [id number]   • Stroke [facility])      Social History     Socioeconomic History   • Marital status: /Civil Union     Spouse name: Not on file   • Number of children: Not on file   • Years of education: Not on file   • Highest education level: Not on file   Occupational History   • Not on file   Tobacco Use   • Smoking status: Former Smoker     Packs/day: 0 25     Years: 5 00     Pack years: 1 25     Types: Cigarettes     Quit date: 1960     Years since quittin 8   • Smokeless tobacco: Never Used   Vaping Use   • Vaping Use: Never used   Substance and Sexual Activity   • Alcohol use: No   • Drug use: No   • Sexual activity: Not on file   Other Topics Concern   • Not on file   Social History Narrative   • Not on file     Social Determinants of Health     Financial Resource Strain: Not on file   Food Insecurity: Not on file   Transportation Needs: Not on file   Physical Activity: Not on file   Stress: Not on file   Social Connections: Not on file   Intimate Partner Violence: Not on file   Housing Stability: Not on file      Family History   Problem Relation Age of Onset   • Cancer Paternal Grandfather    • Hypertension Mother    • Hyperlipidemia Mother    • Hypertension Father    • Hyperlipidemia Father    • Colon cancer Neg Hx      Past Surgical History:   Procedure Laterality Date   • APPENDECTOMY     • CAROTID ENDARTARECTOMY     • COLECTOMY Left     PARTIAL - SIGMOID; HEMICOLECTOMY FOR DIVERTICULITIS; ONSET:    • COLON SURGERY     • COLONOSCOPY N/A 2016    Procedure: COLONOSCOPY;  Surgeon: Cayetano Gonzalez MD;  Location: [facility];   Service:    • HERNIA REPAIR      VENTRAL   • NISSEN FUNDOPLICATION      ESOPHAGOGASTRIC FUNDOPLASTY LAST ASSESSED: [id number]   • SINUS SURGERY     • THROMBOENDARTERECTOMY      CAROTID; ONSET; MAY 2012       Current Outpatient Medications:   •  acetaminophen (TYLENOL) 325 mg tablet, Take 2 tablets (650 mg total) by mouth every 6 (six) hours as needed for mild pain, headaches or fever, Disp: 30 tablet, Rfl: 0  •  albuterol (PROVENTIL HFA,VENTOLIN HFA) 90 mcg/act inhaler, Inhale 2 puffs every 6 (six) hours as needed for wheezing, Disp: 54 g, Rfl: 3  •  aspirin (ECOTRIN LOW STRENGTH) 81 mg EC tablet, Take 81 mg by mouth every other day  , Disp: , Rfl:   •  atorvastatin (LIPITOR) 10 mg tablet, Take 1 tablet (10 mg total) by mouth daily, Disp: 90 tablet, Rfl: 3  •  fluticasone (FLONASE) 50 mcg/act nasal spray, 1 spray into each nostril daily, Disp: , Rfl:   •  fluticasone-salmeterol (Advair HFA) 230-21 MCG/ACT inhaler, Inhale 2 puffs  in the morning and 2 puffs in the evening , Disp: 12 g, Rfl: 3  •  levalbuterol (XOPENEX) 1 25 mg/3 mL nebulizer solution, Take 3 mL (1 25 mg total) by nebulization every 8 (eight) hours as needed for wheezing or shortness of breath, Disp: 75 mL, Rfl: 5  •  lisinopril (ZESTRIL) 10 mg tablet, Take 1 tablet (10 mg total) by mouth daily, Disp: 90 tablet, Rfl: 3  •  multivitamin (THERAGRAN) TABS, Take 1 tablet by mouth daily, Disp: , Rfl:   •  omeprazole (PriLOSEC) 40 MG capsule, Take 1 capsule (40 mg total) by mouth daily, Disp: 90 capsule, Rfl: 2  •  sildenafil (VIAGRA) 50 MG tablet, Take 1 tablet (50 mg total) by mouth daily as needed for erectile dysfunction, Disp: 10 tablet, Rfl: 5  •  benzonatate (TESSALON PERLES) 100 mg capsule, Take 1 capsule (100 mg total) by mouth 3 (three) times a day as needed for cough, Disp: 20 capsule, Rfl: 0  •  diphenhydrAMINE (BENADRYL) 50 mg capsule, Take 1 capsule (50 mg total) by mouth 1 (one) time for 1 dose Take 1 capsule (50 mg) 1 hour prior to CT scan , Disp: 30 capsule, Rfl: 0  Allergies   Allergen Reactions   • Depo-Medrol [Methylprednisolone] Anaphylaxis     Severe Vagal Reaction   • Iv Contrast [Iodinated Diagnostic Agents] Angioedema     Pt states itchy/swollen eyes, trouble breathing (years ago)   • Rosuvastatin Headache and Confusion   • Sulfa Antibiotics Hives     Vitals:    10/12/22 0903   BP: (!) 174/80   BP Location: Left arm   Patient Position: Sitting   Cuff Size: Adult   Pulse: 59   SpO2: 100%   Weight: 84 kg (185 lb 3 2 oz)   Height: 5' 8" (1 727 m)     Weight (last 2 days)     Date/Time Weight    10/12/22 0903 84 (185 2)         Blood pressure (!) 174/80, pulse 59, height 5' 8" (1 727 m), weight 84 kg (185 lb 3 2 oz), SpO2 100 %  , Body mass index is 28 16 kg/m²      Labs:  Admission on 09/02/2022, Discharged on 09/03/2022   Component Date Value   • Ventricular Rate 09/02/2022 54    • Atrial Rate 09/02/2022 54    • NC Interval 09/02/2022 208    • QRSD Interval 09/02/2022 106    • QT Interval 09/02/2022 454    • QTC Interval 09/02/2022 430    • P Axis 09/02/2022 51    • QRS Axis 09/02/2022 44    • T Wave Axis 09/02/2022 45    • WBC 09/02/2022 11 43 (A)   • RBC 09/02/2022 4 27    • Hemoglobin 09/02/2022 13 5    • Hematocrit 09/02/2022 40 1    • MCV 09/02/2022 94    • MCH 09/02/2022 31 6    • MCHC 09/02/2022 33 7    • RDW 09/02/2022 13 9    • MPV 09/02/2022 11 0    • Platelets 53/58/7174 209    • nRBC 09/02/2022 0    • Neutrophils Relative 09/02/2022 84 (A)   • Immat GRANS % 09/02/2022 1    • Lymphocytes Relative 09/02/2022 7 (A)   • Monocytes Relative 09/02/2022 7    • Eosinophils Relative 09/02/2022 1    • Basophils Relative 09/02/2022 0    • Neutrophils Absolute 09/02/2022 9 66 (A)   • Immature Grans Absolute 09/02/2022 0 06    • Lymphocytes Absolute 09/02/2022 0 84    • Monocytes Absolute 09/02/2022 0 74    • Eosinophils Absolute 09/02/2022 0 09    • Basophils Absolute 09/02/2022 0 04    • Sodium 09/02/2022 136    • Potassium 09/02/2022 4 2    • Chloride 09/02/2022 104    • CO2 09/02/2022 22    • ANION GAP 09/02/2022 10    • BUN 09/02/2022 16    • Creatinine 09/02/2022 1 10    • Glucose 09/02/2022 110    • Calcium 09/02/2022 9 1    • AST 09/02/2022 18    • ALT 09/02/2022 10    • Alkaline Phosphatase 09/02/2022 42    • Total Protein 09/02/2022 6 9    • Albumin 09/02/2022 4 1    • Total Bilirubin 09/02/2022 0 49    • eGFR 09/02/2022 67    • Lipase 09/02/2022 18    • LACTIC ACID 09/02/2022 2 7 (A)   • HS TnI random 09/02/2022 6 (A)   • LACTIC ACID 09/02/2022 3 1 (A)   • LACTIC ACID 09/03/2022 1 7    • hs TnI 0hr 09/03/2022 6    Appointment on 08/12/2022   Component Date Value   • Sodium 08/12/2022 138    • Potassium 08/12/2022 4 7    • Chloride 08/12/2022 107    • CO2 08/12/2022 25    • ANION GAP 08/12/2022 6    • BUN 08/12/2022 23    • Creatinine 08/12/2022 1 14    • Glucose, Fasting 08/12/2022 102 (A)   • Calcium 08/12/2022 9 1    • eGFR 08/12/2022 64    Admission on 05/23/2022, Discharged on 05/23/2022   Component Date Value   • WBC 05/23/2022 8 35    • RBC 05/23/2022 4 19    • Hemoglobin 05/23/2022 13 0    • Hematocrit 05/23/2022 39 2    • MCV 05/23/2022 94    • MCH 05/23/2022 31 0    • MCHC 05/23/2022 33 2    • RDW 05/23/2022 13 6    • MPV 05/23/2022 11 1    • Platelets 92/12/4381 164    • nRBC 05/23/2022 0    • Neutrophils Relative 05/23/2022 80 (A)   • Immat GRANS % 05/23/2022 0    • Lymphocytes Relative 05/23/2022 10 (A)   • Monocytes Relative 05/23/2022 7    • Eosinophils Relative 05/23/2022 3    • Basophils Relative 05/23/2022 0    • Neutrophils Absolute 05/23/2022 6 64    • Immature Grans Absolute 05/23/2022 0 03    • Lymphocytes Absolute 05/23/2022 0 82    • Monocytes Absolute 05/23/2022 0 55    • Eosinophils Absolute 05/23/2022 0 28    • Basophils Absolute 05/23/2022 0 03    • Sodium 05/23/2022 139    • Potassium 05/23/2022 3 9    • Chloride 05/23/2022 107    • CO2 05/23/2022 24    • ANION GAP 05/23/2022 8    • BUN 05/23/2022 20    • Creatinine 05/23/2022 0 94    • Glucose 05/23/2022 112    • Calcium 05/23/2022 9 0    • AST 05/23/2022 12 (A)   • ALT 05/23/2022 10    • Alkaline Phosphatase 05/23/2022 46    • Total Protein 05/23/2022 6 8    • Albumin 05/23/2022 3 8    • Total Bilirubin 05/23/2022 0 60    • eGFR 05/23/2022 82    • Magnesium 05/23/2022 1 7 (A)   • hs TnI 0hr 05/23/2022 5    • SARS-CoV-2 05/23/2022 Negative    • INFLUENZA A PCR 05/23/2022 Negative    • INFLUENZA B PCR 05/23/2022 Negative    • RSV PCR 05/23/2022 Negative    • Phosphorus 05/23/2022 1 9 (A)   • hs TnI 2hr 05/23/2022 5    • Delta 2hr hsTnI 05/23/2022 0    • Ventricular Rate 05/23/2022 55    • Atrial Rate 05/23/2022 55    • QRSD Interval 05/23/2022 104    • QT Interval 05/23/2022 414    • QTC Interval 05/23/2022 396    • P Axis 05/23/2022 59    • QRS Axis 05/23/2022 47    • T Wave Axis 05/23/2022 51    Office Visit on 04/14/2022   Component Date Value   • Sodium 05/12/2022 139    • Potassium 05/12/2022 4 6    • Chloride 05/12/2022 108    • CO2 05/12/2022 29    • ANION GAP 05/12/2022 2 (A)   • BUN 05/12/2022 21    • Creatinine 05/12/2022 1 10    • Glucose, Fasting 05/12/2022 100 (A)   • Calcium 05/12/2022 9 6    • AST 05/12/2022 14    • ALT 05/12/2022 27    • Alkaline Phosphatase 05/12/2022 53    • Total Protein 05/12/2022 7 6    • Albumin 05/12/2022 4 0    • Total Bilirubin 05/12/2022 0 76    • eGFR 05/12/2022 68    • WBC 05/12/2022 6 96    • RBC 05/12/2022 4 60    • Hemoglobin 05/12/2022 14 0    • Hematocrit 05/12/2022 43 5    • MCV 05/12/2022 95    • MCH 05/12/2022 30 4    • MCHC 05/12/2022 32 2    • RDW 05/12/2022 13 3    • MPV 05/12/2022 11 7    • Platelets 55/19/8180 195    • nRBC 05/12/2022 0    • Neutrophils Relative 05/12/2022 67    • Immat GRANS % 05/12/2022 0    • Lymphocytes Relative 05/12/2022 20    • Monocytes Relative 05/12/2022 8    • Eosinophils Relative 05/12/2022 4    • Basophils Relative 05/12/2022 1    • Neutrophils Absolute 05/12/2022 4 65    • Immature Grans Absolute 05/12/2022 0 02    • Lymphocytes Absolute 05/12/2022 1 42    • Monocytes Absolute 05/12/2022 0 58    • Eosinophils Absolute 05/12/2022 0 25    • Basophils Absolute 05/12/2022 0 04    • Cholesterol 05/12/2022 152    • Triglycerides 05/12/2022 58    • HDL, Direct 05/12/2022 50    • LDL Calculated 05/12/2022 90    • Non-HDL-Chol (CHOL-HDL) 05/12/2022 102    • TSH 3RD GENERATON 05/12/2022 1 490    • Vitamin B-12 05/12/2022 866    • Folate 05/12/2022 15 1      Imaging: No results found  Review of Systems:  Review of Systems   Constitutional: Negative for diaphoresis, fatigue, fever and unexpected weight change  HENT: Negative  Respiratory: Negative for cough, shortness of breath and wheezing  Cardiovascular: Negative for chest pain, palpitations and leg swelling  Gastrointestinal: Negative for abdominal pain, diarrhea and nausea  Musculoskeletal: Negative for gait problem and myalgias  Skin: Negative for rash  Neurological: Negative for dizziness and numbness  Psychiatric/Behavioral: Negative  Physical Exam:  Physical Exam  Constitutional:       Appearance: He is well-developed  HENT:      Head: Normocephalic and atraumatic  Eyes:      Pupils: Pupils are equal, round, and reactive to light  Neck:      Vascular: No JVD  Cardiovascular:      Rate and Rhythm: Regular rhythm  Pulses: Normal pulses  Carotid pulses are 2+ on the right side and 2+ on the left side  Heart sounds: S1 normal and S2 normal    Pulmonary:      Effort: Pulmonary effort is normal       Breath sounds: Normal breath sounds  No wheezing or rales  Abdominal:      General: Bowel sounds are normal       Palpations: Abdomen is soft  Tenderness: There is no abdominal tenderness  Musculoskeletal:         General: No tenderness  Normal range of motion  Cervical back: Normal range of motion and neck supple     Skin:     General: Skin is warm  Neurological:      Mental Status: He is alert and oriented to person, place, and time  Cranial Nerves: No cranial nerve deficit  Deep Tendon Reflexes: Reflexes are normal and symmetric

## 2022-10-26 ENCOUNTER — OFFICE VISIT (OUTPATIENT)
Dept: FAMILY MEDICINE CLINIC | Facility: CLINIC | Age: 72
End: 2022-10-26
Payer: COMMERCIAL

## 2022-10-26 VITALS
HEART RATE: 56 BPM | BODY MASS INDEX: 28.64 KG/M2 | SYSTOLIC BLOOD PRESSURE: 178 MMHG | WEIGHT: 189 LBS | HEIGHT: 68 IN | OXYGEN SATURATION: 99 % | DIASTOLIC BLOOD PRESSURE: 86 MMHG | TEMPERATURE: 96.3 F

## 2022-10-26 DIAGNOSIS — I10 BENIGN ESSENTIAL HYPERTENSION: ICD-10-CM

## 2022-10-26 DIAGNOSIS — R21 RASH AND OTHER NONSPECIFIC SKIN ERUPTION: Primary | ICD-10-CM

## 2022-10-26 PROCEDURE — 99213 OFFICE O/P EST LOW 20 MIN: CPT | Performed by: FAMILY MEDICINE

## 2022-10-26 NOTE — ASSESSMENT & PLAN NOTE
Blood pressure elevated in office today  Patient and wife both state blood pressure runs around  130/80 at home  Patient always has high blood pressure when visiting doctors offices   Continue lisinopril and monitoring at home

## 2022-10-26 NOTE — PROGRESS NOTES
Name: Jess Arzate      : 1950      MRN: [id number]  Encounter Provider: Lisa Pabon MD  Encounter Date: 10/26/2022   Encounter department: [address] [place] [address] St     1  Rash and other nonspecific skin eruption  Assessment & Plan:  Start hydrocortisone 1%  Follow-up if no improvement  Will biopsy at that time  Images in chart       2  Benign essential hypertension  Assessment & Plan:  Blood pressure elevated in office today  Patient and wife both state blood pressure runs around  130/80 at home  Patient always has high blood pressure when visiting doctors offices  Continue lisinopril and monitoring at home              Subjective      Presents with a rash on his right shin for the past 3 months  Has not changed in size or color  Does endorse some itching occasionally  Has been using topical antibiotic cream     Review of Systems   Skin: Positive for color change and rash  Current Outpatient Medications on File Prior to Visit   Medication Sig   • acetaminophen (TYLENOL) 325 mg tablet Take 2 tablets (650 mg total) by mouth every 6 (six) hours as needed for mild pain, headaches or fever   • albuterol (PROVENTIL HFA,VENTOLIN HFA) 90 mcg/act inhaler Inhale 2 puffs every 6 (six) hours as needed for wheezing   • aspirin (ECOTRIN LOW STRENGTH) 81 mg EC tablet Take 81 mg by mouth every other day     • atorvastatin (LIPITOR) 10 mg tablet Take 1 tablet (10 mg total) by mouth daily   • diphenhydrAMINE (BENADRYL) 50 mg capsule Take 1 capsule (50 mg total) by mouth 1 (one) time for 1 dose Take 1 capsule (50 mg) 1 hour prior to CT scan  • fluticasone (FLONASE) 50 mcg/act nasal spray 1 spray into each nostril daily   • fluticasone-salmeterol (Advair HFA) 230-21 MCG/ACT inhaler Inhale 2 puffs  in the morning and 2 puffs in the evening     • levalbuterol (XOPENEX) 1 25 mg/3 mL nebulizer solution Take 3 mL (1 25 mg total) by nebulization every 8 (eight) hours as needed for wheezing or shortness of breath   • lisinopril (ZESTRIL) 10 mg tablet Take 1 tablet (10 mg total) by mouth daily   • multivitamin (THERAGRAN) TABS Take 1 tablet by mouth daily   • omeprazole (PriLOSEC) 40 MG capsule Take 1 capsule (40 mg total) by mouth daily   • sildenafil (VIAGRA) 50 MG tablet Take 1 tablet (50 mg total) by mouth daily as needed for erectile dysfunction   • [DISCONTINUED] benzonatate (TESSALON PERLES) 100 mg capsule Take 1 capsule (100 mg total) by mouth 3 (three) times a day as needed for cough       Objective     BP (!) 178/86 (BP Location: Left arm, Patient Position: Sitting, Cuff Size: Standard)   Pulse 56   Temp (!) 96 3 °F (35 7 °C)   Ht 5' 8" (1 727 m)   Wt 85 7 kg (189 lb)   SpO2 99%   BMI 28 74 kg/m²     Physical Exam  Skin:     Findings: Erythema and rash present                 Gordon Ga MD Discharged

## 2022-11-07 ENCOUNTER — DOCUMENTATION (OUTPATIENT)
Dept: FAMILY MEDICINE CLINIC | Facility: CLINIC | Age: 72
End: 2022-11-07

## 2022-11-07 NOTE — PROGRESS NOTES
[facility]  Callie Maza       Reason for documentation: Patient was flagged by Third Party Payer and/or internal report as being due for a refill on the following medications:   Atorvastatin; pharmacy fill rate 80% (goal > 80%); refill due date 9/20/22      Outcome:  Patient is taking medication twice weekly instead of daily- as recommended by provider due to side effects and history of intolerance to several statins despite CAD       Adherence Assessment:     Misses doses:  no  # of missed doses in the past week: 0    # of times per day patient takes meds: 2    Use of supportive adherence tools:Yes, describe: wife, pillpacks/box    Med cost concerns: no    PCP: Please sign pended rxs     Pharmacist Tracking Tool  Reason For Outreach: Embedded Pharmacist  Demographics:  Intervention Method: Phone  Type of Intervention: New  Topics Addressed: CVD  Pharmacologic Interventions: N/A  Non-Pharmacologic Interventions: Adherence addressed  Time:  Direct Patient Care: 5 mins  Care Coordination: 15 mins  Recommendation Recipient: Patient/Caregiver  Outcome: Accepted

## 2022-11-29 ENCOUNTER — HOSPITAL ENCOUNTER (OUTPATIENT)
Dept: GASTROENTEROLOGY | Facility: AMBULARY SURGERY CENTER | Age: 72
Setting detail: OUTPATIENT SURGERY
Discharge: HOME | End: 2022-11-29
Attending: INTERNAL MEDICINE

## 2022-11-29 ENCOUNTER — ANESTHESIA EVENT (OUTPATIENT)
Dept: GASTROENTEROLOGY | Facility: AMBULARY SURGERY CENTER | Age: 72
End: 2022-11-29

## 2022-11-29 ENCOUNTER — ANESTHESIA (OUTPATIENT)
Dept: GASTROENTEROLOGY | Facility: AMBULARY SURGERY CENTER | Age: 72
End: 2022-11-29

## 2022-11-29 VITALS
RESPIRATION RATE: 18 BRPM | BODY MASS INDEX: 28.88 KG/M2 | OXYGEN SATURATION: 99 % | SYSTOLIC BLOOD PRESSURE: 150 MMHG | DIASTOLIC BLOOD PRESSURE: 69 MMHG | HEIGHT: 67 IN | HEART RATE: 50 BPM | TEMPERATURE: 97.3 F | WEIGHT: 184 LBS

## 2022-11-29 DIAGNOSIS — R14.0 ABDOMINAL BLOATING: ICD-10-CM

## 2022-11-29 DIAGNOSIS — R10.30 LOWER ABDOMINAL PAIN: ICD-10-CM

## 2022-11-29 RX ORDER — SODIUM CHLORIDE, SODIUM LACTATE, POTASSIUM CHLORIDE, CALCIUM CHLORIDE 600; 310; 30; 20 MG/100ML; MG/100ML; MG/100ML; MG/100ML
INJECTION, SOLUTION INTRAVENOUS CONTINUOUS PRN
Status: DISCONTINUED | OUTPATIENT
Start: 2022-11-29 | End: 2022-11-29

## 2022-11-29 RX ORDER — PROPOFOL 10 MG/ML
INJECTION, EMULSION INTRAVENOUS AS NEEDED
Status: DISCONTINUED | OUTPATIENT
Start: 2022-11-29 | End: 2022-11-29

## 2022-11-29 RX ADMIN — PROPOFOL 20 MG: 10 INJECTION, EMULSION INTRAVENOUS at 09:08

## 2022-11-29 RX ADMIN — PROPOFOL 30 MG: 10 INJECTION, EMULSION INTRAVENOUS at 09:06

## 2022-11-29 RX ADMIN — PROPOFOL 30 MG: 10 INJECTION, EMULSION INTRAVENOUS at 09:04

## 2022-11-29 RX ADMIN — PROPOFOL 20 MG: 10 INJECTION, EMULSION INTRAVENOUS at 09:07

## 2022-11-29 RX ADMIN — PROPOFOL 100 MG: 10 INJECTION, EMULSION INTRAVENOUS at 09:02

## 2022-11-29 RX ADMIN — SODIUM CHLORIDE, SODIUM LACTATE, POTASSIUM CHLORIDE, AND CALCIUM CHLORIDE: .6; .31; .03; .02 INJECTION, SOLUTION INTRAVENOUS at 08:54

## 2022-11-29 NOTE — ANESTHESIA PREPROCEDURE EVALUATION
Procedure:  EGD    Relevant Problems   ANESTHESIA (within normal limits)      CARDIO   (+) Benign essential hypertension   (+) Coronary artery disease involving native coronary artery of native heart without angina pectoris   (+) Mixed hyperlipidemia      GI/HEPATIC   (+) Fatty infiltration of liver   (+) Nonalcoholic steatohepatitis      MUSCULOSKELETAL   (+) Arthritis of right acromioclavicular joint   (+) Osteoarthritis of knee   (+) Osteoarthritis of right glenohumeral joint      NEURO/PSYCH   (+) History of colonic diverticulitis   (+) Stroke (HCC)      PULMONARY   (+) Asthma      Digestive   (+) Genao esophagus   (+) Diverticulosis      Other   (+) History of right-sided carotid endarterectomy        Physical Exam    Airway    Mallampati score: II  TM Distance: >3 FB  Neck ROM: full     Dental   upper dentures,     Cardiovascular      Pulmonary      Other Findings  Upper partial denture      Anesthesia Plan  ASA Score- 3     Anesthesia Type- IV sedation with anesthesia with ASA Monitors  Additional Monitors:   Airway Plan:           Plan Factors-Exercise tolerance (METS): >4 METS  Chart reviewed  EKG reviewed  Existing labs reviewed  Patient summary reviewed  Patient is not a current smoker  Induction-     Postoperative Plan-     Informed Consent- Anesthetic plan and risks discussed with patient  I personally reviewed this patient with the CRNA  Discussed and agreed on the Anesthesia Plan with the CRNA  Alessandro Dewey

## 2022-11-29 NOTE — H&P
History and Physical - [facility]  Shannan Montalvo 81 y o  male MRN: [id number]                  HPI: Shannan Montalvo is a 81y o  year old male who presents for abdominal bloating, wallis's esophagus  REVIEW OF SYSTEMS: Per the HPI, and otherwise unremarkable  Historical Information   Past Medical History:   Diagnosis Date   • Allergic reaction     LAST ASSESSED: [id number]   • Asthma    • Bursitis of heel     LAST ASSESSED: [id number]   • Colon polyp    • Derangement of meniscus of left knee due to old injury     LAST ASSESSED: [id number]   • Diverticulitis of colon     LAST ASSESSED: [id number]   • Dizziness     LAST ASSESSED: [id number]   • GERD (gastroesophageal reflux disease)    • History of colon polyps    • Hypertension    • Kidney stone    • Malignant hypertension     LAST ASSESSED: [id number]   • Myocardial infarction [facility])    • Non-ST elevated myocardial infarction ([facility] [address] ) 03/02/2020   • [facility]ST [facility]    • Plantar fasciitis    • Seasonal allergies    • Shoulder impingement     LAST ASSESSED: [id number]   • Stroke [facility])      Past Surgical History:   Procedure Laterality Date   • APPENDECTOMY     • CAROTID ENDARTARECTOMY     • COLECTOMY Left     PARTIAL - SIGMOID; HEMICOLECTOMY FOR DIVERTICULITIS; ONSET: 1987   • COLON SURGERY     • COLONOSCOPY N/A 12/9/2016    Procedure: COLONOSCOPY;  Surgeon: Dennie Pang, MD;  Location: [facility];   Service:    • HERNIA REPAIR      VENTRAL   • NISSEN FUNDOPLICATION      ESOPHAGOGASTRIC FUNDOPLASTY LAST ASSESSED: [id number]   • SINUS SURGERY     • THROMBOENDARTERECTOMY      CAROTID; ONSET; MAY 2012     Social History   Social History     Substance and Sexual Activity   Alcohol Use No     Social History     Substance and Sexual Activity   Drug Use No     Social History     Tobacco Use   Smoking Status Former   • Packs/day: 0 25   • Years: 5 00   • Pack years: 1 25   • Types: Cigarettes   • Quit date: 56   • Years since quittin 9   Smokeless Tobacco Never     Family History   Problem Relation Age of Onset   • Hypertension Mother    • Hyperlipidemia Mother    • Breast cancer Mother    • Hypertension Father    • Hyperlipidemia Father    • Colon cancer Paternal Grandfather    • Cancer Paternal Grandfather    • Throat cancer Maternal Aunt        Meds/Allergies       Current Outpatient Medications:   •  acetaminophen (TYLENOL) 325 mg tablet  •  aspirin (ECOTRIN LOW STRENGTH) 81 mg EC tablet  •  atorvastatin (LIPITOR) 10 mg tablet  •  fluticasone (FLONASE) 50 mcg/act nasal spray  •  fluticasone-salmeterol (Advair HFA) 230-21 MCG/ACT inhaler  •  levalbuterol (XOPENEX) 1 25 mg/3 mL nebulizer solution  •  lisinopril (ZESTRIL) 10 mg tablet  •  multivitamin (THERAGRAN) TABS  •  albuterol (PROVENTIL HFA,VENTOLIN HFA) 90 mcg/act inhaler  •  diphenhydrAMINE (BENADRYL) 50 mg capsule  •  omeprazole (PriLOSEC) 40 MG capsule  •  sildenafil (VIAGRA) 50 MG tablet    Allergies   Allergen Reactions   • Depo-Medrol [Methylprednisolone] Anaphylaxis     Severe Vagal Reaction   • Iv Contrast [Iodinated Diagnostic Agents] Angioedema     Pt states itchy/swollen eyes, trouble breathing (years ago)   • Rosuvastatin Headache and Confusion   • Sulfa Antibiotics Hives       Objective     BP (!) 178/81   Pulse (!) 52   Temp (!) 97 4 °F (36 3 °C) (Temporal)   Resp 16   Ht 5' 7" (1 702 m)   Wt 83 5 kg (184 lb)   SpO2 100%   BMI 28 82 kg/m²       PHYSICAL EXAM    Gen: NAD  Head: NCAT  CV: RRR  CHEST: Clear  ABD: soft, NT/ND  EXT: no edema      ASSESSMENT/PLAN:  This is a 81y o  year old male here for EGD, and he is stable and optimized for his procedure

## 2022-11-29 NOTE — ANESTHESIA POSTPROCEDURE EVALUATION
Post-Op Assessment Note    CV Status:  Stable  Pain Score: 0    Pain management: adequate     Mental Status:  Alert and awake   Hydration Status:  Euvolemic   PONV Controlled:  Controlled   Airway Patency:  Patent      Post Op Vitals Reviewed: Yes      Staff: Anesthesiologist, CRNA         No notable events documented      BP  152/72   Temp     Pulse 54   Resp   18   SpO2   100

## 2022-12-08 ENCOUNTER — OFFICE VISIT (OUTPATIENT)
Dept: GASTROENTEROLOGY | Facility: AMBULARY SURGERY CENTER | Age: 72
End: 2022-12-08

## 2022-12-08 VITALS
DIASTOLIC BLOOD PRESSURE: 76 MMHG | HEIGHT: 67 IN | OXYGEN SATURATION: 100 % | RESPIRATION RATE: 18 BRPM | BODY MASS INDEX: 28.88 KG/M2 | WEIGHT: 184 LBS | HEART RATE: 54 BPM | SYSTOLIC BLOOD PRESSURE: 132 MMHG

## 2022-12-08 DIAGNOSIS — R10.9 ABDOMINAL PAIN: ICD-10-CM

## 2022-12-08 DIAGNOSIS — K22.70 BARRETT'S ESOPHAGUS WITHOUT DYSPLASIA: Primary | ICD-10-CM

## 2022-12-08 NOTE — ASSESSMENT & PLAN NOTE
Patient just had his most recent surveillance EGD on 11/29/2022, showing M2 Genao's with 1: Tongue, biopsies are actually still pending at this time    No alarm symptoms currently, GERD appears symptomatically well managed with Prilosec 40 mg once daily    -Continue with Prilosec 40 mg once daily, renew patient's prescription    -Advised patient regarding dietary and lifestyle modification strategies for the mitigation of GERD    -We will follow-up on biopsy findings from his recent EGD; depending on presence or absence of dysplasia, will decide about timing of surveillance or if any other intervention needed

## 2023-03-06 DIAGNOSIS — J45.20 MILD INTERMITTENT ASTHMA WITHOUT COMPLICATION: ICD-10-CM

## 2023-03-06 DIAGNOSIS — J45.21 MILD INTERMITTENT ASTHMA WITH ACUTE EXACERBATION: ICD-10-CM

## 2023-03-06 RX ORDER — LEVALBUTEROL INHALATION SOLUTION 1.25 MG/3ML
1.25 SOLUTION RESPIRATORY (INHALATION) EVERY 8 HOURS PRN
Qty: 75 ML | Refills: 0 | Status: SHIPPED | OUTPATIENT
Start: 2023-03-06

## 2023-03-06 RX ORDER — ALBUTEROL SULFATE 90 UG/1
2 AEROSOL, METERED RESPIRATORY (INHALATION) EVERY 6 HOURS PRN
Qty: 54 G | Refills: 0 | Status: SHIPPED | OUTPATIENT
Start: 2023-03-06

## 2023-03-06 RX ORDER — FLUTICASONE PROPIONATE AND SALMETEROL XINAFOATE 230; 21 UG/1; UG/1
2 AEROSOL, METERED RESPIRATORY (INHALATION) 2 TIMES DAILY
Qty: 12 G | Refills: 0 | Status: SHIPPED | OUTPATIENT
Start: 2023-03-06

## 2023-03-20 ENCOUNTER — HOSPITAL ENCOUNTER (OUTPATIENT)
Dept: NON INVASIVE DIAGNOSTICS | Facility: CLINIC | Age: 73
Discharge: HOME | End: 2023-03-20

## 2023-03-20 DIAGNOSIS — I65.22 CAROTID STENOSIS, ASYMPTOMATIC, LEFT: ICD-10-CM

## 2023-03-22 ENCOUNTER — OFFICE VISIT (OUTPATIENT)
Dept: FAMILY MEDICINE CLINIC | Facility: CLINIC | Age: 73
End: 2023-03-22

## 2023-03-22 VITALS
OXYGEN SATURATION: 99 % | DIASTOLIC BLOOD PRESSURE: 70 MMHG | HEIGHT: 67 IN | SYSTOLIC BLOOD PRESSURE: 120 MMHG | HEART RATE: 62 BPM | TEMPERATURE: 97.7 F | BODY MASS INDEX: 29.98 KG/M2 | WEIGHT: 191 LBS

## 2023-03-22 DIAGNOSIS — M19.011 ARTHRITIS OF RIGHT ACROMIOCLAVICULAR JOINT: ICD-10-CM

## 2023-03-22 DIAGNOSIS — S46.219A BICEPS TENDON TEAR: ICD-10-CM

## 2023-03-22 DIAGNOSIS — S46.011D TRAUMATIC INCOMPLETE TEAR OF RIGHT ROTATOR CUFF, SUBSEQUENT ENCOUNTER: ICD-10-CM

## 2023-03-22 DIAGNOSIS — M75.41 IMPINGEMENT SYNDROME OF RIGHT SHOULDER: Primary | ICD-10-CM

## 2023-03-22 RX ORDER — KETOROLAC TROMETHAMINE 30 MG/ML
60 INJECTION, SOLUTION INTRAMUSCULAR; INTRAVENOUS ONCE
Status: COMPLETED | OUTPATIENT
Start: 2023-03-22 | End: 2023-03-22

## 2023-03-22 RX ADMIN — KETOROLAC TROMETHAMINE 60 MG: 30 INJECTION, SOLUTION INTRAMUSCULAR; INTRAVENOUS at 13:35

## 2023-03-22 NOTE — PROGRESS NOTES
Name: Jennifer Dorantes      : 1950      MRN: [id number]  Encounter Provider: Shamika Quinn MD  Encounter Date: 3/22/2023   Encounter department: [address] [place] [address]     1  Impingement syndrome of right shoulder  -     Large joint arthrocentesis: R subacromial bursa  -     ketorolac (TORADOL) 60 mg/2 mL IM injection 60 mg    2  Biceps tendon tear    3  Traumatic incomplete tear of right rotator cuff, subsequent encounter    4  Arthritis of right acromioclavicular joint    I discussed treatment options  Previous reaction to Depo-Medrol/Marcaine injection  Consider trial of PT/repeat MRI/Ortho evaluation    A significant but separately identifiable additional service rendered during the encounter R shoulder injection see procedure note  Subjective     Follow-up visit for recurrent right shoulder pain  Status post fall over a month ago with injury to right shoulder no swelling or bruising  Initially restricted range of motion which has gradually improved  He is using extra strength Tylenol for pain  Right-handed  Prior MRI right shoulder  Supraspinatus tendinosis with full-thickness anterior leading edge tear spanning 9 mm anteroposteriorly and the torn tendon edge proximally retracted by 7 mm  Moderate infraspinatus tendinosis without discrete tear  Moderate subscapularis tendinosis with partial-thickness midsubstance tear resulting in medial subluxation of long head of biceps which is perched over the greater tuberosity  Moderate long head of biceps tendinosis with low-grade interstitial tear  Moderate acromioclavicular degenerative arthropathy        Review of Systems   Constitutional: Negative for appetite change, chills, fever and unexpected weight change  Musculoskeletal: Positive for arthralgias  Negative for joint swelling and neck pain  Skin: Negative for rash  Neurological: Negative for weakness, numbness and headaches         Past Medical History: Diagnosis Date   • Allergic reaction     LAST ASSESSED: [id number]   • Asthma    • Bursitis of heel     LAST ASSESSED: [id number]   • Colon polyp    • Derangement of meniscus of left knee due to old injury     LAST ASSESSED: [id number]   • Diverticulitis of colon     LAST ASSESSED: [id number]   • Dizziness     LAST ASSESSED: [id number]   • GERD (gastroesophageal reflux disease)    • History of colon polyps    • Hypertension    • Kidney stone    • Malignant hypertension     LAST ASSESSED: [id number]   • Myocardial infarction [facility])    • Non[facility] ) 03/02/2020   • NSTEMI (non-ST elevated myocardial infarction) ([facility] )    • Plantar fasciitis    • Seasonal allergies    • Shoulder impingement     LAST ASSESSED: [id number]   • Stroke [facility])      Past Surgical History:   Procedure Laterality Date   • APPENDECTOMY     • CAROTID ENDARTARECTOMY     • COLECTOMY Left     PARTIAL - SIGMOID; HEMICOLECTOMY FOR DIVERTICULITIS; ONSET: 1987   • COLON SURGERY     • COLONOSCOPY N/A 12/9/2016    Procedure: COLONOSCOPY;  Surgeon: Juan Perez MD;  Location: [facility];   Service:    • HERNIA REPAIR      VENTRAL   • NISSEN FUNDOPLICATION      ESOPHAGOGASTRIC FUNDOPLASTY LAST ASSESSED: [id number]   • SINUS SURGERY     • THROMBOENDARTERECTOMY      CAROTID; ONSET; MAY 2012     Family History   Problem Relation Age of Onset   • Hypertension Mother    • Hyperlipidemia Mother    • Breast cancer Mother    • Hypertension Father    • Hyperlipidemia Father    • Colon cancer Paternal Grandfather    • Cancer Paternal Grandfather    • Throat cancer Maternal Aunt      Social History     Socioeconomic History   • Marital status: /Civil Union     Spouse name: None   • Number of children: None   • Years of education: None   • Highest education level: None   Occupational History   • None   Tobacco Use   • Smoking status: Former     Packs/day: 0 25     Years: 5 00     Pack years: 1 25     Types: Cigarettes     Quit date: 1960     Years since quittin 2   • Smokeless tobacco: Never   Vaping Use   • Vaping Use: Never used   Substance and Sexual Activity   • Alcohol use: No   • Drug use: No   • Sexual activity: None   Other Topics Concern   • None   Social History Narrative   • None     Social Determinants of Health     Financial Resource Strain: Not on file   Food Insecurity: Not on file   Transportation Needs: Not on file   Physical Activity: Not on file   Stress: Not on file   Social Connections: Not on file   Intimate Partner Violence: Not on file   Housing Stability: Not on file     Current Outpatient Medications on File Prior to Visit   Medication Sig   • acetaminophen (TYLENOL) 325 mg tablet Take 2 tablets (650 mg total) by mouth every 6 (six) hours as needed for mild pain, headaches or fever   • albuterol (PROVENTIL HFA,VENTOLIN HFA) 90 mcg/act inhaler Inhale 2 puffs every 6 (six) hours as needed for wheezing   • aspirin (ECOTRIN LOW STRENGTH) 81 mg EC tablet Take 81 mg by mouth every other day     • atorvastatin (LIPITOR) 10 mg tablet Take 1 tablet (10 mg total) by mouth daily   • diphenhydrAMINE (BENADRYL) 50 mg capsule Take 1 capsule (50 mg total) by mouth 1 (one) time for 1 dose Take 1 capsule (50 mg) 1 hour prior to CT scan     • fluticasone (FLONASE) 50 mcg/act nasal spray 1 spray into each nostril daily   • fluticasone-salmeterol (Advair HFA) 230-21 MCG/ACT inhaler Inhale 2 puffs 2 (two) times a day   • levalbuterol (XOPENEX) 1 25 mg/3 mL nebulizer solution Take 3 mL (1 25 mg total) by nebulization every 8 (eight) hours as needed for wheezing or shortness of breath   • lisinopril (ZESTRIL) 10 mg tablet Take 1 tablet (10 mg total) by mouth daily   • multivitamin (THERAGRAN) TABS Take 1 tablet by mouth daily   • omeprazole (PriLOSEC) 40 MG capsule Take 1 capsule (40 mg total) by mouth daily   • sildenafil (VIAGRA) 50 MG tablet Take 1 tablet (50 mg total) by mouth daily as needed for erectile dysfunction     Allergies Allergen Reactions   • Depo-Medrol [Methylprednisolone] Anaphylaxis     Severe Vagal Reaction   • Iv Contrast [Iodinated Contrast Media] Angioedema     Pt states itchy/swollen eyes, trouble breathing (years ago)   • Rosuvastatin Headache and Confusion   • Sulfa Antibiotics Hives     Immunization History   Administered Date(s) Administered   • COVID-19 MODERNA VACC 0 5 ML IM 01/22/2021, 02/19/2021, 11/09/2021   • INFLUENZA 12/19/2016, 09/25/2017, 09/18/2018, 09/24/2021   • Influenza Quadrivalent Preservative Free 3 years and older IM 09/25/2017   • Influenza Quadrivalent, 6-35 Months IM 12/19/2016   • Influenza, high dose seasonal 0 7 mL 01/09/2020, 09/04/2020   • Influenza, seasonal, injectable 11/14/2012, 11/12/2013   • Pneumococcal Conjugate 13-Valent 08/30/2019   • Pneumococcal Polysaccharide PPV23 12/17/2007, 01/26/2018   • TD (adult) Preservative Free 08/30/2019   • Td (adult), adsorbed 08/30/2019       Objective     /70 (BP Location: Left arm, Patient Position: Sitting, Cuff Size: Large)   Pulse 62   Temp 97 7 °F (36 5 °C)   Ht 5' 7" (1 702 m)   Wt 86 6 kg (191 lb)   SpO2 99%   BMI 29 91 kg/m²     Physical Exam  Constitutional:       General: He is not in acute distress  Musculoskeletal:      Comments: Inspection R shoulder prominent R Ac joint  No effusion  No warmth or redness  + R AC joint tenderness   + R subacromial tenderness  Restricted  ROM with abduction and external rotation  + cross arm test  Negative Speed test  Negative Yergason sign  + Mj sign  + impingement sign  Negative empty can sign  Negative sulcus sign  Negative Conger   Skin:     Findings: No rash  Neurological:      Mental Status: He is alert  Motor: No weakness  Deep Tendon Reflexes: Reflexes normal        Large joint arthrocentesis: R subacromial bursa  Universal Protocol:  Consent: Verbal consent obtained    Risks and benefits: risks, benefits and alternatives were discussed  Consent given by: patient  Site marked: the operative site was marked  Supporting Documentation  Indications: pain (Impingement syndrome R shoulder )   Procedure Details  Location: shoulder - R subacromial bursa  Preparation: Betadine   Needle size: 22 G  Ultrasound guidance: no  Approach: posterior    Patient tolerance: patient tolerated the procedure well with no immediate complications  Dressing:  Sterile dressing applied    Toradol 60 mg/2 Alejandra Dias MD

## 2023-05-07 DIAGNOSIS — E78.2 MIXED HYPERLIPIDEMIA: ICD-10-CM

## 2023-05-08 RX ORDER — ATORVASTATIN CALCIUM 10 MG/1
10 TABLET, FILM COATED ORAL DAILY
Qty: 90 TABLET | Refills: 0 | Status: SHIPPED | OUTPATIENT
Start: 2023-05-08

## 2023-06-20 DIAGNOSIS — J45.21 MILD INTERMITTENT ASTHMA WITH ACUTE EXACERBATION: ICD-10-CM

## 2023-06-20 RX ORDER — FLUTICASONE PROPIONATE AND SALMETEROL XINAFOATE 230; 21 UG/1; UG/1
2 AEROSOL, METERED RESPIRATORY (INHALATION) 2 TIMES DAILY
Qty: 12 G | Refills: 0 | Status: SHIPPED | OUTPATIENT
Start: 2023-06-20

## 2023-07-12 DIAGNOSIS — J45.21 MILD INTERMITTENT ASTHMA WITH ACUTE EXACERBATION: ICD-10-CM

## 2023-07-12 RX ORDER — FLUTICASONE PROPIONATE AND SALMETEROL XINAFOATE 230; 21 UG/1; UG/1
AEROSOL, METERED RESPIRATORY (INHALATION)
OUTPATIENT
Start: 2023-07-12

## 2023-07-30 DIAGNOSIS — E78.2 MIXED HYPERLIPIDEMIA: ICD-10-CM

## 2023-07-31 RX ORDER — ATORVASTATIN CALCIUM 10 MG/1
10 TABLET, FILM COATED ORAL DAILY
Qty: 90 TABLET | Refills: 0 | OUTPATIENT
Start: 2023-07-31

## 2023-08-03 DIAGNOSIS — I10 BENIGN ESSENTIAL HYPERTENSION: ICD-10-CM

## 2023-08-03 RX ORDER — LISINOPRIL 10 MG/1
10 TABLET ORAL DAILY
Qty: 90 TABLET | Refills: 0 | Status: SHIPPED | OUTPATIENT
Start: 2023-08-03

## 2023-08-14 NOTE — PROGRESS NOTES
Cardiology  Acute  Office Visit Note  Sylvain Baptiste   72 y.o.   male   MRN: [id number]  [place]  [address] [facility] [address] [facility]  [address]  [id number] [phone]    PCP: Ira Aleman MD  Cardiologist:    Cristhian Benedict            Summary of recommendations  Fasting lipid profile, vitamin D level  Increase coenzyme every 10 to 100 mg every 12  Await the vitamin D level before restarting any statin. If he has vitamin D deficiency we need to treat this before restarting a statin. Follow up will be scheduled with Dr Clemens Boeck 10/27        Assessment/plan  Hyperlipidemia  · Myalgias on rosuvastatin 20 mg daily  · Intolerant to atorvastatin 10 mg 3 x a week-myalgias, confusion   Latest Reference Range & Units 10/26/19 09:03 02/24/20 07:54 04/13/21 07:29 10/13/21 07:25 05/12/22 07:05   Cholesterol See Comment mg/dL 152 138 202 (H) 207 (H) 152   Triglycerides See Comment mg/dL 79 81 66 66 58   HDL >=40 mg/dL 47 53 53 49 50   Non-HDL Cholesterol mg/dl  85 149 158 102   LDL Calculated 0 - 100 mg/dL 89 69 136 (H) 145 (H) 90   CAD; S/P Non-STEMI, type 1 ( 8/19/19). · S/P RENE-distal RCA x 2; residual nonobstructive disease of the proximal RCA  (50%) and mid circumflex ( 65%) , medically managed  · on aspirin 81, beta-blocker  Hypertension. BP  146/80 on lisinopril 10 mg/d. Carson Stephanie Carotid stenosis. [facility] rec: Retest September 2023  · CUS 3/2023:   · RIGHT:Patent internal carotid artery and endarterectomy site with some evidence ofminimal homogenous plaque or intimal hyperplasia . The vertebral artery was not identified suggesting a hypoplastic versusdiseased vessel. There is no significant subclavian artery disease. · LEFT:There is < 50% stenosis noted in the internal carotid artery. Plaque is heterogenous and irregular. Hx reflux esophagitis, Barretts esophagus . S/P Nissen fundoplication [id number]   Cardiac testing  · cardiac catheterization   Mid circumflex:  There was a diffuse 65 % stenosis. Proximal RCA: There was a 50 % stenosis. Distal RCA: There was a 95 % stenosis at the origin of RPL1. Intervention: 95 % lesion in the distal RCA s/p RENE x 2.   · TTE 8/19. EF 60 %. hypokinesis of the basal-mid inferior wall(s). RV fxn normal. Grade 1 diastolic dysfunction  · 86-WUJG Holter 9/19:Normal Holter monitor of 24 hrs duration. Normal burden of ventricular and supraventricular ectopic beats. Bradycardia was sinus in mechanism and asymptomatic.                  ERIN Mccrary is a 80 yo gentleman with  history of hypertension and hyperlipidemia, no previous known history of coronary artery disease. He has had Nissen fundoplication in the past for GERD. He also had a recent outpatient EGD which showed Genao's esophagus and esophageal non bleeding stomach ulcers. Recently he presented to [facility] with recurrent chest pain. He notes that was initially exertional and then it started to occur at rest prompting him to come to the hospital.  He had a rising troponin. He was diagnosed with an acute coronary syndrome and followed by Cardiology. Given an IV contrast allergy he was prepared with histamine blockers and steroids. He underwent cardiac catheterization which showed a 95% lesion in the distal RCA for which he underwent deployment of a drug-eluting stent x2. There is also a diffuse 65% stenosis in the mid circumflex and 50% stenosis of the proximal RCA, medically managed. He was placed on dual antiplatelet therapy with aspirin Brilinta. He is also placed on high-intensity statin beta-blocker and ACE-inhibitor. An echocardiogram showed preserved LV function, hypokinesis of the basal inferior wall and no significant valve disease. Interval history  Presented to the emergency room 8/27 with abdominal pain, and vomiting. /86. Pt reports epigastric/left upper abdominal radiating into back since this morning which has become worse with each meal, describes this pain as aching. Pt reports pain intensifies <15 minutes after meals. Pt reports associated sxs of nausea, one episode of vomiting  Worked up with CBC, CMP, Lipase, Coags, Lactate, Troponin, EKG, CXR, CT abdomen   Troponin was less than 0.02. EKG:  Normal sinus rhythm  CTs abdomen:  No acute findings  Chest x-ray: NAD  hgb 15  He was given Maalox for symptoms       8/28/19 he presents for cardiology follow-up. He denies chest pain or shortness of breath. He has had some epigastric/abdominal discomfort with some nausea. He has been checking his vital signs at home and they brought me a list.  His systolic blood pressures been running low, even before he has been taking the Coreg. The lowest on Monday August 26 was 74/42. I am going to stop the beta-blocker. Will check a Holter monitor next week which he is agreeable to. He has follow-up with his GI doctor tomorrow. I discussed the importance of adhering to his dual antiplatelet therapy, uninterrupted    Interval history  3/; 10/9/20; 1/2022 OV Dr Matheus Pichardo    10/20/22 OV Dr. Matheus Hayes. Continues to walk 5 to 10 miles on a regular basis without chest pain or shortness of breath. Blood pressure was 174/80 in the office. At home consistently 120s over 70s. LDL 90  F/U 1 year    8/15/23  PMH: CAD, distal RCA stenting. HTN. HL. Acute OV-myalgias and confusion with atorvastatin 10 mg 3 times weekly. He is very active, does word work etc. with this no exertional chest pain or shortness of breath. He stopped atorvastatin on his own several weeks ago. All of his symptoms resolved. He feels great  Intermittently he was taking vitamin D on his own none recently  He has been on coenzyme every 10, 100 mg daily  Thus far he has been intolerant to rosuvastatin, now atorvastatin  We will check a vitamin D level. If low, we need to optimize this before restarting any statin. I would also like him to start coenzyme every 10 100 mg every 12.   I did give him a prescription for pravastatin 20 mg daily. I would like to hold off on starting it though until his labs are back and to see if we need to treat anything first.  It is known if someone has vitamin D deficiency they will be less likely to tolerate statin therapy.        I have spent 25 minutes with Patient and family today in which greater than 50% of this time was spent in counseling/coordination of care regarding Instructions for management, Patient and family education, Importance of tx compliance, Documenting in the medical record, Reviewing / ordering tests, medicine, procedures   and Obtaining or reviewing history  . Assessment  Diagnoses and all orders for this visit:    Coronary artery disease involving native coronary artery of native heart without angina pectoris    Benign essential hypertension    Mixed hyperlipidemia    Nonalcoholic steatohepatitis    Carotid stenosis, asymptomatic, left          Past Medical History:   Diagnosis Date   • Allergic reaction     LAST ASSESSED: [id number]   • Asthma    • Bursitis of heel     LAST ASSESSED: [id number]   • Colon polyp    • Derangement of meniscus of left knee due to old injury     LAST ASSESSED: [id number]   • Diverticulitis of colon     LAST ASSESSED: [id number]   • Dizziness     LAST ASSESSED: [id number]   • GERD (gastroesophageal reflux disease)    • History of colon polyps    • Hypertension    • Kidney stone    • Malignant hypertension     LAST ASSESSED: [id number]   • Myocardial infarction [facility])    • Non-ST elevated myocardial infarction ([address]) 03/02/2020   • NSTEMI (non-ST elevated myocardial infarction) ([address])    • Plantar fasciitis    • Seasonal allergies    • Shoulder impingement     LAST ASSESSED: [id number]   • Stroke [facility])        Review of Systems   Constitutional: Negative for chills.    Cardiovascular: Negative for chest pain, claudication, cyanosis, dyspnea on exertion, irregular heartbeat, leg swelling, near-syncope, orthopnea, palpitations, paroxysmal nocturnal dyspnea and syncope. Respiratory: Negative for cough and shortness of breath. Gastrointestinal: Negative for heartburn and nausea. See HPI. Some epigastric discomfort and nausea   Neurological: Negative for dizziness, focal weakness, headaches, light-headedness and weakness. All other systems reviewed and are negative. Allergies   Allergen Reactions   • Depo-Medrol [Methylprednisolone] Anaphylaxis     Severe Vagal Reaction   • Iv Contrast [Iodinated Contrast Media] Angioedema     Pt states itchy/swollen eyes, trouble breathing (years ago)   • Rosuvastatin Headache and Confusion   • Sulfa Antibiotics Hives     .     Current Outpatient Medications:   •  acetaminophen (TYLENOL) 325 mg tablet, Take 2 tablets (650 mg total) by mouth every 6 (six) hours as needed for mild pain, headaches or fever, Disp: 30 tablet, Rfl: 0  •  albuterol (PROVENTIL HFA,VENTOLIN HFA) 90 mcg/act inhaler, Inhale 2 puffs every 6 (six) hours as needed for wheezing, Disp: 54 g, Rfl: 0  •  aspirin (ECOTRIN LOW STRENGTH) 81 mg EC tablet, Take 81 mg by mouth every other day  , Disp: , Rfl:   •  atorvastatin (LIPITOR) 10 mg tablet, Take 1 tablet (10 mg total) by mouth daily, Disp: 90 tablet, Rfl: 0  •  diphenhydrAMINE (BENADRYL) 50 mg capsule, Take 1 capsule (50 mg total) by mouth 1 (one) time for 1 dose Take 1 capsule (50 mg) 1 hour prior to CT scan., Disp: 30 capsule, Rfl: 0  •  fluticasone (FLONASE) 50 mcg/act nasal spray, 1 spray into each nostril daily, Disp: , Rfl:   •  fluticasone-salmeterol (Advair HFA) 230-21 MCG/ACT inhaler, Inhale 2 puffs 2 (two) times a day, Disp: 12 g, Rfl: 0  •  levalbuterol (XOPENEX) 1.25 mg/3 mL nebulizer solution, Take 3 mL (1.25 mg total) by nebulization every 8 (eight) hours as needed for wheezing or shortness of breath, Disp: 75 mL, Rfl: 0  •  lisinopril (ZESTRIL) 10 mg tablet, Take 1 tablet (10 mg total) by mouth daily, Disp: 90 tablet, Rfl: 0  •  multivitamin (THERAGRAN) TABS, Take 1 tablet by mouth daily, Disp: , Rfl:   •  omeprazole (PriLOSEC) 40 MG capsule, Take 1 capsule (40 mg total) by mouth daily, Disp: 90 capsule, Rfl: 2  •  sildenafil (VIAGRA) 50 MG tablet, Take 1 tablet (50 mg total) by mouth daily as needed for erectile dysfunction, Disp: 10 tablet, Rfl: 5        Social History     Socioeconomic History   • Marital status: /Civil Union     Spouse name: Not on file   • Number of children: Not on file   • Years of education: Not on file   • Highest education level: Not on file   Occupational History   • Not on file   Tobacco Use   • Smoking status: Former     Packs/day: 0.25     Years: 5.00     Total pack years: 1.25     Types: Cigarettes     Quit date: 56     Years since quittin.6   • Smokeless tobacco: Never   Vaping Use   • Vaping Use: Never used   Substance and Sexual Activity   • Alcohol use: No   • Drug use: No   • Sexual activity: Not on file   Other Topics Concern   • Not on file   Social History Narrative   • Not on file     Social Determinants of Health     Financial Resource Strain: Not on file   Food Insecurity: Not on file   Transportation Needs: Not on file   Physical Activity: Not on file   Stress: Not on file   Social Connections: Not on file   Intimate Partner Violence: Not on file   Housing Stability: Not on file       Family History   Problem Relation Age of Onset   • Hypertension Mother    • Hyperlipidemia Mother    • Breast cancer Mother    • Hypertension Father    • Hyperlipidemia Father    • Colon cancer Paternal Grandfather    • Cancer Paternal Grandfather    • Throat cancer Maternal Aunt        Physical Exam  Vitals and nursing note reviewed. Constitutional:       General: He is not in acute distress. HENT:      Head: Normocephalic and atraumatic. Eyes:      Conjunctiva/sclera: Conjunctivae normal.   Cardiovascular:      Rate and Rhythm: Normal rate and regular rhythm. Pulses: Intact distal pulses.       Heart sounds: Normal heart sounds. Pulmonary:      Effort: Pulmonary effort is normal.      Breath sounds: Normal breath sounds. Abdominal:      General: Bowel sounds are normal.      Palpations: Abdomen is soft. Musculoskeletal:         General: Normal range of motion. Cervical back: Normal range of motion and neck supple. Skin:     General: Skin is warm and dry. Neurological:      Mental Status: He is alert and oriented to person, place, and time. Vitals: There were no vitals taken for this visit. Wt Readings from Last 3 Encounters:   03/22/23 86.6 kg (191 lb)   12/08/22 83.5 kg (184 lb)   11/29/22 83.5 kg (184 lb)         Labs & Results:  Lab Results   Component Value Date    WBC 11.43 (H) 09/02/2022    HGB 13.5 09/02/2022    HCT 40.1 09/02/2022    MCV 94 09/02/2022     09/02/2022     No results found for: "BNP"  No components found for: "CHEM"  Troponin I   Date Value Ref Range Status   08/27/2019 0.02 <=0.04 ng/mL Final     Comment:       [employer] Chemistry analyzer 99% cutoff is > 0.04 ng/mL in network labs     o cTnI 99% cutoff is useful only when applied to patients in the clinical setting of myocardial ischemia   o cTnI 99% cutoff should be interpreted in the context of clinical history, ECG findings and possibly cardiac imaging to establish correct diagnosis. o cTnI 99% cutoff may be suggestive but clearly not indicative of a coronary event without the clinical setting of myocardial ischemia.     08/18/2019 3.82 (H) <=0.04 ng/mL Final     Comment:       Siemens Chemistry analyzer 99% cutoff is > 0.04 ng/mL in network labs     o cTnI 99% cutoff is useful only when applied to patients in the clinical setting of myocardial ischemia   o cTnI 99% cutoff should be interpreted in the context of clinical history, ECG findings and possibly cardiac imaging to establish correct diagnosis.    o cTnI 99% cutoff may be suggestive but clearly not indicative of a coronary event without the clinical setting of myocardial ischemia.     2019 4.61 (H) <=0.04 ng/mL Final     Comment:       Siemens Chemistry analyzer 99% cutoff is > 0.04 ng/mL in network labs     o cTnI 99% cutoff is useful only when applied to patients in the clinical setting of myocardial ischemia   o cTnI 99% cutoff should be interpreted in the context of clinical history, ECG findings and possibly cardiac imaging to establish correct diagnosis. o cTnI 99% cutoff may be suggestive but clearly not indicative of a coronary event without the clinical setting of myocardial ischemia. Results for orders placed during the hospital encounter of 19   Echo complete with contrast if indicated    Narrative [address] [place] [address], [address] [place] [address]  [phone]    Transthoracic Echocardiogram  2D, M-mode, Doppler, and Color Doppler    Study date:  18-Aug-2019    Patient: Neptali Coates  MR number: [id number]  Account number: [de-identified]  : 1950  Age: 81 years  Gender: Male  Status: Outpatient  Location: Bedside  Height: 67 in  Weight: 210.5 lb  BP: 153/ 74 mmHg    Indications: Assess left ventricular function. Diagnoses: I21.3 - ST elevation (STEMI) myocardial infarction of unspecified site    Sonographer:  Abrahan Ponce RDCS  Primary Physician:  Ariel Rosenbaum MD  Referring Physician:  REGLA Nair  Group:  [facility]  Interpreting Physician:  Stephany Belle DO    SUMMARY    LEFT VENTRICLE:  Systolic function was normal. Ejection fraction was estimated to be 60 %. There was hypokinesis of the basal-mid inferior wall(s). Wall thickness was mildly increased. The changes were consistent with concentric remodeling (increased wall thickness with normal wall mass). Doppler parameters were consistent with abnormal left ventricular relaxation (grade 1 diastolic dysfunction).     RIGHT VENTRICLE:  The size was normal.  Systolic function was normal.    LEFT ATRIUM:  The atrium was mildly dilated. MITRAL VALVE:  There was mild regurgitation. HISTORY: PRIOR HISTORY: hypertension, CP    PROCEDURE: The procedure was performed at the bedside. This was a routine study. The transthoracic approach was used. The study included complete 2D imaging, M-mode, complete spectral Doppler, and color Doppler. The heart rate was 54 bpm,  at the start of the study. Images were obtained from the parasternal, apical, subcostal, and suprasternal notch acoustic windows. Image quality was adequate. LEFT VENTRICLE: Size was normal. Systolic function was normal. Ejection fraction was estimated to be 60 %. There was hypokinesis of the basal-mid inferior wall(s). Wall thickness was mildly increased. The changes were consistent with  concentric remodeling (increased wall thickness with normal wall mass). DOPPLER: Doppler parameters were consistent with abnormal left ventricular relaxation (grade 1 diastolic dysfunction). There was no evidence of elevated ventricular  filling pressure by Doppler parameters. RIGHT VENTRICLE: The size was normal. Systolic function was normal. Wall thickness was normal.    LEFT ATRIUM: The atrium was mildly dilated. RIGHT ATRIUM: Size was normal.    MITRAL VALVE: Valve structure was normal. There was normal leaflet separation. DOPPLER: The transmitral velocity was within the normal range. There was no evidence for stenosis. There was mild regurgitation. AORTIC VALVE: The valve was trileaflet. Leaflets exhibited normal thickness and normal cuspal separation. DOPPLER: Transaortic velocity was within the normal range. There was no evidence for stenosis. There was no regurgitation. TRICUSPID VALVE: The valve structure was normal. There was normal leaflet separation. DOPPLER: The transtricuspid velocity was within the normal range. There was no evidence for stenosis. There was trace regurgitation.  The tricuspid jet  envelope definition was inadequate for estimation of RV systolic pressure. There are no indirect findings (abnormal RV volume or geometry, altered pulmonary flow velocity profile, or leftward septal displacement) which would suggest  moderate or severe pulmonary hypertension. PULMONIC VALVE: Leaflets exhibited normal thickness, no calcification, and normal cuspal separation. DOPPLER: The transpulmonic velocity was within the normal range. There was trace regurgitation. PERICARDIUM: There was no pericardial effusion. The pericardium was normal in appearance. AORTA: The root exhibited normal size. SYSTEMIC VEINS: IVC: The inferior vena cava was not well visualized. SYSTEM MEASUREMENT TABLES    2D  %FS: 27.42 %  Ao Diam: 3.5 cm  EDV(Teich): 106.68 ml  EF(Teich): 53.22 %  ESV(Teich): 49.91 ml  IVSd: 1.23 cm  LA Area: 19.6 cm2  LA Diam: 4.5 cm  LVEDV MOD A4C: 180.5 ml  LVEF MOD A4C: 65.88 %  LVESV MOD A4C: 61.59 ml  LVIDd: 4.78 cm  LVIDs: 3.47 cm  LVLd A4C: 9.17 cm  LVLs A4C: 7.45 cm  LVPWd: 1.21 cm  RA Area: 14.45 cm2  RVIDd: 3.32 cm  SV MOD A4C: 118.9 ml  SV(Teich): 56.77 ml    MM  TAPSE: 3.01 cm    PW  E': 0.06 m/s  E/E': 9.06  MV A Martín: 0.78 m/s  MV Dec Smith: 1.87 m/s2  MV DecT: 295.74 ms  MV E Martín: 0.55 m/s  MV E/A Ratio: 0.71  MV PHT: 85.76 ms  MVA By PHT: 2.57 cm2    [employer] Commission Accredited Echocardiography Laboratory    Prepared and electronically signed by    Quincy Steel DO  Signed 18-Aug-2019 11:49:47       No results found for this or any previous visit. This note was completed in part utilizing [employer] direct voice recognition software. Grammatical errors, random word insertion, spelling mistakes, and incomplete sentences may be an occasional consequence of the system secondary to software limitations, ambient noise and hardware issues. At the time of dictation, efforts were made to edit, clarify and /or correct errors. Please read the chart carefully and recognize, using context, where substitutions have occurred.   If you have any questions or concerns about the context, text or information contained within the body of this dictation, please contact myself, the provider, for further clarification

## 2023-08-15 ENCOUNTER — OFFICE VISIT (OUTPATIENT)
Dept: CARDIOLOGY CLINIC | Facility: CLINIC | Age: 73
End: 2023-08-15
Payer: COMMERCIAL

## 2023-08-15 VITALS
OXYGEN SATURATION: 99 % | BODY MASS INDEX: 30.81 KG/M2 | HEART RATE: 55 BPM | WEIGHT: 196.3 LBS | DIASTOLIC BLOOD PRESSURE: 80 MMHG | HEIGHT: 67 IN | SYSTOLIC BLOOD PRESSURE: 146 MMHG

## 2023-08-15 DIAGNOSIS — M79.10 MYALGIA: ICD-10-CM

## 2023-08-15 DIAGNOSIS — I10 BENIGN ESSENTIAL HYPERTENSION: ICD-10-CM

## 2023-08-15 DIAGNOSIS — K75.81 NONALCOHOLIC STEATOHEPATITIS: ICD-10-CM

## 2023-08-15 DIAGNOSIS — I65.22 CAROTID STENOSIS, ASYMPTOMATIC, LEFT: ICD-10-CM

## 2023-08-15 DIAGNOSIS — E78.2 MIXED HYPERLIPIDEMIA: ICD-10-CM

## 2023-08-15 DIAGNOSIS — I25.10 CORONARY ARTERY DISEASE INVOLVING NATIVE CORONARY ARTERY OF NATIVE HEART WITHOUT ANGINA PECTORIS: Primary | ICD-10-CM

## 2023-08-15 PROCEDURE — 93000 ELECTROCARDIOGRAM COMPLETE: CPT | Performed by: NURSE PRACTITIONER

## 2023-08-15 PROCEDURE — 99214 OFFICE O/P EST MOD 30 MIN: CPT | Performed by: NURSE PRACTITIONER

## 2023-08-15 RX ORDER — PRAVASTATIN SODIUM 20 MG
20 TABLET ORAL DAILY
Qty: 30 TABLET | Refills: 3 | Status: SHIPPED | OUTPATIENT
Start: 2023-08-15

## 2023-08-15 RX ORDER — TAURINE 500 MG
CAPSULE ORAL
COMMUNITY

## 2023-08-15 RX ORDER — MELATONIN
1000 DAILY
COMMUNITY

## 2023-08-21 ENCOUNTER — LAB (OUTPATIENT)
Dept: LAB | Facility: CLINIC | Age: 73
End: 2023-08-21
Payer: COMMERCIAL

## 2023-08-21 DIAGNOSIS — E78.2 MIXED HYPERLIPIDEMIA: ICD-10-CM

## 2023-08-21 LAB
25(OH)D3 SERPL-MCNC: 38.3 NG/ML (ref 30–100)
CHOLEST SERPL-MCNC: 208 MG/DL
HDLC SERPL-MCNC: 45 MG/DL
LDLC SERPL CALC-MCNC: 146 MG/DL (ref 0–100)
NONHDLC SERPL-MCNC: 163 MG/DL
TRIGL SERPL-MCNC: 85 MG/DL

## 2023-08-21 PROCEDURE — 80061 LIPID PANEL: CPT

## 2023-08-21 PROCEDURE — 36415 COLL VENOUS BLD VENIPUNCTURE: CPT | Performed by: NURSE PRACTITIONER

## 2023-08-21 PROCEDURE — 82306 VITAMIN D 25 HYDROXY: CPT | Performed by: NURSE PRACTITIONER

## 2023-09-05 DIAGNOSIS — K21.9 GASTROESOPHAGEAL REFLUX DISEASE, UNSPECIFIED WHETHER ESOPHAGITIS PRESENT: ICD-10-CM

## 2023-09-05 RX ORDER — OMEPRAZOLE 40 MG/1
40 CAPSULE, DELAYED RELEASE ORAL DAILY
Qty: 90 CAPSULE | Refills: 0 | Status: SHIPPED | OUTPATIENT
Start: 2023-09-05

## 2023-09-07 DIAGNOSIS — E78.2 MIXED HYPERLIPIDEMIA: ICD-10-CM

## 2023-09-07 RX ORDER — PRAVASTATIN SODIUM 20 MG
20 TABLET ORAL DAILY
Qty: 90 TABLET | Refills: 2 | Status: SHIPPED | OUTPATIENT
Start: 2023-09-07

## 2023-09-11 ENCOUNTER — TELEPHONE (OUTPATIENT)
Dept: FAMILY MEDICINE CLINIC | Facility: CLINIC | Age: 73
End: 2023-09-11

## 2023-09-11 NOTE — TELEPHONE ENCOUNTER
Patient is asking for the roaster shots on the knees    Its been years and not sure how long you need or if it needs to be authorized

## 2023-09-13 ENCOUNTER — RA CDI HCC (OUTPATIENT)
Dept: OTHER | Facility: HOSPITAL | Age: 73
End: 2023-09-13

## 2023-09-13 NOTE — PROGRESS NOTES
[address] [facility] coding opportunities       Chart reviewed, no opportunity found: [address]        Patients Insurance     Medicare Insurance: [employer] Advantage

## 2023-09-18 ENCOUNTER — OFFICE VISIT (OUTPATIENT)
Dept: FAMILY MEDICINE CLINIC | Facility: CLINIC | Age: 73
End: 2023-09-18
Payer: COMMERCIAL

## 2023-09-18 VITALS
HEIGHT: 67 IN | OXYGEN SATURATION: 98 % | DIASTOLIC BLOOD PRESSURE: 72 MMHG | WEIGHT: 193.5 LBS | TEMPERATURE: 97.2 F | HEART RATE: 60 BPM | BODY MASS INDEX: 30.37 KG/M2 | SYSTOLIC BLOOD PRESSURE: 128 MMHG

## 2023-09-18 DIAGNOSIS — M75.42 IMPINGEMENT SYNDROME OF LEFT SHOULDER: Primary | ICD-10-CM

## 2023-09-18 DIAGNOSIS — M75.41 IMPINGEMENT SYNDROME OF RIGHT SHOULDER: ICD-10-CM

## 2023-09-18 PROCEDURE — 20610 DRAIN/INJ JOINT/BURSA W/O US: CPT | Performed by: FAMILY MEDICINE

## 2023-09-18 RX ORDER — KETOROLAC TROMETHAMINE 30 MG/ML
60 INJECTION, SOLUTION INTRAMUSCULAR; INTRAVENOUS ONCE
Status: COMPLETED | OUTPATIENT
Start: 2023-09-18 | End: 2023-09-19

## 2023-09-18 NOTE — PROGRESS NOTES
Assessment and Plan:     Problem List Items Addressed This Visit    None       Preventive health issues were discussed with patient, and age appropriate screening tests were ordered as noted in patient's After Visit Summary. Personalized health advice and appropriate referrals for health education or preventive services given if needed, as noted in patient's After Visit Summary.      History of Present Illness:     Patient presents for a Medicare Wellness Visit    HPI   Patient Care Team:  Km Christensen MD as PCP - Indy Gupta MD as Endoscopist  Gallito Marinelli DO (Cardiology)     Review of Systems:     Review of Systems     Problem List:     Patient Active Problem List   Diagnosis   • Asthma   • Genao's esophagus without dysplasia   • Benign essential hypertension   • Diverticulosis   • Fatty infiltration of liver   • Hyperuricemia   • Impaired fasting glucose   • Mixed hyperlipidemia   • Drug-induced erectile dysfunction   • Non-allergic rhinitis   • Osteoarthritis of knee   • Vitamin B12 deficiency   • Irritable bowel syndrome with diarrhea   • History of colonic diverticulitis   • History of right-sided carotid endarterectomy   • Coronary artery disease involving native coronary artery of native heart without angina pectoris   • Incisional hernia   • Arthritis of right acromioclavicular joint   • Carotid stenosis, asymptomatic, left   • Osteoarthritis of right glenohumeral joint   • Nonalcoholic steatohepatitis   • Rash and other nonspecific skin eruption   • Stroke [facility])      Past Medical and Surgical History:     Past Medical History:   Diagnosis Date   • Allergic reaction     LAST ASSESSED: [id number]   • Asthma    • Bursitis of heel     LAST ASSESSED: [id number]   • Colon polyp    • Derangement of meniscus of left knee due to old injury     LAST ASSESSED: [id number]   • Diverticulitis of colon     LAST ASSESSED: [id number]   • Dizziness     LAST ASSESSED: [id number]   • GERD (gastroesophageal reflux disease)    • History of colon polyps    • Hypertension    • Kidney stone    • Malignant hypertension     LAST ASSESSED:    • Myocardial infarction [facility])    • Non-ST elevated myocardial infarction ([address]) 2020   • NSTEMI (non-ST elevated myocardial infarction) [facility])    • Plantar fasciitis    • Seasonal allergies    • Shoulder impingement     LAST ASSESSED: [id number]   • Stroke [facility])      Past Surgical History:   Procedure Laterality Date   • APPENDECTOMY     • CAROTID ENDARTARECTOMY     • COLECTOMY Left     PARTIAL - SIGMOID; HEMICOLECTOMY FOR DIVERTICULITIS; ONSET:    • COLON SURGERY     • COLONOSCOPY N/A 2016    Procedure: COLONOSCOPY;  Surgeon: Tg Dsouza MD;  Location: [facility];   Service:    • HERNIA REPAIR      VENTRAL   • NISSEN FUNDOPLICATION      ESOPHAGOGASTRIC FUNDOPLASTY LAST ASSESSED: [id number]   • SINUS SURGERY     • THROMBOENDARTERECTOMY      CAROTID; ONSET; MAY 2012      Family History:     Family History   Problem Relation Age of Onset   • Hypertension Mother    • Hyperlipidemia Mother    • Breast cancer Mother    • Hypertension Father    • Hyperlipidemia Father    • Colon cancer Paternal Grandfather    • Cancer Paternal Grandfather    • Throat cancer Maternal Aunt       Social History:     Social History     Socioeconomic History   • Marital status: /Civil Union     Spouse name: Not on file   • Number of children: Not on file   • Years of education: Not on file   • Highest education level: Not on file   Occupational History   • Not on file   Tobacco Use   • Smoking status: Former     Packs/day: 0.25     Years: 5.00     Total pack years: 1.25     Types: Cigarettes     Quit date: 1960     Years since quittin.7   • Smokeless tobacco: Never   Vaping Use   • Vaping Use: Never used   Substance and Sexual Activity   • Alcohol use: No   • Drug use: No   • Sexual activity: Not on file   Other Topics Concern   • Not on file   Social History Narrative   • Not on file     Social Determinants of Health     Financial Resource Strain: Not on file   Food Insecurity: Not on file   Transportation Needs: Not on file   Physical Activity: Not on file   Stress: Not on file   Social Connections: Not on file   Intimate Partner Violence: Not on file   Housing Stability: Not on file      Medications and Allergies:     Current Outpatient Medications   Medication Sig Dispense Refill   • omeprazole (PriLOSEC) 40 MG capsule Take 1 capsule (40 mg total) by mouth daily 90 capsule 0   • acetaminophen (TYLENOL) 325 mg tablet Take 2 tablets (650 mg total) by mouth every 6 (six) hours as needed for mild pain, headaches or fever 30 tablet 0   • albuterol (PROVENTIL HFA,VENTOLIN HFA) 90 mcg/act inhaler Inhale 2 puffs every 6 (six) hours as needed for wheezing 54 g 0   • aspirin (ECOTRIN LOW STRENGTH) 81 mg EC tablet Take 81 mg by mouth every other day       • cholecalciferol (VITAMIN D3) 1,000 units tablet Take 1,000 Units by mouth daily     • Coenzyme Q10 10 MG capsule Take 1 capsule (10 mg total) by mouth daily     • diphenhydrAMINE (BENADRYL) 50 mg capsule Take 1 capsule (50 mg total) by mouth 1 (one) time for 1 dose Take 1 capsule (50 mg) 1 hour prior to CT scan.  30 capsule 0   • fluticasone (FLONASE) 50 mcg/act nasal spray 1 spray into each nostril daily     • fluticasone-salmeterol (Advair HFA) 230-21 MCG/ACT inhaler Inhale 2 puffs 2 (two) times a day 12 g 0   • levalbuterol (XOPENEX) 1.25 mg/3 mL nebulizer solution Take 3 mL (1.25 mg total) by nebulization every 8 (eight) hours as needed for wheezing or shortness of breath 75 mL 0   • lisinopril (ZESTRIL) 10 mg tablet Take 1 tablet (10 mg total) by mouth daily 90 tablet 0   • multivitamin (THERAGRAN) TABS Take 1 tablet by mouth daily     • pravastatin (PRAVACHOL) 20 mg tablet TAKE 1 TABLET BY MOUTH EVERY DAY 90 tablet 2   • sildenafil (VIAGRA) 50 MG tablet Take 1 tablet (50 mg total) by mouth daily as needed for erectile dysfunction 10 tablet 5   • Taurine 500 MG CAPS Take by mouth       No current facility-administered medications for this visit. Allergies   Allergen Reactions   • Depo-Medrol [Methylprednisolone] Anaphylaxis     Severe Vagal Reaction   • Iv Contrast [Iodinated Contrast Media] Angioedema     Pt states itchy/swollen eyes, trouble breathing (years ago)   • Rosuvastatin Headache and Confusion   • Sulfa Antibiotics Hives   • Atorvastatin Confusion      Immunizations:     Immunization History   Administered Date(s) Administered   • COVID-19 MODERNA VACC 0.5 ML IM 01/22/2021, 02/19/2021, 11/09/2021   • INFLUENZA 12/19/2016, 09/25/2017, 09/18/2018, 09/24/2021   • Influenza Quadrivalent Preservative Free 3 years and older IM 09/25/2017   • Influenza Quadrivalent, 6-35 Months IM 12/19/2016   • Influenza, high dose seasonal 0.7 mL 01/09/2020, 09/04/2020   • Influenza, seasonal, injectable 11/14/2012, 11/12/2013   • Pneumococcal Conjugate 13-Valent 08/30/2019   • Pneumococcal Polysaccharide PPV23 12/17/2007, 01/26/2018   • TD (adult) Preservative Free 08/30/2019   • Td (adult), adsorbed 08/30/2019      Health Maintenance:         Topic Date Due   • Colorectal Cancer Screening  03/17/2026   • Hepatitis C Screening  Completed         Topic Date Due   • COVID-19 Vaccine (4 - Moderna series) 01/04/2022   • Influenza Vaccine (1) 09/01/2023      Medicare Screening Tests and Risk Assessments:     Annual Wellness Visit  No results found. Physical Exam:     There were no vitals taken for this visit.     Physical Exam     Ben Alfredo MD

## 2023-09-18 NOTE — PROGRESS NOTES
Large joint arthrocentesis: L subacromial bursa  Universal Protocol:  Consent: Verbal consent obtained. Risks and benefits: risks, benefits and alternatives were discussed  Consent given by: patient  Site marked: the operative site was marked  Supporting Documentation  Indications: pain (Impingement syndrome left shoulder)   Procedure Details  Location: shoulder - L subacromial bursa  Preparation: Betadine. Needle size: 22 G  Ultrasound guidance: no  Approach: posterior    Patient tolerance: patient tolerated the procedure well with no immediate complications  Dressing:  Sterile dressing applied    Using aseptic technique I performed an injection of left subacromial space with Toradol 60 mg/2 ML    ** allergic reaction to DepoMedrol and Marcaine ? In the past. No reaction to Toradol with prior injection. Large joint arthrocentesis: R subacromial bursa  Universal Protocol:  Consent: Verbal consent obtained. Risks and benefits: risks, benefits and alternatives were discussed  Consent given by: patient  Site marked: the operative site was marked  Supporting Documentation  Indications: pain   Procedure Details  Location: shoulder - R subacromial bursa  Preparation: Betadine   Needle size: 22 G  Ultrasound guidance: no  Approach: posterior    Patient tolerance: patient tolerated the procedure well with no immediate complications  Dressing:  Sterile dressing applied    Using aseptic technique I performed an injection of left subacromial space with Toradol 60 mg/2 ML    ** allergic reaction to DepoMedrol and Marcaine ? In the past. No reaction to Toradol with prior injection. Brent Macias was seen today for injections and shoulder pain.     Diagnoses and all orders for this visit:    Impingement syndrome of left shoulder  -     Large joint arthrocentesis: L subacromial bursa  -     ketorolac (TORADOL) 60 mg/2 mL IM injection 60 mg    Impingement syndrome of right shoulder  -     Large joint arthrocentesis: R subacromial bursa  -     ketorolac (TORADOL) 60 mg/2 mL IM injection 60 mg      Recurrent bilateral shoulder pain. Prior pain relief with Toradol injection. Allergic reaction to Depo-Medrol Possible allergic reaction to Marcaine. PE full range of motion of both shoulders. Bilateral impingement sign. No effusion. No warmth or redness. Addendum    Symptomatic OA knees. Prior visco supplementation with relief. GI intolerance to NSAIDs.  Plan  X rays knees

## 2023-09-19 RX ADMIN — KETOROLAC TROMETHAMINE 60 MG: 30 INJECTION, SOLUTION INTRAMUSCULAR; INTRAVENOUS at 08:43

## 2023-09-19 RX ADMIN — KETOROLAC TROMETHAMINE 60 MG: 30 INJECTION, SOLUTION INTRAMUSCULAR; INTRAVENOUS at 08:42

## 2023-09-20 ENCOUNTER — TELEPHONE (OUTPATIENT)
Dept: FAMILY MEDICINE CLINIC | Facility: CLINIC | Age: 73
End: 2023-09-20

## 2023-09-20 DIAGNOSIS — M17.12 PRIMARY OSTEOARTHRITIS OF LEFT KNEE: Primary | ICD-10-CM

## 2023-09-20 DIAGNOSIS — M17.11 PRIMARY OSTEOARTHRITIS OF RIGHT KNEE: ICD-10-CM

## 2023-09-20 NOTE — TELEPHONE ENCOUNTER
----- Message from Karine Ruiz MD sent at 9/20/2023  7:00 AM EDT -----  Re visco supplementation to obtain coverage x rays both recommended.  Orders  placed

## 2023-09-20 NOTE — TELEPHONE ENCOUNTER
----- Message from Yoko Hadley MD sent at 9/20/2023  7:00 AM EDT -----  Re visco supplementation to obtain coverage x rays both recommended.  Orders  placed

## 2023-09-25 ENCOUNTER — TELEPHONE (OUTPATIENT)
Age: 73
End: 2023-09-25

## 2023-09-25 NOTE — TELEPHONE ENCOUNTER
Pts wife called to ask if there was an order for her  to get xrays on his knees and if [facility] in [address] had X-Ray facilities.

## 2023-09-26 ENCOUNTER — APPOINTMENT (OUTPATIENT)
Dept: RADIOLOGY | Facility: MEDICAL CENTER | Age: 73
End: 2023-09-26
Payer: COMMERCIAL

## 2023-09-26 DIAGNOSIS — M17.12 PRIMARY OSTEOARTHRITIS OF LEFT KNEE: ICD-10-CM

## 2023-09-26 DIAGNOSIS — M17.11 PRIMARY OSTEOARTHRITIS OF RIGHT KNEE: ICD-10-CM

## 2023-09-26 PROCEDURE — 73562 X-RAY EXAM OF KNEE 3: CPT

## 2023-10-03 ENCOUNTER — TELEPHONE (OUTPATIENT)
Dept: FAMILY MEDICINE CLINIC | Facility: CLINIC | Age: 73
End: 2023-10-03

## 2023-10-03 NOTE — TELEPHONE ENCOUNTER
Called patient, spoke with patients wife, gave her the message   Wife said it was okay to check if insurance cover the supplements, wife was informed she can also call there insurance to confirm of coverage. She stated she will do that.

## 2023-10-03 NOTE — TELEPHONE ENCOUNTER
Call re x rays knees mild to moderate arthritis. I believe he was interested in visco supplementation for both knees. We would need to check insurance coverage [facility]       Procedure: XR knee 3 vw right non injury    Result Date: 10/3/2023  Narrative: RIGHT KNEE INDICATION:   M17.11: Unilateral primary osteoarthritis, right knee. COMPARISON: 6/13/2008 VIEWS:  XR KNEE 3 VW RIGHT NON INJURY FINDINGS: There is no acute fracture or dislocation. There is no joint effusion. There is moderate patellofemoral, moderate medial compartment, mild  lateral compartment osteoarthrosis manifested by joint space narrowing, marginal osteophyte formation and subchondral sclerosis  . No lytic or blastic osseous lesion. Soft tissues are unremarkable. Impression: No acute osseous abnormality. Degenerative changes as described. Workstation performed: [id number]     Procedure: XR knee 3 vw left non injury    Result Date: 10/3/2023  Narrative: LEFT KNEE INDICATION:   M17.12: Unilateral primary osteoarthritis, left knee. COMPARISON: 6/13/2008 VIEWS:  XR KNEE 3 VW LEFT NON INJURY FINDINGS: There is no acute fracture or dislocation. There is no joint effusion. Mild tricompartmental osteoarthritis with joint space narrowing and small osteophytes. No lytic or blastic osseous lesion. Soft tissues are unremarkable. Impression: No acute osseous abnormality. Degenerative changes as described.  Workstation performed: [id number]

## 2023-10-11 ENCOUNTER — TELEPHONE (OUTPATIENT)
Age: 73
End: 2023-10-11

## 2023-10-11 NOTE — TELEPHONE ENCOUNTER
Jacques Messer from [place] called to see if we have put in for a prior auth for the injections. She was not sure what the medication was called so she will call back. I did not realize there was note of the Visco supplementation injections in this message.      Jacques Messer will call back, I believe this is the medication that will need prior- auth

## 2023-10-11 NOTE — TELEPHONE ENCOUNTER
Pt's wife called because she spot to [facility] and was told that Dr. Joshua Aragon will need to review pt's knee x-ray and decide the exact name of the medication that will be used for the injection. Then an approval will need to be started with [employer] for the medication. Then patient can make the appointments for the injections. Please, call wife with any questions. Thank you.

## 2023-10-17 ENCOUNTER — NURSE TRIAGE (OUTPATIENT)
Age: 73
End: 2023-10-17
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
## 2023-10-17 NOTE — TELEPHONE ENCOUNTER
Prior auth for med needed for knees. Reason for Disposition  • Probable deer tick that was attached > 36 hours (or tick appears swollen, not flat)    Answer Assessment - Initial Assessment Questions  1. LOCATION: "Where does it hurt?"         Achy tired pt thinks he ha s lyme's disease knees shoulders   Esophagus surgery     Surgery on intestines constantly nauseated     Able to drink and eat now      Diverticulitis fundoplication appendectomy       2. RADIATION: "Does the pain shoot anywhere else?" (e.g., chest, back)      unsure      3. ONSET: "When did the pain begin?" (Minutes, hours or days ago)       Over the weekend       4. SUDDEN: "Gradual or sudden onset?"      Gradual       5. PATTERN "Does the pain come and go, or is it constant?"     - If constant: "Is it getting better, staying the same, or worsening?"       (Note: Constant means the pain never goes away completely; most serious pain is constant and it progresses)      - If intermittent: "How long does it last?" "Do you have pain now?"      (Note: Intermittent means the pain goes away completely between bouts)        Intermittent   6. SEVERITY: "How bad is the pain?"  (e.g., Scale 1-10; mild, moderate, or severe)     - MILD (1-3): doesn't interfere with normal activities, abdomen soft and not tender to touch      - MODERATE (4-7): interferes with normal activities or awakens from sleep, tender to touch      - SEVERE (8-10): excruciating pain, doubled over, unable to do any normal activities            Severe abdominal pain sporadic feels sick to stomach all the time         7. RECURRENT SYMPTOM: "Have you ever had this type of stomach pain before?" If Yes, ask: "When was the last time?" and "What happened that time?"           8.  CAUSE: "What do you think is causing the stomach pain?"              9. RELIEVING/AGGRAVATING FACTORS: "What makes it better or worse?" (e.g., movement, antacids, bowel movement)    Answer Assessment - Initial Assessment Questions  1. TYPE of TICK: "Is it a wood tick or a deer tick?" If unsure, ask: "What size was the tick?" "Did it look more like a watermelon seed or a poppy seed?"           Unsure     2. LOCATION: "Where s the tick bite located?"        Right shoulder    3.  ONSET: "How long do you think the tick was attached before you removed it?" (Hours or days)         Unsure    Protocols used: Abdominal Pain - Male-ADULT-[place], Tick Bite-ADULT-[place]

## 2023-10-17 NOTE — TELEPHONE ENCOUNTER
Regarding: Vomiting  ----- Message from Dickson Dove sent at 10/17/2023  8:08 AM EDT -----  Wife called, patient had "food poisoning" symptoms over weekend with vomiting, diarrhea, overall achiness, exhaustion. We tried to get appt in office, nothing available.

## 2023-10-17 NOTE — TELEPHONE ENCOUNTER
Patient's wife called in. Verbal consent given. Wife states patient s not feeling well. Started about a month ago after two tick bites. Body aches, fatigue, nausea. Patient has abdominal pain however this I s a chronic issue. I recommended /offered appointment . Patient request s a script to be sent. Patient is calling in would like Dr Ramy Sadler to prescribe antibiotic prophylactic to treat lyme's. He removed two ticks a month ago. Denies rashes. Please call the patient back and let hm know if medication is sent.

## 2023-10-20 ENCOUNTER — APPOINTMENT (OUTPATIENT)
Dept: LAB | Facility: CLINIC | Age: 73
End: 2023-10-20
Payer: COMMERCIAL
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
## 2023-10-20 DIAGNOSIS — E78.2 MIXED HYPERLIPIDEMIA: ICD-10-CM

## 2023-10-20 LAB
B BURGDOR IGG+IGM SER QL IA: NEGATIVE
CHOLEST SERPL-MCNC: 168 MG/DL
HDLC SERPL-MCNC: 47 MG/DL
LDLC SERPL CALC-MCNC: 109 MG/DL (ref 0–100)
NONHDLC SERPL-MCNC: 121 MG/DL
TRIGL SERPL-MCNC: 61 MG/DL

## 2023-10-20 PROCEDURE — 80061 LIPID PANEL: CPT

## 2023-10-20 PROCEDURE — 86618 LYME DISEASE ANTIBODY: CPT

## 2023-10-20 PROCEDURE — 36415 COLL VENOUS BLD VENIPUNCTURE: CPT

## 2023-10-24 DIAGNOSIS — I10 BENIGN ESSENTIAL HYPERTENSION: ICD-10-CM

## 2023-10-24 RX ORDER — LISINOPRIL 10 MG/1
10 TABLET ORAL DAILY
Qty: 90 TABLET | Refills: 0 | Status: SHIPPED | OUTPATIENT
Start: 2023-10-24

## 2023-10-24 NOTE — TELEPHONE ENCOUNTER
Patient needs updated blood work. Pt needs CMP, please place orders. A courtesy refill was provided.

## 2023-10-27 ENCOUNTER — OFFICE VISIT (OUTPATIENT)
Dept: CARDIOLOGY CLINIC | Facility: CLINIC | Age: 73
End: 2023-10-27
Payer: COMMERCIAL

## 2023-10-27 VITALS
DIASTOLIC BLOOD PRESSURE: 88 MMHG | BODY MASS INDEX: 30.89 KG/M2 | HEIGHT: 67 IN | HEART RATE: 62 BPM | OXYGEN SATURATION: 99 % | WEIGHT: 196.8 LBS | SYSTOLIC BLOOD PRESSURE: 162 MMHG

## 2023-10-27 DIAGNOSIS — Z98.890 HISTORY OF RIGHT-SIDED CAROTID ENDARTERECTOMY: ICD-10-CM

## 2023-10-27 DIAGNOSIS — I21.4 NSTEMI (NON-ST ELEVATED MYOCARDIAL INFARCTION) (HCC): Primary | ICD-10-CM

## 2023-10-27 DIAGNOSIS — I25.10 CORONARY ARTERY DISEASE INVOLVING NATIVE CORONARY ARTERY OF NATIVE HEART WITHOUT ANGINA PECTORIS: ICD-10-CM

## 2023-10-27 DIAGNOSIS — E78.2 MIXED HYPERLIPIDEMIA: ICD-10-CM

## 2023-10-27 DIAGNOSIS — I10 BENIGN ESSENTIAL HYPERTENSION: ICD-10-CM

## 2023-10-27 PROCEDURE — 99213 OFFICE O/P EST LOW 20 MIN: CPT | Performed by: INTERNAL MEDICINE

## 2023-10-27 NOTE — PROGRESS NOTES
Cardiology Follow Up    Cuate Montalvo  1950  [id number]  [facility]  [address]  [address]  [facility] [address] [phone]    1. NSTEMI (non-ST elevated myocardial infarction) ([address])        2. Coronary artery disease involving native coronary artery of native heart without angina pectoris        3. Benign essential hypertension        4. Mixed hyperlipidemia        5. History of right-sided carotid endarterectomy              Discussion/Summary: All of his assessed cardiac problems are stable. I have reviewed his medications and made no changes. No cardiac testing is ordered. RTO 1 year. Interval History: He has not had any cardiac problems since his last office visit. He is very active and denies CP, SOB, palpitations. He is walking several miles almost daily. He is now on Pravachol 20 mg daily which he is tolerating. LDL is down to 109. He has CAD with a NSTEMI in 8/2019. He had RENE placement x2 in the distal RCA.      ECHO 8/2019 - EF 60%          Patient Active Problem List   Diagnosis    Asthma    Genao's esophagus without dysplasia    Benign essential hypertension    Diverticulosis    Fatty infiltration of liver    Hyperuricemia    Impaired fasting glucose    Mixed hyperlipidemia    Drug-induced erectile dysfunction    Non-allergic rhinitis    Osteoarthritis of knee    Vitamin B12 deficiency    Irritable bowel syndrome with diarrhea    History of colonic diverticulitis    History of right-sided carotid endarterectomy    Coronary artery disease involving native coronary artery of native heart without angina pectoris    Incisional hernia    Arthritis of right acromioclavicular joint    Carotid stenosis, asymptomatic, left    Osteoarthritis of right glenohumeral joint    Nonalcoholic steatohepatitis    Rash and other nonspecific skin eruption    Stroke [facility])    NSTEMI (non-ST elevated myocardial infarction) [facility])     Past Medical History:   Diagnosis Date    Allergic reaction     LAST ASSESSED: [id number]    Asthma     Bursitis of heel     LAST ASSESSED: [id number]    Colon polyp     Derangement of meniscus of left knee due to old injury     LAST ASSESSED: 2013    Diverticulitis of colon     LAST ASSESSED: 2013    Dizziness     LAST ASSESSED: [id number]    GERD (gastroesophageal reflux disease)     History of colon polyps     Hypertension     Kidney stone     Malignant hypertension     LAST ASSESSED: [id number]    Myocardial infarction ([address])     Non-ST elevated myocardial infarction ([address]) 2020    NSTEMI (non-ST elevated myocardial infarction) ([facility])     Plantar fasciitis     Seasonal allergies     Shoulder impingement     LAST ASSESSED: [id number]    Stroke ([address])      Social History     Socioeconomic History    Marital status: /Civil Union     Spouse name: Not on file    Number of children: Not on file    Years of education: Not on file    Highest education level: Not on file   Occupational History    Not on file   Tobacco Use    Smoking status: Former     Packs/day: 0.25     Years: 5.00     Total pack years: 1.25     Types: Cigarettes     Quit date: 1960     Years since quittin.8    Smokeless tobacco: Never   Vaping Use    Vaping Use: Never used   Substance and Sexual Activity    Alcohol use: No    Drug use: No    Sexual activity: Not on file   Other Topics Concern    Not on file   Social History Narrative    Not on file     Social Determinants of Health     Financial Resource Strain: Not on file   Food Insecurity: Not on file   Transportation Needs: Not on file   Physical Activity: Not on file   Stress: Not on file   Social Connections: Not on file   Intimate Partner Violence: Not on file   Housing Stability: Not on file      Family History   Problem Relation Age of Onset    Hypertension Mother     Hyperlipidemia Mother     Breast cancer Mother     Hypertension Father     Hyperlipidemia Father Colon cancer Paternal Grandfather     Cancer Paternal Grandfather     Throat cancer Maternal Aunt      Past Surgical History:   Procedure Laterality Date    APPENDECTOMY      CAROTID ENDARTARECTOMY      COLECTOMY Left     PARTIAL - SIGMOID; HEMICOLECTOMY FOR DIVERTICULITIS; ONSET: 1987    COLON SURGERY      COLONOSCOPY N/A 12/9/2016    Procedure: COLONOSCOPY;  Surgeon: Zakia Monique MD;  [facility]: [facility];   Service:     HERNIA REPAIR      VENTRAL    NISSEN FUNDOPLICATION      ESOPHAGOGASTRIC FUNDOPLASTY LAST ASSESSED: [id number]    SINUS SURGERY      THROMBOENDARTERECTOMY      CAROTID; ONSET; MAY 2012       Current Outpatient Medications:     acetaminophen (TYLENOL) 325 mg tablet, Take 2 tablets (650 mg total) by mouth every 6 (six) hours as needed for mild pain, headaches or fever, Disp: 30 tablet, Rfl: 0    albuterol (PROVENTIL HFA,VENTOLIN HFA) 90 mcg/act inhaler, Inhale 2 puffs every 6 (six) hours as needed for wheezing, Disp: 54 g, Rfl: 0    aspirin (ECOTRIN LOW STRENGTH) 81 mg EC tablet, Take 81 mg by mouth every other day  , Disp: , Rfl:     cholecalciferol (VITAMIN D3) 1,000 units tablet, Take 1,000 Units by mouth daily, Disp: , Rfl:     Coenzyme Q10 10 MG capsule, Take 1 capsule (10 mg total) by mouth daily, Disp: , Rfl:     fluticasone (FLONASE) 50 mcg/act nasal spray, 1 spray into each nostril daily, Disp: , Rfl:     fluticasone-salmeterol (Advair HFA) 230-21 MCG/ACT inhaler, Inhale 2 puffs 2 (two) times a day, Disp: 12 g, Rfl: 0    levalbuterol (XOPENEX) 1.25 mg/3 mL nebulizer solution, Take 3 mL (1.25 mg total) by nebulization every 8 (eight) hours as needed for wheezing or shortness of breath, Disp: 75 mL, Rfl: 0    lisinopril (ZESTRIL) 10 mg tablet, TAKE 1 TABLET BY MOUTH EVERY DAY, Disp: 90 tablet, Rfl: 0    multivitamin (THERAGRAN) TABS, Take 1 tablet by mouth daily, Disp: , Rfl:     omeprazole (PriLOSEC) 40 MG capsule, Take 1 capsule (40 mg total) by mouth daily, Disp: 90 capsule, Rfl: 0    pravastatin (PRAVACHOL) 20 mg tablet, TAKE 1 TABLET BY MOUTH EVERY DAY, Disp: 90 tablet, Rfl: 2    sildenafil (VIAGRA) 50 MG tablet, Take 1 tablet (50 mg total) by mouth daily as needed for erectile dysfunction, Disp: 10 tablet, Rfl: 5    Taurine 500 MG CAPS, Take by mouth, Disp: , Rfl:     diphenhydrAMINE (BENADRYL) 50 mg capsule, Take 1 capsule (50 mg total) by mouth 1 (one) time for 1 dose Take 1 capsule (50 mg) 1 hour prior to CT scan. (Patient not taking: Reported on 10/27/2023), Disp: 30 capsule, Rfl: 0  Allergies   Allergen Reactions    Depo-Medrol [Methylprednisolone] Anaphylaxis     Severe Vagal Reaction    Iv Contrast [Iodinated Contrast Media] Angioedema     Pt states itchy/swollen eyes, trouble breathing (years ago)    Rosuvastatin Headache and Confusion    Sulfa Antibiotics Hives    Atorvastatin Confusion     Vitals:    10/27/23 0815   BP: 162/88   BP Location: Left arm   Patient Position: Sitting   Cuff Size: Large   Pulse: 62   SpO2: 99%   Weight: 89.3 kg (196 lb 12.8 oz)   Height: 5' 7" (1.702 m)     Weight (last 2 days)       Date/Time Weight    10/27/23 0815 89.3 (196.8)           Blood pressure 162/88, pulse 62, height 5' 7" (1.702 m), weight 89.3 kg (196 lb 12.8 oz), SpO2 99 %. , Body mass index is 30.82 kg/m². Labs:  Appointment on 10/20/2023   Component Date Value    Cholesterol 10/20/2023 168     Triglycerides 10/20/2023 61     HDL, Direct 10/20/2023 47     LDL Calculated 10/20/2023 109 (H)     Non-HDL-Chol (CHOL-HDL) 10/20/2023 121     Lyme Total Antibodies 10/20/2023 Negative    Lab on 08/21/2023   Component Date Value    Cholesterol 08/21/2023 208 (H)     Triglycerides 08/21/2023 85     HDL, Direct 08/21/2023 45     LDL Calculated 08/21/2023 146 (H)     Non-HDL-Chol (CHOL-HDL) 08/21/2023 163    Office Visit on 08/15/2023   Component Date Value    Vit D, 25-Hydroxy 08/21/2023 38.3      Imaging: No results found.     Review of Systems:  Review of Systems   Constitutional:  Negative for diaphoresis, fatigue, fever and unexpected weight change. HENT: Negative. Respiratory:  Negative for cough, shortness of breath and wheezing. Cardiovascular:  Negative for chest pain, palpitations and leg swelling. Gastrointestinal:  Negative for abdominal pain, diarrhea and nausea. Musculoskeletal:  Negative for gait problem and myalgias. Skin:  Negative for rash. Neurological:  Negative for dizziness and numbness. Psychiatric/Behavioral: Negative. Physical Exam:  Physical Exam  Constitutional:       Appearance: He is well-developed. HENT:      Head: Normocephalic and atraumatic. Eyes:      Pupils: Pupils are equal, round, and reactive to light. Neck:      Vascular: No JVD. Cardiovascular:      Rate and Rhythm: Regular rhythm. Pulses: Normal pulses. Carotid pulses are 2+ on the right side and 2+ on the left side. Heart sounds: S1 normal and S2 normal.   Pulmonary:      Effort: Pulmonary effort is normal.      Breath sounds: Normal breath sounds. No wheezing or rales. Abdominal:      General: Bowel sounds are normal.      Palpations: Abdomen is soft. Musculoskeletal:         General: No tenderness. Normal range of motion. Cervical back: Normal range of motion and neck supple. Skin:     General: Skin is warm. Neurological:      Mental Status: He is alert and oriented to person, place, and time. Cranial Nerves: No cranial nerve deficit. Deep Tendon Reflexes: Reflexes are normal and symmetric.

## 2023-11-26 DIAGNOSIS — K21.9 GASTROESOPHAGEAL REFLUX DISEASE, UNSPECIFIED WHETHER ESOPHAGITIS PRESENT: ICD-10-CM

## 2023-11-27 RX ORDER — OMEPRAZOLE 40 MG/1
40 CAPSULE, DELAYED RELEASE ORAL DAILY
Qty: 90 CAPSULE | Refills: 1 | Status: SHIPPED | OUTPATIENT
Start: 2023-11-27

## 2023-12-07 ENCOUNTER — OFFICE VISIT (OUTPATIENT)
Dept: VASCULAR SURGERY | Facility: CLINIC | Age: 73
End: 2023-12-07
Payer: COMMERCIAL

## 2023-12-07 VITALS
BODY MASS INDEX: 28.88 KG/M2 | SYSTOLIC BLOOD PRESSURE: 142 MMHG | DIASTOLIC BLOOD PRESSURE: 86 MMHG | HEIGHT: 67 IN | HEART RATE: 72 BPM | WEIGHT: 184 LBS

## 2023-12-07 DIAGNOSIS — I65.22 CAROTID STENOSIS, ASYMPTOMATIC, LEFT: Primary | ICD-10-CM

## 2023-12-07 DIAGNOSIS — E78.2 MIXED HYPERLIPIDEMIA: ICD-10-CM

## 2023-12-07 PROCEDURE — 99213 OFFICE O/P EST LOW 20 MIN: CPT | Performed by: NURSE PRACTITIONER

## 2023-12-07 RX ORDER — MAGNESIUM GLYCINATE 100 MG
CAPSULE ORAL
COMMUNITY
Start: 2023-12-05

## 2023-12-07 RX ORDER — LORATADINE 10 MG/1
CAPSULE, LIQUID FILLED ORAL
COMMUNITY
Start: 2023-12-04

## 2023-12-07 NOTE — PATIENT INSTRUCTIONS
Carotid Artery Disease   AMBULATORY CARE:   Carotid artery disease (CAD)  means the major blood vessels in your neck are narrowed or becoming blocked. These 2 major blood vessels are called the carotid arteries. They supply your brain with blood. The narrow or blocked blood vessels increase your risk for a stroke. CAD is also called carotid artery stenosis. Have someone call your local emergency number (911 in the [address]) if:   You have any of the following signs of a stroke:      Numbness or drooping on one side of your face     Weakness in an arm or leg    Confusion or difficulty speaking    Dizziness, a severe headache, or vision loss    You have any of the following signs of a heart attack:      Squeezing, pressure, or pain in your chest    You may  also have any of the following:     Discomfort or pain in your back, neck, jaw, stomach, or arm    Shortness of breath    Nausea or vomiting    Lightheadedness or a sudden cold sweat    Call your doctor if:   You have questions or concerns about your condition or care. Common signs and symptoms:  CAD develops slowly. You may have no signs or symptoms until you have a mini-stroke, or transient ischemic attack (TIA). A TIA is a temporary lack of blood flow to your brain. A TIA goes away quickly and does not cause permanent damage. A TIA may be a warning sign that you are about to have a stroke. If you have any symptoms of a TIA or stroke, seek care immediately. Warning signs of a stroke: The words BE FAST can help you remember and recognize warning signs of a stroke:  B = Balance:  Sudden loss of balance    E = Eyes:  Loss of vision in one or both eyes    F = Face:  Face droops on one side    A = Arms:  Arm drops when both arms are raised    S = Speech:  Speech is slurred or sounds different    T = Time:  Time to get help immediately       Treatment  depends on how narrow your arteries have become.  Treatment also depends on your symptoms and your general health. The goal of treatment is to lower your risk for a stroke. You may need any of the following:  Medicines:      Aspirin,  or other blood thinner, may be recommended. These will help prevent blood clots from forming in your carotid arteries. If your healthcare provider wants you to take aspirin, do not take acetaminophen or ibuprofen instead. Cholesterol medicine  lowers your cholesterol level. Blood pressure medicine  lowers or helps control your blood pressure. Procedures  can help open blocked arteries:     Carotid endarterectomy (CEA)  is used to cut and remove plaque buildup from your arteries. Carotid angioplasty and stenting (MEG)  is used to push the plaque against the artery wall with a balloon device. Then, a stent is placed to keep the artery open. A stent is a small metal mesh tube. Prevent a stroke:   Do not smoke, and avoid secondhand smoke. Nicotine and other chemicals in cigarettes and cigars increase your risk for a stroke. Ask your healthcare provider for information if you currently smoke and need help to quit. E-cigarettes or smokeless tobacco still contain nicotine. Talk to your healthcare provider before you use these products. Eat a variety of healthy foods. Healthy foods include fruit, vegetables, whole-grain breads, low-fat dairy products, chicken, and fish. Choose fish that are high in omega-3 fatty acids, such as salmon and fresh tuna. Ask your healthcare provider for more information on a heart healthy diet and the DASH eating plan. Limit sodium (salt). Sodium may increase your blood pressure. Add less table salt to your foods. Read food labels and choose foods that are low in sodium. Your healthcare provider may suggest you follow a low sodium diet. Reach or maintain a healthy weight. Extra weight makes your heart work harder. Ask your healthcare provider what a healthy weight is for you. He or she can help you create a safe weight loss plan. Even a weight loss of 10% of your extra body weight can help your heart function better. Exercise regularly. Exercise helps improve heart function and can help you manage your weight. Exercise can also help lower your cholesterol and blood sugar levels. Try to get at least 30 minutes of exercise 5 times each week. Try to be physically active every day. This may include walking, riding a bicycle, or swimming. Your healthcare provider can help you create an exercise plan that works best for you. Limit alcohol. Alcohol can increase your blood pressure and triglyceride levels. A drink of alcohol is 12 ounces of beer, 5 ounces of wine, or 1½ ounces of liquor. Follow up with your doctor as directed:  Write down your questions so you remember to ask them during your visits. © Copyright [facility] 2023 Information is for End User's use only and may not be sold, redistributed or otherwise used for commercial purposes. The above information is an  only. It is not intended as medical advice for individual conditions or treatments. Talk to your doctor, nurse or pharmacist before following any medical regimen to see if it is safe and effective for you.

## 2023-12-07 NOTE — PROGRESS NOTES
Assessment/Plan:    Carotid stenosis, asymptomatic, left  79-year-old male with HTN, HLD, CAD s/p PCI, symptomatic right ICA stenosis s/p right CEA by Dr. Hilda Gregory and asymptomatic moderate left ICA stenosis, GERD, IBS, osteoarthritis, + IV contrast allergy. Patient returns the office for yearly visit to review carotid duplex 3/20/2023    -CV duplex showed right ICA patent and left ICA less than 50% stenosis with velocities 153/50, ratio 1.54  -Stable left ICA stenosis  -Continue aspirin 81 mg  -Trialed multiple statins with side effects of confusion. He is currently on pravastatin without adverse effects  -Recommended repeat carotid duplex in 1 year  -Follow-up in the office in 1 year  -Reviewed the signs symptoms of TIA/CVA and that he call 911 immediately should he experience any of the symptoms      Mixed hyperlipidemia  -Intolerant to atorvastatin and Crestor  -Continue pravastatin    Benign essential hypertension  Blood pressure controlled       Diagnoses and all orders for this visit:    Carotid stenosis, asymptomatic, left  -     VAS carotid complete study; Future    Other orders  -     Loratadine 10 MG CAPS  -     Magnesium Glycinate 100 MG CAPS      Subjective:      Patient ID: Riya Mejia is a 72 y.o. male. Patient presents to review CV done 3/20/23. Patient is s/p R CEA 2012 by Dr. Laura Niño. HPI  79-year-old male with HTN, HLD, CAD s/p PCI, symptomatic right ICA stenosis s/p right CEA by Dr. Hilda Gregory and asymptomatic moderate left ICA stenosis, GERD, IBS, osteoarthritis, + IV contrast allergy. Patient returns the office for yearly visit to review carotid duplex 3/20/2023. He was last seen in the office last year by me. He is companied by his daughter for office visit today. He denies any focal neurological events consistent with TIA/CVA. He was previously on atorvastatin and Crestor with significant adverse effects. He is now on pravastatin without an issue.   He can walk several miles without any limitation. He denies any claudication symptoms. Decreased appetite. Wife recently passed away. CV duplex showed right ICA patent and left ICA less than 50% stenosis with velocities 153/50, ratio 1.54  The following portions of the patient's history were reviewed and updated as appropriate: allergies, current medications, past family history, past medical history, past social history, past surgical history, and problem list.  ROS reviewed     Review of Systems   Constitutional: Negative. HENT: Negative. Eyes: Negative. Respiratory: Negative. Cardiovascular: Negative. Gastrointestinal: Negative. Endocrine: Negative. Genitourinary: Negative. Musculoskeletal: Negative. Skin: Negative. Allergic/Immunologic: Negative. Neurological: Negative. Hematological: Negative. Psychiatric/Behavioral: Negative. Objective:    I have reviewed and made appropriate changes to the review of systems input by the medical assistant.     Vitals:    12/07/23 0912   BP: 142/86   BP Location: Right arm   Patient Position: Sitting   Cuff Size: Standard   Pulse: 72   Weight: 83.5 kg (184 lb)   Height: 5' 7" (1.702 m)       Patient Active Problem List   Diagnosis    Asthma    Genao's esophagus without dysplasia    Benign essential hypertension    Diverticulosis    Fatty infiltration of liver    Hyperuricemia    Impaired fasting glucose    Mixed hyperlipidemia    Drug-induced erectile dysfunction    Non-allergic rhinitis    Osteoarthritis of knee    Vitamin B12 deficiency    Irritable bowel syndrome with diarrhea    History of colonic diverticulitis    History of right-sided carotid endarterectomy    Coronary artery disease involving native coronary artery of native heart without angina pectoris    Incisional hernia    Arthritis of right acromioclavicular joint    Carotid stenosis, asymptomatic, left    Osteoarthritis of right glenohumeral joint    Nonalcoholic steatohepatitis    Rash and other nonspecific skin eruption    Stroke [facility])    NSTEMI (non-ST elevated myocardial infarction) ([address] [facility])       Past Surgical History:   Procedure Laterality Date    APPENDECTOMY      CAROTID ENDARTARECTOMY      COLECTOMY Left     PARTIAL - SIGMOID; HEMICOLECTOMY FOR DIVERTICULITIS; ONSET:     COLON SURGERY      COLONOSCOPY N/A 2016    Procedure: COLONOSCOPY;  Surgeon: Andrey Gottron, MD;  Location: [facility];   Service:     HERNIA REPAIR      VENTRAL    NISSEN FUNDOPLICATION      ESOPHAGOGASTRIC FUNDOPLASTY LAST ASSESSED: [id number]    SINUS SURGERY      THROMBOENDARTERECTOMY      CAROTID; ONSET; MAY 2012       Family History   Problem Relation Age of Onset    Hypertension Mother     Hyperlipidemia Mother     Breast cancer Mother     Hypertension Father     Hyperlipidemia Father     Colon cancer Paternal Grandfather     Cancer Paternal Grandfather     Throat cancer Maternal Aunt        Social History     Socioeconomic History    Marital status: /Civil Union     Spouse name: Not on file    Number of children: Not on file    Years of education: Not on file    Highest education level: Not on file   Occupational History    Not on file   Tobacco Use    Smoking status: Former     Packs/day: 0.25     Years: 5.00     Total pack years: 1.25     Types: Cigarettes     Quit date: 1960     Years since quittin.9    Smokeless tobacco: Never   Vaping Use    Vaping Use: Never used   Substance and Sexual Activity    Alcohol use: No    Drug use: No    Sexual activity: Not on file   Other Topics Concern    Not on file   Social History Narrative    Not on file     Social Determinants of Health     Financial Resource Strain: Not on file   Food Insecurity: Not on file   Transportation Needs: Not on file   Physical Activity: Not on file   Stress: Not on file   Social Connections: Not on file   Intimate Partner Violence: Not on file   Housing Stability: Not on file       Allergies   Allergen Reactions    Depo-Medrol [Methylprednisolone] Anaphylaxis     Severe Vagal Reaction    Iv Contrast [Iodinated Contrast Media] Angioedema     Pt states itchy/swollen eyes, trouble breathing (years ago)    Rosuvastatin Headache and Confusion    Sulfa Antibiotics Hives    Atorvastatin Confusion         Current Outpatient Medications:     acetaminophen (TYLENOL) 325 mg tablet, Take 2 tablets (650 mg total) by mouth every 6 (six) hours as needed for mild pain, headaches or fever, Disp: 30 tablet, Rfl: 0    albuterol (PROVENTIL HFA,VENTOLIN HFA) 90 mcg/act inhaler, Inhale 2 puffs every 6 (six) hours as needed for wheezing, Disp: 54 g, Rfl: 0    aspirin (ECOTRIN LOW STRENGTH) 81 mg EC tablet, Take 81 mg by mouth every other day  , Disp: , Rfl:     cholecalciferol (VITAMIN D3) 1,000 units tablet, Take 1,000 Units by mouth daily, Disp: , Rfl:     Coenzyme Q10 10 MG capsule, Take 1 capsule (10 mg total) by mouth daily, Disp: , Rfl:     fluticasone (FLONASE) 50 mcg/act nasal spray, 1 spray into each nostril daily, Disp: , Rfl:     fluticasone-salmeterol (Advair HFA) 230-21 MCG/ACT inhaler, Inhale 2 puffs 2 (two) times a day, Disp: 12 g, Rfl: 0    levalbuterol (XOPENEX) 1.25 mg/3 mL nebulizer solution, Take 3 mL (1.25 mg total) by nebulization every 8 (eight) hours as needed for wheezing or shortness of breath, Disp: 75 mL, Rfl: 0    lisinopril (ZESTRIL) 10 mg tablet, TAKE 1 TABLET BY MOUTH EVERY DAY, Disp: 90 tablet, Rfl: 0    Loratadine 10 MG CAPS, , Disp: , Rfl:     Magnesium Glycinate 100 MG CAPS, , Disp: , Rfl:     multivitamin (THERAGRAN) TABS, Take 1 tablet by mouth daily, Disp: , Rfl:     omeprazole (PriLOSEC) 40 MG capsule, TAKE 1 CAPSULE (40 MG TOTAL) BY MOUTH DAILY. , Disp: 90 capsule, Rfl: 1    pravastatin (PRAVACHOL) 20 mg tablet, TAKE 1 TABLET BY MOUTH EVERY DAY, Disp: 90 tablet, Rfl: 2    sildenafil (VIAGRA) 50 MG tablet, Take 1 tablet (50 mg total) by mouth daily as needed for erectile dysfunction, Disp: 10 tablet, Rfl: 5    Taurine 500 MG CAPS, Take by mouth, Disp: , Rfl:     diphenhydrAMINE (BENADRYL) 50 mg capsule, Take 1 capsule (50 mg total) by mouth 1 (one) time for 1 dose Take 1 capsule (50 mg) 1 hour prior to CT scan. (Patient not taking: Reported on 10/27/2023), Disp: 30 capsule, Rfl: 0    /86 (BP Location: Right arm, Patient Position: Sitting, Cuff Size: Standard)   Pulse 72   Ht 5' 7" (1.702 m)   Wt 83.5 kg (184 lb)   BMI 28.82 kg/m²          Physical Exam  Vitals and nursing note reviewed. Exam conducted with a chaperone present. Constitutional:       Appearance: Normal appearance. HENT:      Head: Normocephalic and atraumatic. Eyes:      Extraocular Movements: Extraocular movements intact. Neck:      Vascular: No carotid bruit. Cardiovascular:      Pulses:           Femoral pulses are 2+ on the right side and 2+ on the left side. Posterior tibial pulses are 2+ on the right side and 2+ on the left side. Heart sounds: Normal heart sounds. Pulmonary:      Effort: Pulmonary effort is normal.   Abdominal:      General: Bowel sounds are normal.   Musculoskeletal:         General: No swelling. Normal range of motion. Skin:     General: Skin is warm. Neurological:      General: No focal deficit present. Mental Status: He is alert and oriented to person, place, and time.    Psychiatric:         Mood and Affect: Mood normal.         Behavior: Behavior normal.

## 2024-01-21 DIAGNOSIS — I10 BENIGN ESSENTIAL HYPERTENSION: ICD-10-CM

## 2024-01-22 RX ORDER — LISINOPRIL 10 MG/1
10 TABLET ORAL DAILY
Qty: 30 TABLET | Refills: 0 | Status: SHIPPED | OUTPATIENT
Start: 2024-01-22

## 2024-02-14 DIAGNOSIS — I10 BENIGN ESSENTIAL HYPERTENSION: ICD-10-CM

## 2024-02-14 RX ORDER — LISINOPRIL 10 MG/1
10 TABLET ORAL DAILY
Qty: 90 TABLET | Refills: 1 | Status: SHIPPED | OUTPATIENT
Start: 2024-02-14

## 2024-03-01 ENCOUNTER — TELEPHONE (OUTPATIENT)
Age: 74
End: 2024-03-01

## 2024-03-01 NOTE — TELEPHONE ENCOUNTER
Pt called, he used to have a penicillin allergy when he was a kid, he does not remember ever having penicillin since.  (Not in allergy list).  Pt said his dentist wants to give him amoxil and he wants to ask Dr Styles if he thinks its ok.

## 2024-03-21 NOTE — PROGRESS NOTES
Daily Note     Today's date: 2021  Patient name: Tru Balderas  : 1950  MRN: [id number]  Referring provider: Shannon Cowan MD  Dx:   Encounter Diagnosis     ICD-10-CM    1  Traumatic complete tear of right rotator cuff, initial encounter  S46 011A        Start Time: 0900  Stop Time: 09  Total time in clinic (min): 45 minutes    Subjective: Patient reports, "I over did it yesterday out in the yard shoveling and using the hedger  My shoulder is pretty sore today "       Objective: See treatment diary below'      Assessment: Patient tolerated treatment well  Patient able to complete all sets and reps despite increased soreness  Patient demonstrated tightness in the posterior capsule  Post manual treatment patient had increased mobility in the AP direction  Patient fatigued post session  Patient would benefit from continued PT      Plan: Continue per plan of care        Precautions: Cardiac history, previous stroke       Manuals       GH AP mobs   ACL    ACL ACL      Scapular Mobs              Manual stretching             STM posterolateral shoulder              LLL   ACL ACL         Neuro Re-Ed             Body blade  2x30"  3x30" 2x30"  2x30"  2x30" 2x30" 2x30"      Prone scapular retractions              Wall ball circles  Red ball   3x10 CW and CCW Red ball   3x10 CW and CCW Red ball   3x10 CW and CCW Red ball   3x10 CW and CCW Yel ball   3x10 CW and CCW Yel ball   3x10 CW and CCW Yel ball   3x10 CW and CCW                                                          Ther Ex             Serratus wall slides  3x10 with bolster 3x10 with bolster 3x10 with bolster 3x10 with bolster 3x10 with pball 3x10 with pball 3x10 with pball      Serratus punches   With TB  GTB  3x10 GTB  3x10 GTB  3x10 GTB  3x10         Resisted rows  GTB 3x10   GTB 3x10 GTB  3x10 GTB  3x10 BTB  3x10 BTB  3x10 15# 3x10       Resisted extensions  GTB 3x10 GTB 3x10 GTB  3x10 GTB  3x10 BTB  3x10 BTB  3x10 10#   3x10      Sidelying ER 3x10 1#  3x10 1#  3x10 1#  3x10 2#  3x10 2#  3x10 2#  3x10 2#      ER/IR resisted TB RTB 3x10 ea  GTB 3x10 ea GTB 3x10 ea GTB  3x10 BTB  3x10 ea  BTB  3x10 ea Pedro 3# ER  8# IR  3x10 ea      Bicep curls              Active Assist ABD with cane   3x10  3x10  3x10  3x10 3x10 2x10      Supine serratus punches   3x10 2#  3x10 2#  3x10 2#          Ther Activity             Lateral raises P! Could not perform      seated with arm on table at mid range and raise to 90 degrees     3x10 seated with arm on table at mid range and raise to 90 degrees   3x10      Front raises  2x10 2# 2x10 3# 2x10 3# 2x10 3# 2x10 3# 2x10 3# 2x10 3#      Scaption raises 2x10 2# 2x10 3# 2x10 3# 2x10 3# 2x10 3# 2x10 3# 2x10 3#      Gait Training                                       HEP             See media  [Normal] : normal gait, coordination grossly intact, no focal deficits and deep tendon reflexes were 2+ and symmetric

## 2024-04-03 ENCOUNTER — HOSPITAL ENCOUNTER (OUTPATIENT)
Dept: NON INVASIVE DIAGNOSTICS | Facility: CLINIC | Age: 74
Discharge: HOME | End: 2024-04-03
Payer: COMMERCIAL

## 2024-04-03 DIAGNOSIS — I65.22 CAROTID STENOSIS, ASYMPTOMATIC, LEFT: ICD-10-CM

## 2024-04-03 PROCEDURE — 93880 EXTRACRANIAL BILAT STUDY: CPT

## 2024-04-03 PROCEDURE — 93880 EXTRACRANIAL BILAT STUDY: CPT | Performed by: SURGERY

## 2024-04-11 ENCOUNTER — HOSPITAL ENCOUNTER (EMERGENCY)
Facility: HOSPITAL | Age: 74
Discharge: HOME | End: 2024-04-11
Attending: EMERGENCY MEDICINE
Payer: COMMERCIAL

## 2024-04-11 ENCOUNTER — APPOINTMENT (EMERGENCY)
Dept: RADIOLOGY | Facility: HOSPITAL | Age: 74
End: 2024-04-11
Payer: COMMERCIAL

## 2024-04-11 VITALS
TEMPERATURE: 97.8 F | SYSTOLIC BLOOD PRESSURE: 138 MMHG | OXYGEN SATURATION: 99 % | RESPIRATION RATE: 18 BRPM | HEART RATE: 52 BPM | DIASTOLIC BLOOD PRESSURE: 84 MMHG

## 2024-04-11 DIAGNOSIS — R03.0 ELEVATED BLOOD PRESSURE READING: ICD-10-CM

## 2024-04-11 DIAGNOSIS — R07.9 CHEST PAIN, UNSPECIFIED TYPE: Primary | ICD-10-CM

## 2024-04-11 LAB
2HR DELTA HS TROPONIN: -1 NG/L
ALBUMIN SERPL BCP-MCNC: 4 G/DL (ref 3.5–5)
ALP SERPL-CCNC: 49 U/L (ref 34–104)
ALT SERPL W P-5'-P-CCNC: 13 U/L (ref 7–52)
ANION GAP SERPL CALCULATED.3IONS-SCNC: 8 MMOL/L (ref 4–13)
AST SERPL W P-5'-P-CCNC: 15 U/L (ref 13–39)
BASOPHILS # BLD AUTO: 0.05 THOUSANDS/ÂΜL (ref 0–0.1)
BASOPHILS NFR BLD AUTO: 1 % (ref 0–1)
BILIRUB SERPL-MCNC: 0.68 MG/DL (ref 0.2–1)
BUN SERPL-MCNC: 15 MG/DL (ref 5–25)
CALCIUM SERPL-MCNC: 8.9 MG/DL (ref 8.4–10.2)
CARDIAC TROPONIN I PNL SERPL HS: 14 NG/L
CARDIAC TROPONIN I PNL SERPL HS: 15 NG/L
CHLORIDE SERPL-SCNC: 104 MMOL/L (ref 96–108)
CO2 SERPL-SCNC: 26 MMOL/L (ref 21–32)
CREAT SERPL-MCNC: 0.93 MG/DL (ref 0.6–1.3)
EOSINOPHIL # BLD AUTO: 0.25 THOUSAND/ÂΜL (ref 0–0.61)
EOSINOPHIL NFR BLD AUTO: 4 % (ref 0–6)
ERYTHROCYTE [DISTWIDTH] IN BLOOD BY AUTOMATED COUNT: 13.2 % (ref 11.6–15.1)
GFR SERPL CREATININE-BSD FRML MDRD: 82 ML/MIN/1.73SQ M
GLUCOSE SERPL-MCNC: 94 MG/DL (ref 65–140)
HCT VFR BLD AUTO: 42.6 % (ref 36.5–49.3)
HGB BLD-MCNC: 14 G/DL (ref 12–17)
IMM GRANULOCYTES # BLD AUTO: 0.01 THOUSAND/UL (ref 0–0.2)
IMM GRANULOCYTES NFR BLD AUTO: 0 % (ref 0–2)
LYMPHOCYTES # BLD AUTO: 2.43 THOUSANDS/ÂΜL (ref 0.6–4.47)
LYMPHOCYTES NFR BLD AUTO: 35 % (ref 14–44)
MCH RBC QN AUTO: 30.9 PG (ref 26.8–34.3)
MCHC RBC AUTO-ENTMCNC: 32.9 G/DL (ref 31.4–37.4)
MCV RBC AUTO: 94 FL (ref 82–98)
MONOCYTES # BLD AUTO: 0.63 THOUSAND/ÂΜL (ref 0.17–1.22)
MONOCYTES NFR BLD AUTO: 9 % (ref 4–12)
NEUTROPHILS # BLD AUTO: 3.54 THOUSANDS/ÂΜL (ref 1.85–7.62)
NEUTS SEG NFR BLD AUTO: 51 % (ref 43–75)
NRBC BLD AUTO-RTO: 0 /100 WBCS
PLATELET # BLD AUTO: 161 THOUSANDS/UL (ref 149–390)
PMV BLD AUTO: 11.5 FL (ref 8.9–12.7)
POTASSIUM SERPL-SCNC: 3.7 MMOL/L (ref 3.5–5.3)
PROT SERPL-MCNC: 6.8 G/DL (ref 6.4–8.4)
RBC # BLD AUTO: 4.53 MILLION/UL (ref 3.88–5.62)
SODIUM SERPL-SCNC: 138 MMOL/L (ref 135–147)
WBC # BLD AUTO: 6.91 THOUSAND/UL (ref 4.31–10.16)

## 2024-04-11 PROCEDURE — 99285 EMERGENCY DEPT VISIT HI MDM: CPT | Performed by: EMERGENCY MEDICINE

## 2024-04-11 PROCEDURE — 85025 COMPLETE CBC W/AUTO DIFF WBC: CPT | Performed by: EMERGENCY MEDICINE

## 2024-04-11 PROCEDURE — 36415 COLL VENOUS BLD VENIPUNCTURE: CPT

## 2024-04-11 PROCEDURE — 80053 COMPREHEN METABOLIC PANEL: CPT | Performed by: EMERGENCY MEDICINE

## 2024-04-11 PROCEDURE — 99285 EMERGENCY DEPT VISIT HI MDM: CPT

## 2024-04-11 PROCEDURE — 71045 X-RAY EXAM CHEST 1 VIEW: CPT

## 2024-04-11 PROCEDURE — 84484 ASSAY OF TROPONIN QUANT: CPT | Performed by: EMERGENCY MEDICINE

## 2024-04-11 PROCEDURE — 93005 ELECTROCARDIOGRAM TRACING: CPT

## 2024-04-11 NOTE — ED PROVIDER NOTES
"History  Chief Complaint   Patient presents with    Chest Pain     C/o chest pain that radiated to left arm that woke him up out of his sleep. States the pain lasted a few minutes. Denies chest pain currently. Endorses lightheadedness. Hx of stents.     HPI    (Jimmy Galvan) Jimmy Galvan is a 73 y.o. male who identifies as male with a significant PMHx of CAD s/p stent placed over 10 years ago presents to the emergency department on April 11, 2024 for chest pain onset 1:30 AM. Pain is brief, described as \"shooting\", located in L chest, with radiation down L arm, rated 0/10 currently. Pt was sleeping and the pain woke him up, but symptoms lasted only a few seconds. States pain is different than when he had to get stents, but was concerned because he was told by his doctor to be evaluated if he ever has chest pain. Denies nausea, vomiting, SOB, diaphoresis, palpitations, leg swelling. Patient denies fever, chills, weakness/numbness, abdominal pain, or any other complaint at this time.      Allergies include:  Allergies   Allergen Reactions    Depo-Medrol [Methylprednisolone] Anaphylaxis     Severe Vagal Reaction    Iv Contrast [Iodinated Contrast Media] Angioedema     Pt states itchy/swollen eyes, trouble breathing (years ago)    Rosuvastatin Headache and Confusion    Sulfa Antibiotics Hives    Atorvastatin Confusion         Immunizations:  Immunization History   Administered Date(s) Administered    COVID-19 MODERNA VACC 0.5 ML IM 01/22/2021, 02/19/2021, 11/09/2021    INFLUENZA 12/19/2016, 09/25/2017, 09/18/2018, 09/24/2021, 09/30/2023    Influenza Quadrivalent Preservative Free 3 years and older IM 09/25/2017    Influenza Quadrivalent, 6-35 Months IM 12/19/2016    Influenza, high dose seasonal 0.7 mL 01/09/2020, 09/04/2020    Influenza, seasonal, injectable 11/14/2012, 11/12/2013    Pneumococcal Conjugate 13-Valent 08/30/2019    Pneumococcal Polysaccharide PPV23 12/17/2007, 01/26/2018    TD (adult) Preservative Free " 08/30/2019    Td (adult), adsorbed 08/30/2019     Immunizations Reviewed.    Prior to Admission Medications   Prescriptions Last Dose Informant Patient Reported? Taking?   Coenzyme Q10 10 MG capsule 4/10/2024 Spouse/Significant Other, Self No Yes   Sig: Take 1 capsule (10 mg total) by mouth daily   Fluticasone-Salmeterol (Wixela Inhub) 250-50 mcg/dose inhaler Unknown  No No   Sig: Inhale 1 puff every 12 (twelve) hours Rinse mouth after use.   Loratadine 10 MG CAPS  Self, Spouse/Significant Other Yes No   Patient not taking: Reported on 3/29/2024   Magnesium Glycinate 100 MG CAPS  Self, Spouse/Significant Other Yes No   Patient not taking: Reported on 3/29/2024   Taurine 500 MG CAPS  Self, Spouse/Significant Other Yes No   Sig: Take by mouth   Patient not taking: Reported on 3/29/2024   acetaminophen (TYLENOL) 325 mg tablet  Self, Spouse/Significant Other No No   Sig: Take 2 tablets (650 mg total) by mouth every 6 (six) hours as needed for mild pain, headaches or fever   Patient not taking: Reported on 3/29/2024   albuterol (PROVENTIL HFA,VENTOLIN HFA) 90 mcg/act inhaler  Self, Spouse/Significant Other No No   Sig: Inhale 2 puffs every 6 (six) hours as needed for wheezing   Patient not taking: Reported on 3/29/2024   albuterol (ProAir HFA) 90 mcg/act inhaler Unknown  No No   Sig: Inhale 2 puffs every 4 (four) hours as needed for wheezing (20 minutes before exertion.)   aspirin (ECOTRIN LOW STRENGTH) 81 mg EC tablet 4/10/2024 Self, Spouse/Significant Other Yes Yes   Sig: Take 324 mg by mouth every other day   cholecalciferol (VITAMIN D3) 1,000 units tablet Unknown Spouse/Significant Other, Self Yes No   Sig: Take 1,000 Units by mouth daily   diphenhydrAMINE (BENADRYL) 50 mg capsule  Self, Spouse/Significant Other No No   Sig: Take 1 capsule (50 mg total) by mouth 1 (one) time for 1 dose Take 1 capsule (50 mg) 1 hour prior to CT scan.   Patient not taking: Reported on 10/27/2023   fluticasone (FLONASE) 50 mcg/act nasal  spray Unknown Self, Spouse/Significant Other Yes No   Si spray into each nostril daily   fluticasone-salmeterol (Advair HFA) 230-21 MCG/ACT inhaler Unknown Self, Spouse/Significant Other No No   Sig: Inhale 2 puffs 2 (two) times a day   levalbuterol (XOPENEX) 1.25 mg/3 mL nebulizer solution  Self, Spouse/Significant Other No No   Sig: Take 3 mL (1.25 mg total) by nebulization every 8 (eight) hours as needed for wheezing or shortness of breath   Patient not taking: Reported on 3/29/2024   levalbuterol (Xopenex) 1.25 mg/3 mL nebulizer solution Unknown  No No   Sig: Take 3 mL (1.25 mg total) by nebulization every 6 (six) hours as needed for wheezing or shortness of breath (with cough or chest tightness and 20 minutes before exertion) for up to 90 doses   lisinopril (ZESTRIL) 10 mg tablet 4/10/2024  No Yes   Sig: TAKE 1 TABLET BY MOUTH EVERY DAY   multivitamin (THERAGRAN) TABS Unknown Self, Spouse/Significant Other Yes No   Sig: Take 1 tablet by mouth daily   omeprazole (PriLOSEC) 40 MG capsule 4/10/2024 Self, Spouse/Significant Other No Yes   Sig: TAKE 1 CAPSULE (40 MG TOTAL) BY MOUTH DAILY.   pravastatin (PRAVACHOL) 20 mg tablet 4/10/2024 Spouse/Significant Other, Self No Yes   Sig: TAKE 1 TABLET BY MOUTH EVERY DAY   sildenafil (VIAGRA) 50 MG tablet  Self, Spouse/Significant Other No No   Sig: Take 1 tablet (50 mg total) by mouth daily as needed for erectile dysfunction   Patient not taking: Reported on 3/29/2024      Facility-Administered Medications: None       Past Medical History:   Diagnosis Date    Allergic reaction     LAST ASSESSED: 2013    Asthma     Bursitis of heel     LAST ASSESSED: 2013    Colon polyp     Derangement of meniscus of left knee due to old injury     LAST ASSESSED: 2013    Diverticulitis of colon     LAST ASSESSED: 2013    Dizziness     LAST ASSESSED: 2013    GERD (gastroesophageal reflux disease)     History of colon polyps     Hypertension     Kidney stone      Malignant hypertension     LAST ASSESSED: [id number]    Myocardial infarction (HCC)     Non-ST elevated myocardial infarction (HCC) 2020    NSTEMI (non-ST elevated myocardial infarction) (HCC)     Plantar fasciitis     Seasonal allergies     Shoulder impingement     LAST ASSESSED: 2012    Stroke (HCC)        Past Surgical History:   Procedure Laterality Date    APPENDECTOMY      CAROTID ENDARTARECTOMY      COLECTOMY Left     PARTIAL - SIGMOID; HEMICOLECTOMY FOR DIVERTICULITIS; ONSET:     COLON SURGERY      COLONOSCOPY N/A 2016    Procedure: COLONOSCOPY;  Surgeon: Silvia Caceres MD;  Location: [facility];  Service:     HERNIA REPAIR      VENTRAL    NISSEN FUNDOPLICATION      ESOPHAGOGASTRIC FUNDOPLASTY LAST ASSESSED: 2014    SINUS SURGERY      THROMBOENDARTERECTOMY      CAROTID; ONSET; MAY 2012       Family History   Problem Relation Age of Onset    Hypertension Mother     Hyperlipidemia Mother     Breast cancer Mother     Hypertension Father     Hyperlipidemia Father     Colon cancer Paternal Grandfather     Cancer Paternal Grandfather     Throat cancer Maternal Aunt      I have reviewed and agree with the history as documented.    E-Cigarette/Vaping    E-Cigarette Use Never User      E-Cigarette/Vaping Substances    Nicotine No     THC No     CBD No     Flavoring No     Other No     Unknown No      Social History     Tobacco Use    Smoking status: Former     Current packs/day: 0.00     Average packs/day: 0.3 packs/day for 5.0 years (1.3 ttl pk-yrs)     Types: Cigarettes     Start date:      Quit date: 1960     Years since quittin.3    Smokeless tobacco: Never   Vaping Use    Vaping status: Never Used   Substance Use Topics    Alcohol use: No    Drug use: No        Review of Systems   Constitutional:  Negative for activity change, fever and unexpected weight change.   Respiratory:  Negative for cough, chest tightness and shortness of breath.    Cardiovascular:  Positive for chest  pain. Negative for palpitations and leg swelling.   Gastrointestinal:  Negative for abdominal pain, diarrhea, nausea and vomiting.   Genitourinary:  Negative for dysuria and hematuria.   Skin:  Negative for wound.   Allergic/Immunologic: Negative for immunocompromised state.   Neurological:  Negative for syncope.   All other systems reviewed and are negative.      Physical Exam  ED Triage Vitals [04/11/24 0242]   Temperature Pulse Respirations Blood Pressure SpO2   97.8 °F (36.6 °C) 63 18 (!) 229/104 99 %      Temp Source Heart Rate Source Patient Position - Orthostatic VS BP Location FiO2 (%)   Oral Monitor Sitting Right arm --      Pain Score       No Pain             Orthostatic Vital Signs  Vitals:    04/11/24 0242 04/11/24 0300 04/11/24 0455   BP: (!) 229/104 (!) 215/105 138/84   Pulse: 63 58 (!) 52   Patient Position - Orthostatic VS: Sitting Sitting Sitting       Physical Exam  Vitals and nursing note reviewed.   Constitutional:       General: He is not in acute distress.     Appearance: Normal appearance. He is not ill-appearing.   HENT:      Head: Normocephalic and atraumatic.      Nose: Nose normal.   Eyes:      Extraocular Movements: Extraocular movements intact.      Conjunctiva/sclera: Conjunctivae normal.   Cardiovascular:      Rate and Rhythm: Normal rate and regular rhythm.      Pulses: Normal pulses.           Radial pulses are 2+ on the right side and 2+ on the left side.        Dorsalis pedis pulses are 2+ on the right side and 2+ on the left side.      Heart sounds: Normal heart sounds. No murmur heard.     No friction rub. No gallop.   Pulmonary:      Effort: Pulmonary effort is normal. No respiratory distress.      Breath sounds: Normal breath sounds. No wheezing.   Abdominal:      General: Bowel sounds are normal.      Palpations: Abdomen is soft.      Tenderness: There is no abdominal tenderness.   Musculoskeletal:         General: No swelling or tenderness. Normal range of motion.       Cervical back: Normal range of motion. No rigidity or tenderness.      Right lower leg: No edema.      Left lower leg: No edema.   Skin:     General: Skin is warm and dry.      Capillary Refill: Capillary refill takes less than 2 seconds.   Neurological:      Mental Status: He is alert and oriented to person, place, and time.      Cranial Nerves: No cranial nerve deficit.      Sensory: No sensory deficit.      Motor: No weakness.      Comments: Patient is speaking clearly in complete sentences.  Patient is answering appropriately and able follow commands.  Patient is moving all four extremities spontaneously.  No facial droop.  Tongue midline.         ED Medications  Medications - No data to display    Diagnostic Studies  Results Reviewed       Procedure Component Value Units Date/Time    HS Troponin I 2hr [[id number]]  (Normal) Collected: 04/11/24 0503    Lab Status: Final result Specimen: Blood from Arm, Right Updated: 04/11/24 0544     hs TnI 2hr 14 ng/L      Delta 2hr hsTnI -1 ng/L     Comprehensive metabolic panel [[id number]] Collected: 04/11/24 0337    Lab Status: Final result Specimen: Blood from Hand, Right Updated: 04/11/24 0411     Sodium 138 mmol/L      Potassium 3.7 mmol/L      Chloride 104 mmol/L      CO2 26 mmol/L      ANION GAP 8 mmol/L      BUN 15 mg/dL      Creatinine 0.93 mg/dL      Glucose 94 mg/dL      Calcium 8.9 mg/dL      AST 15 U/L      ALT 13 U/L      Alkaline Phosphatase 49 U/L      Total Protein 6.8 g/dL      Albumin 4.0 g/dL      Total Bilirubin 0.68 mg/dL      eGFR 82 ml/min/1.73sq m     Narrative:      National Kidney Disease Foundation guidelines for Chronic Kidney Disease (CKD):     Stage 1 with normal or high GFR (GFR > 90 mL/min/1.73 square meters)    Stage 2 Mild CKD (GFR = 60-89 mL/min/1.73 square meters)    Stage 3A Moderate CKD (GFR = 45-59 mL/min/1.73 square meters)    Stage 3B Moderate CKD (GFR = 30-44 mL/min/1.73 square meters)    Stage 4 Severe CKD (GFR = 15-29 mL/min/1.73  square meters)    Stage 5 End Stage CKD (GFR <15 mL/min/1.73 square meters)  Note: GFR calculation is accurate only with a steady state creatinine    HS Troponin I 4hr [[id number]]     Lab Status: No result Specimen: Blood     HS Troponin 0hr (reflex protocol) [[id number]]  (Normal) Collected: 04/11/24 0245    Lab Status: Final result Specimen: Blood from Arm, Left Updated: 04/11/24 0319     hs TnI 0hr 15 ng/L     CBC and differential [[id number]] Collected: 04/11/24 0245    Lab Status: Final result Specimen: Blood from Arm, Left Updated: 04/11/24 0257     WBC 6.91 Thousand/uL      RBC 4.53 Million/uL      Hemoglobin 14.0 g/dL      Hematocrit 42.6 %      MCV 94 fL      MCH 30.9 pg      MCHC 32.9 g/dL      RDW 13.2 %      MPV 11.5 fL      Platelets 161 Thousands/uL      nRBC 0 /100 WBCs      Segmented % 51 %      Immature Grans % 0 %      Lymphocytes % 35 %      Monocytes % 9 %      Eosinophils Relative 4 %      Basophils Relative 1 %      Absolute Neutrophils 3.54 Thousands/µL      Absolute Immature Grans 0.01 Thousand/uL      Absolute Lymphocytes 2.43 Thousands/µL      Absolute Monocytes 0.63 Thousand/µL      Eosinophils Absolute 0.25 Thousand/µL      Basophils Absolute 0.05 Thousands/µL                    XR chest 1 view portable   ED Interpretation by Mary Beth Bryan MD (04/11 0335)   No acute cardiopulmonary findings. Independently interpreted by myself.            Procedures  ECG 12 Lead Documentation Only    Date/Time: 4/11/2024 3:37 AM    Performed by: Mary Beth Bryan MD  Authorized by: Mary Beth Bryan MD    ECG reviewed by me, the ED Provider: yes    Patient location:  ED  Previous ECG:     Previous ECG:  Compared to current    Comparison ECG info:  9/2/2022    Comparison to cardiac monitor: Yes    Interpretation:     Interpretation: normal    Rate:     ECG rate:  60    ECG rate assessment: normal    Rhythm:     Rhythm: sinus rhythm    Ectopy:     Ectopy: none    QRS:     QRS axis:  Normal    QRS intervals:   Normal  Conduction:     Conduction: normal    ST segments:     ST segments:  Normal  T waves:     T waves: normal          ED Course  ED Course as of 04/11/24 0625   Thu Apr 11, 2024   0250 DDx includes but not limited to:  ACS, PNA, PTX, anemia, electrolyte abnormality, musculoskeletal   0333 hs TnI 0hr: 15  Pending 2H trop   0335 XR chest 1 view portable  No acute cardiopulmonary findings. Independently interpreted by myself.   0548 hs TnI 2hr: 14  stable   0624 No acute findings on workup. Suspect pain is musculoskeletal in origin. Patient will need to follow up with cardiology for further management. No further workup indicated at this time.    0625 Discussed results with patient and plan for discharge with outpatient follow up. Instructed patient to apply lidocaine patches or OTC creams as needed for discomfort and to return to ED for new or worsening symptoms. Patient voices understanding and agrees with plan. No other concerns at this time.             HEART Risk Score      Flowsheet Row Most Recent Value   Heart Score Risk Calculator    History 0 Filed at: 04/11/2024 0333   ECG 0 Filed at: 04/11/2024 0333   Age 2 Filed at: 04/11/2024 0333   Risk Factors 2 Filed at: 04/11/2024 0333   Troponin 1 Filed at: 04/11/2024 0333   HEART Score 5 Filed at: 04/11/2024 0333                                  Medical Decision Making  Amount and/or Complexity of Data Reviewed  Labs: ordered. Decision-making details documented in ED Course.  Radiology: ordered and independent interpretation performed. Decision-making details documented in ED Course.        See ED course for MDM.    Disposition  Final diagnoses:   Chest pain, unspecified type   Elevated blood pressure reading     Time reflects when diagnosis was documented in both MDM as applicable and the Disposition within this note       Time User Action Codes Description Comment    4/11/2024  5:59 AM Mary Beth Bryan Add [R07.9] Chest pain, unspecified type     4/11/2024  6:00  Mary Beth Ba Add [R03.0] Elevated blood pressure reading           ED Disposition       ED Disposition   Discharge    Condition   Stable    Date/Time   Thu Apr 11, 2024 0553    Comment   Jimmy Galvan discharge to home/self care.                   Follow-up Information       Follow up With Specialties Details Why Contact Info    Nilo Styles MD Family Medicine  As needed [address] [place] [id number] [phone]      Your cardiologist  In 2 days              Patient's Medications   Discharge Prescriptions    No medications on file         PDMP Review       None             ED Provider  Attending physically available and evaluated Jimmy Galvan. I managed the patient along with the ED Attending.    Electronically Signed by           Mary Beth Bryan MD  04/11/24 0625

## 2024-04-11 NOTE — ED ATTENDING ATTESTATION
4/11/2024  I, Shyam Moody MD, saw and evaluated the patient. I have discussed the patient with the resident/non-physician practitioner and agree with the resident's/non-physician practitioner's findings, Plan of Care, and MDM as documented in the resident's/non-physician practitioner's note, except where noted. All available labs and Radiology studies were reviewed.  I was present for key portions of any procedure(s) performed by the resident/non-physician practitioner and I was immediately available to provide assistance.    At this point I agree with the current assessment done in the Emergency Department. I have conducted an independent evaluation of this patient a history and physical is as follows:    This is a 73 y.o. old male who presents to the ED for evaluation of chest pain. Shooting pain that awoke from sleep L pec down arm. Has not resolved. No assoc symptoms. Hx CAD s/p stent in the past.    VS and nursing notes reviewed  General: Appears in NAD, awake, alert, speaking normally in full sentences.   Well-nourished, well-developed. Appears stated age.   Head: Normocephalic, atraumatic.  Eyes: EOMI. Vision grossly normal. No subconjunctival hemorrhages or occular discharge noted. Symmetrical lids.   ENT: Atraumatic external nose and ears. No stridor. Normal phonation. No drooling. Normal swallowing.   Neck: No JVD. FROM. No goiter  CV: No pallor. Normal rate. Slight anterior CW tenderness  Lungs: No tachypnea. No respiratory distress.  MSK: Moving all extremities equally, no peripheral edema  Skin: Dry, intact. No cyanosis  Neuro: Awake, alert, GCS15. CN II-XII grossly intact. Grossly normal gait.  Psychiatric/Behavioral: Appropriate mood and affect.     A/P: This is a 73 y.o. male who presents to the ED for evaluation of chest pain. Cardiac labs. Pain control. Reeval and dispo accordingly.    ED Course       Critical Care Time  Procedures

## 2024-04-12 ENCOUNTER — VBI (OUTPATIENT)
Dept: FAMILY MEDICINE CLINIC | Facility: CLINIC | Age: 74
End: 2024-04-12

## 2024-04-12 LAB
ATRIAL RATE: 60 BPM
P AXIS: 84 DEGREES
PR INTERVAL: 182 MS
QRS AXIS: 84 DEGREES
QRSD INTERVAL: 108 MS
QT INTERVAL: 414 MS
QTC INTERVAL: 414 MS
T WAVE AXIS: 61 DEGREES
VENTRICULAR RATE: 60 BPM

## 2024-04-12 PROCEDURE — 93010 ELECTROCARDIOGRAM REPORT: CPT | Performed by: INTERNAL MEDICINE

## 2024-04-12 NOTE — TELEPHONE ENCOUNTER
04/12/24 11:05 AM    Patient contacted post ED visit, [facility] department spoke with patient/caregiver and outreach was successful.    Thank you.  Yane Charles  [facility]

## 2024-04-24 ENCOUNTER — RA CDI HCC (OUTPATIENT)
Dept: OTHER | Facility: HOSPITAL | Age: 74
End: 2024-04-24

## 2024-04-24 DIAGNOSIS — Z86.73 PERSONAL HISTORY OF TRANSIENT ISCHEMIC ATTACK (TIA), AND CEREBRAL INFARCTION WITHOUT RESIDUAL DEFICITS: Primary | ICD-10-CM

## 2024-04-24 PROBLEM — I25.2 OLD MYOCARDIAL INFARCT: Status: ACTIVE | Noted: 2024-04-24

## 2024-04-24 PROBLEM — I25.2 OLD MYOCARDIAL INFARCT: Status: ACTIVE | Noted: 2023-10-27

## 2024-04-29 ENCOUNTER — PROCEDURE VISIT (OUTPATIENT)
Dept: FAMILY MEDICINE CLINIC | Facility: CLINIC | Age: 74
End: 2024-04-29
Payer: COMMERCIAL

## 2024-04-29 VITALS
WEIGHT: 200.5 LBS | BODY MASS INDEX: 30.39 KG/M2 | HEART RATE: 64 BPM | DIASTOLIC BLOOD PRESSURE: 88 MMHG | RESPIRATION RATE: 16 BRPM | TEMPERATURE: 98.3 F | OXYGEN SATURATION: 98 % | HEIGHT: 68 IN | SYSTOLIC BLOOD PRESSURE: 164 MMHG

## 2024-04-29 DIAGNOSIS — M17.11 PRIMARY OSTEOARTHRITIS OF RIGHT KNEE: Primary | ICD-10-CM

## 2024-04-29 PROCEDURE — 20610 DRAIN/INJ JOINT/BURSA W/O US: CPT | Performed by: FAMILY MEDICINE

## 2024-04-29 RX ORDER — KETOROLAC TROMETHAMINE 30 MG/ML
60 INJECTION, SOLUTION INTRAMUSCULAR; INTRAVENOUS ONCE
Status: COMPLETED | OUTPATIENT
Start: 2024-04-29 | End: 2024-04-29

## 2024-04-29 RX ADMIN — KETOROLAC TROMETHAMINE 60 MG: 30 INJECTION, SOLUTION INTRAMUSCULAR; INTRAVENOUS at 08:34

## 2024-04-29 NOTE — PROGRESS NOTES
"Care Management Follow Up    Length of Stay (days): 1    Expected Discharge Date: 10/23/2023       Concerns to be Addressed: covid care     Patient plan of care discussed at interdisciplinary rounds: Yes     Anticipated Discharge Disposition:  LTC     Anticipated Discharge Services:  LTC, PT/OT     Anticipated Discharge DME:  no new DME     Education Provided on the Discharge Plan:  per care team           Additional Information:  Patient presenting with UTI. Supra-pubic change out 10/19. Covid positive 10/21.           Social History:  Riley lives at [facility]. He gets assistance with all ADLs and IADLs.   \"He uses his FWW with assist of 1 staff member at his LTC. He likes to walk in the halls when they are able to walk with him or we ([facility]oma or Elizabeth) walk with him when we visit. All other times he uses the wheelchair for mobility\".  He has a suprapubic catheter. He has glasses, but is still considered blind due to his macular degeneration.    Health transport at discharge.           10/22/23:  Patient to return to [facility].  Spoke with daughter-Homa and admissions-Misa confirmed return. [facility] Stretcher ride scheduled for [id number]. Per Transportation he must go by stretcher due to covid positive. Request to add PT/OT to SNF orders.     CM to follow up with Misa at [facility] and [facility] in AM to confirm all is good for discharge.         Sully Sanders RN      " Large joint arthrocentesis: R knee  Universal Protocol:  Consent: Verbal consent obtained.  Risks and benefits: risks, benefits and alternatives were discussed  Consent given by: patient  Site marked: the operative site was marked  Supporting Documentation  Indications: pain (OA right knee )   Procedure Details  Location: knee - R knee  Preparation: Betadine .  Needle size: 22 G  Ultrasound guidance: no  Approach: anterolateral    Patient tolerance: patient tolerated the procedure well with no immediate complications  Dressing:  Sterile dressing applied    Toradol 60 mg/2 ML       Jimmy was seen today for injections.    Diagnoses and all orders for this visit:    Primary osteoarthritis of right knee  -     Large joint arthrocentesis: R knee  -     ketorolac (TORADOL) 60 mg/2 mL IM injection 60 mg       Symptomatic OA R knee. He is using PRN ES Tylenol.     ** allergic reaction to DepoMedrol and Marcaine ? In the past. No reaction   to Toradol with prior injection.     09/2023 x rays R knee moderate patellofemoral, moderate medial compartment, mild lateral compartment osteoarthrosis manifested by joint space narrowing, marginal osteophyte formation and subchondral sclerosis .

## 2024-06-07 DIAGNOSIS — E78.2 MIXED HYPERLIPIDEMIA: ICD-10-CM

## 2024-06-07 RX ORDER — PRAVASTATIN SODIUM 20 MG
20 TABLET ORAL DAILY
Qty: 90 TABLET | Refills: 1 | Status: SHIPPED | OUTPATIENT
Start: 2024-06-07

## 2024-06-13 ENCOUNTER — OFFICE VISIT (OUTPATIENT)
Dept: FAMILY MEDICINE CLINIC | Facility: CLINIC | Age: 74
End: 2024-06-13
Payer: COMMERCIAL

## 2024-06-13 VITALS
HEART RATE: 68 BPM | OXYGEN SATURATION: 97 % | WEIGHT: 202 LBS | RESPIRATION RATE: 20 BRPM | DIASTOLIC BLOOD PRESSURE: 82 MMHG | TEMPERATURE: 98.1 F | HEIGHT: 68 IN | SYSTOLIC BLOOD PRESSURE: 150 MMHG | BODY MASS INDEX: 30.62 KG/M2

## 2024-06-13 DIAGNOSIS — I25.10 CORONARY ARTERY DISEASE INVOLVING NATIVE CORONARY ARTERY OF NATIVE HEART WITHOUT ANGINA PECTORIS: ICD-10-CM

## 2024-06-13 DIAGNOSIS — I10 UNCONTROLLED HYPERTENSION: ICD-10-CM

## 2024-06-13 DIAGNOSIS — K75.81 NONALCOHOLIC STEATOHEPATITIS: ICD-10-CM

## 2024-06-13 DIAGNOSIS — Z00.00 MEDICARE ANNUAL WELLNESS VISIT, SUBSEQUENT: ICD-10-CM

## 2024-06-13 DIAGNOSIS — E78.2 MIXED HYPERLIPIDEMIA: Primary | ICD-10-CM

## 2024-06-13 DIAGNOSIS — K21.9 GASTROESOPHAGEAL REFLUX DISEASE WITHOUT ESOPHAGITIS: ICD-10-CM

## 2024-06-13 DIAGNOSIS — I65.22 CAROTID STENOSIS, ASYMPTOMATIC, LEFT: ICD-10-CM

## 2024-06-13 PROBLEM — R21 RASH AND OTHER NONSPECIFIC SKIN ERUPTION: Status: RESOLVED | Noted: 2022-10-26 | Resolved: 2024-06-13

## 2024-06-13 PROCEDURE — 99214 OFFICE O/P EST MOD 30 MIN: CPT | Performed by: FAMILY MEDICINE

## 2024-06-13 PROCEDURE — G0439 PPPS, SUBSEQ VISIT: HCPCS | Performed by: FAMILY MEDICINE

## 2024-06-13 NOTE — PROGRESS NOTES
Ambulatory Visit  Name: Jimmy Galvan      : 1950      MRN: [id number]  Encounter Provider: Nilo Styles MD  Encounter Date: 2024   Encounter department: [facility]    Assessment & Plan   1. Mixed hyperlipidemia  -     Lipid panel  2. Uncontrolled hypertension  3. Coronary artery disease involving native coronary artery of native heart without angina pectoris  4. Carotid stenosis, asymptomatic, left  5. Nonalcoholic steatohepatitis  6. Gastroesophageal reflux disease without esophagitis  7. Medicare annual wellness visit, subsequent    Increase Lisinopril to 20 mg daily.  Monitor BP at home.  Repeat lipid panel.  Office visit 6 months.      Depression Screening and Follow-up Plan: Patient was screened for depression during today's encounter. They screened negative with a PHQ-2 score of 0.      Preventive health issues were discussed with patient, and age appropriate screening tests were ordered as noted in patient's After Visit Summary. Personalized health advice and appropriate referrals for health education or preventive services given if needed, as noted in patient's After Visit Summary.    History of Present Illness       Follow up visit for chronic medical problems/AWV   Medications reviewed.     Hypertension with intolerance to multiple blood pressure agents in the past.  Element of white coat syndrome. Currently on Lisinopril 10 mg/day. BP elevated today. No headaches, dizziness. 2024 creatinine 0.93. GFR 82.  Electrolytes normal. Hgb 14.0.     Hyperlipidemia mixed type and CAD with prior intolerance to a number of statins, fibrates and fish oils. He is now on Pravastatin 20 mg/day w/out side effects. 10/2023 lipid profile cholesterol 168. TGs 61. HDL 47. . 2024 LFTs normal. FBS 94.      2024 ER visit for non cardiac chest pain. EKG NSR no acute changes. Troponin x 2 negative. CXR no active disease     2019  s/p admission for NSTEMI.  Elevated troponin peaking  at 4.61.  Cardiac catheterization 95% lesion in the distal RCA.  Status post RENE x2.  Mid circumflex 65% stenosis.  Proximal right coronary artery 50% stenosis.  Echocardiogram normal left ventricular systolic function.  EF 60%.  Hypokinesis of the basal mid inferior walls.  Grade 1 diastolic dysfunction.  Normal right ventricle.  Mildly dilated left atrium.  Mild MR.  No pericardial effusion.  Aortic root normal size.       Lab Results   Component Value Date    WBC 6.91 04/11/2024    HGB 14.0 04/11/2024    HCT 42.6 04/11/2024    MCV 94 04/11/2024     04/11/2024     Lab Results   Component Value Date     12/10/2015    SODIUM 138 04/11/2024    K 3.7 04/11/2024     04/11/2024    CO2 26 04/11/2024    ANIONGAP 11 12/10/2015    AGAP 8 04/11/2024    BUN 15 04/11/2024    CREATININE 0.93 04/11/2024    GLUC 94 04/11/2024    GLUF 102 (H) 08/12/2022    CALCIUM 8.9 04/11/2024    AST 15 04/11/2024    ALT 13 04/11/2024    ALKPHOS 49 04/11/2024    PROT 7.6 11/11/2015    TP 6.8 04/11/2024    BILITOT 0.47 11/11/2015    TBILI 0.68 04/11/2024    EGFR 82 04/11/2024     Lab Results   Component Value Date    CHOLESTEROL 168 10/20/2023    CHOLESTEROL 208 (H) 08/21/2023    CHOLESTEROL 152 05/12/2022     Lab Results   Component Value Date    HDL 47 10/20/2023    HDL 45 08/21/2023    HDL 50 05/12/2022     Lab Results   Component Value Date    TRIG 61 10/20/2023    TRIG 85 08/21/2023    TRIG 58 05/12/2022     Lab Results   Component Value Date    LDLCALC 109 (H) 10/20/2023          Patient Care Team:  Nilo Styles MD as PCP - General  Silvia Caceres MD as Endoscopist  Pia Cueto DO (Cardiology)    Review of Systems   Constitutional:  Negative for appetite change, chills, fatigue, fever and unexpected weight change.   HENT:  Positive for hearing loss. Negative for congestion, ear pain, rhinorrhea, sore throat and trouble swallowing.             Eyes:  Negative for visual disturbance.   Respiratory:  Negative for  cough, shortness of breath and wheezing.         History of asthma he uses infrequent Albuterol MDI.  No orthopnea or PND   Cardiovascular:  Negative for chest pain, palpitations and leg swelling.        See HPI. Carotid artery stenosis.  Status post right CEA.  04/2024 Carotid artery Dopplers  right ICA patent internal carotid artery and endarterectomy site.  Left ICA  < 50 % stenosis-no change from 2023.        03/2017 nuclear stress test normal.   Gastrointestinal:  Positive for abdominal distention. Negative for abdominal pain, blood in stool, constipation, diarrhea, nausea and vomiting.        IBS with diarrhea. Episodes of postprandial abdominal bloating.  Multiple abdominal surgeries.  03/2021 colonoscopy mild diverticulosis left colon.  Evidence of previous end to end sigmoid resection.  Four sessile polyps.Biopsies no microscopic colitis.  Polyps benign-tubular adenomas      GERD on Omeprazole 40 mg/day 03/2021 EGD normal esophagus. Status post fundoplication. Biopsies no dysplasia or malignancy.      08/2019 EGD Genao's esophagus.  Multiple small superficial ulcer craters in gastric antrum.      Fatty liver. 04/2021 u/s elastography Metavir score of F0 - F1, Absent or Mild Fibrosis. Liver steatosis grading:  S2 (33% - 66% steatosis). 03/2021 LFTs normal. Ultrasound 06/2019 + fatty liver.  08/2019 alpha 1 antitrypsin level normal.  Celiac antibodies negative.  Iron 84.  Ferritin 349. Hepatitis B surface antigen negative.  Hepatitis-C antibody negative.  Anti smooth muscle antibody negative.  Ceruloplasmin level normal.  RED negative.  SPEP no monoclonal bands.  PEREZ Fibroscore minimal to moderate steatosis.  No fibrosis.  Borderline to probable PEREZ    Lab Results       Component                Value               Date                       ALT                      13                  04/11/2024                 AST                      15                  04/11/2024                 GGT                       14                  08/17/2019                 ALKPHOS                  49                  04/11/2024                 BILITOT                  0.68            04/11/2024           Endocrine: Negative for polydipsia and polyuria.   Genitourinary:  Negative for difficulty urinating.        Lab Results       Component                Value               Date                       PSA                      0.9                 10/13/2021                 PSA                      0.7                 02/24/2020                 PSA                      0.5                 03/11/2017                              '   Musculoskeletal:  Positive for back pain. Negative for arthralgias and myalgias.        Status post fall 09/2020 landing on his right shoulder and right elbow.  MRI right shoulder Supraspinatus tendinosis with full-thickness anterior leading edge tear spanning 9 mm anteroposteriorly and the torn tendon edge proximally retracted by 7 mm.  Moderate infraspinatus tendinosis without discrete tear.  Moderate subscapularis tendinosis with partial-thickness midsubstance tear resulting in medial subluxation of long head of biceps which is perched over the greater tuberosity.  Moderate long head of biceps tendinosis with low-grade interstitial tear.  Moderate acromioclavicular degenerative arthropathy. Symptom improvement with PT.  10/2020 CRP < 3.0. 11/2020 x-rays of right shoulder separate os septic/calcific densities seen about the shoulder joint noted lateral to the acromion process and above AC joint.  Mild osteoarthritis of glenohumeral joint.  Subacromial spurring.    History of gout/hyperuricemia He is no longer taking Allopurinol or Colchicine due to GI side effects.  08/2020 uric acid level 6.3 improved from 9.7 in 2014.  He has a history of multiple drug allergies/sensitivities.  He is on low-dose Aspirin 81 mg daily.      01/2020 x-rays lumbar spine mild loss of disc at L5-S1.  Facet degenerative changes  particularly L3-L4 through L5-S1.    Lab Results       Component                Value               Date                       URICACID                 6.3                 08/25/2020                              Skin:  Negative for rash.   Allergic/Immunologic: Negative for environmental allergies.   Neurological:  Negative for dizziness and headaches.   Hematological:  Negative for adenopathy. Does not bruise/bleed easily.   Psychiatric/Behavioral:  Negative for dysphoric mood and sleep disturbance.      Medical History Reviewed by provider this encounter:       Annual Wellness Visit Questionnaire   Last Medicare Wellness visit information reviewed, patient interviewed and updates made to the record today.      Health Risk Assessment:   Patient rates overall health as very good. Patient feels that their physical health rating is same. Patient is very satisfied with their life. Eyesight was rated as slightly worse. Hearing was rated as same. Patient feels that their emotional and mental health rating is same. Patients states they are never, rarely angry. Patient states they are never, rarely unusually tired/fatigued. Pain experienced in the last 7 days has been some. Patient's pain rating has been 1/10. Patient states that he has experienced no weight loss or gain in last 6 months.     Depression Screening:   PHQ-2 Score: 0      Fall Risk Screening:   In the past year, patient has experienced: no history of falling in past year      Home Safety:  Patient does not have trouble with stairs inside or outside of their home. Patient has working smoke alarms and has working carbon monoxide detector. Home safety hazards include: none.     Nutrition:   Current diet is Regular.     Medications:   Patient is currently taking over-the-counter supplements. OTC medications include: see medication list. Patient is able to manage medications.     Activities of Daily Living (ADLs)/Instrumental Activities of Daily Living (IADLs):    Walk and transfer into and out of bed and chair?: Yes  Dress and groom yourself?: Yes    Bathe or shower yourself?: Yes    Feed yourself? Yes  Do your laundry/housekeeping?: Yes  Manage your money, pay your bills and track your expenses?: No  Make your own meals?: Yes    Do your own shopping?: Yes    Previous Hospitalizations:   Any hospitalizations or ED visits within the last 12 months?: Yes      Advance Care Planning:   Living will: Yes    Advanced directive: Yes      Cognitive Screening:   Provider or family/friend/caregiver concerned regarding cognition?: No    PREVENTIVE SCREENINGS      Cardiovascular Screening:    General: History Lipid Disorder    Due for: Lipid Panel      Diabetes Screening:     General: Screening Current      Colorectal Cancer Screening:     General: Screening Current      Prostate Cancer Screening:    General: Screening Not Indicated      Osteoporosis Screening:    General: Screening Not Indicated      Abdominal Aortic Aneurysm (AAA) Screening:    Risk factors include: age between 65-76 yo and tobacco use        General: Screening Current      Lung Cancer Screening:     General: Screening Not Indicated      Hepatitis C Screening:    General: Screening Current    Screening, Brief Intervention, and Referral to Treatment (SBIRT)    Screening  Typical number of drinks in a day: 0  Typical number of drinks in a week: 0  Interpretation: Low risk drinking behavior.    Single Item Drug Screening:  How often have you used an illegal drug (including marijuana) or a prescription medication for non-medical reasons in the past year? never    Single Item Drug Screen Score: 0  Interpretation: Negative screen for possible drug use disorder    Brief Intervention  Alcohol & drug use screenings were reviewed. No concerns regarding substance use disorder identified.     Other Counseling Topics:   Calcium and vitamin D intake and regular weightbearing exercise.     Social Determinants of Health     Food  "Insecurity: No Food Insecurity (6/13/2024)    Hunger Vital Sign     Worried About Running Out of Food in the Last Year: Never true     Ran Out of Food in the Last Year: Never true   Transportation Needs: No Transportation Needs (6/13/2024)    PRAPARE - Transportation     Lack of Transportation (Medical): No     Lack of Transportation (Non-Medical): No   Housing Stability: Low Risk  (6/13/2024)    Housing Stability Vital Sign     Unable to Pay for Housing in the Last Year: No     Number of Times Moved in the Last Year: 0     Homeless in the Last Year: No   Utilities: Not At Risk (6/13/2024)    [facility] Utilities     Threatened with loss of utilities: No         Objective     /82 (BP Location: Left arm, Patient Position: Sitting, Cuff Size: Large)   Pulse 68   Temp 98.1 °F (36.7 °C)   Resp 20   Ht 5' 7.5\" (1.715 m)   Wt 91.6 kg (202 lb)   SpO2 97%   BMI 31.17 kg/m²     BP Readings from Last 3 Encounters:   06/13/24 150/82   04/29/24 164/88   04/11/24 138/84     Wt Readings from Last 3 Encounters:   06/13/24 91.6 kg (202 lb)   04/29/24 90.9 kg (200 lb 8 oz)   03/29/24 77.1 kg (170 lb)          Physical Exam  Constitutional:       General: He is not in acute distress.  HENT:      Right Ear: Tympanic membrane and ear canal normal.      Left Ear: Tympanic membrane and ear canal normal.   Eyes:      General: No scleral icterus.     Extraocular Movements: Extraocular movements intact.      Conjunctiva/sclera: Conjunctivae normal.      Pupils: Pupils are equal, round, and reactive to light.   Neck:      Thyroid: No thyroid mass or thyromegaly.      Vascular: Normal carotid pulses. No carotid bruit or JVD.   Cardiovascular:      Rate and Rhythm: Normal rate and regular rhythm.      Heart sounds: No murmur heard.     No gallop.   Pulmonary:      Effort: Pulmonary effort is normal.      Breath sounds: Normal breath sounds. No wheezing or rales.   Abdominal:      General: Bowel sounds are normal. There is no distension. "      Palpations: Abdomen is soft. There is no hepatomegaly, splenomegaly or mass.      Tenderness: There is no abdominal tenderness. There is no guarding or rebound.      Comments: Large midline abdominal scar.   Musculoskeletal:      Right lower leg: No edema.      Left lower leg: No edema.   Lymphadenopathy:      Cervical: No cervical adenopathy.   Skin:     Findings: No rash.   Neurological:      General: No focal deficit present.      Mental Status: He is alert and oriented to person, place, and time.   Psychiatric:         Mood and Affect: Mood normal.         Behavior: Behavior normal.         Cognition and Memory: Cognition normal.       Administrative Statements

## 2024-06-13 NOTE — PATIENT INSTRUCTIONS
Medicare Preventive Visit Patient Instructions  Thank you for completing your Welcome to Medicare Visit or Medicare Annual Wellness Visit today. Your next wellness visit will be due in one year (6/14/2025).  The screening/preventive services that you may require over the next 5-10 years are detailed below. Some tests may not apply to you based off risk factors and/or age. Screening tests ordered at today's visit but not completed yet may show as past due. Also, please note that scanned in results may not display below.  Preventive Screenings:  Service Recommendations Previous Testing/Comments   Colorectal Cancer Screening  Colonoscopy    Fecal Occult Blood Test (FOBT)/Fecal Immunochemical Test (FIT)  Fecal DNA/Cologuard Test  Flexible Sigmoidoscopy Age: 45-75 years old   Colonoscopy: every 10 years (May be performed more frequently if at higher risk)  OR  FOBT/FIT: every 1 year  OR  Cologuard: every 3 years  OR  Sigmoidoscopy: every 5 years  Screening may be recommended earlier than age 45 if at higher risk for colorectal cancer. Also, an individualized decision between you and your healthcare provider will decide whether screening between the ages of 76-85 would be appropriate. Colonoscopy: 03/17/2021  FOBT/FIT: Not on file  Cologuard: Not on file  Sigmoidoscopy: Not on file    Screening Current     Prostate Cancer Screening Individualized decision between patient and health care provider in men between ages of 55-69   Medicare will cover every 12 months beginning on the day after your 50th birthday PSA: 0.9 ng/mL           Hepatitis C Screening Once for adults born between 1945 and 1965  More frequently in patients at high risk for Hepatitis C Hep C Antibody: 08/06/2019    Screening Current   Diabetes Screening 1-2 times per year if you're at risk for diabetes or have pre-diabetes Fasting glucose: 102 mg/dL (8/12/2022)  A1C: 5.2 % (2/24/2020)  Screening Current   Cholesterol Screening Once every 5 years if you  don't have a lipid disorder. May order more often based on risk factors. Lipid panel: 10/20/2023  Screening Not Indicated  History Lipid Disorder      Other Preventive Screenings Covered by Medicare:  Abdominal Aortic Aneurysm (AAA) Screening: covered once if your at risk. You're considered to be at risk if you have a family history of AAA or a male between the age of 65-75 who smoking at least 100 cigarettes in your lifetime.  Lung Cancer Screening: covers low dose CT scan once per year if you meet all of the following conditions: (1) Age 55-77; (2) No signs or symptoms of lung cancer; (3) Current smoker or have quit smoking within the last 15 years; (4) You have a tobacco smoking history of at least 20 pack years (packs per day x number of years you smoked); (5) You get a written order from a healthcare provider.  Glaucoma Screening: covered annually if you're considered high risk: (1) You have diabetes OR (2) Family history of glaucoma OR (3)  aged 50 and older OR (4)  American aged 65 and older  Osteoporosis Screening: covered every 2 years if you meet one of the following conditions: (1) Have a vertebral abnormality; (2) On glucocorticoid therapy for more than 3 months; (3) Have primary hyperparathyroidism; (4) On osteoporosis medications and need to assess response to drug therapy.  HIV Screening: covered annually if you're between the age of 15-65. Also covered annually if you are younger than 15 and older than 65 with risk factors for HIV infection. For pregnant patients, it is covered up to 3 times per pregnancy.    Immunizations:  Immunization Recommendations   Influenza Vaccine Annual influenza vaccination during flu season is recommended for all persons aged >= 6 months who do not have contraindications   Pneumococcal Vaccine   * Pneumococcal conjugate vaccine = PCV13 (Prevnar 13), PCV15 (Vaxneuvance), PCV20 (Prevnar 20)  * Pneumococcal polysaccharide vaccine = PPSV23 (Pneumovax)  Adults 19-65 yo with certain risk factors or if 65+ yo  If never received any pneumonia vaccine: recommend Prevnar 20 (PCV20)  Give PCV20 if previously received 1 dose of PCV13 or PPSV23   Hepatitis B Vaccine 3 dose series if at intermediate or high risk (ex: diabetes, end stage renal disease, liver disease)   Respiratory syncytial virus (RSV) Vaccine - COVERED BY MEDICARE PART D  * RSVPreF3 (Arexvy) CDC recommends that adults 60 years of age and older may receive a single dose of RSV vaccine using shared clinical decision-making (SCDM)   Tetanus (Td) Vaccine - COST NOT COVERED BY MEDICARE PART B Following completion of primary series, a booster dose should be given every 10 years to maintain immunity against tetanus. Td may also be given as tetanus wound prophylaxis.   Tdap Vaccine - COST NOT COVERED BY MEDICARE PART B Recommended at least once for all adults. For pregnant patients, recommended with each pregnancy.   Shingles Vaccine (Shingrix) - COST NOT COVERED BY MEDICARE PART B  2 shot series recommended in those 19 years and older who have or will have weakened immune systems or those 50 years and older     Health Maintenance Due:      Topic Date Due   • Colorectal Cancer Screening  03/17/2026   • Hepatitis C Screening  Completed     Immunizations Due:      Topic Date Due   • COVID-19 Vaccine (4 - 2023-24 season) 09/01/2023     Advance Directives   What are advance directives?  Advance directives are legal documents that state your wishes and plans for medical care. These plans are made ahead of time in case you lose your ability to make decisions for yourself. Advance directives can apply to any medical decision, such as the treatments you want, and if you want to donate organs.   What are the types of advance directives?  There are many types of advance directives, and each state has rules about how to use them. You may choose a combination of any of the following:  Living will:  This is a written record of  the treatment you want. You can also choose which treatments you do not want, which to limit, and which to stop at a certain time. This includes surgery, medicine, IV fluid, and tube feedings.   Durable power of  for healthcare (DPAHC):  This is a written record that states who you want to make healthcare choices for you when you are unable to make them for yourself. This person, called a proxy, is usually a family member or a friend. You may choose more than 1 proxy.  Do not resuscitate (DNR) order:  A DNR order is used in case your heart stops beating or you stop breathing. It is a request not to have certain forms of treatment, such as CPR. A DNR order may be included in other types of advance directives.  Medical directive:  This covers the care that you want if you are in a coma, near death, or unable to make decisions for yourself. You can list the treatments you want for each condition. Treatment may include pain medicine, surgery, blood transfusions, dialysis, IV or tube feedings, and a ventilator (breathing machine).  Values history:  This document has questions about your views, beliefs, and how you feel and think about life. This information can help others choose the care that you would choose.  Why are advance directives important?  An advance directive helps you control your care. Although spoken wishes may be used, it is better to have your wishes written down. Spoken wishes can be misunderstood, or not followed. Treatments may be given even if you do not want them. An advance directive may make it easier for your family to make difficult choices about your care.   Weight Management   Why it is important to manage your weight:  Being overweight increases your risk of health conditions such as heart disease, high blood pressure, type 2 diabetes, and certain types of cancer. It can also increase your risk for osteoarthritis, sleep apnea, and other respiratory problems. Aim for a slow, steady  weight loss. Even a small amount of weight loss can lower your risk of health problems.  How to lose weight safely:  A safe and healthy way to lose weight is to eat fewer calories and get regular exercise. You can lose up about 1 pound a week by decreasing the number of calories you eat by 500 calories each day.   Healthy meal plan for weight management:  A healthy meal plan includes a variety of foods, contains fewer calories, and helps you stay healthy. A healthy meal plan includes the following:  Eat whole-grain foods more often.  A healthy meal plan should contain fiber. Fiber is the part of grains, fruits, and vegetables that is not broken down by your body. Whole-grain foods are healthy and provide extra fiber in your diet. Some examples of whole-grain foods are whole-wheat breads and pastas, oatmeal, brown rice, and bulgur.  Eat a variety of vegetables every day.  Include dark, leafy greens such as spinach, kale, gladis greens, and mustard greens. Eat yellow and orange vegetables such as carrots, sweet potatoes, and winter squash.   Eat a variety of fruits every day.  Choose fresh or canned fruit (canned in its own juice or light syrup) instead of juice. Fruit juice has very little or no fiber.  Eat low-fat dairy foods.  Drink fat-free (skim) milk or 1% milk. Eat fat-free yogurt and low-fat cottage cheese. Try low-fat cheeses such as mozzarella and other reduced-fat cheeses.  Choose meat and other protein foods that are low in fat.  Choose beans or other legumes such as split peas or lentils. Choose fish, skinless poultry (chicken or turkey), or lean cuts of red meat (beef or pork). Before you cook meat or poultry, cut off any visible fat.   Use less fat and oil.  Try baking foods instead of frying them. Add less fat, such as margarine, sour cream, regular salad dressing and mayonnaise to foods. Eat fewer high-fat foods. Some examples of high-fat foods include french fries, doughnuts, ice cream, and  cakes.  Eat fewer sweets.  Limit foods and drinks that are high in sugar. This includes candy, cookies, regular soda, and sweetened drinks.  Exercise:  Exercise at least 30 minutes per day on most days of the week. Some examples of exercise include walking, biking, dancing, and swimming. You can also fit in more physical activity by taking the stairs instead of the elevator or parking farther away from stores. Ask your healthcare provider about the best exercise plan for you.      © Copyright [employer] 2018 Information is for End User's use only and may not be sold, redistributed or otherwise used for commercial purposes. All illustrations and images included in CareNotes® are the copyrighted property of A.D.A[employer]M., Inc. or [employer]

## 2024-07-01 DIAGNOSIS — K21.9 GASTROESOPHAGEAL REFLUX DISEASE, UNSPECIFIED WHETHER ESOPHAGITIS PRESENT: ICD-10-CM

## 2024-07-01 RX ORDER — OMEPRAZOLE 40 MG/1
40 CAPSULE, DELAYED RELEASE ORAL DAILY
Qty: 90 CAPSULE | Refills: 1 | Status: SHIPPED | OUTPATIENT
Start: 2024-07-01

## 2024-08-02 DIAGNOSIS — I10 BENIGN ESSENTIAL HYPERTENSION: ICD-10-CM

## 2024-08-02 RX ORDER — LISINOPRIL 10 MG/1
10 TABLET ORAL DAILY
Qty: 90 TABLET | Refills: 1 | Status: SHIPPED | OUTPATIENT
Start: 2024-08-02

## 2024-09-07 DIAGNOSIS — E78.2 MIXED HYPERLIPIDEMIA: ICD-10-CM

## 2024-09-09 RX ORDER — PRAVASTATIN SODIUM 20 MG
20 TABLET ORAL DAILY
Qty: 90 TABLET | Refills: 1 | Status: SHIPPED | OUTPATIENT
Start: 2024-09-09

## 2024-10-11 ENCOUNTER — APPOINTMENT (OUTPATIENT)
Dept: LAB | Facility: CLINIC | Age: 74
End: 2024-10-11
Payer: COMMERCIAL

## 2024-10-11 LAB
CHOLEST SERPL-MCNC: 180 MG/DL
HDLC SERPL-MCNC: 47 MG/DL
LDLC SERPL CALC-MCNC: 120 MG/DL (ref 0–100)
NONHDLC SERPL-MCNC: 133 MG/DL
TRIGL SERPL-MCNC: 66 MG/DL

## 2024-10-25 ENCOUNTER — OFFICE VISIT (OUTPATIENT)
Dept: CARDIOLOGY CLINIC | Facility: CLINIC | Age: 74
End: 2024-10-25
Payer: COMMERCIAL

## 2024-10-25 VITALS — HEART RATE: 68 BPM | WEIGHT: 195.5 LBS | BODY MASS INDEX: 30.17 KG/M2 | OXYGEN SATURATION: 98 %

## 2024-10-25 DIAGNOSIS — I10 BENIGN ESSENTIAL HYPERTENSION: Primary | ICD-10-CM

## 2024-10-25 DIAGNOSIS — J45.20 MILD INTERMITTENT ASTHMA, UNSPECIFIED WHETHER COMPLICATED: ICD-10-CM

## 2024-10-25 DIAGNOSIS — I25.2 OLD MYOCARDIAL INFARCT: ICD-10-CM

## 2024-10-25 DIAGNOSIS — E78.2 MIXED HYPERLIPIDEMIA: ICD-10-CM

## 2024-10-25 DIAGNOSIS — I25.10 CORONARY ARTERY DISEASE INVOLVING NATIVE CORONARY ARTERY OF NATIVE HEART WITHOUT ANGINA PECTORIS: ICD-10-CM

## 2024-10-25 PROCEDURE — 99213 OFFICE O/P EST LOW 20 MIN: CPT | Performed by: INTERNAL MEDICINE

## 2024-10-25 RX ORDER — PRAVASTATIN SODIUM 40 MG
40 TABLET ORAL DAILY
Qty: 90 TABLET | Refills: 4 | Status: SHIPPED | OUTPATIENT
Start: 2024-10-25

## 2024-10-25 NOTE — PROGRESS NOTES
Cardiology Follow Up    Jimmy Galvan  1950  [id number]  [facility] [place]  [address]  [place] [address] [phone]    1. Benign essential hypertension  Ambulatory Referral to Cardiology      2. Old myocardial infarct        3. Mixed hyperlipidemia  pravastatin (PRAVACHOL) 40 mg tablet      4. Coronary artery disease involving native coronary artery of native heart without angina pectoris        5. Mild intermittent asthma, unspecified whether complicated              Discussion/Summary: All of his assessed cardiac problems are stable. I have reviewed his medications and made no changes. No cardiac testing is ordered.  RTO 1 year.      Interval History: He remains active and denies CP, SOB, palpitations. His asthma is activing up with the colder weather.  His last LDL was 120. His Pravachol dose was increased to 40 mg daily by his PCP.  His weight is stable at 195 lbs.     / 80 in the office. He says that it is good and often low normal at home.    He has CAD with a NSTEMI in 8/2019. He had RENE placement x2 in the distal RCA.      ECHO 8/2019 - EF 60%    Patient Active Problem List   Diagnosis    Asthma    Genao's esophagus without dysplasia    Benign essential hypertension    Diverticulosis    Fatty infiltration of liver    Gastroesophageal reflux disease without esophagitis    Hyperuricemia    Impaired fasting glucose    Mixed hyperlipidemia    Drug-induced erectile dysfunction    Non-allergic rhinitis    Osteoarthritis of knee    Vitamin B12 deficiency    Irritable bowel syndrome with diarrhea    History of colonic diverticulitis    History of right-sided carotid endarterectomy    Coronary artery disease involving native coronary artery of native heart without angina pectoris    Incisional hernia    Arthritis of right acromioclavicular joint    Carotid stenosis, asymptomatic, left    Osteoarthritis of right  glenohumeral joint    Nonalcoholic steatohepatitis    Old myocardial infarct    Personal history of transient ischemic attack (TIA), and cerebral infarction without residual deficits     Past Medical History:   Diagnosis Date    Allergic 2022    Allergic reaction     LAST ASSESSED: 2013    Asthma     Bursitis of heel     LAST ASSESSED: [id number]    Colon polyp     Derangement of meniscus of left knee due to old injury     LAST ASSESSED: [id number]    Diverticulitis of colon     LAST ASSESSED: 2013    Dizziness     LAST ASSESSED: [id number]    GERD (gastroesophageal reflux disease)     History of colon polyps     Hypertension     Kidney stone     Malignant hypertension     LAST ASSESSED: [id number]    Myocardial infarction (HCC)     Non-ST elevated myocardial infarction (HCC) 2020    NSTEMI (non-ST elevated myocardial infarction) (HCC)     Plantar fasciitis     Seasonal allergies     Shoulder impingement     LAST ASSESSED: 2012    Stroke ([facility])      Social History     Socioeconomic History    Marital status: /Civil Union     Spouse name: Not on file    Number of children: Not on file    Years of education: Not on file    Highest education level: Not on file   Occupational History    Not on file   Tobacco Use    Smoking status: Former     Current packs/day: 0.00     Average packs/day: 0.3 packs/day for 5.0 years (1.3 ttl pk-yrs)     Types: Cigarettes     Start date:      Quit date: 1960     Years since quittin.8    Smokeless tobacco: Never   Vaping Use    Vaping status: Never Used   Substance and Sexual Activity    Alcohol use: No    Drug use: No    Sexual activity: Yes     Partners: Female     Birth control/protection: Male Sterilization   Other Topics Concern    Not on file   Social History Narrative    Not on file     Social Determinants of Health     Financial Resource Strain: Not on file   Food Insecurity: No Food Insecurity (2024)    Nursing - Inadequate Food Risk  Classification     Worried About Running Out of Food in the Last Year: Never true     Ran Out of Food in the Last Year: Never true     Ran Out of Food in the Last Year: Not on file   Transportation Needs: No Transportation Needs (6/13/2024)    PRAPARE - Transportation     Lack of Transportation (Medical): No     Lack of Transportation (Non-Medical): No   Physical Activity: Not on file   Stress: Not on file   Social Connections: Not on file   Intimate Partner Violence: Not on file   Housing Stability: Low Risk  (6/13/2024)    Housing Stability Vital Sign     Unable to Pay for Housing in the Last Year: No     Number of Times Moved in the Last Year: 0     Homeless in the Last Year: No      Family History   Problem Relation Age of Onset    Hypertension Mother     Hyperlipidemia Mother     Breast cancer Mother     Hypertension Father     Hyperlipidemia Father     Colon cancer Paternal Grandfather     Cancer Paternal Grandfather     Asthma Paternal Grandfather     Throat cancer Maternal Aunt      Past Surgical History:   Procedure Laterality Date    APPENDECTOMY      CAROTID ENDARTARECTOMY      COLECTOMY Left     PARTIAL - SIGMOID; HEMICOLECTOMY FOR DIVERTICULITIS; ONSET: 1987    COLON SURGERY      COLONOSCOPY N/A 12/9/2016    Procedure: COLONOSCOPY;  Surgeon: Silvia Caceres MD;  [facility]: [facility];  Service:     HERNIA REPAIR      VENTRAL    NISSEN FUNDOPLICATION      ESOPHAGOGASTRIC FUNDOPLASTY LAST ASSESSED: 06OCT2014    SINUS SURGERY      THROMBOENDARTERECTOMY      CAROTID; ONSET; MAY 2012       Current Outpatient Medications:     albuterol (ProAir HFA) 90 mcg/act inhaler, Inhale 2 puffs every 4 (four) hours as needed for wheezing (20 minutes before exertion.), Disp: 18 g, Rfl: 0    aspirin (ECOTRIN LOW STRENGTH) 81 mg EC tablet, Take 324 mg by mouth every other day, Disp: , Rfl:     cholecalciferol (VITAMIN D3) 1,000 units tablet, Take 1,000 Units by mouth daily, Disp: , Rfl:     Coenzyme Q10 10 MG capsule,  Take 1 capsule (10 mg total) by mouth daily, Disp: , Rfl:     fluticasone (FLONASE) 50 mcg/act nasal spray, 1 spray into each nostril daily, Disp: , Rfl:     lisinopril (ZESTRIL) 10 mg tablet, TAKE 1 TABLET BY MOUTH EVERY DAY, Disp: 90 tablet, Rfl: 1    multivitamin (THERAGRAN) TABS, Take 1 tablet by mouth daily, Disp: , Rfl:     omeprazole (PriLOSEC) 40 MG capsule, Take 1 capsule (40 mg total) by mouth daily, Disp: 90 capsule, Rfl: 1    pravastatin (PRAVACHOL) 40 mg tablet, Take 1 tablet (40 mg total) by mouth daily, Disp: 90 tablet, Rfl: 4    acetaminophen (TYLENOL) 325 mg tablet, Take 2 tablets (650 mg total) by mouth every 6 (six) hours as needed for mild pain, headaches or fever (Patient not taking: Reported on 3/29/2024), Disp: 30 tablet, Rfl: 0    Fluticasone-Salmeterol (Wixela Inhub) 250-50 mcg/dose inhaler, Inhale 1 puff every 12 (twelve) hours Rinse mouth after use. (Patient not taking: Reported on 6/13/2024), Disp: 60 blister, Rfl: 2    levalbuterol (XOPENEX) 1.25 mg/3 mL nebulizer solution, Take 3 mL (1.25 mg total) by nebulization every 8 (eight) hours as needed for wheezing or shortness of breath (Patient not taking: Reported on 3/29/2024), Disp: 75 mL, Rfl: 0    levalbuterol (Xopenex) 1.25 mg/3 mL nebulizer solution, Take 3 mL (1.25 mg total) by nebulization every 6 (six) hours as needed for wheezing or shortness of breath (with cough or chest tightness and 20 minutes before exertion) for up to 90 doses (Patient not taking: Reported on 4/29/2024), Disp: 90 mL, Rfl: 2    Loratadine 10 MG CAPS, , Disp: , Rfl:     Magnesium Glycinate 100 MG CAPS, , Disp: , Rfl:     Taurine 500 MG CAPS, Take by mouth (Patient not taking: Reported on 3/29/2024), Disp: , Rfl:   Allergies   Allergen Reactions    Depo-Medrol [Methylprednisolone] Anaphylaxis     Severe Vagal Reaction    Iv Contrast [Iodinated Contrast Media] Angioedema     Pt states itchy/swollen eyes, trouble breathing (years ago)    Rosuvastatin Headache and  Confusion    Sulfa Antibiotics Hives    Atorvastatin Confusion     Vitals:    10/25/24 0803   Pulse: 68   SpO2: 98%   Weight: 88.7 kg (195 lb 8 oz)     Weight (last 2 days)       Date/Time Weight    10/25/24 0803 88.7 (195.5)           Pulse 68, weight 88.7 kg (195 lb 8 oz), SpO2 98%., Body mass index is 30.17 kg/m².    Labs:  Office Visit on 06/13/2024   Component Date Value    Cholesterol 10/11/2024 180     Triglycerides 10/11/2024 66     HDL, Direct 10/11/2024 47     LDL Calculated 10/11/2024 120 (H)     Non-HDL-Chol (CHOL-HDL) 10/11/2024 133      Imaging: No results found.    Review of Systems:  Review of Systems   Constitutional:  Negative for diaphoresis, fatigue, fever and unexpected weight change.   HENT: Negative.     Respiratory:  Negative for cough, shortness of breath and wheezing.    Cardiovascular:  Negative for chest pain, palpitations and leg swelling.   Gastrointestinal:  Negative for abdominal pain, diarrhea and nausea.   Musculoskeletal:  Negative for gait problem and myalgias.   Skin:  Negative for rash.   Neurological:  Negative for dizziness and numbness.   Psychiatric/Behavioral: Negative.         Physical Exam:  Physical Exam  Constitutional:       Appearance: He is well-developed.   HENT:      Head: Normocephalic and atraumatic.   Eyes:      Pupils: Pupils are equal, round, and reactive to light.   Neck:      Vascular: No JVD.   Cardiovascular:      Rate and Rhythm: Regular rhythm.      Pulses: Normal pulses.           Carotid pulses are 2+ on the right side and 2+ on the left side.     Heart sounds: S1 normal and S2 normal.   Pulmonary:      Effort: Pulmonary effort is normal.      Breath sounds: Normal breath sounds. No wheezing or rales.   Abdominal:      General: Bowel sounds are normal.      Palpations: Abdomen is soft.   Musculoskeletal:         General: No tenderness. Normal range of motion.      Cervical back: Normal range of motion and neck supple.   Skin:     General: Skin is warm.    Neurological:      Mental Status: He is alert and oriented to person, place, and time.      Cranial Nerves: No cranial nerve deficit.      Deep Tendon Reflexes: Reflexes are normal and symmetric.

## 2024-10-30 ENCOUNTER — PREP FOR PROCEDURE (OUTPATIENT)
Dept: GASTROENTEROLOGY | Facility: AMBULARY SURGERY CENTER | Age: 74
End: 2024-10-30

## 2024-10-30 ENCOUNTER — TELEPHONE (OUTPATIENT)
Dept: GASTROENTEROLOGY | Facility: AMBULARY SURGERY CENTER | Age: 74
End: 2024-10-30

## 2024-10-30 DIAGNOSIS — K22.70 BARRETT'S ESOPHAGUS WITHOUT DYSPLASIA: Primary | ICD-10-CM

## 2024-11-08 ENCOUNTER — NURSE TRIAGE (OUTPATIENT)
Age: 74
End: 2024-11-08

## 2024-11-08 DIAGNOSIS — I20.89 STABLE ANGINA PECTORIS (HCC): Primary | ICD-10-CM

## 2024-11-08 NOTE — TELEPHONE ENCOUNTER
"Reason for Conversation: Pt saw the asthma provider today and they feel the chest tightness is cardiac related and advised to call cardiology.  Chest tightness is worse with exertion, especially with yard work and bending over. Pt had an episode of dizziness pre syncope this morning after bending over.   Feels worse since increasing Pravastatin  Slight relief with inhalers  Pt instructed to report to ED if symptoms persist or worsen    Do you want any testing? Last diagnostic testing was in 2019    VS/Weight: (Note: Please include date/time vitals/weight were measured) pt has white coat syndrome BP at allergist today 192/94    Pain: No, tightness yes    Risk Factors: Hypertension, asthma, HTN, old MI    Recent relevant testing and date of testing: Cardiac Catheterization 8/2019    Medication: pravastatin 40 mg, lisinopril 10 mg,     Upcoming Office Visit: No    Last Office Visit: 10/25/24       Answer Assessment - Initial Assessment Questions  1. LOCATION: \"Where does it hurt?\"        Chest tightness/ heaviness  2. RADIATION: \"Does the pain go anywhere else?\" (e.g., into neck, jaw, arms, back)      Non radiating  3. ONSET: \"When did the chest pain begin?\" (Minutes, hours or days)       Few weeks ago  4. PATTERN: \"Does the pain come and go, or has it been constant since it started?\"  \"Does it get worse with exertion?\"       Comes and  goes  5. DURATION: \"How long does it last\" (e.g., seconds, minutes, hours)      Hours,   6. SEVERITY: \"How bad is the pain?\"  (e.g., Scale 1-10; mild, moderate, or severe)      Mild to moderate  7. CARDIAC RISK FACTORS: \"Do you have any history of heart problems or risk factors for heart disease?\" (e.g., angina, prior heart attack; diabetes, high blood pressure, high cholesterol, smoker, or strong family history of heart disease)      Old MI, CAD  8. PULMONARY RISK FACTORS: \"Do you have any history of lung disease?\"  (e.g., blood clots in lung, asthma, emphysema, birth control pills)    " "  Asthma  9. CAUSE: \"What do you think is causing the chest pain?\"      unknown  10. OTHER SYMPTOMS: \"Do you have any other symptoms?\" (e.g., dizziness, nausea, vomiting, sweating, fever, difficulty breathing, cough)        Dizziness, chest tightness    Protocols used: Chest Pain-Adult-[place]    "

## 2024-11-08 NOTE — TELEPHONE ENCOUNTER
Related message as given - pt verbalized understanding . Provided numbers for pt to scheduled testing and appts after testing . Pt verbalized understanding

## 2024-11-18 ENCOUNTER — TELEPHONE (OUTPATIENT)
Age: 74
End: 2024-11-18

## 2024-11-18 NOTE — TELEPHONE ENCOUNTER
Caller: Jimmy Galvan    Doctor: Dr. Ridley    Reason for call: Patient called to make Dr. Ridley aware that his NM stress test originally scheduled for 11/19/24 has been canceled/postponed.  Insurance has not yet provided authorization for this test.    Call back#: [phone]

## 2024-12-06 ENCOUNTER — RA CDI HCC (OUTPATIENT)
Dept: OTHER | Facility: HOSPITAL | Age: 74
End: 2024-12-06

## 2024-12-13 ENCOUNTER — TELEPHONE (OUTPATIENT)
Age: 74
End: 2024-12-13

## 2024-12-13 ENCOUNTER — OFFICE VISIT (OUTPATIENT)
Dept: FAMILY MEDICINE CLINIC | Facility: CLINIC | Age: 74
End: 2024-12-13
Payer: COMMERCIAL

## 2024-12-13 VITALS
OXYGEN SATURATION: 98 % | TEMPERATURE: 98.3 F | SYSTOLIC BLOOD PRESSURE: 130 MMHG | WEIGHT: 206 LBS | HEART RATE: 82 BPM | HEIGHT: 68 IN | RESPIRATION RATE: 18 BRPM | BODY MASS INDEX: 31.22 KG/M2 | DIASTOLIC BLOOD PRESSURE: 78 MMHG

## 2024-12-13 DIAGNOSIS — I65.22 CAROTID STENOSIS, ASYMPTOMATIC, LEFT: ICD-10-CM

## 2024-12-13 DIAGNOSIS — J45.20 MILD INTERMITTENT ASTHMA WITHOUT COMPLICATION: ICD-10-CM

## 2024-12-13 DIAGNOSIS — E78.2 MIXED HYPERLIPIDEMIA: ICD-10-CM

## 2024-12-13 DIAGNOSIS — I25.10 CORONARY ARTERY DISEASE INVOLVING NATIVE CORONARY ARTERY OF NATIVE HEART WITHOUT ANGINA PECTORIS: ICD-10-CM

## 2024-12-13 DIAGNOSIS — E79.0 HYPERURICEMIA: ICD-10-CM

## 2024-12-13 DIAGNOSIS — K58.0 IRRITABLE BOWEL SYNDROME WITH DIARRHEA: ICD-10-CM

## 2024-12-13 DIAGNOSIS — I10 BENIGN ESSENTIAL HYPERTENSION: Primary | ICD-10-CM

## 2024-12-13 DIAGNOSIS — K76.0 FATTY INFILTRATION OF LIVER: ICD-10-CM

## 2024-12-13 DIAGNOSIS — K21.9 GASTROESOPHAGEAL REFLUX DISEASE WITHOUT ESOPHAGITIS: ICD-10-CM

## 2024-12-13 PROBLEM — K75.81 NONALCOHOLIC STEATOHEPATITIS: Status: RESOLVED | Noted: 2020-03-18 | Resolved: 2024-12-13

## 2024-12-13 PROCEDURE — G2211 COMPLEX E/M VISIT ADD ON: HCPCS | Performed by: FAMILY MEDICINE

## 2024-12-13 PROCEDURE — 99214 OFFICE O/P EST MOD 30 MIN: CPT | Performed by: FAMILY MEDICINE

## 2024-12-13 RX ORDER — LEVALBUTEROL INHALATION SOLUTION 1.25 MG/3ML
1.25 SOLUTION RESPIRATORY (INHALATION) EVERY 8 HOURS PRN
Qty: 75 ML | Refills: 3 | Status: SHIPPED | OUTPATIENT
Start: 2024-12-13

## 2024-12-13 NOTE — ASSESSMENT & PLAN NOTE
Hyperlipidemia mixed type and CAD with prior intolerance to a number of statins, fibrates and fish oils. Currently on Pravastatin 20 mg/day w/out side effects.     Lab Results   Component Value Date    CHOLESTEROL 180 10/11/2024    CHOLESTEROL 168 10/20/2023    CHOLESTEROL 208 (H) 08/21/2023     Lab Results   Component Value Date    HDL 47 10/11/2024    HDL 47 10/20/2023    HDL 45 08/21/2023     Lab Results   Component Value Date    TRIG 66 10/11/2024    TRIG 61 10/20/2023    TRIG 85 08/21/2023     Lab Results   Component Value Date    LDLCALC 120 (H) 10/11/2024        Lab Results   Component Value Date    ALT 13 04/11/2024    AST 15 04/11/2024    GGT 14 08/17/2019    ALKPHOS 49 04/11/2024    BILITOT 0.47 11/11/2015

## 2024-12-13 NOTE — ASSESSMENT & PLAN NOTE
Carotid artery stenosis.  Status post right CEA.      04/2024 Carotid artery Dopplers  right ICA patent internal carotid artery and endarterectomy site.  Left ICA  < 50 % stenosis-no change from 2023.

## 2024-12-13 NOTE — ASSESSMENT & PLAN NOTE
Hypertension with intolerance to multiple blood pressure agents in the past.  Element of white coat syndrome. Blood pressure stable today on Lisinopril 10 mg/day.     BP Readings from Last 3 Encounters:   12/13/24 130/78   11/08/24 (!) 192/94   06/13/24 150/82      Lab Results   Component Value Date     12/10/2015    SODIUM 138 04/11/2024    K 3.7 04/11/2024     04/11/2024    CO2 26 04/11/2024    ANIONGAP 11 12/10/2015    AGAP 8 04/11/2024    BUN 15 04/11/2024    CREATININE 0.93 04/11/2024    GLUC 94 04/11/2024    GLUF 102 (H) 08/12/2022    CALCIUM 8.9 04/11/2024    AST 15 04/11/2024    ALT 13 04/11/2024    ALKPHOS 49 04/11/2024    PROT 7.6 11/11/2015    TP 6.8 04/11/2024    BILITOT 0.47 11/11/2015    TBILI 0.68 04/11/2024    EGFR 82 04/11/2024     Lab Results   Component Value Date    WBC 6.91 04/11/2024    HGB 14.0 04/11/2024    HCT 42.6 04/11/2024    MCV 94 04/11/2024     04/11/2024

## 2024-12-13 NOTE — ASSESSMENT & PLAN NOTE
GERD on Omeprazole 40 mg/day     03/2021 EGD normal esophagus. Status post fundoplication. Biopsies no dysplasia or malignancy.      08/2019 EGD Genao's esophagus.  Multiple small superficial ulcer craters in gastric antrum.

## 2024-12-13 NOTE — TELEPHONE ENCOUNTER
Medications that are used with DME will not be covered by Medicare Part D plans. Please use [facility] to order nebulizer medications from a DME company. This process will select a DME company that participates with patients insurance and handle all processing and shipping. Retail pharmacies can not process these types of medication unless they submit it under Medicare Part B. If the pt wants to continue to use their local retail pharmacy, please call the pharmacy and have the pharmacist submit nebulizer medication under patient Medicare Part B plan. This is the quickest most efficient way to ensure the pt gets their medications in a timely manner.

## 2024-12-13 NOTE — PROGRESS NOTES
Name: Jimmy Galvan      : 1950      MRN: [id number]  Encounter Provider: Nilo Styles MD  Encounter Date: 2024   Encounter department: [facility]  :  Assessment & Plan  Benign essential hypertension    Hypertension with intolerance to multiple blood pressure agents in the past.  Element of white coat syndrome. Blood pressure stable today on Lisinopril 10 mg/day.     BP Readings from Last 3 Encounters:   24 130/78   24 (!) 192/94   24 150/82      Lab Results   Component Value Date     12/10/2015    SODIUM 138 2024    K 3.7 2024     2024    CO2 26 2024    ANIONGAP 11 12/10/2015    AGAP 8 2024    BUN 15 2024    CREATININE 0.93 2024    GLUC 94 2024    GLUF 102 (H) 2022    CALCIUM 8.9 2024    AST 15 2024    ALT 13 2024    ALKPHOS 49 2024    PROT 7.6 2015    TP 6.8 2024    BILITOT 0.47 2015    TBILI 0.68 2024    EGFR 82 2024     Lab Results   Component Value Date    WBC 6.91 2024    HGB 14.0 2024    HCT 42.6 2024    MCV 94 2024     2024          Mixed hyperlipidemia    Hyperlipidemia mixed type and CAD with prior intolerance to a number of statins, fibrates and fish oils. Currently on Pravastatin 20 mg/day w/out side effects.     Lab Results   Component Value Date    CHOLESTEROL 180 10/11/2024    CHOLESTEROL 168 10/20/2023    CHOLESTEROL 208 (H) 2023     Lab Results   Component Value Date    HDL 47 10/11/2024    HDL 47 10/20/2023    HDL 45 2023     Lab Results   Component Value Date    TRIG 66 10/11/2024    TRIG 61 10/20/2023    TRIG 85 2023     Lab Results   Component Value Date    LDLCALC 120 (H) 10/11/2024        Lab Results   Component Value Date    ALT 13 2024    AST 15 2024    GGT 14 2019    ALKPHOS 49 2024    BILITOT 0.47 2015             Coronary artery disease  involving native coronary artery of native heart without angina pectoris    08/2019  s/p admission for NSTEMI.  Elevated troponin peaking at 4.61.  Cardiac catheterization 95% lesion in the distal RCA.  Status post RENE x2.  Mid circumflex 65% stenosis.  Proximal right coronary artery 50% stenosis.  Echocardiogram normal left ventricular systolic function.  EF 60%.  Hypokinesis of the basal mid inferior walls.  Grade 1 diastolic dysfunction.  Normal right ventricle.  Mildly dilated left atrium.  Mild MR.  No pericardial effusion.  Aortic root normal size.    03/2017 nuclear stress test normal.       Carotid stenosis, asymptomatic, left    Carotid artery stenosis.  Status post right CEA.      04/2024 Carotid artery Dopplers  right ICA patent internal carotid artery and endarterectomy site.  Left ICA  < 50 % stenosis-no change from 2023.               Mild intermittent asthma without complication    He uses PRN Xopenex via nebulizer     Orders:    levalbuterol (XOPENEX) 1.25 mg/3 mL nebulizer solution; Take 3 mL (1.25 mg total) by nebulization every 8 (eight) hours as needed for wheezing or shortness of breath    Gastroesophageal reflux disease without esophagitis    GERD on Omeprazole 40 mg/day     03/2021 EGD normal esophagus. Status post fundoplication. Biopsies no dysplasia or malignancy.      08/2019 EGD Genao's esophagus.  Multiple small superficial ulcer craters in gastric antrum.         Irritable bowel syndrome with diarrhea    IBS with diarrhea. Episodes of postprandial abdominal bloating.  Multiple abdominal surgeries.  03/2021 colonoscopy mild diverticulosis left colon.  Evidence of previous end to end sigmoid resection.  Four sessile polyps.Biopsies no microscopic colitis.  Polyps benign-tubular adenomas           Fatty infiltration of liver    Fatty liver. 04/2021 u/s elastography Metavir score of F0 - F1, Absent or Mild Fibrosis. Liver steatosis grading:  S2 (33% - 66% steatosis).    8/2019 alpha 1  antitrypsin level normal.  Celiac antibodies negative.  Iron 84.  Ferritin 349. Hepatitis B surface antigen negative.  Hepatitis-C antibody negative.  Anti smooth muscle antibody negative.  Ceruloplasmin level normal.  ERD negative.  SPEP no monoclonal bands.  PEREZ Fibroscore minimal to moderate steatosis.  No fibrosis.  Borderline to probable PEREZ      06/2019 Ultrasound + fatty liver.      Lab Results   Component Value Date    ALT 13 04/11/2024    AST 15 04/11/2024    GGT 14 08/17/2019    ALKPHOS 49 04/11/2024    BILITOT 0.47 11/11/2015           Hyperuricemia    Hyperuricemia-history of gout  He is no longer taking Allopurinol or Colchicine due to GI side effects.      Lab Results   Component Value Date    URICACID 6.3 08/25/2020              Continue with current medications  OV 6 months with labs  Follow up with Cardiology          History of Present Illness       CC Follow up visit for chronic medical problems   Medications reviewed.     .      Review of Systems   Constitutional:  Negative for appetite change, chills, fatigue, fever and unexpected weight change.   HENT:  Positive for hearing loss. Negative for congestion, ear pain, rhinorrhea, sore throat and trouble swallowing.             Eyes:  Negative for visual disturbance.   Respiratory:  Negative for cough, shortness of breath and wheezing.    Cardiovascular:  Negative for chest pain, palpitations and leg swelling.   Gastrointestinal:  Positive for abdominal distention and diarrhea. Negative for abdominal pain, blood in stool, constipation, nausea and vomiting.   Endocrine: Negative for polydipsia and polyuria.   Genitourinary:  Negative for difficulty urinating.        Lab Results       Component                Value               Date                       PSA                      0.9                 10/13/2021                 PSA                      0.7                 02/24/2020                 PSA                      0.5                  "03/11/2017                              '   Musculoskeletal:  Positive for back pain. Negative for arthralgias and myalgias.        Status post fall 09/2020 landing on his right shoulder and right elbow.  MRI right shoulder Supraspinatus tendinosis with full-thickness anterior leading edge tear spanning 9 mm anteroposteriorly and the torn tendon edge proximally retracted by 7 mm.  Moderate infraspinatus tendinosis without discrete tear.  Moderate subscapularis tendinosis with partial-thickness midsubstance tear resulting in medial subluxation of long head of biceps which is perched over the greater tuberosity.  Moderate long head of biceps tendinosis with low-grade interstitial tear.  Moderate acromioclavicular degenerative arthropathy. Symptom improvement with PT.  10/2020 CRP < 3.0. 11/2020 x-rays of right shoulder separate os septic/calcific densities seen about the shoulder joint noted lateral to the acromion process and above AC joint.  Mild osteoarthritis of glenohumeral joint.  Subacromial spurring.         01/2020 x-rays lumbar spine mild loss of disc at L5-S1.  Facet degenerative changes particularly L3-L4 through L5-S1.                        Skin:  Negative for rash.   Allergic/Immunologic: Negative for environmental allergies.   Neurological:  Negative for dizziness and headaches.   Hematological:  Negative for adenopathy. Does not bruise/bleed easily.   Psychiatric/Behavioral:  Negative for dysphoric mood and sleep disturbance.        Objective   /78 (BP Location: Left arm, Patient Position: Sitting, Cuff Size: Large)   Pulse 82   Temp 98.3 °F (36.8 °C) (Temporal)   Resp 18   Ht 5' 7.5\" (1.715 m)   Wt 93.4 kg (206 lb)   SpO2 98%   BMI 31.79 kg/m²      Wt Readings from Last 3 Encounters:   12/13/24 93.4 kg (206 lb)   11/08/24 88.5 kg (195 lb)   10/25/24 88.7 kg (195 lb 8 oz)        Physical Exam  Constitutional:       General: He is not in acute distress.  Eyes:      General: No scleral " icterus.     Conjunctiva/sclera: Conjunctivae normal.   Neck:      Thyroid: No thyroid mass or thyromegaly.      Vascular: Normal carotid pulses. No carotid bruit or JVD.   Cardiovascular:      Rate and Rhythm: Normal rate and regular rhythm.      Heart sounds: No murmur heard.     No gallop.   Pulmonary:      Effort: Pulmonary effort is normal.      Breath sounds: Normal breath sounds. No wheezing or rales.   Musculoskeletal:      Right lower leg: No edema.      Left lower leg: No edema.   Lymphadenopathy:      Cervical: No cervical adenopathy.   Skin:     Coloration: Skin is not cyanotic.      Findings: No rash.      Nails: There is no clubbing.   Neurological:      General: No focal deficit present.      Mental Status: He is alert and oriented to person, place, and time.   Psychiatric:         Mood and Affect: Mood normal.

## 2024-12-13 NOTE — ASSESSMENT & PLAN NOTE
Hyperuricemia-history of gout  He is no longer taking Allopurinol or Colchicine due to GI side effects.      Lab Results   Component Value Date    URICACID 6.3 08/25/2020              Continue with current medications  OV 6 months with labs  Follow up with Cardiology

## 2024-12-13 NOTE — ASSESSMENT & PLAN NOTE
Fatty liver. 04/2021 u/s elastography Metavir score of F0 - F1, Absent or Mild Fibrosis. Liver steatosis grading:  S2 (33% - 66% steatosis).    8/2019 alpha 1 antitrypsin level normal.  Celiac antibodies negative.  Iron 84.  Ferritin 349. Hepatitis B surface antigen negative.  Hepatitis-C antibody negative.  Anti smooth muscle antibody negative.  Ceruloplasmin level normal.  RED negative.  SPEP no monoclonal bands.  PEREZ Fibroscore minimal to moderate steatosis.  No fibrosis.  Borderline to probable PEREZ      06/2019 Ultrasound + fatty liver.      Lab Results   Component Value Date    ALT 13 04/11/2024    AST 15 04/11/2024    GGT 14 08/17/2019    ALKPHOS 49 04/11/2024    BILITOT 0.47 11/11/2015

## 2024-12-13 NOTE — ASSESSMENT & PLAN NOTE
08/2019  s/p admission for NSTEMI.  Elevated troponin peaking at 4.61.  Cardiac catheterization 95% lesion in the distal RCA.  Status post RENE x2.  Mid circumflex 65% stenosis.  Proximal right coronary artery 50% stenosis.  Echocardiogram normal left ventricular systolic function.  EF 60%.  Hypokinesis of the basal mid inferior walls.  Grade 1 diastolic dysfunction.  Normal right ventricle.  Mildly dilated left atrium.  Mild MR.  No pericardial effusion.  Aortic root normal size.    03/2017 nuclear stress test normal.

## 2024-12-13 NOTE — ASSESSMENT & PLAN NOTE
He uses PRN Xopenex via nebulizer     Orders:    levalbuterol (XOPENEX) 1.25 mg/3 mL nebulizer solution; Take 3 mL (1.25 mg total) by nebulization every 8 (eight) hours as needed for wheezing or shortness of breath

## 2024-12-15 NOTE — ASSESSMENT & PLAN NOTE
IBS with diarrhea. Episodes of postprandial abdominal bloating.  Multiple abdominal surgeries.  03/2021 colonoscopy mild diverticulosis left colon.  Evidence of previous end to end sigmoid resection.  Four sessile polyps.Biopsies no microscopic colitis.  Polyps benign-tubular adenomas

## 2024-12-24 DIAGNOSIS — K21.9 GASTROESOPHAGEAL REFLUX DISEASE, UNSPECIFIED WHETHER ESOPHAGITIS PRESENT: ICD-10-CM

## 2024-12-24 RX ORDER — OMEPRAZOLE 40 MG/1
40 CAPSULE, DELAYED RELEASE ORAL DAILY
Qty: 90 CAPSULE | Refills: 1 | Status: SHIPPED | OUTPATIENT
Start: 2024-12-24

## 2024-12-27 ENCOUNTER — NURSE TRIAGE (OUTPATIENT)
Age: 74
End: 2024-12-27

## 2024-12-27 NOTE — TELEPHONE ENCOUNTER
"Patient called in with concerns about something being in his left ear. Reports he was out hunting yesterday and believes something flew in his ear. Reports feeling a pressure and decreased hearing. Also reports pain 6/10 in the left ear.  Unsure of what it could be and unable to see anything. Denies drainage. Patient insistent on waiting for OV with provider. No same day OV available. RN strongly recommended not waiting and to go to UC/ED but patient states he doesn't want to.  OV scheduled for  12/30 at 2:40 with Anum Chen DO per patient request.      Reason for Disposition   All other foreign bodies in ear canal  (Exception: Insect or small smooth foreign body.)    Answer Assessment - Initial Assessment Questions  1. OBJECT: \"What do you think it is?\"       Left ear. Was in woods hunting and feels that something went into ear. Unsure of what it could be and unable to see anything.   2. PAIN: \"Is it causing any pain?\" If Yes, ask: \"How bad is the pain?\"  (Scale 0-10; or none, mild, moderate, severe)      6/10  3. ONSET: \"How long do you think it's been in there?\"      Since 12/26  4. DISCHARGE: \"Has there been any discharge or bleeding from the ear?\" If Yes, ask: \"Please describe it\". (e.g., clear, cloudy, blood)      Denies   5. OTHER SYMPTOMS: \"Do you have any other symptoms?\" (e.g., decreased hearing, dizziness)      Reports decreased hearing In left ear   6. PREGNANCY: \"Is there any chance you are pregnant?\" \"When was your last menstrual period?\"      NA    Protocols used: Ear - Foreign Body-Adult-[place]    "

## 2024-12-30 ENCOUNTER — OFFICE VISIT (OUTPATIENT)
Dept: FAMILY MEDICINE CLINIC | Facility: CLINIC | Age: 74
End: 2024-12-30
Payer: COMMERCIAL

## 2024-12-30 VITALS
WEIGHT: 207.5 LBS | RESPIRATION RATE: 18 BRPM | HEART RATE: 85 BPM | TEMPERATURE: 97.7 F | OXYGEN SATURATION: 97 % | BODY MASS INDEX: 31.45 KG/M2 | HEIGHT: 68 IN | SYSTOLIC BLOOD PRESSURE: 138 MMHG | DIASTOLIC BLOOD PRESSURE: 82 MMHG

## 2024-12-30 DIAGNOSIS — H91.92 DECREASED HEARING OF LEFT EAR: Primary | ICD-10-CM

## 2024-12-30 PROCEDURE — 99213 OFFICE O/P EST LOW 20 MIN: CPT

## 2024-12-30 PROCEDURE — G2211 COMPLEX E/M VISIT ADD ON: HCPCS

## 2024-12-30 NOTE — PROGRESS NOTES
Name: Jimmy Galvan      : 1950      MRN: [id number]  Encounter Provider: Anum Chen DO  Encounter Date: 2024   Encounter department: [facility]  :  Assessment & Plan  Decreased hearing of left ear  Has a history of decreased hearing but reports it got abruptly worse when he went hunting this past week. Does wear hearing aids but not when hunting.  Denies any discharge or bleeding.  Has some associated congestion but denies any sinus pain or pressure.   Difficult to say if this is secondary to clogged sinuses versus abrupt loud noise from shooting versus other etiology  No evidence of infection or foreign body on exam today  Will provide with audiology referral due to sudden abrupt decrease in hearing that is worse than usual, recommend follow-up.  Restart flonase daily to help with congestive symptoms and continue supportive care; tylenol prn for pain  ED precautions strictly reviewed for worsening pain, swelling, erythema, fevers, drainage, etc.  Patient understands and agrees to plan      Orders:    Ambulatory Referral to Audiology; Future         History of Present Illness     HPI    74-year-old male presents for new evaluation of left ear discomfort.  Has a history of decreased hearing, does wear hearing aids when in large crowds.  Patient went hunting on , shot his shotgun and suddenly felt increased pressure in his left ear with notable decrease in hearing.  He does not wear his hearing aids when he goes out hunting.  He does report that it was extremely cold and windy and blowing towards his left ear.  Denies any bleeding or discharge.  Reports discomfort with palpation of the tragus with may be some mild swelling.  No mastoid tenderness.  No discomfort in the right ear.  Has not taken anything for pain.  Does report some congestion but no pain.  No fevers or chills.  Has been using his albuterol nebulizer on and off.  Was concerned if there was a foreign body in  "his ear.    Review of Systems   Constitutional:  Negative for chills and fever.   HENT:  Positive for congestion and ear pain. Negative for ear discharge.         Decreased hearing, more than usual       Objective   /82 (BP Location: Left arm, Patient Position: Sitting, Cuff Size: Standard)   Pulse 85   Temp 97.7 °F (36.5 °C) (Temporal)   Resp 18   Ht 5' 7.5\" (1.715 m)   Wt 94.1 kg (207 lb 8 oz)   SpO2 97%   BMI 32.02 kg/m²      Physical Exam  Vitals reviewed.   Constitutional:       General: He is not in acute distress.     Appearance: Normal appearance.   HENT:      Head: Normocephalic and atraumatic.      Right Ear: Tympanic membrane, ear canal and external ear normal.      Left Ear: Tympanic membrane, ear canal and external ear normal.      Ears:      Comments: No evidence of foreign body in either ear. Canals open, some wax, but TM normal. No evidence of bleeding or discharge.   Some discomfort with palpation in front of the L tragus. No pain with palpation over mastoid. No evidence of erythema, overt swelling around ear.      Nose: Nose normal.      Mouth/Throat:      Pharynx: Oropharynx is clear.   Eyes:      Conjunctiva/sclera: Conjunctivae normal.   Cardiovascular:      Rate and Rhythm: Normal rate.   Pulmonary:      Effort: Pulmonary effort is normal.   Musculoskeletal:      Left lower leg: Edema present.   Skin:     General: Skin is warm.   Neurological:      Mental Status: He is alert and oriented to person, place, and time.   Psychiatric:         Mood and Affect: Mood normal.         Behavior: Behavior normal.         "

## 2025-01-02 ENCOUNTER — NURSE TRIAGE (OUTPATIENT)
Age: 75
End: 2025-01-02

## 2025-01-02 DIAGNOSIS — I10 BENIGN ESSENTIAL HYPERTENSION: ICD-10-CM

## 2025-01-02 RX ORDER — LISINOPRIL 10 MG/1
10 TABLET ORAL DAILY
Qty: 90 TABLET | Refills: 1 | Status: SHIPPED | OUTPATIENT
Start: 2025-01-02

## 2025-01-02 NOTE — TELEPHONE ENCOUNTER
"Pt called stating that over the past 2-3 weeks he started with having SOB when bending forward. Pt states that he feels fine otherwise. He states but he is concerned about his SOB when bending forward. He also reports having a weight gain and that he feels that might contribute. Pt requesting to be seen sooner.    Scheduled pt for an appointment tomorrow with Eliza ARAUZ at 9am      Answer Assessment - Initial Assessment Questions  1. RESPIRATORY STATUS: \"Describe your breathing?\" (e.g., wheezing, shortness of breath, unable to speak, severe coughing)       SOB when bending forward, weight gain  2. ONSET: \"When did this breathing problem begin?\"       2-3 weeks ago  3. PATTERN \"Does the difficult breathing come and go, or has it been constant since it started?\"       Comes and goes   4. SEVERITY: \"How bad is your breathing?\" (e.g., mild, moderate, severe)       Mild   5. RECURRENT SYMPTOM: \"Have you had difficulty breathing before?\" If Yes, ask: \"When was the last time?\" and \"What happened that time?\"       On going   6. CARDIAC HISTORY: \"Do you have any history of heart disease?\" (e.g., heart attack, angina, bypass surgery, angioplasty)       Unsure if he had heart attack   7. LUNG HISTORY: \"Do you have any history of lung disease?\"  (e.g., pulmonary embolus, asthma, emphysema)      Asthma   8. CAUSE: \"What do you think is causing the breathing problem?\"       Unsure  9. OTHER SYMPTOMS: \"Do you have any other symptoms?\" (e.g., chest pain, cough, dizziness, fever, runny nose)      Weight gain   10. O2 SATURATION MONITOR:  \"Do you use an oxygen saturation monitor (pulse oximeter) at home?\" If Yes, ask: \"What is your reading (oxygen level) today?\" \"What is your usual oxygen saturation reading?\" (e.g., 95%)        Does not have pulse ox   12. TRAVEL: \"Have you traveled out of the country in the last month?\" (e.g., travel history, exposures)        Denies    Protocols used: Breathing Difficulty-Adult-[place]    "

## 2025-01-02 NOTE — TELEPHONE ENCOUNTER
----- Message from Bill MCADAMS sent at 1/2/2025  9:33 AM EST -----  Patient Jimmy [phone] established patient of Dr. Ridley contacting [facility] experiencing SOB.  Patient reported in the cold weather it is worse however recently, when patient bends down it gets much worse.  Patient is scheduled for his routine f/u with Dr. Ridley in Oct 2025.

## 2025-01-03 ENCOUNTER — OFFICE VISIT (OUTPATIENT)
Dept: CARDIOLOGY CLINIC | Facility: CLINIC | Age: 75
End: 2025-01-03
Payer: COMMERCIAL

## 2025-01-03 VITALS
WEIGHT: 203 LBS | HEART RATE: 63 BPM | BODY MASS INDEX: 31.33 KG/M2 | OXYGEN SATURATION: 99 % | DIASTOLIC BLOOD PRESSURE: 80 MMHG | SYSTOLIC BLOOD PRESSURE: 166 MMHG

## 2025-01-03 DIAGNOSIS — I65.22 CAROTID STENOSIS, ASYMPTOMATIC, LEFT: ICD-10-CM

## 2025-01-03 DIAGNOSIS — I25.10 CORONARY ARTERY DISEASE INVOLVING NATIVE CORONARY ARTERY OF NATIVE HEART WITHOUT ANGINA PECTORIS: ICD-10-CM

## 2025-01-03 DIAGNOSIS — R06.02 SHORTNESS OF BREATH: ICD-10-CM

## 2025-01-03 DIAGNOSIS — I10 BENIGN ESSENTIAL HYPERTENSION: ICD-10-CM

## 2025-01-03 DIAGNOSIS — E78.2 MIXED HYPERLIPIDEMIA: ICD-10-CM

## 2025-01-03 PROCEDURE — 99214 OFFICE O/P EST MOD 30 MIN: CPT

## 2025-01-03 NOTE — PROGRESS NOTES
Jimmy Galvan  1950  [id number]  [facility]  [address]  [address]  [place] [address] [phone]    1. Shortness of breath        2. Coronary artery disease involving native coronary artery of native heart without angina pectoris        3. Benign essential hypertension        4. Mixed hyperlipidemia        5. Carotid stenosis, asymptomatic, left            Summary/Discussion:  Shortness of breath:  - hx of asthma in which he reports that his asthma has been acting up recently   - obtain NM rx stress test as noted below   - no clinical s/s of volume overload on exam although there is evidence of mild weight gain based on chart review-- no prior hx of HF  - blood pressure noted to be elevated today which could be contributing to his shortness of breath   - echo (8/2019): LVEF 60%, hypokinesis of basal mid inferior walls, LV wall thickness mildly increased, concentric remodeling, G1DD, normal RV, mild MR  consider repeat echo for worsening weight gain and/or worsening shortness of breath     Coronary artery disease:  - with prior NSTEMI s/p RENE x 2 to the distal RCA in 8/2019  cardiac catheterization (8/2019) with residual 65% stenosis of mid circumflex and 50% stenosis of proximal RCA  - he remains active and denies any exertional chest pain or worsening shortness of breath from his baseline (hx of asthma). He reports recent symptoms of lower mid chest wall pain which happens only when he bends over. He does have hx of Nissen fundoplication and noted to have a hiatal hernia on CT in 9/2022-- ? if his symptoms are related to that  - he has a NM rx stress test scheduled for 1/13/2025 which was ordered by his primary cardiologist  - not on beta blocker  - continue aspirin and statin     Hypertension:  - elevated today, 166/80  reports of white coat syndrome   acceptable blood pressure readings documented on 12/13/2024 and 12/30/2024  - continue present medication  regimen  - lifestyle modification   - close blood pressure monitoring   - he will begin checking his blood pressure once daily at home and notify the office in 2 weeks with updated readings  if blood pressure remains elevated then further adjustments can be made at that time     Dyslipidemia:  - Lipid Profile:    Latest Reference Range & Units 10/11/24 09:19   Cholesterol See Comment mg/dL 180   Triglycerides See Comment mg/dL 66   HDL >=40 mg/dL 47   Non-HDL Cholesterol mg/dl 133   LDL Calculated 0 - 100 mg/dL 120 (H)   - pravastatin increased to 40 mg daily at this time-- consider repeat lipids to reassess LDL   - encouraged low cholesterol, mediterranean diet, and annual lipid follow up    Carotid stenosis:  - s/p right carotid endarterectomy in 2012   - follows with vascular surgery   - carotid dopplers (4/2024): patent internal carotid artery and endarterectomy site, left internal carotid artery <50% stenosis  - continue aspirin and statin     Interval History: Jimmy Galvan is a 74 y.o. year old male with history mentioned in problem list who presents to the office today for an urgent follow up.    Today, he reports symptoms of shortness of breath in which he states that his asthma as been acting up recently. He also states symptoms of lower mid chest wall pain which happens only when bending over. He remains active and denies any exertional chest pain or worsening shortness of breath from his baseline (hx of asthma). His functional capacity remains unchanged.  He denies lower extremity edema, orthopnea, and PND. He denies lightheadedness, dizziness, and syncope. He denies palpitations.        He will RTO in 4 months with myself and in 10/20/2025 with Dr. Ridley or sooner if necessary. He will call with any concerns.         Medical Problems       Problem List       Asthma    Genao's esophagus without dysplasia    Benign essential hypertension    Diverticulosis    Fatty infiltration of liver     Gastroesophageal reflux disease without esophagitis    Hyperuricemia    Impaired fasting glucose    Mixed hyperlipidemia    Drug-induced erectile dysfunction    Non-allergic rhinitis    Osteoarthritis of knee    Vitamin B12 deficiency    Irritable bowel syndrome with diarrhea    History of colonic diverticulitis    History of right-sided carotid endarterectomy    Overview Signed 12/19/2019  9:19 AM by REGLA Felix   RIGHT 2012         Coronary artery disease involving native coronary artery of native heart without angina pectoris    Incisional hernia    Arthritis of right acromioclavicular joint    Carotid stenosis, asymptomatic, left    Osteoarthritis of right glenohumeral joint    Old myocardial infarct    Overview Signed 4/24/2024 10:24 AM by Georgia Milan MA   Per coding guidelines         Personal history of transient ischemic attack (TIA), and cerebral infarction without residual deficits    Overview Signed 4/24/2024 10:31 AM by Georgia Milan MA   Per coding guidelines             Past Medical History:   Diagnosis Date    Allergic 01/01/2022    Allergic reaction     LAST ASSESSED: 12MAR2013    Asthma     Bursitis of heel     LAST ASSESSED: [id number]    Colon polyp     Derangement of meniscus of left knee due to old injury     LAST ASSESSED: [id number]    Diverticulitis of colon     LAST ASSESSED: 15OCT2013    Dizziness     LAST ASSESSED: [id number]    GERD (gastroesophageal reflux disease)     History of colon polyps     Hypertension     Kidney stone     Malignant hypertension     LAST ASSESSED: [id number]    Myocardial infarction (HCC)     Non-ST elevated myocardial infarction (HCC) 03/02/2020    NSTEMI (non-ST elevated myocardial infarction) (HCC)     Plantar fasciitis     Seasonal allergies     Shoulder impingement     LAST ASSESSED: [id number]    Stroke (HCC)      Social History     Socioeconomic History    Marital status: /Civil Union     Spouse name: Not on file    Number of children: Not on file     Years of education: Not on file    Highest education level: Not on file   Occupational History    Not on file   Tobacco Use    Smoking status: Former     Current packs/day: 0.00     Average packs/day: 0.3 packs/day for 5.0 years (1.3 ttl pk-yrs)     Types: Cigarettes     Start date:      Quit date:      Years since quittin.0    Smokeless tobacco: Never   Vaping Use    Vaping status: Never Used   Substance and Sexual Activity    Alcohol use: No    Drug use: No    Sexual activity: Yes     Partners: Female     Birth control/protection: Male Sterilization   Other Topics Concern    Not on file   Social History Narrative    Not on file     Social Drivers of Health     Financial Resource Strain: Not on file   Food Insecurity: No Food Insecurity (2024)    Nursing - Inadequate Food Risk Classification     Worried About Running Out of Food in the Last Year: Never true     Ran Out of Food in the Last Year: Never true     Ran Out of Food in the Last Year: Not on file   Transportation Needs: No Transportation Needs (2024)    PRAPARE - Transportation     Lack of Transportation (Medical): No     Lack of Transportation (Non-Medical): No   Physical Activity: Not on file   Stress: Not on file   Social Connections: Not on file   Intimate Partner Violence: Not on file   Housing Stability: Low Risk  (2024)    Housing Stability Vital Sign     Unable to Pay for Housing in the Last Year: No     Number of Times Moved in the Last Year: 0     Homeless in the Last Year: No      Family History   Problem Relation Age of Onset    Hypertension Mother     Hyperlipidemia Mother     Breast cancer Mother     Hypertension Father     Hyperlipidemia Father     Colon cancer Paternal Grandfather     Cancer Paternal Grandfather     Asthma Paternal Grandfather     Throat cancer Maternal Aunt      Past Surgical History:   Procedure Laterality Date    APPENDECTOMY      CAROTID ENDARTARECTOMY      COLECTOMY Left     PARTIAL -  SIGMOID; HEMICOLECTOMY FOR DIVERTICULITIS; ONSET: 1987    COLON SURGERY      COLONOSCOPY N/A 12/9/2016    Procedure: COLONOSCOPY;  Surgeon: Silvia Caceres MD;  Location: [facility];  Service:     HERNIA REPAIR      VENTRAL    NISSEN FUNDOPLICATION      ESOPHAGOGASTRIC FUNDOPLASTY LAST ASSESSED: 06OCT2014    SINUS SURGERY      THROMBOENDARTERECTOMY      CAROTID; ONSET; MAY 2012       Current Outpatient Medications:     acetaminophen (TYLENOL) 325 mg tablet, Take 2 tablets (650 mg total) by mouth every 6 (six) hours as needed for mild pain, headaches or fever, Disp: 30 tablet, Rfl: 0    albuterol (ProAir HFA) 90 mcg/act inhaler, Inhale 2 puffs every 4 (four) hours as needed for wheezing (20 minutes before exertion.), Disp: 18 g, Rfl: 0    aspirin (ECOTRIN LOW STRENGTH) 81 mg EC tablet, Take 324 mg by mouth every other day, Disp: , Rfl:     cholecalciferol (VITAMIN D3) 1,000 units tablet, Take 1,000 Units by mouth daily, Disp: , Rfl:     Coenzyme Q10 10 MG capsule, Take 1 capsule (10 mg total) by mouth daily, Disp: , Rfl:     fluticasone (FLONASE) 50 mcg/act nasal spray, 1 spray into each nostril daily, Disp: , Rfl:     Fluticasone-Salmeterol (Wixela Inhub) 250-50 mcg/dose inhaler, Inhale 1 puff daily in the early morning Rinse mouth after use., Disp: 60 blister, Rfl: 5    levalbuterol (XOPENEX) 1.25 mg/3 mL nebulizer solution, Take 3 mL (1.25 mg total) by nebulization every 8 (eight) hours as needed for wheezing or shortness of breath, Disp: 75 mL, Rfl: 3    lisinopril (ZESTRIL) 10 mg tablet, TAKE 1 TABLET BY MOUTH EVERY DAY, Disp: 90 tablet, Rfl: 1    Loratadine 10 MG CAPS, , Disp: , Rfl:     omeprazole (PriLOSEC) 40 MG capsule, TAKE 1 CAPSULE (40 MG TOTAL) BY MOUTH DAILY., Disp: 90 capsule, Rfl: 1    pravastatin (PRAVACHOL) 40 mg tablet, Take 1 tablet (40 mg total) by mouth daily, Disp: 90 tablet, Rfl: 4    Taurine 500 MG CAPS, Take by mouth, Disp: , Rfl:     multivitamin (THERAGRAN) TABS, Take 1 tablet by mouth  "daily (Patient not taking: Reported on 1/3/2025), Disp: , Rfl:   Allergies   Allergen Reactions    Depo-Medrol [Methylprednisolone] Anaphylaxis     Severe Vagal Reaction    Iv Contrast [Iodinated Contrast Media] Angioedema     Pt states itchy/swollen eyes, trouble breathing (years ago)    Rosuvastatin Headache and Confusion    Sulfa Antibiotics Hives    Atorvastatin Confusion       Labs:     Chemistry        Component Value Date/Time     12/10/2015 1424    K 3.7 04/11/2024 0337    K 4.7 12/10/2015 1424     04/11/2024 0337     12/10/2015 1424    CO2 26 04/11/2024 0337    CO2 27.7 12/10/2015 1424    BUN 15 04/11/2024 0337    BUN 17 12/10/2015 1424    CREATININE 0.93 04/11/2024 0337    CREATININE 1.14 12/10/2015 1424        Component Value Date/Time    CALCIUM 8.9 04/11/2024 0337    CALCIUM 8.9 12/10/2015 1424    ALKPHOS 49 04/11/2024 0337    ALKPHOS 58 11/11/2015 0928    AST 15 04/11/2024 0337    AST 25 11/11/2015 0928    ALT 13 04/11/2024 0337    ALT 46 11/11/2015 0928    BILITOT 0.47 11/11/2015 0928            Lab Results   Component Value Date    CHOL 230 11/11/2015    CHOL 221 10/01/2014    CHOL 216 04/18/2014     Lab Results   Component Value Date    HDL 47 10/11/2024    HDL 47 10/20/2023    HDL 45 08/21/2023     Lab Results   Component Value Date    LDLCALC 120 (H) 10/11/2024    LDLCALC 109 (H) 10/20/2023    LDLCALC 146 (H) 08/21/2023     Lab Results   Component Value Date    TRIG 66 10/11/2024    TRIG 61 10/20/2023    TRIG 85 08/21/2023     No results found for: \"CHOLHDL\"    Imaging: No results found.    ECG:  n/a    Review of Systems   Cardiovascular:  Positive for chest pain (when bending over) and dyspnea on exertion (hx of asthma).   Respiratory:  Positive for shortness of breath (hx of asthma).    All other systems reviewed and are negative.      Vitals:    01/03/25 0848   BP: 166/80   Pulse: 63   SpO2: 99%     Vitals:    01/03/25 0848   Weight: 92.1 kg (203 lb)         Body mass index is " 31.33 kg/m².    Physical Exam  Vitals and nursing note reviewed.   Constitutional:       General: He is not in acute distress.     Appearance: He is well-developed. He is not ill-appearing.   HENT:      Head: Normocephalic.   Cardiovascular:      Rate and Rhythm: Normal rate and regular rhythm.      Heart sounds: No murmur heard.  Pulmonary:      Breath sounds: Decreased breath sounds present.   Musculoskeletal:         General: Normal range of motion.      Cervical back: Normal range of motion.      Right lower leg: No edema.      Left lower leg: No edema.   Skin:     General: Skin is warm and dry.   Neurological:      Mental Status: He is alert and oriented to person, place, and time.   Psychiatric:         Mood and Affect: Mood normal.         Behavior: Behavior normal.

## 2025-01-10 ENCOUNTER — TELEPHONE (OUTPATIENT)
Age: 75
End: 2025-01-10

## 2025-01-10 NOTE — TELEPHONE ENCOUNTER
Pt called in again regarding stress test. Please follow up with patient regarding peer to peer.

## 2025-01-10 NOTE — TELEPHONE ENCOUNTER
Caller: Jimmy Galvan    Doctor: Dr Ridley    Reason for call: Patient called stating he received a call that his stress test for Monday 1/13 would need to be cancelled for the 2nd time. It was an insurance issue and I verified with Geovany from [facility] that we are waiting on a pier to pier in order for the insurance to review and approve it. Once that is completed, we will get him rescheduled.

## 2025-01-13 ENCOUNTER — HOSPITAL ENCOUNTER (OUTPATIENT)
Dept: NON INVASIVE DIAGNOSTICS | Facility: CLINIC | Age: 75
Discharge: HOME | End: 2025-01-13

## 2025-02-04 ENCOUNTER — TELEPHONE (OUTPATIENT)
Age: 75
End: 2025-02-04

## 2025-02-04 NOTE — TELEPHONE ENCOUNTER
OA Questions for EGD  Date: [ 02/04/2025]  Screened by: [Tati FOWLER ]     Referring Provider: [ DR. BLOOM]     Pre-Screening: BMI [31.33  ]    Past EGD? If yes - Date: [  11/29/2022 ]  Physician/Facility: [[facility]]  Reason: [ wallis's esophagus ]     SCHEDULING STAFF: If the patient is over 75 years old, please schedule an office visit.  ·      Does the patient want to see a gastroenterologist prior to their procedure to discuss any GI symptoms?no  ·      Has the patient been hospitalized or had abdominal surgery in the past 6 months?no  ·      Does the patient use supplemental oxygen?no  ·      Does the patient take [Coumadin], [Lovenox], [Plavix], [Eliquis], [Xarelto], or other blood thinning medication? no  ·      Has the patient had a stroke, cardiac event, or stent placed in the past year? no     SCHEDULING STAFF: If patient answers NO to the above questions, then schedule the procedure. If patient answers YES to any of the above questions, then schedule an office appointment.  ·       If a repeat EGD is belated and patient declines procedure à notify provider.

## 2025-02-04 NOTE — TELEPHONE ENCOUNTER
Scheduled date of EGD(as of today):4/17/2025  Physician performing EGD:Dr. Mendez  Location of EGD[facility]  Instructions reviewed with patient by:CB-Prep instructions sent to [facility]  Clearances: n/a

## 2025-02-04 NOTE — LETTER
Hello,    Attached are your prep instructions for your upcoming procedure on 4/17/2025. If you have any questions, please give us a call at [phone].    Thank you,     [facility]

## 2025-03-25 ENCOUNTER — APPOINTMENT (EMERGENCY)
Dept: RADIOLOGY | Facility: HOSPITAL | Age: 75
End: 2025-03-25
Payer: COMMERCIAL

## 2025-03-25 ENCOUNTER — HOSPITAL ENCOUNTER (EMERGENCY)
Facility: HOSPITAL | Age: 75
Discharge: HOME | End: 2025-03-25
Payer: COMMERCIAL

## 2025-03-25 ENCOUNTER — TELEPHONE (OUTPATIENT)
Dept: CARDIOLOGY CLINIC | Facility: CLINIC | Age: 75
End: 2025-03-25

## 2025-03-25 VITALS
HEART RATE: 58 BPM | SYSTOLIC BLOOD PRESSURE: 182 MMHG | OXYGEN SATURATION: 98 % | RESPIRATION RATE: 18 BRPM | TEMPERATURE: 97.9 F | DIASTOLIC BLOOD PRESSURE: 86 MMHG

## 2025-03-25 DIAGNOSIS — R06.02 SOB (SHORTNESS OF BREATH): ICD-10-CM

## 2025-03-25 DIAGNOSIS — J45.30 MILD PERSISTENT ASTHMA WITHOUT COMPLICATION: ICD-10-CM

## 2025-03-25 DIAGNOSIS — J45.909 ASTHMA: ICD-10-CM

## 2025-03-25 DIAGNOSIS — R06.2 WHEEZING: Primary | ICD-10-CM

## 2025-03-25 LAB
2HR DELTA HS TROPONIN: 0 NG/L
ALBUMIN SERPL BCG-MCNC: 4.4 G/DL (ref 3.5–5)
ALP SERPL-CCNC: 49 U/L (ref 34–104)
ALT SERPL W P-5'-P-CCNC: 13 U/L (ref 7–52)
ANION GAP SERPL CALCULATED.3IONS-SCNC: 6 MMOL/L (ref 4–13)
AST SERPL W P-5'-P-CCNC: 16 U/L (ref 13–39)
BASOPHILS # BLD AUTO: 0.04 THOUSANDS/ÂΜL (ref 0–0.1)
BASOPHILS NFR BLD AUTO: 1 % (ref 0–1)
BILIRUB SERPL-MCNC: 0.63 MG/DL (ref 0.2–1)
BNP SERPL-MCNC: 205 PG/ML (ref 0–100)
BUN SERPL-MCNC: 19 MG/DL (ref 5–25)
CALCIUM SERPL-MCNC: 9.2 MG/DL (ref 8.4–10.2)
CARDIAC TROPONIN I PNL SERPL HS: 24 NG/L (ref ?–50)
CARDIAC TROPONIN I PNL SERPL HS: 24 NG/L (ref ?–50)
CHLORIDE SERPL-SCNC: 107 MMOL/L (ref 96–108)
CO2 SERPL-SCNC: 26 MMOL/L (ref 21–32)
CREAT SERPL-MCNC: 1.09 MG/DL (ref 0.6–1.3)
EOSINOPHIL # BLD AUTO: 0.19 THOUSAND/ÂΜL (ref 0–0.61)
EOSINOPHIL NFR BLD AUTO: 2 % (ref 0–6)
ERYTHROCYTE [DISTWIDTH] IN BLOOD BY AUTOMATED COUNT: 13.6 % (ref 11.6–15.1)
GFR SERPL CREATININE-BSD FRML MDRD: 67 ML/MIN/1.73SQ M
GLUCOSE SERPL-MCNC: 112 MG/DL (ref 65–140)
HCT VFR BLD AUTO: 45.9 % (ref 36.5–49.3)
HGB BLD-MCNC: 15.2 G/DL (ref 12–17)
IMM GRANULOCYTES # BLD AUTO: 0.03 THOUSAND/UL (ref 0–0.2)
IMM GRANULOCYTES NFR BLD AUTO: 0 % (ref 0–2)
LYMPHOCYTES # BLD AUTO: 1.56 THOUSANDS/ÂΜL (ref 0.6–4.47)
LYMPHOCYTES NFR BLD AUTO: 20 % (ref 14–44)
MCH RBC QN AUTO: 31.1 PG (ref 26.8–34.3)
MCHC RBC AUTO-ENTMCNC: 33.1 G/DL (ref 31.4–37.4)
MCV RBC AUTO: 94 FL (ref 82–98)
MONOCYTES # BLD AUTO: 0.63 THOUSAND/ÂΜL (ref 0.17–1.22)
MONOCYTES NFR BLD AUTO: 8 % (ref 4–12)
NEUTROPHILS # BLD AUTO: 5.56 THOUSANDS/ÂΜL (ref 1.85–7.62)
NEUTS SEG NFR BLD AUTO: 69 % (ref 43–75)
NRBC BLD AUTO-RTO: 0 /100 WBCS
PLATELET # BLD AUTO: 208 THOUSANDS/UL (ref 149–390)
PMV BLD AUTO: 10.8 FL (ref 8.9–12.7)
POTASSIUM SERPL-SCNC: 4.2 MMOL/L (ref 3.5–5.3)
PROT SERPL-MCNC: 7.3 G/DL (ref 6.4–8.4)
RBC # BLD AUTO: 4.89 MILLION/UL (ref 3.88–5.62)
SODIUM SERPL-SCNC: 139 MMOL/L (ref 135–147)
WBC # BLD AUTO: 8.01 THOUSAND/UL (ref 4.31–10.16)

## 2025-03-25 PROCEDURE — 84484 ASSAY OF TROPONIN QUANT: CPT

## 2025-03-25 PROCEDURE — 85025 COMPLETE CBC W/AUTO DIFF WBC: CPT

## 2025-03-25 PROCEDURE — 36415 COLL VENOUS BLD VENIPUNCTURE: CPT

## 2025-03-25 PROCEDURE — 99285 EMERGENCY DEPT VISIT HI MDM: CPT

## 2025-03-25 PROCEDURE — 94760 N-INVAS EAR/PLS OXIMETRY 1: CPT

## 2025-03-25 PROCEDURE — 71046 X-RAY EXAM CHEST 2 VIEWS: CPT

## 2025-03-25 PROCEDURE — 94644 CONT INHLJ TX 1ST HOUR: CPT

## 2025-03-25 PROCEDURE — 80053 COMPREHEN METABOLIC PANEL: CPT

## 2025-03-25 PROCEDURE — 93005 ELECTROCARDIOGRAM TRACING: CPT

## 2025-03-25 PROCEDURE — 83880 ASSAY OF NATRIURETIC PEPTIDE: CPT

## 2025-03-25 RX ORDER — ALBUTEROL SULFATE 90 UG/1
2 INHALANT RESPIRATORY (INHALATION) EVERY 4 HOURS PRN
Qty: 6.7 G | Refills: 0 | Status: SHIPPED | OUTPATIENT
Start: 2025-03-25 | End: 2026-03-25

## 2025-03-25 RX ORDER — ALBUTEROL SULFATE 5 MG/ML
10 SOLUTION RESPIRATORY (INHALATION) ONCE
Status: COMPLETED | OUTPATIENT
Start: 2025-03-25 | End: 2025-03-25

## 2025-03-25 RX ORDER — SODIUM CHLORIDE FOR INHALATION 0.9 %
12 VIAL, NEBULIZER (ML) INHALATION ONCE
Status: COMPLETED | OUTPATIENT
Start: 2025-03-25 | End: 2025-03-25

## 2025-03-25 RX ORDER — FLUTICASONE PROPIONATE AND SALMETEROL 250; 50 UG/1; UG/1
1 POWDER RESPIRATORY (INHALATION)
Qty: 60 BLISTER | Refills: 0 | Status: SHIPPED | OUTPATIENT
Start: 2025-03-25

## 2025-03-25 RX ADMIN — ALBUTEROL SULFATE 10 MG: 2.5 SOLUTION RESPIRATORY (INHALATION) at 16:33

## 2025-03-25 RX ADMIN — ISODIUM CHLORIDE 12 ML: 0.03 SOLUTION RESPIRATORY (INHALATION) at 16:33

## 2025-03-25 RX ADMIN — IPRATROPIUM BROMIDE 1 MG: 0.5 SOLUTION RESPIRATORY (INHALATION) at 16:33

## 2025-03-25 NOTE — ED NOTES
RT called for administration of heart neb treatment at this time.     Giovanna Tellez RN  03/25/25 [id number]

## 2025-03-25 NOTE — TELEPHONE ENCOUNTER
"Spoke to PT. PT states he is having a hard time breathing with tightness in his chest when bending over. PT does not know if there is something wrong with his \"stomach wrap\" from a prior surgery. PT states \"maybe my stomach wrap expanded and his pushing on my chest\" Advised PT to go to the ED for evaluation and imaging.   "

## 2025-03-25 NOTE — ED PROVIDER NOTES
"  History  Chief Complaint   Patient presents with    Emesis     Pt presents to ED with multiple complaints. N/V/D with intermittent fever and chest wall discomfort x 2 days. Took Tylenol 3 hrs ago.      37-year-old female platelet  currently 33 weeks pregnant with history of gestational diabetes presents for evaluation of abdominal discomfort associated with nausea, vomiting, diarrhea and bloating.  Patient also noticed subjective fever. Symptoms ongoing for the past 2 days.  Son noted to be at home with similar related vomiting and diarrhea.  Tylenol taken for symptom relief in the outpatient setting.  Patient is followed by Obstetrics/Gynecology         Past Medical History:   Diagnosis Date    Cholelithiasis 12/15/2020       Past Surgical History:   Procedure Laterality Date    CHOLECYSTECTOMY         No family history on file.    Social History     Tobacco Use    Smoking status: Never     Passive exposure: Never    Smokeless tobacco: Never   Substance Use Topics    Alcohol use: No    Drug use: Never       ROS  Review of Systems   Constitutional:  Positive for fever (subjective).   Gastrointestinal:  Positive for diarrhea, nausea and vomiting.        Bloating       Physical Exam  /84   Pulse 92   Temp 97.9 °F (36.6 °C) (Oral)   Resp 18   Ht 5' 4" (1.626 m)   Wt 86 kg (189 lb 9.6 oz)   LMP  (LMP Unknown)   SpO2 99%   Breastfeeding No   BMI 32.54 kg/m²   Physical Exam    Constitutional: She appears well-developed and well-nourished. She is cooperative.   HENT:   Head: Normocephalic and atraumatic.   Eyes: Conjunctivae, EOM and lids are normal. Pupils are equal, round, and reactive to light.   Neck: Phonation normal.   Normal range of motion.  Cardiovascular:  Normal rate, regular rhythm and intact distal pulses.           Pulmonary/Chest: Breath sounds normal. No respiratory distress.   Abdominal: Abdomen is soft.   Normal gravid abdomen   Musculoskeletal:      Cervical back: Normal " Time reflects when diagnosis was documented in both MDM as applicable and the Disposition within this note       Time User Action Codes Description Comment    3/25/2025  6:14 PM Palladino, Annette Add [R06.2] Wheezing     3/25/2025  6:14 PM Palladino, Annette Add [J45.909] Asthma     3/25/2025  6:15 PM PalladinoJenny codyette Add [J45.30] Mild persistent asthma without complication     3/25/2025  6:45 PM Palladino, Annette Add [R06.02] SOB (shortness of breath)           ED Disposition       ED Disposition   Discharge    Condition   Stable    Date/Time   Tue Mar 25, 2025  6:14 PM    Comment   Jimmy Galvan discharge to home/self care.                   Assessment & Plan       Medical Decision Making  Amount and/or Complexity of Data Reviewed  Labs: ordered. Decision-making details documented in ED Course.  Radiology: independent interpretation performed.    Risk  Prescription drug management.        ED Course as of 03/25/25 1846   Tue Mar 25, 2025   1624 - Given patient's concerns, will do a cardiac workup.   - Will do an EKG for arrythmia, strain; troponin for same as per protocol for evaluation of ACS.   - CBC for anemia; CMP for kidney function and electrolytes.   - Will check CXR for pneumonia, PTX, fluid overload  - HEART score:  - Heart neb for expiratory wheezing for reactive airway  - Will also check bnp to evaluate for fluid overload  -Patient is aware of the current workup will frequently reevaluate all questions concerns answered.  - Disposition per workup.    1631 hs TnI 0hr: 24  Will need delta trop   1701 BNP(!): 205   1751 Patient reevaluated still getting the heart neb.  Patient is feeling much better after administration of some the medication.  Will frequently reevaluate.  Patient's expiratory wheeze is gone now.   1814 Delta 2hr hsTnI: 0   1845 Pt re-examined and evaluated after testing and treatment. Spoke with the patient and feeling improved and sxs have resolved. Will discharge home with close f/u  range of motion.     Neurological: She is alert and oriented to person, place, and time.   Skin: Skin is warm and dry.   Psychiatric: She has a normal mood and affect. Her speech is normal and behavior is normal.               Labs Reviewed   CULTURE, URINE - Abnormal       Result Value    Urine Culture, Routine   (*)     Value: PRESUMPTIVE PROTEUS SPECIES  > 100,000 cfu/ml  Identification and susceptibility pending      Narrative:     Preferred Collection Type->Urine, Clean Catch  Specimen Source->Urine   CBC W/ AUTO DIFFERENTIAL - Abnormal    WBC 9.60      RBC 4.64      Hemoglobin 14.1      Hematocrit 40.9      MCV 88      MCH 30.4      MCHC 34.5      RDW 13.5      Platelets 291      MPV 10.7      Immature Granulocytes 0.3      Gran # (ANC) 8.0 (*)     Immature Grans (Abs) 0.03      Lymph # 0.9 (*)     Mono # 0.7      Eos # 0.0      Baso # 0.01      nRBC 0      Gran % 82.9 (*)     Lymph % 9.7 (*)     Mono % 7.0      Eosinophil % 0.0      Basophil % 0.1      Differential Method Automated     COMPREHENSIVE METABOLIC PANEL - Abnormal    Sodium 133 (*)     Potassium 3.4 (*)     Chloride 99      CO2 23      Glucose 170 (*)     BUN 6      Creatinine 0.6      Calcium 8.8      Total Protein 6.7      Albumin 2.5 (*)     Total Bilirubin 0.4      Alkaline Phosphatase 231 (*)     AST 77 (*)     ALT 63 (*)     eGFR >60.0      Anion Gap 11     URINALYSIS, REFLEX TO URINE CULTURE - Abnormal    Specimen UA Urine, Clean Catch      Color, UA Yellow      Appearance, UA Cloudy (*)     pH, UA 6.0      Specific Gravity, UA >=1.030 (*)     Protein, UA 1+ (*)     Glucose, UA 4+ (*)     Ketones, UA 3+ (*)     Bilirubin (UA) Negative      Occult Blood UA Negative      Nitrite, UA Negative      Urobilinogen, UA 1.0      Leukocytes, UA 1+ (*)     Narrative:     Preferred Collection Type->Urine, Clean Catch  Specimen Source->Urine   URINALYSIS MICROSCOPIC - Abnormal    RBC, UA 10 (*)     WBC, UA 61 (*)     Bacteria Many (*)     Yeast, UA None  with pcp and instructed to return to the ED if sxs worsen or continue. Pt agrees with the plan for discharge and feels comfortable to go home with proper f/u. Advised to return for worsening or additional problems. Diagnostic tests were reviewed and questions answered. Diagnosis, care plan and treatment options were discussed. The patient understand instructions and will follow up as directed.    Counseling:there was a  detailed discussion with the patient and/or guardian regarding: the historical points, exam findings, and any diagnostic results supporting the discharge diagnosis, lab results, radiology results, discharge instructions reviewed with patient and/or family/caregiver and understanding was verbalized. Instructions given to return to the emergency department if symptoms worsen or persist, or if there are any questions or concerns that arise at home.     All labs reviewed and utilized in the medical decision making process    All radiology studies independently viewed by me.           Medications   albuterol inhalation solution 10 mg (10 mg Nebulization Given 3/25/25 1633)   ipratropium (ATROVENT) 0.02 % inhalation solution 1 mg (1 mg Nebulization Given 3/25/25 1633)   sodium chloride 0.9 % inhalation solution 12 mL (12 mL Nebulization Given 3/25/25 1633)       ED Risk Strat Scores   HEART Risk Score      Flowsheet Row Most Recent Value   Heart Score Risk Calculator    History 1 Filed at: 03/25/2025 1631   ECG 1 Filed at: 03/25/2025 1631   Age 2 Filed at: 03/25/2025 1631   Risk Factors 1 Filed at: 03/25/2025 1631   Troponin 1 Filed at: 03/25/2025 1631   HEART Score 6 Filed at: 03/25/2025 1631          HEART Risk Score      Flowsheet Row Most Recent Value   Heart Score Risk Calculator    History 1 Filed at: 03/25/2025 1631   ECG 1 Filed at: 03/25/2025 1631   Age 2 Filed at: 03/25/2025 1631   Risk Factors 1 Filed at: 03/25/2025 1631   Troponin 1 Filed at: 03/25/2025 1631   HEART Score 6 Filed at: 03/25/2025       Squam Epithel, UA 6      Hyaline Casts, UA 13.1 (*)     Microscopic Comment SEE COMMENT      Narrative:     Preferred Collection Type->Urine, Clean Catch  Specimen Source->Urine   LIPASE    Lipase 44     SARS-COV-2 RDRP GENE    POC Rapid COVID Negative       Acceptable Yes      Narrative:     This test utilizes isothermal nucleic acid amplification technology to detect the SARS-CoV-2 RdRp nucleic acid segment. The analytical sensitivity (limit of detection) is 500 copies/swab.     A POSITIVE result is indicative of the presence of SARS-CoV-2 RNA; clinical correlation with patient history and other diagnostic information is necessary to determine patient infection status.    A NEGATIVE result means that SARS-CoV-2 nucleic acids are not present above the limit of detection. A NEGATIVE result should be treated as presumptive. It does not rule out the possibility of COVID-19 and should not be the sole basis for treatment decisions. If COVID-19 is strongly suspected based on clinical and exposure history, re-testing using an alternate molecular assay should be considered.     Commercial kits are provided by [employer].       POCT INFLUENZA A/B MOLECULAR    POC Molecular Influenza A Ag Negative      POC Molecular Influenza B Ag Negative       Acceptable Yes             Imaging Results    None                     Procedures             Medical Decision Making  Suspect symptoms related to acute viral gastroenteritis.  Fetal heart tones in the 170s.  Will give medication for symptom relief and obtain labs.  Will also obtain urinalysis.  See ED course for updates.    Amount and/or Complexity of Data Reviewed  Labs: ordered.    Risk  OTC drugs.  Prescription drug management.               ED Course as of 11/30/24 0450   Wed Nov 27, 2024 2252 ALP(!): 231 [NA]   2252 AST(!): 77 [NA]   2252 ALT(!): 63 [NA]   2252 WBC: 9.60 [NA]   2252 Hemoglobin: 14.1 [NA]   2252 Hematocrit: 40.9 [NA]  1631                                                  History of Present Illness       Chief Complaint   Patient presents with    Asthma     Pt c/o sob with bending over X1 month. Per pt has a history of asthma and it increases with weather change. Pt has nebs and inhalers at home with little relief        Past Medical History:   Diagnosis Date    Allergic 01/01/2022    Allergic reaction     LAST ASSESSED: 12MAR2013    Asthma     Bursitis of heel     LAST ASSESSED: [id number]    Colon polyp     Derangement of meniscus of left knee due to old injury     LAST ASSESSED: 23AUG2013    Diverticulitis of colon     LAST ASSESSED: 15OCT2013    Dizziness     LAST ASSESSED: 09SEP2013    GERD (gastroesophageal reflux disease)     History of colon polyps     Hypertension     Kidney stone     Malignant hypertension     LAST ASSESSED: [id number]    Myocardial infarction (HCC)     Non-ST elevated myocardial infarction (HCC) 03/02/2020    NSTEMI (non-ST elevated myocardial infarction) (HCC)     Plantar fasciitis     Seasonal allergies     Shoulder impingement     LAST ASSESSED: 10SEP2012    Stroke (HCC)       Past Surgical History:   Procedure Laterality Date    APPENDECTOMY      CAROTID ENDARTARECTOMY      COLECTOMY Left     PARTIAL - SIGMOID; HEMICOLECTOMY FOR DIVERTICULITIS; ONSET: 1987    COLON SURGERY      COLONOSCOPY N/A 12/9/2016    Procedure: COLONOSCOPY;  Surgeon: Silvia Caceres MD;  Location: [facility];  Service:     HERNIA REPAIR      VENTRAL    NISSEN FUNDOPLICATION      ESOPHAGOGASTRIC FUNDOPLASTY LAST ASSESSED: 06OCT2014    SINUS SURGERY      THROMBOENDARTERECTOMY      CAROTID; ONSET; MAY 2012      Family History   Problem Relation Age of Onset    Hypertension Mother     Hyperlipidemia Mother     Breast cancer Mother     Hypertension Father     Hyperlipidemia Father     Colon cancer Paternal Grandfather     Cancer Paternal Grandfather     Asthma Paternal Grandfather     Throat cancer Maternal Aunt       Social    2252 Platelet Count: 291 [NA]   2252 Lipase: 44 [NA]   2252 SARS-CoV-2 RNA, Amplification, Qual: Negative [NA]   2341 SARS-CoV-2 RNA, Amplification, Qual: Negative [NA]   2342 POCT Influenza A/B Molecular  Negative [NA]   2354 POC Molecular Influenza A Ag: Negative [NA]   2354 POC Molecular Influenza B Ag: Negative [NA]   2357 Leukocyte Esterase, UA(!): 1+ [NA]   2357 WBC, UA(!): 61 [NA]   2357 Bacteria, UA(!): Many [NA]      ED Course User Index  [NA] Marybeth Kelley MD       DISCHARGE NOTE:       Fior Noble's  results have been reviewed with her.  She has been counseled regarding her diagnosis, treatment, and plan.  She verbally conveys understanding and agreement of the signs, symptoms, diagnosis, treatment and prognosis and additionally agrees to follow up as discussed.  She also agrees with the care-plan and conveys that all of her questions have been answered.  I have also provided discharge instructions for her that include: educational information regarding their diagnosis and treatment, and list of reasons why they would want to return to the ED prior to their follow-up appointment, should her condition change. She has been provided with education for proper emergency department utilization.      CLINICAL IMPRESSION:         1. Urinary tract infection without hematuria, site unspecified    2. Viral gastroenteritis    3. 33 weeks gestation of pregnancy              PLAN:   1. Discharge  2.   Discharge Medication List as of 11/28/2024 12:34 AM        START taking these medications    Details   cephALEXin (KEFLEX) 500 MG capsule Take 1 capsule (500 mg total) by mouth every 8 (eight) hours. for 5 days, Starting Fri 11/29/2024, Until Wed 12/4/2024, Normal      ondansetron (ZOFRAN-ODT) 4 MG TbDL Take 1 tablet (4 mg total) by mouth every 6 (six) hours as needed (Nausea/vomiting)., Starting Thu 11/28/2024, Until Tue 12/3/2024 at 2359, Normal           3. Maya Terrazas III, MD  [address]  History     Tobacco Use    Smoking status: Former     Current packs/day: 0.00     Average packs/day: 0.3 packs/day for 5.0 years (1.3 ttl pk-yrs)     Types: Cigarettes     Start date:      Quit date: 1960     Years since quittin.2    Smokeless tobacco: Never   Vaping Use    Vaping status: Never Used   Substance Use Topics    Alcohol use: No    Drug use: No      E-Cigarette/Vaping    E-Cigarette Use Never User       E-Cigarette/Vaping Substances    Nicotine No     THC No     CBD No     Flavoring No     Other No     Unknown No       I have reviewed and agree with the history as documented.     Patient is a 74-year-old male presenting to the emergency department for evaluation of 1 month of shortness of breath.  Patient notes that when the weather starts to get nice and he goes outside he feels like his asthma gets worse.  Patient notes he specifically notices it more when he is bending down.  He denies any chest pain associated with it.  Denies any headaches dizziness tinnitus vision change neck pain back pain numbness or tingling in any of his extremities.  He denies any chest pain or shortness of breath with ambulation or activity.  Patient notes he feels like his inhalers have not been helping as much.  Has been taking all medications as prescribed.  He denies any nausea vomiting diarrhea constipation dysuria hematuria.  Patient does note he has been gaining weight.  Patient with no prolonged travel.  No DVT or PE history.  No hemoptysis.        Review of Systems   Constitutional:  Positive for activity change. Negative for chills and fever.   HENT:  Negative for congestion, ear pain and sore throat.    Eyes:  Negative for pain and visual disturbance.   Respiratory:  Positive for shortness of breath and wheezing. Negative for cough and chest tightness.    Cardiovascular:  Negative for chest pain and palpitations.   Gastrointestinal:  Negative for abdominal pain, constipation, diarrhea, nausea and vomiting.    Genitourinary:  Negative for dysuria and hematuria.   Musculoskeletal:  Negative for arthralgias and back pain.   Skin:  Negative for color change and rash.   Neurological:  Negative for seizures and syncope.   Psychiatric/Behavioral:  Negative for agitation and behavioral problems.    All other systems reviewed and are negative.          Objective       ED Triage Vitals [03/25/25 1539]   Temperature Pulse Blood Pressure Respirations SpO2 Patient Position - Orthostatic VS   97.9 °F (36.6 °C) 58 (!) 223/85 16 98 % Sitting      Temp Source Heart Rate Source BP Location FiO2 (%) Pain Score    Oral Monitor Right arm -- --      Vitals      Date and Time Temp Pulse SpO2 Resp BP Pain Score FACES Pain Rating User   03/25/25 1700 -- 58 98 % 18 182/86 -- -- KB   03/25/25 1633 -- -- 98 % -- -- -- -- DA   03/25/25 1539 97.9 °F (36.6 °C) 58 98 % 16 223/85 -- -- AK            Physical Exam  Vitals and nursing note reviewed.   Constitutional:       General: He is not in acute distress.     Appearance: Normal appearance. He is well-developed.   HENT:      Head: Normocephalic and atraumatic.      Right Ear: External ear normal.      Left Ear: External ear normal.      Nose: Nose normal.      Mouth/Throat:      Mouth: Mucous membranes are moist.   Eyes:      Extraocular Movements: Extraocular movements intact.      Conjunctiva/sclera: Conjunctivae normal.   Cardiovascular:      Rate and Rhythm: Normal rate and regular rhythm.      Heart sounds: No murmur heard.  Pulmonary:      Effort: Pulmonary effort is normal. No respiratory distress.      Breath sounds: Wheezing present.      Comments: Bilateral expiratory wheezing  Abdominal:      Palpations: Abdomen is soft.      Tenderness: There is no abdominal tenderness. There is no right CVA tenderness, left CVA tenderness or guarding.   Musculoskeletal:         General: No swelling.      Cervical back: Normal range of motion and neck supple.   Skin:     General: Skin is warm and dry.     [address] [address]  [phone]    Schedule an appointment as soon as possible for a visit in 1 day            Marybeth Kelley MD  11/30/24 7940       Capillary Refill: Capillary refill takes less than 2 seconds.   Neurological:      General: No focal deficit present.      Mental Status: He is alert.      Motor: No weakness.      Gait: Gait normal.   Psychiatric:         Mood and Affect: Mood normal.         Behavior: Behavior normal.         Results Reviewed       Procedure Component Value Units Date/Time    HS Troponin I 2hr [[id number]]  (Normal) Collected: 03/25/25 1730    Lab Status: Final result Specimen: Blood from Arm, Left Updated: 03/25/25 1757     hs TnI 2hr 24 ng/L      Delta 2hr hsTnI 0 ng/L     B-Type Natriuretic Peptide(BNP) [[id number]]  (Abnormal) Collected: 03/25/25 1548    Lab Status: Final result Specimen: Blood from Arm, Right Updated: 03/25/25 1649      pg/mL     HS Troponin I 4hr [[id number]]     Lab Status: No result Specimen: Blood     HS Troponin 0hr (reflex protocol) [[id number]]  (Normal) Collected: 03/25/25 1548    Lab Status: Final result Specimen: Blood from Arm, Right Updated: 03/25/25 1628     hs TnI 0hr 24 ng/L     Comprehensive metabolic panel [[id number]] Collected: 03/25/25 1548    Lab Status: Final result Specimen: Blood from Arm, Right Updated: 03/25/25 1622     Sodium 139 mmol/L      Potassium 4.2 mmol/L      Chloride 107 mmol/L      CO2 26 mmol/L      ANION GAP 6 mmol/L      BUN 19 mg/dL      Creatinine 1.09 mg/dL      Glucose 112 mg/dL      Calcium 9.2 mg/dL      AST 16 U/L      ALT 13 U/L      Alkaline Phosphatase 49 U/L      Total Protein 7.3 g/dL      Albumin 4.4 g/dL      Total Bilirubin 0.63 mg/dL      eGFR 67 ml/min/1.73sq m     Narrative:      National Kid[employer] Disease Foundation guidelines for Chronic Kidney Disease (CKD):     Stage 1 with normal or high GFR (GFR > 90 mL/min/1.73 square meters)    Stage 2 Mild CKD (GFR = 60-89 mL/min/1.73 square meters)    Stage 3A Moderate CKD (GFR = 45-59 mL/min/1.73 square meters)    Stage 3B Moderate CKD (GFR = 30-44 mL/min/1.73 square meters)    Stage 4 Severe CKD (GFR =  15-29 mL/min/1.73 square meters)    Stage 5 End Stage CKD (GFR <15 mL/min/1.73 square meters)  Note: GFR calculation is accurate only with a steady state creatinine    CBC and differential [[id number]] Collected: 03/25/25 1548    Lab Status: Final result Specimen: Blood from Arm, Right Updated: 03/25/25 1608     WBC 8.01 Thousand/uL      RBC 4.89 Million/uL      Hemoglobin 15.2 g/dL      Hematocrit 45.9 %      MCV 94 fL      MCH 31.1 pg      MCHC 33.1 g/dL      RDW 13.6 %      MPV 10.8 fL      Platelets 208 Thousands/uL      nRBC 0 /100 WBCs      Segmented % 69 %      Immature Grans % 0 %      Lymphocytes % 20 %      Monocytes % 8 %      Eosinophils Relative 2 %      Basophils Relative 1 %      Absolute Neutrophils 5.56 Thousands/µL      Absolute Immature Grans 0.03 Thousand/uL      Absolute Lymphocytes 1.56 Thousands/µL      Absolute Monocytes 0.63 Thousand/µL      Eosinophils Absolute 0.19 Thousand/µL      Basophils Absolute 0.04 Thousands/µL             XR chest 2 views   ED Interpretation by Annette Maria Palladino, DO (03/25 1845)   No acute cardiopulmonary process notd.          ECG 12 Lead Documentation Only    Date/Time: 3/25/2025 4:23 PM    Performed by: Annette Maria Palladino, DO  Authorized by: Annette Maria Palladino, DO    Indications / Diagnosis:  Sob  ECG reviewed by me, the ED Provider: yes    Patient location:  ED  Previous ECG:     Previous ECG:  Compared to current    Similarity:  Changes noted  Interpretation:     Interpretation: abnormal    Rate:     ECG rate:  66    ECG rate assessment: normal    Rhythm:     Rhythm: sinus rhythm    Ectopy:     Ectopy: none    QRS:     QRS axis:  Normal    QRS intervals:  Normal  Conduction:     Conduction: normal    ST segments:     ST segments:  Normal  T waves:     T waves: inverted    Q waves:     Q waves:  III and aVF      ED Medication and Procedure Management   Prior to Admission Medications   Prescriptions Last Dose Informant Patient Reported? Taking?    Coenzyme Q10 10 MG capsule  Self No No   Sig: Take 1 capsule (10 mg total) by mouth daily   Fluticasone-Salmeterol (Wixela Inhub) 250-50 mcg/dose inhaler  Self No No   Sig: Inhale 1 puff daily in the early morning Rinse mouth after use.   Fluticasone-Salmeterol (Wixela Inhub) 250-50 mcg/dose inhaler   No Yes   Sig: Inhale 1 puff daily in the early morning Rinse mouth after use.   Loratadine 10 MG CAPS  Self Yes No   Taurine 500 MG CAPS  Self Yes No   Sig: Take by mouth   acetaminophen (TYLENOL) 325 mg tablet  Self No No   Sig: Take 2 tablets (650 mg total) by mouth every 6 (six) hours as needed for mild pain, headaches or fever   albuterol (PROVENTIL HFA,VENTOLIN HFA) 90 mcg/act inhaler   No No   Sig: INHALE 2 PUFFS EVERY 4 (FOUR) HOURS AS NEEDED FOR WHEEZING (20 MINUTES BEFORE EXERTION.)   albuterol (PROVENTIL HFA,VENTOLIN HFA) 90 mcg/act inhaler   No Yes   Sig: Inhale 2 puffs every 4 (four) hours as needed for wheezing (20 minutes before exertion.)   aspirin (ECOTRIN LOW STRENGTH) 81 mg EC tablet  Self Yes No   Sig: Take 324 mg by mouth every other day   cholecalciferol (VITAMIN D3) 1,000 units tablet  Self Yes No   Sig: Take 1,000 Units by mouth daily   fluticasone (FLONASE) 50 mcg/act nasal spray  Self Yes No   Si spray into each nostril daily   levalbuterol (XOPENEX) 1.25 mg/3 mL nebulizer solution  Self No No   Sig: Take 3 mL (1.25 mg total) by nebulization every 8 (eight) hours as needed for wheezing or shortness of breath   lisinopril (ZESTRIL) 10 mg tablet   No No   Sig: TAKE 1 TABLET BY MOUTH EVERY DAY   multivitamin (THERAGRAN) TABS  Self Yes No   Sig: Take 1 tablet by mouth daily   Patient not taking: Reported on 1/3/2025   omeprazole (PriLOSEC) 40 MG capsule  Self No No   Sig: TAKE 1 CAPSULE (40 MG TOTAL) BY MOUTH DAILY.   pravastatin (PRAVACHOL) 40 mg tablet  Self No No   Sig: Take 1 tablet (40 mg total) by mouth daily      Facility-Administered Medications: None     Current Discharge Medication  List        CONTINUE these medications which have CHANGED    Details   albuterol (PROVENTIL HFA,VENTOLIN HFA) 90 mcg/act inhaler Inhale 2 puffs every 4 (four) hours as needed for wheezing (20 minutes before exertion.)  Qty: 6.7 g, Refills: 0    Comments: DX Code Needed  . - Substitution to a formulary equivalent within the same pharmaceutical class is authorized.  Associated Diagnoses: Mild persistent asthma without complication      Fluticasone-Salmeterol (Wixela Inhub) 250-50 mcg/dose inhaler Inhale 1 puff daily in the early morning Rinse mouth after use.  Qty: 60 blister, Refills: 0    Comments: Substitution to a formulary equivalent within the same pharmaceutical class is authorized.  Associated Diagnoses: Mild persistent asthma without complication           CONTINUE these medications which have NOT CHANGED    Details   acetaminophen (TYLENOL) 325 mg tablet Take 2 tablets (650 mg total) by mouth every 6 (six) hours as needed for mild pain, headaches or fever  Qty: 30 tablet, Refills: 0    Associated Diagnoses: NSTEMI (non-ST elevated myocardial infarction) ([facility])      aspirin (ECOTRIN LOW STRENGTH) 81 mg EC tablet Take 324 mg by mouth every other day      cholecalciferol (VITAMIN D3) 1,000 units tablet Take 1,000 Units by mouth daily      Coenzyme Q10 10 MG capsule Take 1 capsule (10 mg total) by mouth daily    Associated Diagnoses: Mixed hyperlipidemia      fluticasone (FLONASE) 50 mcg/act nasal spray 1 spray into each nostril daily      levalbuterol (XOPENEX) 1.25 mg/3 mL nebulizer solution Take 3 mL (1.25 mg total) by nebulization every 8 (eight) hours as needed for wheezing or shortness of breath  Qty: 75 mL, Refills: 3    Associated Diagnoses: Mild intermittent asthma without complication      lisinopril (ZESTRIL) 10 mg tablet TAKE 1 TABLET BY MOUTH EVERY DAY  Qty: 90 tablet, Refills: 1    Associated Diagnoses: Benign essential hypertension      Loratadine 10 MG CAPS       multivitamin (THERAGRAN) TABS Take  1 tablet by mouth daily      omeprazole (PriLOSEC) 40 MG capsule TAKE 1 CAPSULE (40 MG TOTAL) BY MOUTH DAILY.  Qty: 90 capsule, Refills: 1    Associated Diagnoses: Gastroesophageal reflux disease, unspecified whether esophagitis present      pravastatin (PRAVACHOL) 40 mg tablet Take 1 tablet (40 mg total) by mouth daily  Qty: 90 tablet, Refills: 4    Associated Diagnoses: Mixed hyperlipidemia      Taurine 500 MG CAPS Take by mouth           No discharge procedures on file.  ED SEPSIS DOCUMENTATION   Time reflects when diagnosis was documented in both MDM as applicable and the Disposition within this note       Time User Action Codes Description Comment    3/25/2025  6:14 PM Palladino, Annette Add [R06.2] Wheezing     3/25/2025  6:14 PM Palladino, Annette Add [J45.909] Asthma     3/25/2025  6:15 PM Palladino, Annette Add [J45.30] Mild persistent asthma without complication     3/25/2025  6:45 PM Palladino, Annette Add [R06.02] SOB (shortness of breath)                  Annette Maria Palladino, DO  03/25/25 5905

## 2025-03-25 NOTE — DISCHARGE INSTRUCTIONS
Thank you for coming to the ER today. Please follow up with your primary care doctor in 1-2 days to be re-evaluated. If at any point you experience any new or worsening symptoms do not hesitate to come back to the hospital to be evaluated.  Thank you and hope you have a great rest of your day.

## 2025-03-26 ENCOUNTER — VBI (OUTPATIENT)
Dept: FAMILY MEDICINE CLINIC | Facility: CLINIC | Age: 75
End: 2025-03-26

## 2025-03-26 NOTE — TELEPHONE ENCOUNTER
03/26/25 1:55 PM    Patient contacted post ED visit, [facility] department spoke with patient/caregiver and outreach was successful.    Thank you.  Lissette Dan  [facility]

## 2025-03-27 DIAGNOSIS — E78.2 MIXED HYPERLIPIDEMIA: ICD-10-CM

## 2025-03-28 LAB
ATRIAL RATE: 66 BPM
P AXIS: 50 DEGREES
PR INTERVAL: 184 MS
QRS AXIS: 75 DEGREES
QRSD INTERVAL: 100 MS
QT INTERVAL: 414 MS
QTC INTERVAL: 434 MS
T WAVE AXIS: -8 DEGREES
VENTRICULAR RATE: 66 BPM

## 2025-03-28 PROCEDURE — 93010 ELECTROCARDIOGRAM REPORT: CPT | Performed by: INTERNAL MEDICINE

## 2025-03-29 RX ORDER — PRAVASTATIN SODIUM 40 MG
40 TABLET ORAL DAILY
Qty: 90 TABLET | Refills: 4 | Status: SHIPPED | OUTPATIENT
Start: 2025-03-29

## 2025-03-31 ENCOUNTER — OFFICE VISIT (OUTPATIENT)
Dept: FAMILY MEDICINE CLINIC | Facility: CLINIC | Age: 75
End: 2025-03-31
Payer: COMMERCIAL

## 2025-03-31 VITALS
BODY MASS INDEX: 32.58 KG/M2 | OXYGEN SATURATION: 99 % | DIASTOLIC BLOOD PRESSURE: 90 MMHG | SYSTOLIC BLOOD PRESSURE: 164 MMHG | HEART RATE: 60 BPM | TEMPERATURE: 98.3 F | RESPIRATION RATE: 16 BRPM | WEIGHT: 215 LBS | HEIGHT: 68 IN

## 2025-03-31 DIAGNOSIS — J45.40 MODERATE PERSISTENT ASTHMA WITHOUT COMPLICATION: ICD-10-CM

## 2025-03-31 PROCEDURE — 99214 OFFICE O/P EST MOD 30 MIN: CPT

## 2025-03-31 PROCEDURE — G2211 COMPLEX E/M VISIT ADD ON: HCPCS

## 2025-03-31 RX ORDER — FLUTICASONE PROPIONATE AND SALMETEROL 250; 50 UG/1; UG/1
1 POWDER RESPIRATORY (INHALATION) 2 TIMES DAILY
Qty: 60 BLISTER | Refills: 0 | Status: SHIPPED | OUTPATIENT
Start: 2025-03-31

## 2025-03-31 NOTE — PROGRESS NOTES
Name: Jimmy Galvan      : 1950      MRN: [id number]  Encounter Provider: Anum Chen DO  Encounter Date: 3/31/2025   Encounter department: [facility]  :  Assessment & Plan  Moderate persistent asthma without complication  Increase Wixela to BID, can continue Xoponex neb [id number] prn, and slowly increase interval of albuterol use from 4 to 6 hours prn and wean off. Wean off Xoponex as tolerated. Lungs CTAB in office today.   Pulm referral at patient request  ED/Return precautions reviewed extensively, pt understands and agrees to plan.     Orders:    Fluticasone-Salmeterol (Wixela Inhub) 250-50 mcg/dose inhaler; Inhale 1 puff 2 (two) times a day Rinse mouth after use.    Ambulatory Referral to Pulmonology; Future           History of Present Illness   HPI    74-year-old male presents for ED follow-up.  Seen in the ED 3/25 for wheezing and complaints of 1 month of shortness of breath.  Usually notes worsening asthma around the time of weather change.  Underwent a cardiac workup that was unremarkable.  Chest x-ray negative.  Heart neb provided for expiratory wheezing for reactive airway, tolerated procedure well and was discharged home. Slowly improving since ED visit but still catching his breath at times. Current regimen includes Wixela 1 puff daily, xoponex neb q8h, and albuterol q4 hours. Reports that he is feeling better than he had been but would like to follow with pulmonology.       Review of Systems   Constitutional:  Negative for chills and fever.   HENT:  Negative for congestion and rhinorrhea.    Eyes:  Negative for visual disturbance.   Respiratory:  Negative for cough and shortness of breath.    Cardiovascular:  Negative for chest pain and palpitations.   Gastrointestinal:  Negative for abdominal pain, constipation, diarrhea, nausea and vomiting.   Genitourinary:  Negative for dysuria.   Musculoskeletal:  Negative for arthralgias.   Skin:  Negative for rash.   Neurological:   "Negative for dizziness, light-headedness and headaches.       Objective   /90 (BP Location: Left arm, Patient Position: Sitting, Cuff Size: Large)   Pulse 60   Temp 98.3 °F (36.8 °C) (Temporal)   Resp 16   Ht 5' 7.5\" (1.715 m)   Wt 97.5 kg (215 lb)   SpO2 99%   BMI 33.18 kg/m²      Physical Exam  Vitals reviewed.   Constitutional:       General: He is not in acute distress.     Appearance: Normal appearance.   HENT:      Head: Normocephalic and atraumatic.      Right Ear: External ear normal.      Left Ear: External ear normal.      Nose: Nose normal.      Mouth/Throat:      Pharynx: Oropharynx is clear.   Eyes:      Conjunctiva/sclera: Conjunctivae normal.   Cardiovascular:      Rate and Rhythm: Normal rate and regular rhythm.      Pulses: Normal pulses.      Heart sounds: Normal heart sounds.   Pulmonary:      Effort: Pulmonary effort is normal. No respiratory distress.      Breath sounds: Normal breath sounds. No wheezing, rhonchi or rales.   Abdominal:      Palpations: Abdomen is soft.   Musculoskeletal:      Right lower leg: No edema.      Left lower leg: No edema.   Skin:     General: Skin is warm.   Neurological:      Mental Status: He is alert and oriented to person, place, and time.   Psychiatric:         Mood and Affect: Mood normal.         Behavior: Behavior normal.         "

## 2025-03-31 NOTE — ASSESSMENT & PLAN NOTE
Increase Wixela to BID, can continue Xoponex neb [id number] prn, and slowly increase interval of albuterol use from 4 to 6 hours prn and wean off. Wean off Xoponex as tolerated. Lungs CTAB in office today.   Pulm referral at patient request  ED/Return precautions reviewed extensively, pt understands and agrees to plan.     Orders:    Fluticasone-Salmeterol (Wixela Inhub) 250-50 mcg/dose inhaler; Inhale 1 puff 2 (two) times a day Rinse mouth after use.    Ambulatory Referral to Pulmonology; Future

## 2025-04-01 ENCOUNTER — NURSE TRIAGE (OUTPATIENT)
Age: 75
End: 2025-04-01

## 2025-04-01 NOTE — TELEPHONE ENCOUNTER
"Pt called in stating that he has been having increased SOB with exertion over the past few months. He does use a nebulizer and has seen his PCP. He was told to follow up with cardiology.    Scheduled pt for an appointment on 4/8 with Dr. Ridley  Answer Assessment - Initial Assessment Questions  1. RESPIRATORY STATUS: \"Describe your breathing?\" (e.g., wheezing, shortness of breath, unable to speak, severe coughing)       SOB with exertion   2. ONSET: \"When did this breathing problem begin?\"       Months   3. PATTERN \"Does the difficult breathing come and go, or has it been constant since it started?\"       Comes and goes   4. SEVERITY: \"How bad is your breathing?\" (e.g., mild, moderate, severe)       Mild  5. RECURRENT SYMPTOM: \"Have you had difficulty breathing before?\" If Yes, ask: \"When was the last time?\" and \"What happened that time?\"       On going   6. CARDIAC HISTORY: \"Do you have any history of heart disease?\" (e.g., heart attack, angina, bypass surgery, angioplasty)       CAD, 2 stents   7. 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 CAUSE: \"What do you think is causing the breathing problem?\"       Unsure   9. OTHER SYMPTOMS: \"Do you have any other symptoms?\" (e.g., chest pain, cough, dizziness, fever, runny nose)      denies  10. O2 SATURATION MONITOR:  \"Do you use an oxygen saturation monitor (pulse oximeter) at home?\" If Yes, ask: \"What is your reading (oxygen level) today?\" \"What is your usual oxygen saturation reading?\" (e.g., 95%)        Does not check is sp02  12. TRAVEL: \"Have you traveled out of the country in the last month?\" (e.g., travel history, exposures)        Denies    Protocols used: Breathing Difficulty-Adult-[place]    "

## 2025-04-08 ENCOUNTER — OFFICE VISIT (OUTPATIENT)
Dept: CARDIOLOGY CLINIC | Facility: CLINIC | Age: 75
End: 2025-04-08
Payer: COMMERCIAL

## 2025-04-08 VITALS
SYSTOLIC BLOOD PRESSURE: 212 MMHG | HEIGHT: 68 IN | HEART RATE: 61 BPM | DIASTOLIC BLOOD PRESSURE: 78 MMHG | BODY MASS INDEX: 32.13 KG/M2 | WEIGHT: 212 LBS | OXYGEN SATURATION: 97 %

## 2025-04-08 DIAGNOSIS — E78.00 HYPERCHOLESTEROLEMIA: Primary | ICD-10-CM

## 2025-04-08 DIAGNOSIS — I10 PRIMARY HYPERTENSION: ICD-10-CM

## 2025-04-08 DIAGNOSIS — I10 BENIGN ESSENTIAL HYPERTENSION: ICD-10-CM

## 2025-04-08 DIAGNOSIS — I25.118 CORONARY ARTERY DISEASE OF NATIVE ARTERY OF NATIVE HEART WITH STABLE ANGINA PECTORIS (HCC): ICD-10-CM

## 2025-04-08 PROCEDURE — 99214 OFFICE O/P EST MOD 30 MIN: CPT | Performed by: INTERNAL MEDICINE

## 2025-04-08 RX ORDER — TORSEMIDE 20 MG/1
20 TABLET ORAL DAILY
Qty: 90 TABLET | Refills: 4 | Status: SHIPPED | OUTPATIENT
Start: 2025-04-08

## 2025-04-08 RX ORDER — ATORVASTATIN CALCIUM 40 MG/1
40 TABLET, FILM COATED ORAL DAILY
Qty: 90 TABLET | Refills: 4 | Status: SHIPPED | OUTPATIENT
Start: 2025-04-08

## 2025-04-08 RX ORDER — LISINOPRIL 20 MG/1
20 TABLET ORAL DAILY
Qty: 90 TABLET | Refills: 4 | Status: SHIPPED | OUTPATIENT
Start: 2025-04-08

## 2025-04-14 NOTE — PROGRESS NOTES
Cardiology Follow Up    Jimmy Galvan  1950  [id number]  [facility]  [address] [facility] [address] [phone]    1. Hypercholesterolemia  atorvastatin (LIPITOR) 40 mg tablet      2. Primary hypertension  lisinopril (ZESTRIL) 20 mg tablet      3. Coronary artery disease of native artery of native heart with stable angina pectoris (HCC)  Echo complete w/ contrast if indicated    torsemide (DEMADEX) 20 mg tablet            Discussion/Summary: His symptoms are c/w fluid retention. BP is significantly elevated.  ECHO  Start Torsemide 20 mg daily  Increase Lisinopril to 20 mg daily  4. Switch Pravachol to Lipitor 40 mg daily with .  5. RTO 2 months    Interval History: I last saw him back on 10/25/2024.     He has CAD with a NSTEMI in 8/2019. He had RENE placement x2 in the distal RCA.      ECHO 8/2019 - EF 60%    BP was 138/ 80 at that time.    His weight is up from 203 to 212 lbs.          He is not active. He was seen in the ER 3/25/2025 for 1 month of SOB. He denies CP.  He thought that his asthma was acting up.  He denies LE edema.    CXR 3/25/2025 - NAD    /78        Patient Active Problem List   Diagnosis    Asthma    Genao's esophagus without dysplasia    Benign essential hypertension    Diverticulosis    Fatty infiltration of liver    Gastroesophageal reflux disease without esophagitis    Hyperuricemia    Impaired fasting glucose    Mixed hyperlipidemia    Drug-induced erectile dysfunction    Non-allergic rhinitis    Osteoarthritis of knee    Vitamin B12 deficiency    Irritable bowel syndrome with diarrhea    History of colonic diverticulitis    History of right-sided carotid endarterectomy    Coronary artery disease involving native coronary artery of native heart without angina pectoris    Incisional hernia    Arthritis of right acromioclavicular joint    Carotid stenosis,  asymptomatic, left    Osteoarthritis of right glenohumeral joint    Old myocardial infarct    Personal history of transient ischemic attack (TIA), and cerebral infarction without residual deficits     Past Medical History:   Diagnosis Date    Allergic 2022    Allergic reaction     LAST ASSESSED: 2013    Asthma     Bursitis of heel     LAST ASSESSED: [id number]    Colon polyp     Derangement of meniscus of left knee due to old injury     LAST ASSESSED: [id number]    Diverticulitis of colon     LAST ASSESSED: 2013    Diverticulosis     Dizziness     LAST ASSESSED: [id number]    GERD (gastroesophageal reflux disease)     Hernia years    History of colon polyps     Hypertension     Kidney stone     Malignant hypertension     LAST ASSESSED: [id number]    Myocardial infarction ([facility]) 2017    Non-ST elevated myocardial infarction ([facility]) 2020    NSTEMI (non-ST elevated myocardial infarction) ([facility])     Plantar fasciitis     Seasonal allergies     Shoulder impingement     LAST ASSESSED: 2012    Stroke ([facility])      Social History     Socioeconomic History    Marital status: /Civil Union     Spouse name: Not on file    Number of children: Not on file    Years of education: Not on file    Highest education level: Not on file   Occupational History    Not on file   Tobacco Use    Smoking status: Former     Current packs/day: 0.00     Average packs/day: 0.3 packs/day for 5.0 years (1.3 ttl pk-yrs)     Types: Cigarettes     Start date:      Quit date: 1960     Years since quittin.3    Smokeless tobacco: Never   Vaping Use    Vaping status: Never Used   Substance and Sexual Activity    Alcohol use: No    Drug use: No    Sexual activity: Yes     Partners: Female     Birth control/protection: Male Sterilization   Other Topics Concern    Not on file   Social History Narrative    Not on file     Social Drivers of Health     Financial Resource Strain: Not on file   Food Insecurity: No Food Insecurity  (6/13/2024)    Nursing - Inadequate Food Risk Classification     Worried About Running Out of Food in the Last Year: Never true     Ran Out of Food in the Last Year: Never true     Ran Out of Food in the Last Year: Not on file   Transportation Needs: No Transportation Needs (6/13/2024)    PRAPARE - Transportation     Lack of Transportation (Medical): No     Lack of Transportation (Non-Medical): No   Physical Activity: Not on file   Stress: Not on file   Social Connections: Not on file   Intimate Partner Violence: Not on file   Housing Stability: Low Risk  (6/13/2024)    Housing Stability Vital Sign     Unable to Pay for Housing in the Last Year: No     Number of Times Moved in the Last Year: 0     Homeless in the Last Year: No      Family History   Problem Relation Age of Onset    Hypertension Mother     Hyperlipidemia Mother     Breast cancer Mother     Aneurysm Mother     Anemia Mother     Hypertension Father     Hyperlipidemia Father     Colon cancer Paternal Grandfather     Cancer Paternal Grandfather     Asthma Paternal Grandfather     Throat cancer Maternal Aunt      Past Surgical History:   Procedure Laterality Date    APPENDECTOMY      CAROTID ENDARTARECTOMY      COLECTOMY Left     PARTIAL - SIGMOID; HEMICOLECTOMY FOR DIVERTICULITIS; ONSET: 1987    COLON SURGERY      COLONOSCOPY N/A 12/09/2016    Procedure: COLONOSCOPY;  Surgeon: Silvia Caceres MD;  Location: [facility];  Service:     CORONARY STENT PLACEMENT      HERNIA REPAIR      VENTRAL    NISSEN FUNDOPLICATION      ESOPHAGOGASTRIC FUNDOPLASTY LAST ASSESSED: 06OCT2014    PERIPHERAL ANGIOGRAM      SINUS SURGERY      THROMBOENDARTERECTOMY      CAROTID; ONSET; MAY 2012    VASECTOMY  1982       Current Outpatient Medications:     acetaminophen (TYLENOL) 325 mg tablet, Take 2 tablets (650 mg total) by mouth every 6 (six) hours as needed for mild pain, headaches or fever, Disp: 30 tablet, Rfl: 0    albuterol (PROVENTIL HFA,VENTOLIN HFA) 90 mcg/act inhaler,  Inhale 2 puffs every 4 (four) hours as needed for wheezing (20 minutes before exertion.), Disp: 6.7 g, Rfl: 0    aspirin (ECOTRIN LOW STRENGTH) 81 mg EC tablet, Take 324 mg by mouth every other day, Disp: , Rfl:     atorvastatin (LIPITOR) 40 mg tablet, Take 1 tablet (40 mg total) by mouth daily, Disp: 90 tablet, Rfl: 4    cholecalciferol (VITAMIN D3) 1,000 units tablet, Take 1,000 Units by mouth daily, Disp: , Rfl:     Coenzyme Q10 10 MG capsule, Take 1 capsule (10 mg total) by mouth daily, Disp: , Rfl:     fluticasone (FLONASE) 50 mcg/act nasal spray, 1 spray into each nostril daily, Disp: , Rfl:     Fluticasone-Salmeterol (Wixela Inhub) 250-50 mcg/dose inhaler, Inhale 1 puff 2 (two) times a day Rinse mouth after use., Disp: 60 blister, Rfl: 0    levalbuterol (XOPENEX) 1.25 mg/3 mL nebulizer solution, Take 3 mL (1.25 mg total) by nebulization every 8 (eight) hours as needed for wheezing or shortness of breath, Disp: 75 mL, Rfl: 3    lisinopril (ZESTRIL) 20 mg tablet, Take 1 tablet (20 mg total) by mouth daily, Disp: 90 tablet, Rfl: 4    Loratadine 10 MG CAPS, , Disp: , Rfl:     omeprazole (PriLOSEC) 40 MG capsule, TAKE 1 CAPSULE (40 MG TOTAL) BY MOUTH DAILY., Disp: 90 capsule, Rfl: 1    Taurine 500 MG CAPS, Take by mouth, Disp: , Rfl:     torsemide (DEMADEX) 20 mg tablet, Take 1 tablet (20 mg total) by mouth daily, Disp: 90 tablet, Rfl: 4    multivitamin (THERAGRAN) TABS, Take 1 tablet by mouth daily (Patient not taking: Reported on 1/3/2025), Disp: , Rfl:   Allergies   Allergen Reactions    Depo-Medrol [Methylprednisolone] Anaphylaxis     Severe Vagal Reaction    Iv Contrast [Iodinated Contrast Media] Angioedema     Pt states itchy/swollen eyes, trouble breathing (years ago)    Rosuvastatin Headache and Confusion    Sulfa Antibiotics Hives    Atorvastatin Confusion     Vitals:    04/08/25 1007   BP: (!) 212/78   BP Location: Left arm   Patient Position: Sitting   Cuff Size: Large   Pulse: 61   SpO2: 97%   Weight:  "96.2 kg (212 lb)   Height: 5' 7.5\" (1.715 m)     Weight (last 2 days)       None           Blood pressure (!) 212/78, pulse 61, height 5' 7.5\" (1.715 m), weight 96.2 kg (212 lb), SpO2 97%., Body mass index is 32.71 kg/m².    Labs:  Admission on 03/25/2025, Discharged on 03/25/2025   Component Date Value    WBC 03/25/2025 8.01     RBC 03/25/2025 4.89     Hemoglobin 03/25/2025 15.2     Hematocrit 03/25/2025 45.9     MCV 03/25/2025 94     MCH 03/25/2025 31.1     MCHC 03/25/2025 33.1     RDW 03/25/2025 13.6     MPV 03/25/2025 10.8     Platelets 03/25/2025 208     nRBC 03/25/2025 0     Segmented % 03/25/2025 69     Immature Grans % 03/25/2025 0     Lymphocytes % 03/25/2025 20     Monocytes % 03/25/2025 8     Eosinophils Relative 03/25/2025 2     Basophils Relative 03/25/2025 1     Absolute Neutrophils 03/25/2025 5.56     Absolute Immature Grans 03/25/2025 0.03     Absolute Lymphocytes 03/25/2025 1.56     Absolute Monocytes 03/25/2025 0.63     Eosinophils Absolute 03/25/2025 0.19     Basophils Absolute 03/25/2025 0.04     Sodium 03/25/2025 139     Potassium 03/25/2025 4.2     Chloride 03/25/2025 107     CO2 03/25/2025 26     ANION GAP 03/25/2025 6     BUN 03/25/2025 19     Creatinine 03/25/2025 1.09     Glucose 03/25/2025 112     Calcium 03/25/2025 9.2     AST 03/25/2025 16     ALT 03/25/2025 13     Alkaline Phosphatase 03/25/2025 49     Total Protein 03/25/2025 7.3     Albumin 03/25/2025 4.4     Total Bilirubin 03/25/2025 0.63     eGFR 03/25/2025 67     hs TnI 0hr 03/25/2025 24     BNP 03/25/2025 205 (H)     hs TnI 2hr 03/25/2025 24     Delta 2hr hsTnI 03/25/2025 0     Ventricular Rate 03/25/2025 66     Atrial Rate 03/25/2025 66     AK Interval 03/25/2025 184     QRSD Interval 03/25/2025 100     QT Interval 03/25/2025 414     QTC Interval 03/25/2025 434     P Axis 03/25/2025 50     QRS Axis 03/25/2025 75     T Wave [place] 03/25/2025 -8      Imaging: XR chest 2 views  Result Date: 3/26/2025  Narrative: XR CHEST PA AND " LATERAL INDICATION: Chest Pain. COMPARISON: 4/11/2024 FINDINGS: Clear lungs. No pneumothorax or pleural effusion. Normal cardiomediastinal silhouette. Bones are unremarkable for age. Normal upper abdomen.     Impression: No acute cardiopulmonary disease. Workstation performed: [id number]       Review of Systems:  Review of Systems   Constitutional:  Positive for fatigue. Negative for diaphoresis, fever and unexpected weight change.   HENT: Negative.     Respiratory:  Positive for shortness of breath and wheezing. Negative for cough.    Cardiovascular:  Negative for chest pain, palpitations and leg swelling.   Gastrointestinal:  Negative for abdominal pain, diarrhea and nausea.   Musculoskeletal:  Negative for gait problem and myalgias.   Skin:  Negative for rash.   Neurological:  Negative for dizziness and numbness.   Psychiatric/Behavioral: Negative.         Physical Exam:  Physical Exam  Constitutional:       Appearance: He is well-developed.   HENT:      Head: Normocephalic and atraumatic.   Eyes:      Pupils: Pupils are equal, round, and reactive to light.   Neck:      Vascular: No JVD.   Cardiovascular:      Rate and Rhythm: Regular rhythm.      Pulses: Normal pulses.           Carotid pulses are 2+ on the right side and 2+ on the left side.     Heart sounds: S1 normal and S2 normal.   Pulmonary:      Effort: Pulmonary effort is normal.      Breath sounds: Normal breath sounds. No wheezing or rales.   Abdominal:      General: Bowel sounds are normal.      Palpations: Abdomen is soft.   Musculoskeletal:         General: No tenderness. Normal range of motion.      Cervical back: Normal range of motion and neck supple.   Skin:     General: Skin is warm.   Neurological:      Mental Status: He is alert and oriented to person, place, and time.      Cranial Nerves: No cranial nerve deficit.      Deep Tendon Reflexes: Reflexes are normal and symmetric.

## 2025-04-16 ENCOUNTER — ANESTHESIA EVENT (OUTPATIENT)
Dept: ANESTHESIOLOGY | Facility: HOSPITAL | Age: 75
End: 2025-04-16

## 2025-04-16 ENCOUNTER — ANESTHESIA (OUTPATIENT)
Dept: ANESTHESIOLOGY | Facility: HOSPITAL | Age: 75
End: 2025-04-16

## 2025-04-16 RX ORDER — SODIUM CHLORIDE, SODIUM LACTATE, POTASSIUM CHLORIDE, CALCIUM CHLORIDE 600; 310; 30; 20 MG/100ML; MG/100ML; MG/100ML; MG/100ML
50 INJECTION, SOLUTION INTRAVENOUS CONTINUOUS
Status: CANCELLED | OUTPATIENT
Start: 2025-04-16

## 2025-04-17 ENCOUNTER — ANESTHESIA EVENT (OUTPATIENT)
Dept: GASTROENTEROLOGY | Facility: HOSPITAL | Age: 75
End: 2025-04-17
Payer: COMMERCIAL

## 2025-04-17 ENCOUNTER — HOSPITAL ENCOUNTER (OUTPATIENT)
Dept: GASTROENTEROLOGY | Facility: HOSPITAL | Age: 75
Setting detail: OUTPATIENT SURGERY
End: 2025-04-17
Attending: INTERNAL MEDICINE
Payer: COMMERCIAL

## 2025-04-17 ENCOUNTER — ANESTHESIA (OUTPATIENT)
Dept: GASTROENTEROLOGY | Facility: HOSPITAL | Age: 75
End: 2025-04-17
Payer: COMMERCIAL

## 2025-04-17 VITALS
DIASTOLIC BLOOD PRESSURE: 63 MMHG | HEIGHT: 67 IN | SYSTOLIC BLOOD PRESSURE: 130 MMHG | BODY MASS INDEX: 32.18 KG/M2 | HEART RATE: 57 BPM | RESPIRATION RATE: 16 BRPM | TEMPERATURE: 97 F | WEIGHT: 205 LBS | OXYGEN SATURATION: 99 %

## 2025-04-17 DIAGNOSIS — K22.70 BARRETT'S ESOPHAGUS WITHOUT DYSPLASIA: ICD-10-CM

## 2025-04-17 PROCEDURE — 88305 TISSUE EXAM BY PATHOLOGIST: CPT | Performed by: PATHOLOGY

## 2025-04-17 PROCEDURE — 43239 EGD BIOPSY SINGLE/MULTIPLE: CPT | Performed by: INTERNAL MEDICINE

## 2025-04-17 RX ORDER — LIDOCAINE HYDROCHLORIDE 10 MG/ML
INJECTION, SOLUTION EPIDURAL; INFILTRATION; INTRACAUDAL; PERINEURAL AS NEEDED
Status: DISCONTINUED | OUTPATIENT
Start: 2025-04-17 | End: 2025-04-17

## 2025-04-17 RX ORDER — PROPOFOL 10 MG/ML
INJECTION, EMULSION INTRAVENOUS AS NEEDED
Status: DISCONTINUED | OUTPATIENT
Start: 2025-04-17 | End: 2025-04-17

## 2025-04-17 RX ORDER — SODIUM CHLORIDE, SODIUM LACTATE, POTASSIUM CHLORIDE, CALCIUM CHLORIDE 600; 310; 30; 20 MG/100ML; MG/100ML; MG/100ML; MG/100ML
INJECTION, SOLUTION INTRAVENOUS CONTINUOUS PRN
Status: DISCONTINUED | OUTPATIENT
Start: 2025-04-17 | End: 2025-04-17

## 2025-04-17 RX ORDER — SODIUM CHLORIDE, SODIUM LACTATE, POTASSIUM CHLORIDE, CALCIUM CHLORIDE 600; 310; 30; 20 MG/100ML; MG/100ML; MG/100ML; MG/100ML
50 INJECTION, SOLUTION INTRAVENOUS CONTINUOUS
Status: DISCONTINUED | OUTPATIENT
Start: 2025-04-17 | End: 2025-04-21 | Stop reason: HOSPADM

## 2025-04-17 RX ADMIN — PROPOFOL 100 MG: 10 INJECTION, EMULSION INTRAVENOUS at 08:15

## 2025-04-17 RX ADMIN — PROPOFOL 30 MG: 10 INJECTION, EMULSION INTRAVENOUS at 08:18

## 2025-04-17 RX ADMIN — SODIUM CHLORIDE, SODIUM LACTATE, POTASSIUM CHLORIDE, AND CALCIUM CHLORIDE: .6; .31; .03; .02 INJECTION, SOLUTION INTRAVENOUS at 08:10

## 2025-04-17 RX ADMIN — PROPOFOL 20 MG: 10 INJECTION, EMULSION INTRAVENOUS at 08:20

## 2025-04-17 RX ADMIN — SODIUM CHLORIDE, SODIUM LACTATE, POTASSIUM CHLORIDE, AND CALCIUM CHLORIDE 50 ML/HR: .6; .31; .03; .02 INJECTION, SOLUTION INTRAVENOUS at 07:25

## 2025-04-17 RX ADMIN — PROPOFOL 50 MG: 10 INJECTION, EMULSION INTRAVENOUS at 08:16

## 2025-04-17 RX ADMIN — LIDOCAINE HYDROCHLORIDE 50 MG: 10 INJECTION, SOLUTION EPIDURAL; INFILTRATION; INTRACAUDAL at 08:15

## 2025-04-17 NOTE — ANESTHESIA POSTPROCEDURE EVALUATION
Post-Op Assessment Note    CV Status:  Stable  Pain Score: 0    Pain management: adequate       Mental Status:  Arousable and sleepy   Hydration Status:  Euvolemic and stable   PONV Controlled:  Controlled   Airway Patency:  Patent and adequate     Post Op Vitals Reviewed: Yes    No anethesia notable event occurred.    Staff: CRNA       Last Filed PACU Vitals:  Vitals Value Taken Time   Temp 97.4 °F (36.3 °C) 04/17/25 0824   Pulse 62 04/17/25 0824   /62 04/17/25 0824   Resp 16 04/17/25 0824   SpO2 98 % 04/17/25 0824       Modified Rebecca:     Vitals Value Taken Time   Activity 2 04/17/25 0825   Respiration 2 04/17/25 0825   Circulation 2 04/17/25 0825   Consciousness 1 04/17/25 0825   Oxygen Saturation 2 04/17/25 0825     Modified Rebecca Score: 9

## 2025-04-17 NOTE — ANESTHESIA PREPROCEDURE EVALUATION
Procedure:  EGD    Relevant Problems   CARDIO   (+) Benign essential hypertension   (+) Carotid stenosis, asymptomatic, left   (+) Coronary artery disease involving native coronary artery of native heart without angina pectoris   (+) Mixed hyperlipidemia   (+) Old myocardial infarct      GI/HEPATIC   (+) Fatty infiltration of liver   (+) Gastroesophageal reflux disease without esophagitis      MUSCULOSKELETAL   (+) Arthritis of right acromioclavicular joint   (+) Osteoarthritis of knee   (+) Osteoarthritis of right glenohumeral joint      PULMONARY   (+) Asthma        Physical Exam    Airway    Mallampati score: II  TM Distance: >3 FB  Neck ROM: full     Dental    upper dentures    Cardiovascular  Cardiovascular exam normal    Pulmonary  Pulmonary exam normal     Other Findings        Anesthesia Plan  ASA Score- 2     Anesthesia Type- IV sedation with anesthesia with ASA Monitors.         Additional Monitors:     Airway Plan:            Plan Factors-Exercise tolerance (METS): >4 METS.    Chart reviewed. EKG reviewed.  Existing labs reviewed. Patient summary reviewed.    Patient is not a current smoker. Patient not instructed to abstain from smoking on day of procedure. Patient did not smoke on day of surgery.            Induction-     Postoperative Plan-     Perioperative Resuscitation Plan - Level 1 - Full Code.       Informed Consent- Anesthetic plan and risks discussed with patient and spouse.  I personally reviewed this patient with the CRNA. Discussed and agreed on the Anesthesia Plan with the CRNA..      NPO Status:  Vitals Value Taken Time   Date of last liquid 04/16/25 04/17/25 0705   Time of last liquid 1700 04/17/25 0705   Date of last solid 04/16/25 04/17/25 0705   Time of last solid 1800 04/17/25 0705       Lab Results   Component Value Date    HGBA1C 5.2 02/24/2020       Lab Results   Component Value Date     12/10/2015    K 4.2 03/25/2025     03/25/2025    CO2 26 03/25/2025    ANIONGAP 11  12/10/2015    BUN 19 03/25/2025    CREATININE 1.09 03/25/2025    GLUCOSE 94 12/10/2015    GLUF 102 (H) 08/12/2022    CALCIUM 9.2 03/25/2025    AST 16 03/25/2025    ALT 13 03/25/2025    ALKPHOS 49 03/25/2025    PROT 7.6 11/11/2015    BILITOT 0.47 11/11/2015    EGFR 67 03/25/2025       Lab Results   Component Value Date    WBC 8.01 03/25/2025    HGB 15.2 03/25/2025    HCT 45.9 03/25/2025    MCV 94 03/25/2025     03/25/2025     Normal sinus rhythm  Cannot rule out Inferior infarct , age undetermined  Cannot rule out Anterior infarct , age undetermined  Abnormal ECG  When compared with ECG of 11-Apr-2024 02:43,  T wave inversion now evident in Inferior leads  T wave amplitude has increased in Lateral leads  Confirmed by Fouzia Muse ([id number]) on 3/28/2025 9:40:43 AM     Specimen Collected: 03/25/25 15:45 Last Resulted: 03/28/25 09:40         He has CAD with a NSTEMI in 8/2019. He had RENE placement x2 in the distal RCA.      ECHO 8/2019 - EF 60%     BP was 138/ 80 at that time.

## 2025-04-17 NOTE — H&P
History and Physical - [facility]  Jimmy Galvan 74 y.o. male MRN: [id number]                  HPI: Jimmy Galvan is a 74 y.o. year old male who presents for Genao's esophagus.      REVIEW OF SYSTEMS: Per the HPI, and otherwise unremarkable.    Historical Information   Past Medical History:   Diagnosis Date    Allergic 01/01/2022    Allergic reaction     LAST ASSESSED: 12MAR2013    Asthma     Bursitis of heel     LAST ASSESSED: [id number]    Colon polyp     Derangement of meniscus of left knee due to old injury     LAST ASSESSED: 23AUG2013    Diverticulitis of colon     LAST ASSESSED: 15OCT2013    Diverticulosis     Dizziness     LAST ASSESSED: 09SEP2013    GERD (gastroesophageal reflux disease)     Hernia years    History of colon polyps     Hypertension     Kidney stone     Malignant hypertension     LAST ASSESSED: [id number]    Myocardial infarction (HCC) 08/2017    Non-ST elevated myocardial infarction (HCC) 03/02/2020    NSTEMI (non-ST elevated myocardial infarction) ([facility])     Plantar fasciitis     Seasonal allergies     Shoulder impingement     LAST ASSESSED: 10SEP2012    Stroke ([facility])     eye     Past Surgical History:   Procedure Laterality Date    APPENDECTOMY      CAROTID ENDARTARECTOMY      COLECTOMY Left     PARTIAL - SIGMOID; HEMICOLECTOMY FOR DIVERTICULITIS; ONSET: 1987    COLON SURGERY      COLONOSCOPY N/A 12/09/2016    Procedure: COLONOSCOPY;  Surgeon: Silvia Caceres MD;  Location: [facility];  Service:     CORONARY STENT PLACEMENT      HERNIA REPAIR      VENTRAL    NISSEN FUNDOPLICATION      ESOPHAGOGASTRIC FUNDOPLASTY LAST ASSESSED: 06OCT2014    PERIPHERAL ANGIOGRAM      SINUS SURGERY      THROMBOENDARTERECTOMY      CAROTID; ONSET; MAY 2012    VASECTOMY  1982     Social History   Social History     Substance and Sexual Activity   Alcohol Use No     Social History     Substance and Sexual Activity   Drug Use No     Social History     Tobacco Use   Smoking Status Former     "Current packs/day: 0.00    Average packs/day: 0.3 packs/day for 5.0 years (1.3 ttl pk-yrs)    Types: Cigarettes    Start date:     Quit date:     Years since quittin.3   Smokeless Tobacco Never     Family History   Problem Relation Age of Onset    Hypertension Mother     Hyperlipidemia Mother     Breast cancer Mother     Aneurysm Mother     Anemia Mother     Hypertension Father     Hyperlipidemia Father     Colon cancer Paternal Grandfather     Cancer Paternal Grandfather     Asthma Paternal Grandfather     Throat cancer Maternal Aunt        Meds/Allergies       Current Outpatient Medications:     albuterol (PROVENTIL HFA,VENTOLIN HFA) 90 mcg/act inhaler    aspirin (ECOTRIN LOW STRENGTH) 81 mg EC tablet    atorvastatin (LIPITOR) 40 mg tablet    cholecalciferol (VITAMIN D3) 1,000 units tablet    fluticasone (FLONASE) 50 mcg/act nasal spray    Fluticasone-Salmeterol (Wixela Inhub) 250-50 mcg/dose inhaler    levalbuterol (XOPENEX) 1.25 mg/3 mL nebulizer solution    lisinopril (ZESTRIL) 20 mg tablet    Loratadine 10 MG CAPS    multivitamin (THERAGRAN) TABS    omeprazole (PriLOSEC) 40 MG capsule    Taurine 500 MG CAPS    torsemide (DEMADEX) 20 mg tablet    acetaminophen (TYLENOL) 325 mg tablet    Coenzyme Q10 10 MG capsule    Current Facility-Administered Medications:     lactated ringers infusion, 50 mL/hr, Intravenous, Continuous, 50 mL/hr at 25 0725    Allergies   Allergen Reactions    Depo-Medrol [Methylprednisolone] Anaphylaxis     Severe Vagal Reaction    Iv Contrast [Iodinated Contrast Media] Angioedema     Pt states itchy/swollen eyes, trouble breathing (years ago)    Rosuvastatin Headache and Confusion    Sulfa Antibiotics Hives    Atorvastatin Confusion    Penicillins Other (See Comments)     As a child-turned blue/purple       Objective     BP (!) 171/80   Pulse 89   Temp (!) 96.9 °F (36.1 °C) (Temporal)   Resp 18   Ht 5' 7\" (1.702 m)   Wt 93 kg (205 lb)   SpO2 98%   BMI 32.11 kg/m² "       PHYSICAL EXAM    Gen: NAD  Head: NCAT  CV: RRR  CHEST: Clear  ABD: soft, NT/ND  EXT: no edema      ASSESSMENT/PLAN:  This is a 74 y.o. year old male here for EGD, and he is stable and optimized for his procedure.

## 2025-04-17 NOTE — ANESTHESIA PREPROCEDURE EVALUATION
Procedure:  PRE-OP ONLY    Relevant Problems   CARDIO   (+) Benign essential hypertension   (+) Carotid stenosis, asymptomatic, left   (+) Coronary artery disease involving native coronary artery of native heart without angina pectoris   (+) Mixed hyperlipidemia   (+) Old myocardial infarct      GI/HEPATIC   (+) Fatty infiltration of liver   (+) Gastroesophageal reflux disease without esophagitis      MUSCULOSKELETAL   (+) Arthritis of right acromioclavicular joint   (+) Osteoarthritis of knee   (+) Osteoarthritis of right glenohumeral joint      PULMONARY   (+) Asthma      Neurology/Sleep   (+) Personal history of transient ischemic attack (TIA), and cerebral infarction without residual deficits      Surgery/Wound/Pain   (+) History of right-sided carotid endarterectomy             Anesthesia Plan  ASA Score- 3     Anesthesia Type- IV sedation with anesthesia with ASA Monitors.         Additional Monitors:     Airway Plan:            Plan Factors-    Chart reviewed. EKG reviewed.  Existing labs reviewed. Patient summary reviewed.                  Induction-     Postoperative Plan-     Perioperative Resuscitation Plan - Level 1 - Full Code.       Informed Consent- Anesthetic plan and risks discussed with patient.  I personally reviewed this patient with the CRNA. Discussed and agreed on the Anesthesia Plan with the CRNA..      NPO Status:  No vitals data found for the desired time range.      Lab Results   Component Value Date    HGBA1C 5.2 02/24/2020       Lab Results   Component Value Date     12/10/2015    K 4.2 03/25/2025     03/25/2025    CO2 26 03/25/2025    ANIONGAP 11 12/10/2015    BUN 19 03/25/2025    CREATININE 1.09 03/25/2025    GLUCOSE 94 12/10/2015    GLUF 102 (H) 08/12/2022    CALCIUM 9.2 03/25/2025    AST 16 03/25/2025    ALT 13 03/25/2025    ALKPHOS 49 03/25/2025    PROT 7.6 11/11/2015    BILITOT 0.47 11/11/2015    EGFR 67 03/25/2025       Lab Results   Component Value Date    WBC 8.01  03/25/2025    HGB 15.2 03/25/2025    HCT 45.9 03/25/2025    MCV 94 03/25/2025     03/25/2025     Normal sinus rhythm  Cannot rule out Inferior infarct , age undetermined  Cannot rule out Anterior infarct , age undetermined  Abnormal ECG  When compared with ECG of 11-Apr-2024 02:43,  T wave inversion now evident in Inferior leads  T wave amplitude has increased in Lateral leads  Confirmed by Fouzia Muse ([id number]) on 3/28/2025 9:40:43 AM     Specimen Collected: 03/25/25 15:45 Last Resulted: 03/28/25 09:40         He has CAD with a NSTEMI in 8/2019. He had RENE placement x2 in the distal RCA.      ECHO 8/2019 - EF 60%     BP was 138/ 80 at that time.

## 2025-04-22 PROCEDURE — 88305 TISSUE EXAM BY PATHOLOGIST: CPT | Performed by: PATHOLOGY

## 2025-04-25 ENCOUNTER — CONSULT (OUTPATIENT)
Dept: PULMONOLOGY | Facility: CLINIC | Age: 75
End: 2025-04-25
Payer: COMMERCIAL

## 2025-04-25 VITALS
TEMPERATURE: 98.1 F | HEART RATE: 68 BPM | DIASTOLIC BLOOD PRESSURE: 80 MMHG | OXYGEN SATURATION: 100 % | WEIGHT: 209.2 LBS | SYSTOLIC BLOOD PRESSURE: 130 MMHG | BODY MASS INDEX: 32.83 KG/M2 | RESPIRATION RATE: 12 BRPM | HEIGHT: 67 IN

## 2025-04-25 DIAGNOSIS — I20.89 STABLE ANGINA (HCC): Primary | ICD-10-CM

## 2025-04-25 DIAGNOSIS — J81.0 ACUTE PULMONARY EDEMA (HCC): ICD-10-CM

## 2025-04-25 DIAGNOSIS — J45.40 MODERATE PERSISTENT ASTHMA WITHOUT COMPLICATION: ICD-10-CM

## 2025-04-25 DIAGNOSIS — I25.10 CORONARY ARTERY DISEASE INVOLVING NATIVE CORONARY ARTERY OF NATIVE HEART WITHOUT ANGINA PECTORIS: ICD-10-CM

## 2025-04-25 PROCEDURE — 99205 OFFICE O/P NEW HI 60 MIN: CPT | Performed by: INTERNAL MEDICINE

## 2025-04-25 PROCEDURE — G2211 COMPLEX E/M VISIT ADD ON: HCPCS | Performed by: INTERNAL MEDICINE

## 2025-04-25 RX ORDER — BUDESONIDE AND FORMOTEROL FUMARATE DIHYDRATE 160; 4.5 UG/1; UG/1
2 AEROSOL RESPIRATORY (INHALATION) 2 TIMES DAILY
Qty: 30.6 G | Refills: 5 | Status: SHIPPED | OUTPATIENT
Start: 2025-04-25 | End: 2025-05-25

## 2025-04-25 NOTE — LETTER
April 25, 2025     Pia Cueto DO  [address] [facility] [address]    Patient: Jimmy Galvan   YOB: 1950   Date of Visit: 4/25/2025       Dear DO Nilo Osorio MD:    Thank you for referring Jimmy Galvan to me for evaluation. Below are my notes for this consultation.    If you have questions, please do not hesitate to call me. I look forward to following your patient along with you.         Sincerely,        Surjit Nair MD        CC: MD Surjit Joe MD  4/25/2025  8:39 AM  Sign when Signing Visit  Pulmonary Consultation   Jimmy Galvan 74 y.o. male MRN: [id number]  4/25/2025    Referring Physician or Provider:  Anum Chen DO  [address] [place] [address]       Chief Complaint:  Shortness of breath with exertion    HPI:    74-year-old male with past medical history coronary artery disease status post stents, hypertension, hyperlipidemia, moderate persistent asthma presents for evaluation of worsening shortness of breath with exertion.  Patient states that over the last 1 month he has felt chest tightness and shortness of breath particular when mowing his lawn.  No fevers or chills.  No recent pulmonary illness.  He states that he has been compliant with using Advair 250 twice daily.  He states that his albuterol requirement is increased during this time.  Previously worked in the [employer] without respiratory protection.  Denies significant tobacco history.  + Significant family history of cardiac disease.  Owns 2 cats.  Does not feel that he is allergic to cats.      Past Medical Hx  Past Medical History:   Diagnosis Date   • Allergic 01/01/2022   • Allergic reaction     LAST ASSESSED: 12MAR2013   • Asthma    • Bursitis of heel     LAST ASSESSED: 23AUG2013   • Colon polyp    • Derangement of meniscus of left knee due to old injury     LAST ASSESSED: 23AUG2013   • Diverticulitis of colon     LAST ASSESSED: 15OCT2013   •  Diverticulosis    • Dizziness     LAST ASSESSED: 09SEP2013   • GERD (gastroesophageal reflux disease)    • Hernia years   • History of colon polyps    • Hypertension    • Kidney stone    • Malignant hypertension     LAST ASSESSED: 27FEB2017   • Myocardial infarction (HCC) 08/2017   • Non-ST elevated myocardial infarction (HCC) 03/02/2020   • NSTEMI (non-ST elevated myocardial infarction) (HCC)    • Plantar fasciitis    • Seasonal allergies    • Shoulder impingement     LAST ASSESSED: 10SEP2012   • Stroke ([facility])     eye           Past Surgical Hx  Past Surgical History:   Procedure Laterality Date   • APPENDECTOMY     • CAROTID ENDARTARECTOMY     • COLECTOMY Left     PARTIAL - SIGMOID; HEMICOLECTOMY FOR DIVERTICULITIS; ONSET: 1987   • COLON SURGERY     • COLONOSCOPY N/A 12/09/2016    Procedure: COLONOSCOPY;  Surgeon: Silvia Caceres MD;  Location[facility];  Service:    • CORONARY STENT PLACEMENT     • HERNIA REPAIR      VENTRAL   • NISSEN FUNDOPLICATION      ESOPHAGOGASTRIC FUNDOPLASTY LAST ASSESSED: 06OCT2014   • PERIPHERAL ANGIOGRAM     • SINUS SURGERY     • THROMBOENDARTERECTOMY      CAROTID; ONSET; MAY 2012   • VASECTOMY  1982           Family Hx  Family History   Problem Relation Age of Onset   • Hypertension Mother    • Hyperlipidemia Mother    • Breast cancer Mother    • Aneurysm Mother    • Anemia Mother    • Hypertension Father    • Hyperlipidemia Father    • Colon cancer Paternal Grandfather    • Cancer Paternal Grandfather    • Asthma Paternal Grandfather    • Throat cancer Maternal Aunt            Occupational History:   Previously worked in a cement mill.      Social History:   Social History     Socioeconomic History   • Marital status: /Civil Union     Spouse name: Not on file   • Number of children: Not on file   • Years of education: Not on file   • Highest education level: Not on file   Occupational History   • Not on file   Tobacco Use   • Smoking status: Former     Current packs/day:  0.00     Average packs/day: 0.3 packs/day for 5.0 years (1.3 ttl pk-yrs)     Types: Cigarettes     Start date:      Quit date: 1960     Years since quittin.3   • Smokeless tobacco: Never   Vaping Use   • Vaping status: Never Used   Substance and Sexual Activity   • Alcohol use: No   • Drug use: No   • Sexual activity: Yes     Partners: Female     Birth control/protection: Male Sterilization   Other Topics Concern   • Not on file   Social History Narrative   • Not on file     Social Drivers of Health     Financial Resource Strain: Not on file   Food Insecurity: No Food Insecurity (2024)    Nursing - Inadequate Food Risk Classification    • Worried About Running Out of Food in the Last Year: Never true    • Ran Out of Food in the Last Year: Never true    • Ran Out of Food in the Last Year: Not on file   Transportation Needs: No Transportation Needs (2024)    PRAPARE - Transportation    • Lack of Transportation (Medical): No    • Lack of Transportation (Non-Medical): No   Physical Activity: Not on file   Stress: Not on file   Social Connections: Not on file   Intimate Partner Violence: Not on file   Housing Stability: Low Risk  (2024)    Housing Stability Vital Sign    • Unable to Pay for Housing in the Last Year: No    • Number of Times Moved in the Last Year: 0    • Homeless in the Last Year: No            Pertinent Meds  Advair 250 twice daily  Albuterol as needed, frequently      ROS:  Constitutional: - Fatigue, - chills, - fever, - weight change.   HEENT: - rhinorrhea, - sneezing, - sore throat.    Respiratory: - cough, - sputum production, + exertional shortness of breath, - wheezing.    Cardiovascular: + Chest tightness with exertional activities,  -palpitations, - leg swelling.   Gastrointestinal: - abdominal pain, - constipation, - diarrhea, - nausea, - vomiting.   Endocrine: - cold intolerance, - heat intolerance.   Genitourinary: - dysuria.   Musculoskeletal: - arthralgias.   Skin:-  "rash, - wound.   Allergic/Immunologic: - allergies  Neurological: - dizziness, - numbness      Vitals: Blood pressure 130/80, pulse 68, temperature 98.1 °F (36.7 °C), temperature source Temporal, resp. rate 12, height 5' 7\" (1.702 m), weight 94.9 kg (209 lb 3.2 oz), SpO2 100%., Body mass index is 32.77 kg/m².    Physical Exam  GEN  NAD  HEENT  ncat, non icteric, MM moist  NECK  supple, no JVD, no LAD  CV  +s1s2, no mrg, RRR  PULM + bilateral basilar predominant rales, no wheeze, no rhonchi  ABD  soft, nt, + obese abdomen  EXT 1+ lower extremity pitting edema, no cyanosis, no clubbing  NEURO  Aox3, no focal weakness    Imaging and other studies     I have personally viewed and interpreted the following studies:  Chest x-ray 3/25/2025 shows bilateral basilar fullness with mild interstitial pattern opacification of both bases.  + Probable left atrial enlargement.      Pulmonary function testing:   No PFTs available for interpretation      Assessment:  Shortness of breath with exertion  Asthma, moderate persistent-without acute exacerbation  Coronary artery disease  Pulmonary edema/volume overload    Plan:  Patient's clinical presentation is suspicious for pulmonary edema/acute CHF.  Awaiting echocardiogram that was previously ordered by cardiologist.  Diuretics can be managed by patient's cardiologist.  This note will be forwarded to patient's cardiologist and primary care physician.  Discontinue Advair and start Breo 160 twice daily.  Continue albuterol as needed.  Check full pulmonary function test.  Patient will call pulmonary office with any worsening or development of new pulmonary symptoms prior to next visit.    Return visit in 2 months  On next visit we will evaluate symptoms, PFT results, echocardiogram results, improvement in symptoms when switching from Advair to Symbicort    Note: Portions of the record may have been created with voice recognition software. Occasional wrong word or \"sound a like\" " substitutions may have occurred due to the inherent limitations of voice recognition software. Read the chart carefully and recognize, using context, where substitutions have occurred.     Surjit Nair M.D.  [facility]

## 2025-04-25 NOTE — PROGRESS NOTES
Pulmonary Consultation   Jimmy Galvan 74 y.o. male MRN: [id number]  4/25/2025    Referring Physician or Provider:  Anum Chen DO  [address] [place] [address]       Chief Complaint:  Shortness of breath with exertion    HPI:    74-year-old male with past medical history coronary artery disease status post stents, hypertension, hyperlipidemia, moderate persistent asthma presents for evaluation of worsening shortness of breath with exertion.  Patient states that over the last 1 month he has felt chest tightness and shortness of breath particular when mowing his lawn.  No fevers or chills.  No recent pulmonary illness.  He states that he has been compliant with using Advair 250 twice daily.  He states that his albuterol requirement is increased during this time.  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  Denies significant tobacco history.  + Significant family history of cardiac disease.  Owns 2 cats.  Does not feel that he is allergic to cats.      Past Medical Hx  Past Medical History:   Diagnosis Date    Allergic 01/01/2022    Allergic reaction     LAST ASSESSED: 12MAR2013    Asthma     Bursitis of heel     LAST ASSESSED: [id number]    Colon polyp     Derangement of meniscus of left knee due to old injury     LAST ASSESSED: [id number]    Diverticulitis of colon     LAST ASSESSED: 15OCT2013    Diverticulosis     Dizziness     LAST ASSESSED: [id number]    GERD (gastroesophageal reflux disease)     Hernia years    History of colon polyps     Hypertension     Kidney stone     Malignant hypertension     LAST ASSESSED: [id number]    Myocardial infarction (HCC) 08/2017    Non-ST elevated myocardial infarction (HCC) 03/02/2020    NSTEMI (non-ST elevated myocardial infarction) (HCC)     Plantar fasciitis     Seasonal allergies     Shoulder impingement     LAST ASSESSED: [id number]    Stroke (HCC)     eye           Past Surgical Hx  Past Surgical History:   Procedure  Laterality Date    APPENDECTOMY      CAROTID ENDARTARECTOMY      COLECTOMY Left     PARTIAL - SIGMOID; HEMICOLECTOMY FOR DIVERTICULITIS; ONSET:     COLON SURGERY      COLONOSCOPY N/A 2016    Procedure: COLONOSCOPY;  Surgeon: Silvia Caceres MD;  Location: [facility];  Service:     CORONARY STENT PLACEMENT      HERNIA REPAIR      VENTRAL    NISSEN FUNDOPLICATION      ESOPHAGOGASTRIC FUNDOPLASTY LAST ASSESSED: 2014    PERIPHERAL ANGIOGRAM      SINUS SURGERY      THROMBOENDARTERECTOMY      CAROTID; ONSET; MAY 2012    VASECTOMY  1982           Family Hx  Family History   Problem Relation Age of Onset    Hypertension Mother     Hyperlipidemia Mother     Breast cancer Mother     Aneurysm Mother     Anemia Mother     Hypertension Father     Hyperlipidemia Father     Colon cancer Paternal Grandfather     Cancer Paternal Grandfather     Asthma Paternal Grandfather     Throat cancer Maternal Aunt            Occupational History:   Previously worked in a cement mill.      Social History:   Social History     Socioeconomic History    Marital status: /Civil Union     Spouse name: Not on file    Number of children: Not on file    Years of education: Not on file    Highest education level: Not on file   Occupational History    Not on file   Tobacco Use    Smoking status: Former     Current packs/day: 0.00     Average packs/day: 0.3 packs/day for 5.0 years (1.3 ttl pk-yrs)     Types: Cigarettes     Start date:      Quit date: 1960     Years since quittin.3    Smokeless tobacco: Never   Vaping Use    Vaping status: Never Used   Substance and Sexual Activity    Alcohol use: No    Drug use: No    Sexual activity: Yes     Partners: Female     Birth control/protection: Male Sterilization   Other Topics Concern    Not on file   Social History Narrative    Not on file     Social Drivers of Health     Financial Resource Strain: Not on file   Food Insecurity: No Food Insecurity (2024)    Nursing -  "Inadequate Food Risk Classification     Worried About Running Out of Food in the Last Year: Never true     Ran Out of Food in the Last Year: Never true     Ran Out of Food in the Last Year: Not on file   Transportation Needs: No Transportation Needs (6/13/2024)    PRAPARE - Transportation     Lack of Transportation (Medical): No     Lack of Transportation (Non-Medical): No   Physical Activity: Not on file   Stress: Not on file   Social Connections: Not on file   Intimate Partner Violence: Not on file   Housing Stability: Low Risk  (6/13/2024)    Housing Stability Vital Sign     Unable to Pay for Housing in the Last Year: No     Number of Times Moved in the Last Year: 0     Homeless in the Last Year: No            Pertinent Meds  Advair 250 twice daily  Albuterol as needed, frequently      ROS:  Constitutional: - Fatigue, - chills, - fever, - weight change.   HEENT: - rhinorrhea, - sneezing, - sore throat.    Respiratory: - cough, - sputum production, + exertional shortness of breath, - wheezing.    Cardiovascular: + Chest tightness with exertional activities,  -palpitations, - leg swelling.   Gastrointestinal: - abdominal pain, - constipation, - diarrhea, - nausea, - vomiting.   Endocrine: - cold intolerance, - heat intolerance.   Genitourinary: - dysuria.   Musculoskeletal: - arthralgias.   Skin:- rash, - wound.   Allergic/Immunologic: - allergies  Neurological: - dizziness, - numbness      Vitals: Blood pressure 130/80, pulse 68, temperature 98.1 °F (36.7 °C), temperature source Temporal, resp. rate 12, height 5' 7\" (1.702 m), weight 94.9 kg (209 lb 3.2 oz), SpO2 100%., Body mass index is 32.77 kg/m².    Physical Exam  GEN  NAD  HEENT  ncat, non icteric, MM moist  NECK  supple, no JVD, no LAD  CV  +s1s2, no mrg, RRR  PULM + bilateral basilar predominant rales, no wheeze, no rhonchi  ABD  soft, nt, + obese abdomen  EXT 1+ lower extremity pitting edema, no cyanosis, no clubbing  NEURO  Aox3, no focal " "weakness    Imaging and other studies     I have personally viewed and interpreted the following studies:  Chest x-ray 3/25/2025 shows bilateral basilar fullness with mild interstitial pattern opacification of both bases.  + Probable left atrial enlargement.      Pulmonary function testing:   No PFTs available for interpretation      Assessment:  Shortness of breath with exertion  Asthma, moderate persistent-without acute exacerbation  Coronary artery disease  Pulmonary edema/volume overload    Plan:  Patient's clinical presentation is suspicious for pulmonary edema/acute CHF.  Awaiting echocardiogram that was previously ordered by cardiologist.  Diuretics can be managed by patient's cardiologist.  This note will be forwarded to patient's cardiologist and primary care physician.  Discontinue Advair and start Breo 160 twice daily.  Continue albuterol as needed.  Check full pulmonary function test.  Patient will call pulmonary office with any worsening or development of new pulmonary symptoms prior to next visit.    Return visit in 2 months  On next visit we will evaluate symptoms, PFT results, echocardiogram results, improvement in symptoms when switching from Advair to Symbicort    Note: Portions of the record may have been created with voice recognition software. Occasional wrong word or \"sound a like\" substitutions may have occurred due to the inherent limitations of voice recognition software. Read the chart carefully and recognize, using context, where substitutions have occurred.     Surjit Nair M.D.  [facility]    "

## 2025-04-28 ENCOUNTER — RESULTS FOLLOW-UP (OUTPATIENT)
Dept: GASTROENTEROLOGY | Facility: AMBULARY SURGERY CENTER | Age: 75
End: 2025-04-28

## 2025-04-30 ENCOUNTER — HOSPITAL ENCOUNTER (OUTPATIENT)
Dept: PULMONOLOGY | Facility: HOSPITAL | Age: 75
Discharge: HOME | End: 2025-04-30
Attending: INTERNAL MEDICINE
Payer: COMMERCIAL

## 2025-04-30 DIAGNOSIS — J45.40 MODERATE PERSISTENT ASTHMA WITHOUT COMPLICATION: ICD-10-CM

## 2025-04-30 PROCEDURE — 94618 PULMONARY STRESS TESTING: CPT

## 2025-04-30 PROCEDURE — 94729 DIFFUSING CAPACITY: CPT | Performed by: INTERNAL MEDICINE

## 2025-04-30 PROCEDURE — 94729 DIFFUSING CAPACITY: CPT

## 2025-04-30 PROCEDURE — 94060 EVALUATION OF WHEEZING: CPT | Performed by: INTERNAL MEDICINE

## 2025-04-30 PROCEDURE — 94726 PLETHYSMOGRAPHY LUNG VOLUMES: CPT

## 2025-04-30 PROCEDURE — 94060 EVALUATION OF WHEEZING: CPT

## 2025-04-30 PROCEDURE — 94618 PULMONARY STRESS TESTING: CPT | Performed by: INTERNAL MEDICINE

## 2025-04-30 PROCEDURE — 94726 PLETHYSMOGRAPHY LUNG VOLUMES: CPT | Performed by: INTERNAL MEDICINE

## 2025-04-30 RX ORDER — ALBUTEROL SULFATE 0.83 MG/ML
2.5 SOLUTION RESPIRATORY (INHALATION) ONCE
Status: COMPLETED | OUTPATIENT
Start: 2025-04-30 | End: 2025-04-30

## 2025-04-30 RX ADMIN — ALBUTEROL SULFATE 2.5 MG: 2.5 SOLUTION RESPIRATORY (INHALATION) at 10:43

## 2025-05-02 ENCOUNTER — OFFICE VISIT (OUTPATIENT)
Dept: CARDIOLOGY CLINIC | Facility: CLINIC | Age: 75
End: 2025-05-02
Payer: COMMERCIAL

## 2025-05-02 VITALS
BODY MASS INDEX: 32.62 KG/M2 | HEART RATE: 60 BPM | OXYGEN SATURATION: 99 % | DIASTOLIC BLOOD PRESSURE: 80 MMHG | SYSTOLIC BLOOD PRESSURE: 170 MMHG | HEIGHT: 67 IN | WEIGHT: 207.8 LBS

## 2025-05-02 DIAGNOSIS — E78.2 MIXED HYPERLIPIDEMIA: ICD-10-CM

## 2025-05-02 DIAGNOSIS — I10 BENIGN ESSENTIAL HYPERTENSION: ICD-10-CM

## 2025-05-02 DIAGNOSIS — I10 PRIMARY HYPERTENSION: ICD-10-CM

## 2025-05-02 DIAGNOSIS — I25.10 CORONARY ARTERY DISEASE INVOLVING NATIVE CORONARY ARTERY OF NATIVE HEART WITHOUT ANGINA PECTORIS: ICD-10-CM

## 2025-05-02 PROCEDURE — 99214 OFFICE O/P EST MOD 30 MIN: CPT

## 2025-05-02 NOTE — PROGRESS NOTES
Jimmy CAMARGO Galvan  1950  [id number]  [facility]  [address] [facility]  [address]  [facility] [address] [phone]    1. Mixed hyperlipidemia  Lipid Panel with Direct LDL reflex      2. Primary hypertension  Basic metabolic panel      3. Coronary artery disease involving native coronary artery of native heart without angina pectoris        4. Benign essential hypertension              Summary/Discussion:  Coronary artery disease:  - with prior NSTEMI s/p RENE x 2 to the distal RCA in 8/2019  cardiac catheterization (8/2019) with residual 65% stenosis of mid circumflex and 50% stenosis of proximal RCA  - echo (8/2019): LVEF 60%, hypokinesis of basal mid inferior walls, LV wall thickness mildly increased, concentric remodeling, G1DD, normal RV, mild MR  - without symptoms of angina   - NM rx stress test  previously ordered by his cardiologist-- not completed   - not on beta blocker  - continue aspirin and statin     Volume overload:  - noted during recent office visit with his cardiologist on 4/8/2025. Initiated on torsemide 20 mg daily and advised to repeat echo   - echo as noted above   - repeat BMP to reassess renal function/electrolytes   - his weights have improved, 207 lb today previously 2/12 lb  - continue torsemide 20 mg daily   - heart failure education provided     Hypertension:  - blood pressure remains elevated despite recent adjustments to his antihypertensive therapy, 170/80  reports of white coat syndrome and recently taking his antihypertensive medications  acceptable blood pressure readings documented on 4/17/2025 and 4/25/2025  - continue present medication regimen at this time  - lifestyle modification   - close blood pressure monitoring   - he will begin checking his blood pressure once daily at home and notify the office in 1 weeks with updated readings  if blood pressure remains elevated then further adjustments can be made at that time     Dyslipidemia:  -  Lipid Profile:    Latest Reference Range & Units 10/11/24 09:19   Cholesterol See Comment mg/dL 180   Triglycerides See Comment mg/dL 66   HDL >=40 mg/dL 47   Non-HDL Cholesterol mg/dl 133   LDL Calculated 0 - 100 mg/dL 120 (H)   - recently switched from pravastatin to atorvastatin 40 mg daily   - repeat lipids to reassess LDL  - encouraged low cholesterol, mediterranean diet, and annual lipid follow up    Carotid stenosis:  - s/p right carotid endarterectomy in 2012   - follows with vascular surgery   - carotid dopplers (4/2024): patent internal carotid artery and endarterectomy site, left internal carotid artery <50% stenosis  - continue aspirin and statin     Interval History: Jimmy Galvan is a 74 y.o. year old male with history mentioned in problem list who presents to the office today for a short term follow up.    Since his recent office visit he has been overall feeling well from a cardiac standpoint. He denies any chest pain/pressure/discomfort or worsening shortness of breath. He denies worsening lower extremity edema, orthopnea, and PND. He denies lightheadedness, dizziness, and syncope. He denies palpitations.  His functional capacity is limited due to chronic back pain.       He will RTO on 12/30/2025 with Dr. Ridley or sooner if necessary. He will call with any concerns.         Medical Problems       Problem List       Asthma    Genao's esophagus without dysplasia    Benign essential hypertension    Diverticulosis    Fatty infiltration of liver    Gastroesophageal reflux disease without esophagitis    Hyperuricemia    Impaired fasting glucose    Mixed hyperlipidemia    Drug-induced erectile dysfunction    Non-allergic rhinitis    Osteoarthritis of knee    Vitamin B12 deficiency    Irritable bowel syndrome with diarrhea    History of colonic diverticulitis    History of right-sided carotid endarterectomy    Overview Signed 12/19/2019  9:19 AM by REGLA Felix   RIGHT 2012         Coronary  artery disease involving native coronary artery of native heart without angina pectoris    Incisional hernia    Arthritis of right acromioclavicular joint    Carotid stenosis, asymptomatic, left    Osteoarthritis of right glenohumeral joint    Old myocardial infarct    Overview Signed 2024 10:24 AM by Georgia Milan MA   Per coding guidelines         Personal history of transient ischemic attack (TIA), and cerebral infarction without residual deficits    Overview Signed 2024 10:31 AM by Georgia Milan MA   Per coding guidelines             Past Medical History:   Diagnosis Date    Allergic 2022    Allergic reaction     LAST ASSESSED: 2013    Asthma     Bursitis of heel     LAST ASSESSED: [id number]    Colon polyp     Derangement of meniscus of left knee due to old injury     LAST ASSESSED: [id number]    Diverticulitis of colon     LAST ASSESSED: 2013    Diverticulosis     Dizziness     LAST ASSESSED: [id number]    GERD (gastroesophageal reflux disease)     Hernia years    History of colon polyps     Hypertension     Kidney stone     Malignant hypertension     LAST ASSESSED: [id number]    Myocardial infarction (HCC) 2017    Non-ST elevated myocardial infarction (HCC) 2020    NSTEMI (non-ST elevated myocardial infarction) ([facility])     Plantar fasciitis     Seasonal allergies     Shoulder impingement     LAST ASSESSED: [id number]    Stroke ([facility])     eye     Social History     Socioeconomic History    Marital status: /Civil Union     Spouse name: Not on file    Number of children: Not on file    Years of education: Not on file    Highest education level: Not on file   Occupational History    Not on file   Tobacco Use    Smoking status: Former     Current packs/day: 0.00     Average packs/day: 0.3 packs/day for 5.0 years (1.3 ttl pk-yrs)     Types: Cigarettes     Start date:      Quit date: 1960     Years since quittin.3    Smokeless tobacco: Never   Vaping Use    Vaping status:  Never Used   Substance and Sexual Activity    Alcohol use: No    Drug use: No    Sexual activity: Yes     Partners: Female     Birth control/protection: Male Sterilization   Other Topics Concern    Not on file   Social History Narrative    Not on file     Social Drivers of Health     Financial Resource Strain: Not on file   Food Insecurity: No Food Insecurity (6/13/2024)    Nursing - Inadequate Food Risk Classification     Worried About Running Out of Food in the Last Year: Never true     Ran Out of Food in the Last Year: Never true     Ran Out of Food in the Last Year: Not on file   Transportation Needs: No Transportation Needs (6/13/2024)    PRAPARE - Transportation     Lack of Transportation (Medical): No     Lack of Transportation (Non-Medical): No   Physical Activity: Not on file   Stress: Not on file   Social Connections: Not on file   Intimate Partner Violence: Not on file   Housing Stability: Low Risk  (6/13/2024)    Housing Stability Vital Sign     Unable to Pay for Housing in the Last Year: No     Number of Times Moved in the Last Year: 0     Homeless in the Last Year: No      Family History   Problem Relation Age of Onset    Hypertension Mother     Hyperlipidemia Mother     Breast cancer Mother     Aneurysm Mother     Anemia Mother     Hypertension Father     Hyperlipidemia Father     Colon cancer Paternal Grandfather     Cancer Paternal Grandfather     Asthma Paternal Grandfather     Throat cancer Maternal Aunt      Past Surgical History:   Procedure Laterality Date    APPENDECTOMY      CAROTID ENDARTARECTOMY      COLECTOMY Left     PARTIAL - SIGMOID; HEMICOLECTOMY FOR DIVERTICULITIS; ONSET: 1987    COLON SURGERY      COLONOSCOPY N/A 12/09/2016    Procedure: COLONOSCOPY;  Surgeon: Silvia Caceres MD;  Location: [facility];  Service:     CORONARY STENT PLACEMENT      HERNIA REPAIR      VENTRAL    NISSEN FUNDOPLICATION      ESOPHAGOGASTRIC FUNDOPLASTY LAST ASSESSED: 06OCT2014    PERIPHERAL ANGIOGRAM       SINUS SURGERY      THROMBOENDARTERECTOMY      CAROTID; ONSET; MAY 2012    VASECTOMY  1982       Current Outpatient Medications:     acetaminophen (TYLENOL) 325 mg tablet, Take 2 tablets (650 mg total) by mouth every 6 (six) hours as needed for mild pain, headaches or fever, Disp: 30 tablet, Rfl: 0    albuterol (PROVENTIL HFA,VENTOLIN HFA) 90 mcg/act inhaler, Inhale 2 puffs every 4 (four) hours as needed for wheezing (20 minutes before exertion.), Disp: 6.7 g, Rfl: 0    aspirin (ECOTRIN LOW STRENGTH) 81 mg EC tablet, Take 324 mg by mouth every other day, Disp: , Rfl:     atorvastatin (LIPITOR) 40 mg tablet, Take 1 tablet (40 mg total) by mouth daily, Disp: 90 tablet, Rfl: 4    budesonide-formoterol (Symbicort) 160-4.5 mcg/act inhaler, Inhale 2 puffs 2 (two) times a day Rinse mouth after use., Disp: 30.6 g, Rfl: 5    cholecalciferol (VITAMIN D3) 1,000 units tablet, Take 1,000 Units by mouth daily, Disp: , Rfl:     Coenzyme Q10 10 MG capsule, Take 1 capsule (10 mg total) by mouth daily, Disp: , Rfl:     fluticasone (FLONASE) 50 mcg/act nasal spray, 1 spray into each nostril daily, Disp: , Rfl:     levalbuterol (XOPENEX) 1.25 mg/3 mL nebulizer solution, Take 3 mL (1.25 mg total) by nebulization every 8 (eight) hours as needed for wheezing or shortness of breath, Disp: 75 mL, Rfl: 3    lisinopril (ZESTRIL) 20 mg tablet, Take 1 tablet (20 mg total) by mouth daily, Disp: 90 tablet, Rfl: 4    Loratadine 10 MG CAPS, , Disp: , Rfl:     multivitamin (THERAGRAN) TABS, Take 1 tablet by mouth daily, Disp: , Rfl:     omeprazole (PriLOSEC) 40 MG capsule, TAKE 1 CAPSULE (40 MG TOTAL) BY MOUTH DAILY., Disp: 90 capsule, Rfl: 1    Taurine 500 MG CAPS, Take by mouth, Disp: , Rfl:     torsemide (DEMADEX) 20 mg tablet, Take 1 tablet (20 mg total) by mouth daily, Disp: 90 tablet, Rfl: 4  Allergies   Allergen Reactions    Depo-Medrol [Methylprednisolone] Anaphylaxis     Severe Vagal Reaction    Iv Contrast [Iodinated Contrast Media]  "Angioedema     Pt states itchy/swollen eyes, trouble breathing (years ago)    Rosuvastatin Headache and Confusion    Sulfa Antibiotics Hives    Atorvastatin Confusion    Penicillins Other (See Comments)     As a child-turned blue/purple       Labs:     Chemistry        Component Value Date/Time     12/10/2015 1424    K 4.2 03/25/2025 1548    K 4.7 12/10/2015 1424     03/25/2025 1548     12/10/2015 1424    CO2 26 03/25/2025 1548    CO2 27.7 12/10/2015 1424    BUN 19 03/25/2025 1548    BUN 17 12/10/2015 1424    CREATININE 1.09 03/25/2025 1548    CREATININE 1.14 12/10/2015 1424        Component Value Date/Time    CALCIUM 9.2 03/25/2025 1548    CALCIUM 8.9 12/10/2015 1424    ALKPHOS 49 03/25/2025 1548    ALKPHOS 58 11/11/2015 0928    AST 16 03/25/2025 1548    AST 25 11/11/2015 0928    ALT 13 03/25/2025 1548    ALT 46 11/11/2015 0928    BILITOT 0.47 11/11/2015 0928            Lab Results   Component Value Date    CHOL 230 11/11/2015    CHOL 221 10/01/2014    CHOL 216 04/18/2014     Lab Results   Component Value Date    HDL 47 10/11/2024    HDL 47 10/20/2023    HDL 45 08/21/2023     Lab Results   Component Value Date    LDLCALC 120 (H) 10/11/2024    LDLCALC 109 (H) 10/20/2023    LDLCALC 146 (H) 08/21/2023     Lab Results   Component Value Date    TRIG 66 10/11/2024    TRIG 61 10/20/2023    TRIG 85 08/21/2023     No results found for: \"CHOLHDL\"    Imaging: No results found.    ECG:  n/a    Review of Systems   Cardiovascular:  Positive for dyspnea on exertion (chronic).   Musculoskeletal:  Positive for back pain.   All other systems reviewed and are negative.      Vitals:    05/02/25 0821   BP: 170/80   Pulse: 60   SpO2: 99%       Vitals:    05/02/25 0821   Weight: 94.3 kg (207 lb 12.8 oz)       Height: 5' 7\" (170.2 cm)   Body mass index is 32.55 kg/m².    Physical Exam  Vitals and nursing note reviewed.   Constitutional:       General: He is not in acute distress.     Appearance: He is well-developed. He " is not ill-appearing.   HENT:      Head: Normocephalic.   Cardiovascular:      Rate and Rhythm: Normal rate and regular rhythm.      Heart sounds: No murmur heard.  Pulmonary:      Breath sounds: Decreased breath sounds present.   Musculoskeletal:         General: Normal range of motion.      Cervical back: Normal range of motion.      Right lower leg: No edema.      Left lower leg: No edema.   Skin:     General: Skin is warm and dry.   Neurological:      Mental Status: He is alert and oriented to person, place, and time.      Motor: Weakness present.   Psychiatric:         Mood and Affect: Mood normal.         Behavior: Behavior normal.

## 2025-05-14 DIAGNOSIS — J45.20 MILD INTERMITTENT ASTHMA WITHOUT COMPLICATION: ICD-10-CM

## 2025-05-14 RX ORDER — LEVALBUTEROL INHALATION SOLUTION 1.25 MG/3ML
1.25 SOLUTION RESPIRATORY (INHALATION) EVERY 8 HOURS PRN
Qty: 90 ML | Refills: 3 | Status: SHIPPED | OUTPATIENT
Start: 2025-05-14

## 2025-05-15 ENCOUNTER — HOSPITAL ENCOUNTER (OUTPATIENT)
Dept: NON INVASIVE DIAGNOSTICS | Facility: CLINIC | Age: 75
Discharge: HOME | End: 2025-05-15
Payer: COMMERCIAL

## 2025-05-15 VITALS
HEIGHT: 67 IN | BODY MASS INDEX: 32.63 KG/M2 | SYSTOLIC BLOOD PRESSURE: 170 MMHG | WEIGHT: 207.89 LBS | HEART RATE: 69 BPM | DIASTOLIC BLOOD PRESSURE: 80 MMHG

## 2025-05-15 DIAGNOSIS — I25.118 CORONARY ARTERY DISEASE OF NATIVE ARTERY OF NATIVE HEART WITH STABLE ANGINA PECTORIS (HCC): ICD-10-CM

## 2025-05-15 LAB
AORTIC ROOT: 3.3 CM
ASCENDING AORTA: 3.7 CM
BSA FOR ECHO PROCEDURE: 2.06 M2
DOP CALC LVOT AREA: 3.8 CM2
DOP CALC LVOT DIAMETER: 2.2 CM
E WAVE DECELERATION TIME: 329 MS
E/A RATIO: 0.64
FRACTIONAL SHORTENING: 34 (ref 28–44)
INTERVENTRICULAR SEPTUM IN DIASTOLE (PARASTERNAL SHORT AXIS VIEW): 1 CM
INTERVENTRICULAR SEPTUM: 1 CM (ref 0.6–1.1)
LAAS-AP2: 21 CM2
LAAS-AP4: 24.2 CM2
LEFT ATRIUM AREA SYSTOLE SINGLE PLANE A4C: 24.4 CM2
LEFT ATRIUM SIZE: 5 CM
LEFT ATRIUM VOLUME (MOD BIPLANE): 77 ML
LEFT ATRIUM VOLUME INDEX (MOD BIPLANE): 37.4 ML/M2
LEFT INTERNAL DIMENSION IN SYSTOLE: 3.3 CM (ref 2.1–4)
LEFT VENTRICULAR INTERNAL DIMENSION IN DIASTOLE: 5 CM (ref 3.5–6)
LEFT VENTRICULAR POSTERIOR WALL IN END DIASTOLE: 1 CM
LEFT VENTRICULAR STROKE VOLUME: 73 ML
LV EF US.2D.A4C+ESTIMATED: 60 %
LVSV (TEICH): 73 ML
MV E'TISSUE VEL-SEP: 5 CM/S
MV PEAK A VEL: 0.86 M/S
MV PEAK E VEL: 55 CM/S
MV STENOSIS PRESSURE HALF TIME: 95 MS
MV VALVE AREA P 1/2 METHOD: 2.32
RIGHT ATRIUM AREA SYSTOLE A4C: 16.6 CM2
RIGHT VENTRICLE ID DIMENSION: 3.8 CM
SINOTUBULAR JUNCTION: 2.5 CM
SL CV LEFT ATRIUM LENGTH A2C: 5.2 CM
SL CV LV EF: 55
SL CV PED ECHO LEFT VENTRICLE DIASTOLIC VOLUME (MOD BIPLANE) 2D: 118 ML
SL CV PED ECHO LEFT VENTRICLE SYSTOLIC VOLUME (MOD BIPLANE) 2D: 45 ML
SL CV SINUS OF VALSALVA 2D: 3.5 CM
STJ: 2.5 CM
TR MAX PG: 19 MMHG
TR PEAK VELOCITY: 2.2 M/S
TRICUSPID ANNULAR PLANE SYSTOLIC EXCURSION: 2.5 CM
TRICUSPID VALVE PEAK REGURGITATION VELOCITY: 2.21 M/S

## 2025-05-15 PROCEDURE — 93306 TTE W/DOPPLER COMPLETE: CPT

## 2025-05-15 PROCEDURE — 93306 TTE W/DOPPLER COMPLETE: CPT | Performed by: INTERNAL MEDICINE

## 2025-05-29 DIAGNOSIS — K21.9 GASTROESOPHAGEAL REFLUX DISEASE, UNSPECIFIED WHETHER ESOPHAGITIS PRESENT: ICD-10-CM

## 2025-05-30 ENCOUNTER — TELEPHONE (OUTPATIENT)
Age: 75
End: 2025-05-30

## 2025-05-30 RX ORDER — OMEPRAZOLE 40 MG/1
40 CAPSULE, DELAYED RELEASE ORAL DAILY
Qty: 90 CAPSULE | Refills: 1 | Status: SHIPPED | OUTPATIENT
Start: 2025-05-30

## 2025-05-30 NOTE — TELEPHONE ENCOUNTER
Patient aware that dr. Styles is not in today.    He stated his right shoulder is having pain and Dr. Styles usually will give him a shot.  He will only see Dr. Styles as he had an allergic reaction in the past.     Please call if appointment can be made for next week.  Thank you

## 2025-06-03 ENCOUNTER — OFFICE VISIT (OUTPATIENT)
Dept: SURGERY | Facility: CLINIC | Age: 75
End: 2025-06-03
Payer: COMMERCIAL

## 2025-06-03 VITALS — WEIGHT: 220 LBS | BODY MASS INDEX: 33.34 KG/M2 | HEIGHT: 68 IN

## 2025-06-03 DIAGNOSIS — K21.9 GERD (GASTROESOPHAGEAL REFLUX DISEASE): ICD-10-CM

## 2025-06-03 DIAGNOSIS — R06.02 SHORTNESS OF BREATH: Primary | ICD-10-CM

## 2025-06-03 PROCEDURE — 99212 OFFICE O/P EST SF 10 MIN: CPT | Performed by: SURGERY

## 2025-06-03 NOTE — PROGRESS NOTES
Name: Jimmy Galvan      : 1950      MRN: [id number]  Encounter Provider: Partha Ulloa MD  Encounter Date: 6/3/2025   Encounter department: [facility]  :  Assessment & Plan  Shortness of breath         GERD (gastroesophageal reflux disease)             History of Present Illness   HPI  Jimmy Galvan is a 74 y.o. male who presents for follow up.  History of Nissen.  EGD 2025.  States he has trouble breathing when he bends over.  He has a history for Nissen fundoplication.  EGD had demonstrated that the fundoplication is intact and appears well.  There is a history for esophageal dysplasia.    He has no shortness of breath with walking up steps or walking in the yard.  He only has shortness of breath feeling when he bends over at the waist.  He feels a lot of pressure.    On exam he has always had a very taut abdomen.  This partially explains why he has had recurrent hernias.  There is still firmness to his abdomen with palpation, but when I press down he does not have shortness of breath.  There is no guarding or rebound.    He has a baseline element of asthma.  I suspect he has restrictive lung disease when he bends over as the intra-abdominal contents is pushing into the thoracic cavity.  I recommended that he buy a pulse oximetry to see what his actual oxygen numbers are while he feels short of breath.  He could consider Ozempic for additional weight loss.  It is not so much his overall BMI, rather than the characteristics of his short wasted abdomen.  I asked that he restart Prilosec or Protonix given his history for GERD.  Perhaps a pulmonary consultation would be indicated to further test his lung function?      Review of Systems   Constitutional: Negative.  Negative for activity change.   HENT: Negative.     Eyes: Negative.    Respiratory:  Positive for shortness of breath. Negative for cough, choking, chest tightness, wheezing and stridor.    Cardiovascular:  "Negative.    Gastrointestinal: Negative.    Endocrine: Negative.    Genitourinary: Negative.    Musculoskeletal: Negative.    Skin: Negative.    Allergic/Immunologic: Negative.    Neurological: Negative.    Psychiatric/Behavioral:  Negative for agitation, behavioral problems and confusion. The patient is not nervous/anxious.           Objective   Ht 5' 7.5\" (1.715 m)   Wt 99.8 kg (220 lb)   BMI 33.95 kg/m²      Physical Exam  Constitutional:       Appearance: Normal appearance.     Cardiovascular:      Rate and Rhythm: Normal rate.      Pulses: Normal pulses.   Pulmonary:      Effort: Pulmonary effort is normal.   Abdominal:      General: Abdomen is flat.      Tenderness: There is no abdominal tenderness. There is no guarding.      Hernia: No hernia is present.      Comments: Firmness of his abdomen persists secondary to excess caloric intake.     Neurological:      Mental Status: He is alert.           "

## 2025-06-09 ENCOUNTER — OFFICE VISIT (OUTPATIENT)
Dept: FAMILY MEDICINE CLINIC | Facility: CLINIC | Age: 75
End: 2025-06-09
Payer: COMMERCIAL

## 2025-06-09 VITALS
BODY MASS INDEX: 31.37 KG/M2 | SYSTOLIC BLOOD PRESSURE: 130 MMHG | RESPIRATION RATE: 18 BRPM | DIASTOLIC BLOOD PRESSURE: 78 MMHG | TEMPERATURE: 98.2 F | OXYGEN SATURATION: 98 % | WEIGHT: 207 LBS | HEIGHT: 68 IN | HEART RATE: 74 BPM

## 2025-06-09 DIAGNOSIS — K21.9 GASTROESOPHAGEAL REFLUX DISEASE WITHOUT ESOPHAGITIS: ICD-10-CM

## 2025-06-09 DIAGNOSIS — M75.41 IMPINGEMENT SYNDROME OF RIGHT SHOULDER: Primary | ICD-10-CM

## 2025-06-09 PROBLEM — R06.02 SHORTNESS OF BREATH: Status: RESOLVED | Noted: 2025-06-03 | Resolved: 2025-06-09

## 2025-06-09 PROCEDURE — 20610 DRAIN/INJ JOINT/BURSA W/O US: CPT | Performed by: FAMILY MEDICINE

## 2025-06-09 RX ORDER — KETOROLAC TROMETHAMINE 30 MG/ML
60 INJECTION, SOLUTION INTRAMUSCULAR; INTRAVENOUS ONCE
Status: COMPLETED | OUTPATIENT
Start: 2025-06-09 | End: 2025-06-09

## 2025-06-09 RX ORDER — OMEPRAZOLE 40 MG/1
40 CAPSULE, DELAYED RELEASE ORAL DAILY
Qty: 90 CAPSULE | Refills: 3 | Status: SHIPPED | OUTPATIENT
Start: 2025-06-09

## 2025-06-09 RX ADMIN — KETOROLAC TROMETHAMINE 60 MG: 30 INJECTION, SOLUTION INTRAMUSCULAR; INTRAVENOUS at 13:57

## 2025-06-09 NOTE — PROGRESS NOTES
Large joint arthrocentesis: R subacromial bursa    Performed by: Nilo Styles MD  Authorized by: Nilo Styles MD    [place] Protocol:  procedure performed by consultantConsent: Verbal consent obtained  Risks and benefits: risks, benefits and alternatives were discussed  Consent given by: patient  Site marked: the operative site was marked  Supporting Documentation  Indications: pain (Impingement syndrome R shoulder)     Is this a Visco injection? NoProcedure Details  Location: shoulder - R subacromial bursa  Preparation: Patient was prepped and draped in the usual sterile fashion (Betadine)  Needle size: 22 G  Ultrasound guidance: no  Approach: posterior    Patient tolerance: patient tolerated the procedure well with no immediate complications  Dressing:  Sterile dressing applied    Toradol 60 mg/2 ML-2 ML     Prior allergic reaction to DepoMedrol/Marcaine         Jimmy was seen today for shoulder pain and diabetes.    Diagnoses and all orders for this visit:    Impingement syndrome of right shoulder  -     Large joint arthrocentesis: R subacromial bursa  -     ketorolac (TORADOL) 60 mg/2 mL IM injection 60 mg    Gastroesophageal reflux disease without esophagitis  -     omeprazole (PriLOSEC) 40 MG capsule; Take 1 capsule (40 mg total) by mouth daily

## 2025-06-09 NOTE — PROGRESS NOTES
"Name: Jimmy Galvan      : 1950      MRN: [id number]  Encounter Provider: iNlo Styles MD  Encounter Date: 2025   Encounter department: [facility]  :  Assessment & Plan  Gastroesophageal reflux disease, unspecified whether esophagitis present    Orders:  •  omeprazole (PriLOSEC) 40 MG capsule; Take 1 capsule (40 mg total) by mouth daily           History of Present Illness {?Quick Links Encounters * My Last Note * Last Note in Specialty * Snapshot * Since Last Visit * History :49460}  HPI  Review of Systems    Objective {?Quick Links Trend Vitals * Enter New Vitals * Results Review * Timeline (Adult) * Labs * Imaging * Cardiology * Procedures * Lung Cancer Screening * Surgical eConsent :86243}  /78 (BP Location: Left arm, Patient Position: Sitting, Cuff Size: Standard)   Pulse 74   Temp 98.2 °F (36.8 °C)   Resp 18   Ht 5' 7.5\" (1.715 m)   Wt 93.9 kg (207 lb)   SpO2 98%   BMI 31.94 kg/m²      Physical Exam  {Administrative / Billing Section (Optional):65443}  "

## 2025-06-25 ENCOUNTER — OFFICE VISIT (OUTPATIENT)
Dept: PULMONOLOGY | Facility: CLINIC | Age: 75
End: 2025-06-25
Payer: COMMERCIAL

## 2025-06-25 VITALS
OXYGEN SATURATION: 98 % | BODY MASS INDEX: 31.79 KG/M2 | HEART RATE: 77 BPM | WEIGHT: 206 LBS | DIASTOLIC BLOOD PRESSURE: 74 MMHG | TEMPERATURE: 96.4 F | SYSTOLIC BLOOD PRESSURE: 126 MMHG

## 2025-06-25 DIAGNOSIS — J45.20 MILD INTERMITTENT ASTHMA, UNSPECIFIED WHETHER COMPLICATED: Primary | ICD-10-CM

## 2025-06-25 DIAGNOSIS — J81.0 ACUTE PULMONARY EDEMA (HCC): ICD-10-CM

## 2025-06-25 PROCEDURE — 99213 OFFICE O/P EST LOW 20 MIN: CPT | Performed by: NURSE PRACTITIONER

## 2025-06-25 PROCEDURE — G2211 COMPLEX E/M VISIT ADD ON: HCPCS | Performed by: NURSE PRACTITIONER

## 2025-06-25 NOTE — ASSESSMENT & PLAN NOTE
Improved  Echo completed May 2025 shows LVEF 55% with grade 1 diastolic dysfunction  Currently on Lasix

## 2025-06-25 NOTE — ASSESSMENT & PLAN NOTE
Switched from Advair to Breo  Does not appear exacerbation  PFTs completed April 2025 reviewed showed normal spirometry

## 2025-06-25 NOTE — PROGRESS NOTES
Follow-up  Visit - Pulmonary Medicine   Name: Jimmy Galvan      : 1950      MRN: [id number]  Encounter Provider: REGLA Gardner  Encounter Date: 2025   Encounter department: [facility]  :  Assessment & Plan  Mild intermittent asthma, unspecified whether complicated  Switched from Advair to Breo  Does not appear exacerbation  PFTs completed 2025 reviewed showed normal spirometry         Acute pulmonary edema (HCC)  Improved  Echo completed May 2025 shows LVEF 55% with grade 1 diastolic dysfunction  Currently on Lasix           No follow-ups on file.    History of Present Illness   Jimmy Galvan is a 74 y.o. male who presents     Review of Systems   Respiratory:  Positive for shortness of breath.         When bending over which resolves when he stands up     Aside from what is mentioned in the HPI, ROS is otherwise negative    Medical History Reviewed by provider this encounter:     .     Medical History Reviewed by provider this encounter:     .    Objective   /74 (BP Location: Left arm, Patient Position: Sitting, Cuff Size: Standard)   Pulse 77   Temp (!) 96.4 °F (35.8 °C)   Wt 93.4 kg (206 lb)   SpO2 98%   BMI 31.79 kg/m²     Physical Exam  Vitals reviewed.   Constitutional:       General: He is not in acute distress.     Appearance: He is not ill-appearing.   HENT:      Head: Normocephalic and atraumatic.      Right Ear: External ear normal.      Left Ear: External ear normal.      Nose: Nose normal.      Mouth/Throat:      Mouth: Mucous membranes are moist.      Pharynx: Oropharynx is clear.     Eyes:      Extraocular Movements: Extraocular movements intact.      Pupils: Pupils are equal, round, and reactive to light.       Cardiovascular:      Rate and Rhythm: Normal rate and regular rhythm.      Pulses: Normal pulses.      Heart sounds: Normal heart sounds.   Pulmonary:      Effort: Pulmonary effort is normal.      Breath sounds: No wheezing or  "rhonchi.   Abdominal:      Palpations: Abdomen is soft.     Musculoskeletal:         General: Normal range of motion.      Cervical back: Normal range of motion.      Right lower leg: No edema.      Left lower leg: No edema.     Skin:     General: Skin is warm and dry.     Neurological:      Mental Status: He is alert and oriented to person, place, and time.     Psychiatric:         Mood and Affect: Mood normal.         Behavior: Behavior normal.         Thought Content: Thought content normal.         Judgment: Judgment normal.     Diagnostic Data:  Labs: I personally reviewed the most recent laboratory data pertinent to today's visit.      Radiology results:  Radiology Results Review: I have reviewed radiology reports from Chest Xray including: chest xray.  Chest XRay 03/25/2025 shows clear lungs no pneumothorax or pleural effusion    PFT/spirometry results:  No results found for: \"FEV1\", \"FVC\", \"BCC4YGA\", \"TLC\", \"DLCO\"   PFT 0/30/2025 showed normal spirometry, FEV1/FVC ratio 76%, FEV1 106%, %    Oximetry testing:  Six minute walk test with O2 saturation 96%        REGLA Gardner      "

## 2025-06-28 NOTE — TELEPHONE ENCOUNTER
ADMITTING DIAGNOSIS:   Patient Active Problem List   Diagnosis    Disorganized schizophrenia  (CMD)    Cocaine use disorder (CMD)    Cannabis use disorder         DISCHARGE DIAGNOSIS:   Patient Active Problem List   Diagnosis    Disorganized schizophrenia  (CMD)    Cocaine use disorder (CMD)    Cannabis use disorder         PERTINENT LABORATORY, X-RAY, PHYSICAL FINDINGS AND CONCLUSIONS:      No results found    Invalid input(s): \"TROPONIN\"  Vitals:    06/25/25 0820   BP: 117/77   Pulse: 79   Resp: 18   Temp: 97.2 °F (36.2 °C)        HOSPITAL COURSE:  Patient had been started on risperdal which was increased to 2 mg bid. He refused a long acting injection. He refused [facility] placement.Patient has been wanting to be discharged, even though he has been encouraged to get help with placement.  Patient will likely not comply with medications after discharge, refusing long-acting injection.  He is still irritable but has been denying suicidal/homicidal ideations, he denies auditory and visual donations but he still seems a little disorganized.  He has been in better behavioral control, sleep and appetite were good. He was still disheveled and isolative to his room.        RESTRAINTS: none    MSE: General/Joanna- appears disheveled, older than stated age.  Attitude/Behavior- irritable , and mostly refused to speak to writer Psychomotor- normal Eye Contact- poor Speech- underproductive Mood- \"fine\" Affect- blunted Thought process- impoverished  Thought content- denies suicidal/homicidal ideation, intent and plan no delusions. Perception- denied Auditory/ visual hallucinations. Cognition-AAOx3   Insight and judgement limited impulse has improved    PROCEDURES PERFORMED: None    CONDITION ON DISCHARGE: Stable     DISCHARGE INSTRUCTIONS: Followup with - MSW provided him information for [facility]     Discharge To: General acute hospital in [place] who will assist with Shelter placement      PHYSICAL ACTIVITY:  As  SW PATIENT'S WIFE  NOTIFIED OF MESSAGE AND WILL GO FOR CHEST XRAY  tolerated    MEDICATIONS:                                                                                                                                                                                                    risperdal                                                                                                              Tobacco user?   x   Yes                                    No   IF YES:  must provide cessation medications at discharge                                                                                                                                                                                              Alcohol or drug use disorder?      x  Yes                                                                                     No   IF YES:  must provide either medication or referral to addictions treatment program         Medication for alcohol or drug use disorder?        Yes                                                                                                                 No                                                                                                               x N/A         Referral for addictions treatment?            Yes                                                                                      X No- he refused                                                                                               N/A    DIET: per medical doctor                                                                                                                                                                 FOLLOWUP INSTRUCTIONS:  Patient was seen and evaluated today with nurse and .  Chart reviewed. Vitals and recent labs reviewed, which were within normal limits. Patient reports feeling better. Patient reported feeling reports feeling better after being in the hospital and looking forward to outpatient services  Patient denies  current suicidal and homicidal thoughts. Denies auditory and visual hallucinations and not psychotic. Has maintained improved behavioral control. Patient feels safe with discharge and willing to continue follow-up with outpatient mental health services. Patient is not deemed to be a danger to self or others at this time. No indication for further acute intervention/ involuntary hold.  At this time, patient does not present with any acute psychiatric symptoms requiring further in-hospital care. Patient is psychiatrically cleared for discharge  Patient to follow up with : see above  Prescribed: patient agreed to take medications after discharge, prescribed 1 month supply for risperdal 2 mg bid  Psychoeducation provided regarding the deleterious effects of illicit substance use on physical and mental health. Patient will be provided with resources to seek help regarding substance abuse.  Patient was counseled about the side effects risks and benefits of the medications. Patient was in agreement with the treatment plan.  Patient tolerated the medications well in the inpatient unit with no side effects   Patient instructed to return to nearest ER or call 911 or crisis intake  or call crisis hotline number   /case management for [facility]  Discharge time > 30 minutes  Discharge on: 06/28/25     Sandee Guerrero MD

## 2025-07-07 ENCOUNTER — NURSE TRIAGE (OUTPATIENT)
Age: 75
End: 2025-07-07

## 2025-07-07 NOTE — TELEPHONE ENCOUNTER
"Reason for Disposition  • MILD SWELLING of abdomen (e.g., looks distended or swollen) that is NEW-ONSET or getting worse and fever    Answer Assessment - Initial Assessment Questions  1. SYMPTOM: \"What's the main symptom you're concerned about?\" (e.g., abdomen bloating, swelling)      Abdomen bloating and swelling   2. ONSET: \"When did   start?\"      A month ago   3. SEVERITY: \"How bad is the bloating or swelling?\"      Mild  bloating and swelling ,pain increased with movement and bending over   4. ABDOMEN PAIN:  \"Is there any abdomen pain?\" If Yes, ask: \"How bad is the pain?\"  (e.g., Scale 1-10; mild, moderate, or severe)      10 when bending over   5. RELIEVING AND AGGRAVATING FACTORS: \"What makes it better or worse?\" (e.g., certain foods, lactose, medicines)      Worse bending over ,after bowel movement relieves the pain   6. GI HISTORY: \"Do you have any history of stomach or intestine problems?\" (e.g., bowel obstruction, cancer, irritable bowel)        Yes irritable bowel   7. CAUSE: \"What do you think is causing the bloating?\"       Unknown   8. OTHER SYMPTOMS: \"Do you have any other symptoms?\" (e.g., belching, blood in stool, breathing difficulty, constipation, diarrhea, fever, passing gas, vomiting, weight loss, white of eyes have turned yellow)      Nausea shortness of breath with bending over   Patient states when he dends over he can't breath  Patient declined Emergency Room evaluation  Appointment scheduled for tomorrow    Protocols used: Abdomen Bloating and Swelling-Adult-[place]  REASON FOR CONVERSATION: Bloated and Nausea    SYMPTOMS: abdominal bloating and swelling ,nausea,symptoms started a month ago.pain increases when bending over,patient states he can't breath when bending over,bowel movement relieves the pain      OTHER HEALTH INFORMATION: Diverticulosis ,Irritable bowel syndrome with diarrhea     PROTOCOL DISPOSITION: Go to Office Now/Urgent Care, See Within 3 Days in Office  Declined Emergency Room " evaluation,appointment scheduled for tomorrow.  CARE ADVICE PROVIDED: CALL BACK IF: * Abdomen bloating lasts more than 1 week * New or worsening abdomen swelling * Constant pain lasting more than 2 hours * Intermittent pain (comes and goes, cramps) lasts over 48 hours * You become worse     PRACTICE FOLLOW-UP: n/a

## 2025-07-08 ENCOUNTER — OFFICE VISIT (OUTPATIENT)
Dept: FAMILY MEDICINE CLINIC | Facility: CLINIC | Age: 75
End: 2025-07-08
Payer: COMMERCIAL

## 2025-07-08 VITALS
HEART RATE: 67 BPM | DIASTOLIC BLOOD PRESSURE: 66 MMHG | BODY MASS INDEX: 31.94 KG/M2 | WEIGHT: 207 LBS | RESPIRATION RATE: 18 BRPM | TEMPERATURE: 98.5 F | OXYGEN SATURATION: 98 % | SYSTOLIC BLOOD PRESSURE: 134 MMHG

## 2025-07-08 DIAGNOSIS — Z98.890 HISTORY OF ABDOMINAL SURGERY: ICD-10-CM

## 2025-07-08 DIAGNOSIS — Z98.890 HISTORY OF NISSEN FUNDOPLICATION: ICD-10-CM

## 2025-07-08 DIAGNOSIS — R06.02 SOB (SHORTNESS OF BREATH): ICD-10-CM

## 2025-07-08 DIAGNOSIS — R10.84 GENERALIZED ABDOMINAL PAIN: Primary | ICD-10-CM

## 2025-07-08 PROCEDURE — 99214 OFFICE O/P EST MOD 30 MIN: CPT | Performed by: FAMILY MEDICINE

## 2025-07-08 PROCEDURE — G2211 COMPLEX E/M VISIT ADD ON: HCPCS | Performed by: FAMILY MEDICINE

## 2025-07-08 NOTE — PROGRESS NOTES
Name: Jimmy Galvan      : 1950      MRN: [id number]  Encounter Provider: Nigel Eason MD  Encounter Date: 2025   Encounter department: [facility]  :  Assessment & Plan  History of abdominal surgery         History of Nissen fundoplication    Orders:    CT abdomen pelvis wo contrast; Future    Generalized abdominal pain  Worsening generalized abdominal pain with shortness of breath.  History of Nissen fundoplication.  Experiencing abdominal distention as well.  Will obtain CT of abdomen pelvis without  Orders:    CT abdomen pelvis wo contrast; Future    SOB (shortness of breath)                  History of Present Illness   Patient presents with:  Breathing Problem: Couple months only when bending down     Patient presents today with ongoing shortness of breath and generalized abdominal pain.  Experiencing abdominal bloating.  Feels he has trouble breathing when bending over.  Pain is been present for a few months.  History of Nissen fundoplication.  Abdominal pain has begun to worsen.      Review of Systems   Respiratory:  Positive for shortness of breath.    Gastrointestinal:  Positive for abdominal distention and abdominal pain.       Objective   /66 (BP Location: Left arm, Patient Position: Sitting, Cuff Size: Large)   Pulse 67   Temp 98.5 °F (36.9 °C) (Temporal)   Resp 18   Wt 93.9 kg (207 lb)   SpO2 98%   BMI 31.94 kg/m²      Physical Exam  Vitals and nursing note reviewed.   Constitutional:       Appearance: Normal appearance. He is well-developed.   HENT:      Head: Normocephalic and atraumatic.     Cardiovascular:      Rate and Rhythm: Normal rate and regular rhythm.   Pulmonary:      Effort: Pulmonary effort is normal.      Breath sounds: Normal breath sounds.   Abdominal:      General: Bowel sounds are normal. There is distension.      Palpations: Abdomen is soft.      Tenderness: There is abdominal tenderness.     Musculoskeletal:      Cervical back:  Normal range of motion.     Skin:     General: Skin is warm.     Neurological:      General: No focal deficit present.      Mental Status: He is alert.     Psychiatric:         Mood and Affect: Mood normal.         Speech: Speech normal.

## 2025-07-09 ENCOUNTER — TELEPHONE (OUTPATIENT)
Age: 75
End: 2025-07-09

## 2025-07-09 NOTE — TELEPHONE ENCOUNTER
Call from Mariana @ [facility] requesting to speak with clinical staff regarding this patient. She is asking that we call her back tomorrow 7/10 @ [phone].    Mariana advised patient is scheduled for a cat scan and due to the Algae prep they need to make sure patient did not have a previous reaction to Prednisone. She advised patient does not recall who prescribed it for him but believes it was in 2019.2020 and 2022 according to pharmacy records. Patient does not remember the reaction he had either. She is hoping we have some documentation of it.     Please return Mariana's call to discuss and make sure scan will be safe for patient to have. Thank you.

## 2025-07-10 ENCOUNTER — TELEPHONE (OUTPATIENT)
Dept: FAMILY MEDICINE CLINIC | Facility: CLINIC | Age: 75
End: 2025-07-10

## 2025-07-10 NOTE — TELEPHONE ENCOUNTER
----- Message from Nigel Eason MD sent at 7/10/2025  8:07 AM EDT -----  Regarding: Allergy  Please call patient confirm he has a true allergy to methylprednisolone.  Radiology would like him pretreated with methylprednisolone.  He has an allergy in his chart of vasovagal.  I do not suspect this is a real allergy which would prevent him from taking a one-time dose.  Please clarify

## 2025-07-10 NOTE — TELEPHONE ENCOUNTER
"Patient stated that he had a severe reaction to methylprednisolone, but he doesn't remember what kind of reaction he had. Patient stated that \"he lost his wife recently, who took care of this stuff.\"   "

## 2025-07-10 NOTE — TELEPHONE ENCOUNTER
Called patient no response left message to call the office back was calling to ask question please see down below

## 2025-07-11 ENCOUNTER — APPOINTMENT (OUTPATIENT)
Dept: LAB | Facility: CLINIC | Age: 75
End: 2025-07-11
Payer: COMMERCIAL

## 2025-07-11 DIAGNOSIS — E78.2 MIXED HYPERLIPIDEMIA: ICD-10-CM

## 2025-07-11 DIAGNOSIS — I10 PRIMARY HYPERTENSION: ICD-10-CM

## 2025-07-11 LAB
ANION GAP SERPL CALCULATED.3IONS-SCNC: 11 MMOL/L (ref 4–13)
BUN SERPL-MCNC: 10 MG/DL (ref 5–25)
CALCIUM SERPL-MCNC: 8.9 MG/DL (ref 8.4–10.2)
CHLORIDE SERPL-SCNC: 107 MMOL/L (ref 96–108)
CHOLEST SERPL-MCNC: 141 MG/DL (ref ?–200)
CO2 SERPL-SCNC: 25 MMOL/L (ref 21–32)
CREAT SERPL-MCNC: 1.07 MG/DL (ref 0.6–1.3)
GFR SERPL CREATININE-BSD FRML MDRD: 68 ML/MIN/1.73SQ M
GLUCOSE P FAST SERPL-MCNC: 101 MG/DL (ref 65–99)
HDLC SERPL-MCNC: 43 MG/DL
LDLC SERPL CALC-MCNC: 88 MG/DL (ref 0–100)
POTASSIUM SERPL-SCNC: 4.4 MMOL/L (ref 3.5–5.3)
SODIUM SERPL-SCNC: 143 MMOL/L (ref 135–147)
TRIGL SERPL-MCNC: 51 MG/DL (ref ?–150)

## 2025-07-11 PROCEDURE — 80061 LIPID PANEL: CPT

## 2025-07-11 PROCEDURE — 80048 BASIC METABOLIC PNL TOTAL CA: CPT

## 2025-07-11 PROCEDURE — 36415 COLL VENOUS BLD VENIPUNCTURE: CPT

## 2025-07-11 NOTE — TELEPHONE ENCOUNTER
"Uma called from [facility] regarding the question for the patient.  Informed her after reviewing patients chart in a note from 11/2024 from  he placed in his note 'As Mr. Galvan is allergic to Depo-Medrol, another steroid such as dexamethasone or prednisone and may have to be used for intra-articular steroid injections.\"  She stated since we are not sure if patient is allergic to medications he has taken prednisone in the past. Since his wife who has passed would have known this informations   She stated the radiologist recommends patient can take benadryl or he can do the CT without contrast or with oral contrast.    wanted to know if patient do the ct with the contrast and take the benadryl   Uma went to speak with the radiologist   While on the phone, I asked if patient can just take the oral contrast  she stated the patient can, the doctor would just have to place a new order for patient.  Informed  he will place a new order.   "

## 2025-07-15 ENCOUNTER — HOSPITAL ENCOUNTER (OUTPATIENT)
Dept: CT IMAGING | Facility: HOSPITAL | Age: 75
Discharge: HOME | End: 2025-07-15
Attending: FAMILY MEDICINE
Payer: COMMERCIAL

## 2025-07-15 DIAGNOSIS — Z98.890 HISTORY OF NISSEN FUNDOPLICATION: ICD-10-CM

## 2025-07-15 DIAGNOSIS — R10.84 GENERALIZED ABDOMINAL PAIN: ICD-10-CM

## 2025-07-15 PROCEDURE — 74176 CT ABD & PELVIS W/O CONTRAST: CPT
